# Patient Record
Sex: FEMALE | Race: WHITE | NOT HISPANIC OR LATINO | Employment: OTHER | URBAN - METROPOLITAN AREA
[De-identification: names, ages, dates, MRNs, and addresses within clinical notes are randomized per-mention and may not be internally consistent; named-entity substitution may affect disease eponyms.]

---

## 2017-03-02 ENCOUNTER — TRANSFERRED RECORDS (OUTPATIENT)
Dept: HEALTH INFORMATION MANAGEMENT | Facility: CLINIC | Age: 72
End: 2017-03-02

## 2017-06-09 DIAGNOSIS — I10 HYPERTENSION GOAL BP (BLOOD PRESSURE) < 150/90: ICD-10-CM

## 2017-06-09 DIAGNOSIS — E78.5 HYPERLIPIDEMIA WITH TARGET LDL LESS THAN 130: ICD-10-CM

## 2017-06-09 DIAGNOSIS — E03.8 OTHER SPECIFIED HYPOTHYROIDISM: ICD-10-CM

## 2017-06-12 RX ORDER — LISINOPRIL AND HYDROCHLOROTHIAZIDE 20; 25 MG/1; MG/1
TABLET ORAL
Qty: 90 TABLET | Refills: 0 | Status: SHIPPED | OUTPATIENT
Start: 2017-06-12 | End: 2017-08-17

## 2017-06-12 RX ORDER — LEVOTHYROXINE SODIUM 137 UG/1
TABLET ORAL
Qty: 90 TABLET | Refills: 0 | Status: SHIPPED | OUTPATIENT
Start: 2017-06-12 | End: 2017-08-17

## 2017-06-12 RX ORDER — ATORVASTATIN CALCIUM 40 MG/1
TABLET, FILM COATED ORAL
Qty: 90 TABLET | Refills: 0 | Status: SHIPPED | OUTPATIENT
Start: 2017-06-12 | End: 2017-08-17

## 2017-06-12 NOTE — TELEPHONE ENCOUNTER
Prescription approved per [facility]ill [facility].  Patient due for office visit for further refills.  Idalia Burleson RN - BC

## 2017-06-13 NOTE — PROGRESS NOTES
"  SUBJECTIVE:                                                    Gina Yeh is a 72 year old morbidly obese female who presents to clinic today for the following health issues:    Groin Pain      Duration: 3+ months     Description (location/character/radiation): localized left Groin Pain    Intensity:  Severe - off and on - improving     Accompanying signs and symptoms: none    History (similar episodes/previous evaluation): None    Precipitating or alleviating factors: sitting and laying makes pain worse    Therapies tried and outcome: stretching and exercise, Tylenol with some help     ROS:  C: NEGATIVE for fever, chills, change in weight  I: NEGATIVE for worrisome rashes, moles or lesions  MUSCULOSKELETAL:as above   N: NEGATIVE for weakness, dizziness or paresthesias    OBJECTIVE:                                                    /68 (BP Location: Right arm, Patient Position: Chair, Cuff Size: Adult Large)  Pulse 100  Temp 98.9  F (37.2  C) (Oral)  Ht 5' 3\" (1.6 m)  Wt 267 lb (121.1 kg)  SpO2 98%  BMI 47.3 kg/m2  Body mass index is 47.3 kg/(m^2).  GENERAL: alert, no distress and obese  NECK: no adenopathy, no asymmetry, masses, or scars and thyroid normal to palpation  RESP: lungs clear to auscultation - no rales, rhonchi or wheezes  CV: regular rate and rhythm, normal S1 S2, no S3 or S4, no murmur, click or rub, no peripheral edema and peripheral pulses strong  MS: extremities normal- no gross deformities noted  NEURO: Normal strength and tone, mentation intact and speech normal  PSYCH: mentation appears normal, affect normal/bright    Diagnostic Test Results:  Results for orders placed or performed in visit on 10/19/16   Lipid Profile   Result Value Ref Range    Triglycerides 241 (H) <150 mg/dL    HDL Cholesterol 33 (L) >OR=46 mg/dL    LDL Cholesterol Calculated 80 <130 mg/dL    Non HDL Cholesterol 128 mg/dl    Narrative    [facility]        ASSESSMENT/PLAN:               "                                      (R10.30) Left groin pain  (primary encounter diagnosis)  Comment: suspect ligamentous strain   Plan: XR Hip Left 2-3 Views        Over the counter pain medication as needed, ice or heat as she finds helpful, avoidance of aggravating activities, and return for persistence for consideration of further imaging or referral.     (E66.01) Morbid obesity due to excess calories (H)  (I10) Hypertension goal BP (blood pressure) < 150/90  (E78.5) Hyperlipidemia with target LDL less than 130  Plan: TSH WITH FREE T4 REFLEX, Lipid panel reflex to         direct LDL          (E03.9) Acquired hypothyroidism  Plan: TSH WITH FREE T4 REFLEX         (Z12.11) Screen for colon cancer  Plan: Fecal colorectal cancer screen (FIT)            See Patient Instructions    Verena Vee MD  [facility]

## 2017-06-13 NOTE — PATIENT INSTRUCTIONS
[facility]    If you have any questions regarding to your visit please contact your care team:       Team Red:   Clinic Hours Telephone Number   Dr. Verena Somers  (pediatrics)  Venecia Jesus NP 7am-7pm  Monday - Thursday   7am-5pm  Fridays  [phone] (fax)    Baljinder ARRIAGA  [phone]   Urgent Care - [place] and [place] Monday-Friday  [place] - 11am-8pm  Saturday-Sunday  Both sites - 9am-5pm  [phone] - [facility]  [phone] - [place]       What options do I have for visits at the clinic other than the traditional office visit?  To expand how we care for you, many of our providers are utilizing electronic visits (e-visits) and telephone visits, when medically appropriate, for interactions with their patients rather than a visit in the clinic.   We also offer nurse visits for many medical concerns. Just like any other service, we will bill your insurance company for this type of visit based on time spent on the phone with your provider. Not all insurance companies cover these visits. Please check with your medical insurance if this type of visit is covered. You will be responsible for any charges that are not paid by your insurance.      E-visits via [employer]:  generally incur a $35.00 fee.  Telephone visits:  Time spent on the phone: *charged based on time that is spent on the phone in increments of 10 minutes. Estimated cost:   5-10 mins $30.00   11-20 mins. $59.00   21-30 mins. $85.00     As always, Thank you for trusting us with your health care needs!            Discharged by Bambi Cadet MA.

## 2017-06-19 ENCOUNTER — OFFICE VISIT (OUTPATIENT)
Dept: FAMILY MEDICINE | Facility: CLINIC | Age: 72
End: 2017-06-19
Payer: MEDICARE

## 2017-06-19 ENCOUNTER — RADIANT APPOINTMENT (OUTPATIENT)
Dept: GENERAL RADIOLOGY | Facility: CLINIC | Age: 72
End: 2017-06-19
Attending: FAMILY MEDICINE
Payer: MEDICARE

## 2017-06-19 VITALS
SYSTOLIC BLOOD PRESSURE: 110 MMHG | HEIGHT: 63 IN | OXYGEN SATURATION: 98 % | BODY MASS INDEX: 47.31 KG/M2 | WEIGHT: 267 LBS | TEMPERATURE: 98.9 F | DIASTOLIC BLOOD PRESSURE: 68 MMHG | HEART RATE: 100 BPM

## 2017-06-19 DIAGNOSIS — R10.32 LEFT GROIN PAIN: Primary | ICD-10-CM

## 2017-06-19 DIAGNOSIS — E66.01 MORBID OBESITY DUE TO EXCESS CALORIES (H): ICD-10-CM

## 2017-06-19 DIAGNOSIS — E78.5 HYPERLIPIDEMIA WITH TARGET LDL LESS THAN 130: ICD-10-CM

## 2017-06-19 DIAGNOSIS — Z12.11 SCREEN FOR COLON CANCER: ICD-10-CM

## 2017-06-19 DIAGNOSIS — I10 HYPERTENSION GOAL BP (BLOOD PRESSURE) < 150/90: ICD-10-CM

## 2017-06-19 DIAGNOSIS — R10.32 LEFT GROIN PAIN: ICD-10-CM

## 2017-06-19 DIAGNOSIS — E03.9 ACQUIRED HYPOTHYROIDISM: ICD-10-CM

## 2017-06-19 LAB
ANION GAP SERPL CALCULATED.3IONS-SCNC: 10 MMOL/L (ref 3–14)
BUN SERPL-MCNC: 43 MG/DL (ref 7–30)
CALCIUM SERPL-MCNC: 9.9 MG/DL (ref 8.5–10.1)
CHLORIDE SERPL-SCNC: 104 MMOL/L (ref 94–109)
CHOLEST SERPL-MCNC: 161 MG/DL
CO2 SERPL-SCNC: 27 MMOL/L (ref 20–32)
CREAT SERPL-MCNC: 1.18 MG/DL (ref 0.52–1.04)
GFR SERPL CREATININE-BSD FRML MDRD: 45 ML/MIN/1.7M2
GLUCOSE SERPL-MCNC: 108 MG/DL (ref 70–99)
HDLC SERPL-MCNC: 39 MG/DL
LDLC SERPL CALC-MCNC: 88 MG/DL
NONHDLC SERPL-MCNC: 122 MG/DL
POTASSIUM SERPL-SCNC: 4.5 MMOL/L (ref 3.4–5.3)
SODIUM SERPL-SCNC: 141 MMOL/L (ref 133–144)
TRIGL SERPL-MCNC: 171 MG/DL
TSH SERPL DL<=0.005 MIU/L-ACNC: 1.38 MU/L (ref 0.4–4)

## 2017-06-19 PROCEDURE — 84443 ASSAY THYROID STIM HORMONE: CPT | Performed by: FAMILY MEDICINE

## 2017-06-19 PROCEDURE — 80048 BASIC METABOLIC PNL TOTAL CA: CPT | Performed by: FAMILY MEDICINE

## 2017-06-19 PROCEDURE — 80061 LIPID PANEL: CPT | Performed by: FAMILY MEDICINE

## 2017-06-19 PROCEDURE — 36415 COLL VENOUS BLD VENIPUNCTURE: CPT | Performed by: FAMILY MEDICINE

## 2017-06-19 PROCEDURE — 73502 X-RAY EXAM HIP UNI 2-3 VIEWS: CPT

## 2017-06-19 PROCEDURE — 99213 OFFICE O/P EST LOW 20 MIN: CPT | Performed by: FAMILY MEDICINE

## 2017-06-19 PROCEDURE — 82274 ASSAY TEST FOR BLOOD FECAL: CPT | Performed by: FAMILY MEDICINE

## 2017-06-19 NOTE — NURSING NOTE
"Chief Complaint   Patient presents with     Groin Pain     muscle pain       Initial /68 (BP Location: Right arm, Patient Position: Chair, Cuff Size: Adult Large)  Pulse 100  Temp 98.9  F (37.2  C) (Oral)  Ht 5' 3\" (1.6 m)  Wt 267 lb (121.1 kg)  SpO2 98%  BMI 47.3 kg/m2 Estimated body mass index is 47.3 kg/(m^2) as calculated from the following:    Height as of this encounter: 5' 3\" (1.6 m).    Weight as of this encounter: 267 lb (121.1 kg).  Medication Reconciliation: complete    "

## 2017-06-19 NOTE — PROGRESS NOTES
"Please call patient:  Your cholesterol and thyroid tests are fine. You have impaired glucose tolerance or \"pre-diabetes.\"  Exercise, weight loss, and low-calorie balanced diet including at least 5 servings of fruits and/or vegetables daily can lower blood glucose and risk for diabetes.  I recommend yearly diabetes screening.       Your kidney test is some worse. Avoid medications like ibuprofen, aleve, Excedrin, or higher doses than 81 mg of aspirin. Return for lab-only recheck of your test at your convenience in 3 months. Drink plenty of water the day of your test. You can take your medications and do not need to be fasting. Call our appointment line at [phone].     Verena Vee MD  "

## 2017-06-19 NOTE — MR AVS SNAPSHOT
After Visit Summary   6/19/2017    Gina Yhe    MRN: [id number]           Patient Information     Date Of Birth          1945        Visit Information        Provider Department      6/19/2017 9:40 AM Verena Vee MD [facility]        Today's Diagnoses     Left groin pain    -  1    Morbid obesity due to excess calories (H)        Hypertension goal BP (blood pressure) < 150/90        Hyperlipidemia with target LDL less than 130        Acquired hypothyroidism        Screen for colon cancer          Care Instructions    [facility]    If you have any questions regarding to your visit please contact your care team:       Team Red:   Clinic Hours Telephone Number   Dr. Verena Somers  (pediatrics)  Venecia Jesus NP 7am-7pm  Monday - Thursday   7am-5pm  Fridays  [phone] (fax)    Baljinder ARRIAGA  [phone]   Urgent Care - [place] and [place] Monday-Friday  [place] - 11am-8pm  Saturday-Sunday  Both sites - 9am-5pm  [phone] - [facility]  [phone] - [place]       What options do I have for visits at the clinic other than the traditional office visit?  To expand how we care for you, many of our providers are utilizing electronic visits (e-visits) and telephone visits, when medically appropriate, for interactions with their patients rather than a visit in the clinic.   We also offer nurse visits for many medical concerns. Just like any other service, we will bill your insurance company for this type of visit based on time spent on the phone with your provider. Not all insurance companies cover these visits. Please check with your medical insurance if this type of visit is covered. You will be responsible for any charges that are not paid by your insurance.      E-visits via [employer]:  generally incur a $35.00 fee.  Telephone visits:  Time spent on the phone: *charged based on time that is  spent on the phone in increments of 10 minutes. Estimated cost:   5-10 mins $30.00   11-20 mins. $59.00   21-30 mins. $85.00     As always, Thank you for trusting us with your health care needs!            Discharged by Bambi Cadet MA.            Follow-ups after your visit        Follow-up notes from your care team     Return in about 3 months (around 9/19/2017) for Wellness visit (fasting labs up to one week prior).      Future tests that were ordered for you today     Open Future Orders        Priority Expected Expires Ordered    BASIC METABOLIC PANEL Routine 7/31/2017 6/19/2018 6/19/2017    TSH WITH FREE T4 REFLEX Routine 7/31/2017 6/19/2018 6/19/2017    Fecal colorectal cancer screen (FIT) Routine 7/10/2017 9/11/2017 6/19/2017    Lipid panel reflex to direct LDL Routine 7/31/2017 6/19/2018 6/19/2017            Who to contact     If you have questions or need follow up information about today's clinic visit or your schedule please contact [facility] directly at [phone].  Normal or non-critical lab and imaging results will be communicated to you by [employer] Online[employer], letter or phone within 4 business days after the clinic has received the results. If you do not hear from us within 7 days, please contact the clinic through [email or web address] or phone. If you have a critical or abnormal lab result, we will notify you by phone as soon as possible.  Submit refill requests through [employer] or call your pharmacy and they will forward the refill request to us. Please allow 3 business days for your refill to be completed.          Additional Information About Your Visit        [employer] Online[employer] Information     [employer] gives you secure access to your electronic health record. If you see a primary care provider, you can also send messages to your care team and make appointments. If you have questions, please call your primary care clinic.  If you do not have a primary care provider, please call [phone] and they will assist  "you.        Care EveryWhere ID     This is your Care EveryWhere ID. This could be used by other organizations to access your [place] medical records  [id number]        Your Vitals Were     Pulse Temperature Height Pulse Oximetry BMI (Body Mass Index)       100 98.9  F (37.2  C) (Oral) 5' 3\" (1.6 m) 98% 47.3 kg/m2        Blood Pressure from Last 3 Encounters:   06/19/17 110/68   07/06/16 139/80   02/10/16 123/65    Weight from Last 3 Encounters:   06/19/17 267 lb (121.1 kg)   07/06/16 270 lb 8 oz (122.7 kg)   02/10/16 250 lb (113.4 kg)               Primary Care Provider Office Phone # Fax #    Verena Vee [phone]       [address] [address]        Thank you!     Thank you for choosing [facility]  for your care. Our goal is always to provide you with excellent care. Hearing back from our patients is one way we can continue to improve our services. Please take a few minutes to complete the written survey that you may receive in the mail after your visit with us. Thank you!             Your Updated Medication List - Protect others around you: Learn how to safely use, store and throw away your medicines at www.disposemymeds.org.          This list is accurate as of: 6/19/17 10:49 AM.  Always use your most recent med list.                   Brand Name Dispense Instructions for use    ASPIRIN CAPS 81 MG OR      1 CAPSULE DAILY       atorvastatin 40 MG tablet    LIPITOR    90 tablet    TAKE 1 TABLET EVERY DAY       CALCIUM GUMMIES PO          fish oil-omega-3 fatty acids 1000 MG capsule      2 daily.       levothyroxine 137 MCG tablet    SYNTHROID/LEVOTHROID    90 tablet    TAKE 1 TABLET EVERY DAY       lisinopril-hydrochlorothiazide 20-25 MG per tablet    PRINZIDE/ZESTORETIC    90 tablet    TAKE 1 TABLET EVERY DAY       MULTI-VITAMIN GUMMIES PO          VITAMIN D (ERGOCALCIFEROL) PO      Take 3,000 Units by mouth daily         "

## 2017-06-21 ENCOUNTER — MYC MEDICAL ADVICE (OUTPATIENT)
Dept: FAMILY MEDICINE | Facility: CLINIC | Age: 72
End: 2017-06-21

## 2017-06-21 DIAGNOSIS — Z12.11 SPECIAL SCREENING FOR MALIGNANT NEOPLASMS, COLON: Primary | ICD-10-CM

## 2017-06-21 PROBLEM — R19.5 POSITIVE FIT (FECAL IMMUNOCHEMICAL TEST): Status: ACTIVE | Noted: 2017-06-21

## 2017-06-21 LAB — HEMOCCULT STL QL IA: POSITIVE

## 2017-06-21 NOTE — PROGRESS NOTES
Please call patient:  Your stool test is not normal, with blood detected in your stool.  While this does not necessarily mean that you have colon cancer, further evaluation is recommended with colonoscopy.  Colonoscopy can prevent colon cancer by detecting and removing pre-cancerous colon polyps.  A  will call you to arrange this test.      Verena Vee MD

## 2017-07-28 ENCOUNTER — TRANSFERRED RECORDS (OUTPATIENT)
Dept: HEALTH INFORMATION MANAGEMENT | Facility: CLINIC | Age: 72
End: 2017-07-28

## 2017-08-07 ENCOUNTER — MYC MEDICAL ADVICE (OUTPATIENT)
Dept: FAMILY MEDICINE | Facility: CLINIC | Age: 72
End: 2017-08-07

## 2017-08-07 DIAGNOSIS — G47.33 OSA (OBSTRUCTIVE SLEEP APNEA): Primary | ICD-10-CM

## 2017-08-07 PROBLEM — R19.5 POSITIVE FIT (FECAL IMMUNOCHEMICAL TEST): Status: RESOLVED | Noted: 2017-06-21 | Resolved: 2017-08-07

## 2017-08-08 ENCOUNTER — RADIANT APPOINTMENT (OUTPATIENT)
Dept: MAMMOGRAPHY | Facility: CLINIC | Age: 72
End: 2017-08-08
Payer: MEDICARE

## 2017-08-08 DIAGNOSIS — Z12.31 VISIT FOR SCREENING MAMMOGRAM: ICD-10-CM

## 2017-08-08 PROCEDURE — G0202 SCR MAMMO BI INCL CAD: HCPCS | Mod: TC

## 2017-08-17 DIAGNOSIS — I10 HYPERTENSION GOAL BP (BLOOD PRESSURE) < 150/90: ICD-10-CM

## 2017-08-17 DIAGNOSIS — E78.5 HYPERLIPIDEMIA WITH TARGET LDL LESS THAN 130: ICD-10-CM

## 2017-08-17 DIAGNOSIS — E03.8 OTHER SPECIFIED HYPOTHYROIDISM: ICD-10-CM

## 2017-08-18 RX ORDER — LISINOPRIL AND HYDROCHLOROTHIAZIDE 20; 25 MG/1; MG/1
1 TABLET ORAL DAILY
Qty: 30 TABLET | Refills: 0 | Status: SHIPPED | OUTPATIENT
Start: 2017-08-18 | End: 2017-09-14

## 2017-08-18 RX ORDER — LEVOTHYROXINE SODIUM 137 UG/1
TABLET ORAL
Qty: 90 TABLET | Refills: 2 | Status: SHIPPED | OUTPATIENT
Start: 2017-08-18 | End: 2017-10-25

## 2017-08-18 RX ORDER — ATORVASTATIN CALCIUM 40 MG/1
TABLET, FILM COATED ORAL
Qty: 90 TABLET | Refills: 2 | Status: SHIPPED | OUTPATIENT
Start: 2017-08-18 | End: 2017-10-25

## 2017-08-18 NOTE — TELEPHONE ENCOUNTER
Lipitor and Levothyroxine Prescription approved per [facility] Refill Protocol.  Routing Lisinopril-hydrochlororthiazide refill request to provider for review/approval because:  Labs out of range:  Charles Burleson RN - BC

## 2017-08-18 NOTE — TELEPHONE ENCOUNTER
Signed Prescriptions:                        Disp   Refills    atorvastatin (LIPITOR) 40 MG tablet        90 tab*2        Sig: TAKE 1 TABLET EVERY DAY (DUE FOR OFFICE VISIT FOR           FURTHER REFILLS)  Authorizing Provider: SCAR VALENCIA  Ordering User: ISABEL VICKERS    lisinopril-hydrochlorothiazide (PRINZIDE/Z*30 tab*0        Sig: Take 1 tablet by mouth daily  Authorizing Provider: SCAR VALENCIA    levothyroxine (SYNTHROID/LEVOTHROID) 137 M*90 tab*2        Sig: TAKE 1 TABLET EVERY DAY (NEED APPOINTMENT FOR           REFILLS)  Authorizing Provider: SCAR VALENCIA  Ordering User: ISABEL VICKERS

## 2017-08-30 ENCOUNTER — OFFICE VISIT (OUTPATIENT)
Dept: SLEEP MEDICINE | Facility: CLINIC | Age: 72
End: 2017-08-30
Attending: FAMILY MEDICINE
Payer: MEDICARE

## 2017-08-30 VITALS
OXYGEN SATURATION: 95 % | HEART RATE: 84 BPM | WEIGHT: 271 LBS | DIASTOLIC BLOOD PRESSURE: 78 MMHG | HEIGHT: 64 IN | SYSTOLIC BLOOD PRESSURE: 119 MMHG | BODY MASS INDEX: 46.26 KG/M2

## 2017-08-30 DIAGNOSIS — I10 HYPERTENSION GOAL BP (BLOOD PRESSURE) < 150/90: ICD-10-CM

## 2017-08-30 DIAGNOSIS — E66.01 MORBID OBESITY DUE TO EXCESS CALORIES (H): Primary | ICD-10-CM

## 2017-08-30 DIAGNOSIS — E61.1 IRON DEFICIENCY: ICD-10-CM

## 2017-08-30 DIAGNOSIS — G47.33 OSA (OBSTRUCTIVE SLEEP APNEA): ICD-10-CM

## 2017-08-30 DIAGNOSIS — G25.81 RESTLESS LEGS SYNDROME (RLS): ICD-10-CM

## 2017-08-30 LAB
ANION GAP SERPL CALCULATED.3IONS-SCNC: 6 MMOL/L (ref 3–14)
BUN SERPL-MCNC: 33 MG/DL (ref 7–30)
CALCIUM SERPL-MCNC: 9.3 MG/DL (ref 8.5–10.1)
CHLORIDE SERPL-SCNC: 104 MMOL/L (ref 94–109)
CO2 SERPL-SCNC: 28 MMOL/L (ref 20–32)
CREAT SERPL-MCNC: 0.91 MG/DL (ref 0.52–1.04)
FERRITIN SERPL-MCNC: 7 NG/ML (ref 8–252)
GFR SERPL CREATININE-BSD FRML MDRD: 61 ML/MIN/1.7M2
GLUCOSE SERPL-MCNC: 104 MG/DL (ref 70–99)
POTASSIUM SERPL-SCNC: 4.1 MMOL/L (ref 3.4–5.3)
SODIUM SERPL-SCNC: 138 MMOL/L (ref 133–144)

## 2017-08-30 PROCEDURE — 80048 BASIC METABOLIC PNL TOTAL CA: CPT | Performed by: FAMILY MEDICINE

## 2017-08-30 PROCEDURE — 82728 ASSAY OF FERRITIN: CPT | Performed by: INTERNAL MEDICINE

## 2017-08-30 PROCEDURE — 36415 COLL VENOUS BLD VENIPUNCTURE: CPT | Performed by: INTERNAL MEDICINE

## 2017-08-30 PROCEDURE — 99204 OFFICE O/P NEW MOD 45 MIN: CPT | Performed by: INTERNAL MEDICINE

## 2017-08-30 RX ORDER — ZOLPIDEM TARTRATE 5 MG/1
TABLET ORAL
Qty: 1 TABLET | Refills: 0 | Status: SHIPPED | OUTPATIENT
Start: 2017-08-30 | End: 2017-09-15

## 2017-08-30 RX ORDER — GABAPENTIN 300 MG/1
300 CAPSULE ORAL AT BEDTIME
Qty: 30 CAPSULE | Refills: 1 | Status: SHIPPED | OUTPATIENT
Start: 2017-08-30 | End: 2017-10-11

## 2017-08-30 NOTE — NURSING NOTE
"Chief Complaint   Patient presents with     Sleep Problem     Consult       Initial There were no vitals taken for this visit. Estimated body mass index is 47.3 kg/(m^2) as calculated from the following:    Height as of 6/19/17: 1.6 m (5' 3\").    Weight as of 6/19/17: 121.1 kg (267 lb).  Medication Reconciliation: complete   Neck circumference: 18.25 inches/47 cm    "

## 2017-08-30 NOTE — MR AVS SNAPSHOT
After Visit Summary   8/30/2017    Gina Yeh    MRN: [id number]           Patient Information     Date Of Birth          1945        Visit Information        Provider Department      8/30/2017 8:00 AM Félix Salazar MD [facility]        Today's Diagnoses     Morbid obesity due to excess calories (H)    -  1    JOSE (obstructive sleep apnea)        Restless legs syndrome (RLS)        Iron deficiency           Care Instructions      Your BMI is Body mass index is 46.52 kg/(m^2).  Weight management is a personal decision.  If you are interested in exploring weight loss strategies, the following discussion covers the approaches that may be successful. Body mass index (BMI) is one way to tell whether you are at a healthy weight, overweight, or obese. It measures your weight in relation to your height.  A BMI of 18.5 to 24.9 is in the healthy range. A person with a BMI of 25 to 29.9 is considered overweight, and someone with a BMI of 30 or greater is considered obese. More than two-thirds of American adults are considered overweight or obese.  Being overweight or obese increases the risk for further weight gain. Excess weight may lead to heart disease and diabetes.  Creating and following plans for healthy eating and physical activity may help you improve your health.  Weight control is part of healthy lifestyle and includes exercise, emotional health, and healthy eating habits. Careful eating habits lifelong are the mainstay of weight control. Though there are significant health benefits from weight loss, long-term weight loss with diet alone may be very difficult to achieve- studies show long-term success with dietary management in less than 10% of people. Attaining a healthy weight may be especially difficult to achieve in those with severe obesity. In some cases, medications, devices and surgical management might be considered.  What can you do?  If you are  overweight or obese and are interested in methods for weight loss, you should discuss this with your provider.     Consider reducing daily calorie intake by 500 calories.     Keep a food journal.     Avoiding skipping meals, consider cutting portions instead.    Diet combined with exercise helps maintain muscle while optimizing fat loss. Strength training is particularly important for building and maintaining muscle mass. Exercise helps reduce stress, increase energy, and improves fitness. Increasing exercise without diet control, however, may not burn enough calories to loose weight.       Start walking three days a week 10-20 minutes at a time    Work towards walking thirty minutes five days a week     Eventually, increase the speed of your walking for 1-2 minutes at time    In addition, we recommend that you review healthy lifestyles and methods for weight loss available through the National Institutes of Health patient information sites:  http://win.niddk.nih.gov/publications/index.htm    And look into health and wellness programs that may be available through your health insurance provider, employer, local community center, or bebeto club.    Weight management plan: Patient was referred to their PCP to discuss a diet and exercise plan.              Follow-ups after your visit        Your next 10 appointments already scheduled     Aug 30, 2017  9:45 AM CDT   LAB with BK LAB   [facility]    [address] [place] [id number] [phone]           Patient must bring picture ID. Patient should be prepared to give a urine specimen  Please do not eat 10-12 hours before your appointment if you are coming in fasting for labs on lipids, cholesterol, or glucose (sugar). Pregnant women should follow their Care Team instructions. Water with medications is okay. Do not drink coffee or other fluids. If you have concerns about taking  your medications,  please ask at office or if scheduling via [employer], send a message by clicking on Secure Messaging, Message Your Care Team.            Sep 12, 2017  8:00 PM CDT   Psg Split W/Tcm with BK BED 4   [facility] ([facility]    [address]  [facility] [id number] [phone]            Oct 11, 2017 10:30 AM CDT   Return Sleep Patient with Félix Salazar MD   [facility] ([facility] [place])    [address]  [place] [id number] [phone]              Future tests that were ordered for you today     Open Future Orders        Priority Expected Expires Ordered    Comprehensive Sleep Study Routine  2/26/2018 8/30/2017    Ferritin Routine  10/29/2017 8/30/2017            Who to contact     If you have questions or need follow up information about today's clinic visit or your schedule please contact [facility] directly at [phone].  Normal or non-critical lab and imaging results will be communicated to you by Garment[employer]hart, letter or phone within 4 business days after the clinic has received the results. If you do not hear from us within 7 days, please contact the clinic through [employer] or phone. If you have a critical or abnormal lab result, we will notify you by phone as soon as possible.  Submit refill requests through [employer] or call your pharmacy and they will forward the refill request to us. Please allow 3 business days for your refill to be completed.          Additional Information About Your Visit        [employer] Information     [employer] gives you secure access to your electronic health record. If you see a primary care provider, you can also send messages to your care team and make appointments. If you have questions, please call your primary care clinic.  If you do not have a primary care provider, please call [phone] and they will assist you.       "  Care EveryWhere ID     This is your Care EveryWhere ID. This could be used by other organizations to access your [place] medical records  [id number]        Your Vitals Were     Pulse Height Pulse Oximetry BMI (Body Mass Index)          84 1.626 m (5' 4\") 95% 46.52 kg/m2         Blood Pressure from Last 3 Encounters:   08/30/17 119/78   06/19/17 110/68   07/06/16 139/80    Weight from Last 3 Encounters:   08/30/17 122.9 kg (271 lb)   06/19/17 121.1 kg (267 lb)   07/06/16 122.7 kg (270 lb 8 oz)              We Performed the Following     SLEEP EVALUATION & MANAGEMENT REFERRAL - ADULT          Today's Medication Changes          These changes are accurate as of: 8/30/17  8:54 AM.  If you have any questions, ask your nurse or doctor.               Start taking these medicines.        Dose/Directions    gabapentin 300 MG capsule   Commonly known as:  NEURONTIN   Used for:  Restless legs syndrome (RLS)   Started by:  Félix Salazar MD        Dose:  300 mg   Take 1 capsule (300 mg) by mouth At Bedtime   Quantity:  30 capsule   Refills:  1       zolpidem 5 MG tablet   Commonly known as:  AMBIEN   Used for:  JOSE (obstructive sleep apnea)   Started by:  Félix Salazar MD        Take tablet by mouth 15 minutes prior to sleep, for Sleep Study   Quantity:  1 tablet   Refills:  0            Where to get your medicines      These medications were sent to [place] [id number] [place], [place] - [address]  [address] [address]     Phone:  [phone]     gabapentin 300 MG capsule         Some of these will need a paper prescription and others can be bought over the counter.  Ask your nurse if you have questions.     Bring a paper prescription for each of these medications     zolpidem 5 MG tablet                Primary Care Provider Office Phone # Fax #    Verena Vee [phone] [address] [address]        Equal Access " to Services     CHELSI STEPHENS : Jose Oh, phylicia benavides, qaroverto cummings. So [facility] [phone].    ATENCIÓN: Si carlenela javon, tiene a alamo disposición servicios gratuitos de asistencia lingüística. Llame al [phone].    We comply with applicable federal civil rights laws and [place] laws. We do not discriminate on the basis of race, color, national origin, age, disability sex, sexual orientation or gender identity.            Thank you!     Thank you for choosing [facility]  for your care. Our goal is always to provide you with excellent care. Hearing back from our patients is one way we can continue to improve our services. Please take a few minutes to complete the written survey that you may receive in the mail after your visit with us. Thank you!             Your Updated Medication List - Protect others around you: Learn how to safely use, store and throw away your medicines at www.disposemymeds.org.          This list is accurate as of: 8/30/17  8:54 AM.  Always use your most recent med list.                   Brand Name Dispense Instructions for use Diagnosis    ASPIRIN CAPS 81 MG OR      1 CAPSULE DAILY        atorvastatin 40 MG tablet    LIPITOR    90 tablet    TAKE 1 TABLET EVERY DAY (DUE FOR OFFICE VISIT FOR FURTHER REFILLS)    Hyperlipidemia with target LDL less than 130       CALCIUM GUMMIES PO           fish oil-omega-3 fatty acids 1000 MG capsule      2 daily.        gabapentin 300 MG capsule    NEURONTIN    30 capsule    Take 1 capsule (300 mg) by mouth At Bedtime    Restless legs syndrome (RLS)       levothyroxine 137 MCG tablet    SYNTHROID/LEVOTHROID    90 tablet    TAKE 1 TABLET EVERY DAY (NEED APPOINTMENT FOR REFILLS)    Other specified hypothyroidism       lisinopril-hydrochlorothiazide 20-25 MG per tablet    PRINZIDE/ZESTORETIC    30 tablet    Take 1 tablet by mouth daily    Hypertension goal BP  (blood pressure) < 150/90       MULTI-VITAMIN GUMMIES PO           VITAMIN D (ERGOCALCIFEROL) PO      Take 3,000 Units by mouth daily        zolpidem 5 MG tablet    AMBIEN    1 tablet    Take tablet by mouth 15 minutes prior to sleep, for Sleep Study    JOSE (obstructive sleep apnea)

## 2017-08-30 NOTE — LETTER
8/30/2017         RE: Gina Yeh  [address]   [place] [address]        Dear Colleague,    Thank you for referring your patient, Gina Yeh, to the [facility]. Please see a copy of my visit note below.      Sleep Consultation:    Date on this visit: 8/30/2017    Gina Yeh is sent by Verena Vee for a sleep consultation regarding snoring and poor quality sleep.    Primary Physician: Verena Vee     She has a history of morbid obesity, HTN and hypothyroidism.    Schedule - Retired  but retired when first child was born.  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     Currently waking around 5 AM spontaneously and gets up.  Goes to computer for a couple hours.  Lays down for nap on couch around 10 AM and is back up around 20 minutes later.   Spends evenings watching TV and often falls asleep watching TV.  Usually getting into bed between 11 - midnight.  Then usually takes about 1 hour to fall asleep.  Wakes up on and off throughout the night.    Sleep Disordered Breathing - Snoring is terribly loud; snoring is audible outside the bedroom.  Does have snort arousals and witnessed apneas.   Has nocturia 2 - 4 times per night.  No nocturnal GERD.    Upon waking feels tired.  She reports morning headaches.  Does get morning dry mouth.  Does get morning sinus congestion.   Patient was counseled on the importance of driving while alert, to pull over if drowsy, or nap before getting into the vehicle if sleepy.    Movement/Behaviors - No somniloquy.  No somnambulism.  No sleep related eating.  No dream enactment behavior.   Patient reports typical restless legs syndrome symptoms.  Gets symptoms in the evenings requiring her to move and thrash. Symptoms may last hours.  Symptoms occur 2 - 3 times per week.     She denies bruxism.     Alcohol use - Rare wine.  Caffeine intake - no caffeine.  Tobacco exposure - Former (quit 5  years ago).  Illicit substances - None    Lives alone.   with 2 adult daughters.  Has no pets.     Does have family history of snoring in father.     Allergies:    Allergies   Allergen Reactions     Nkda [No Known Drug Allergies]        Medications:    Current Outpatient Prescriptions   Medication Sig Dispense Refill     atorvastatin (LIPITOR) 40 MG tablet TAKE 1 TABLET EVERY DAY (DUE FOR OFFICE VISIT FOR FURTHER REFILLS) 90 tablet 2     lisinopril-hydrochlorothiazide (PRINZIDE/ZESTORETIC) 20-25 MG per tablet Take 1 tablet by mouth daily 30 tablet 0     levothyroxine (SYNTHROID/LEVOTHROID) 137 MCG tablet TAKE 1 TABLET EVERY DAY (NEED APPOINTMENT FOR REFILLS) 90 tablet 2     VITAMIN D, ERGOCALCIFEROL, PO Take 3,000 Units by mouth daily       Calcium-Phosphorus-Vitamin D (CALCIUM GUMMIES PO)        Multiple Vitamins-Minerals (MULTI-VITAMIN GUMMIES PO)        ASPIRIN CAPS 81 MG OR 1 CAPSULE DAILY       FISH OIL 1000 MG OR CAPS 2 daily.          Problem List:  Patient Active Problem List    Diagnosis Date Noted     Hypertension goal BP (blood pressure) < 150/90      Priority: High     Hyperlipidemia with target LDL less than 130      Priority: High     Sensorineural hearing loss      Priority: Medium     JOSE (obstructive sleep apnea) 07/24/2015     Priority: Medium     Doesn't use cpap       MMT (medial meniscus tear) 10/13/2014     Priority: Medium     Morbidly obese (H) 05/29/2012     Priority: Medium     Bariatric surgery consult offered       Impaired glucose tolerance 05/29/2012     Priority: Medium     History of cervical dysplasia 05/28/2012     Priority: Medium     Advanced directives, counseling/discussion 06/02/2011     Priority: Medium     Advance Care Planning 1/7/2016: Receipt of ACP document:  Received: Health Care Directive which was witnessed or notarized on 11/25/15.  Document not previously scanned.  Validation form completed and sent with document to be scanned.  Code Status reflects choices in  most recent ACP document.  Confirmed/documented designated decision maker(s).  Added by Melina Griffin    Health Care Directive on file 11/25/15       Hypothyroid      Priority: Medium        Past Medical/Surgical History:  Past Medical History:   Diagnosis Date     Arthritis      Cervical dysplasia 1988     Hyperlipidemia LDL goal < 130      Hypertension goal BP (blood pressure) < 140/90      Hypothyroid      Impaired glucose tolerance 5/29/2012     MMT (medial meniscus tear) 9/21/2006    LT     Moderate recurrent major depression (H) 2/13/2014     Morbid obesity (H)      JOSE (obstructive sleep apnea) 7/24/2015    Doesn't use cpap     Positive FIT (fecal immunochemical test) 6/21/2017     Sensorineural hearing loss      Vitamin D insufficiency      Past Surgical History:   Procedure Laterality Date     BIOPSY       CERVIX SURGERY  1988    cervical cone     COLONOSCOPY       TONSILLECTOMY & ADENOIDECTOMY         Social History:  Social History     Social History     Marital status:      Spouse name: Umberto     Number of children: 2     Years of education: 13     Occupational History     book keeper  Retired     Social History Main Topics     Smoking status: Former Smoker     Packs/day: 0.50     Years: 49.00     Types: Cigarettes     Start date: 5/1/1960     Quit date: 5/1/2011     Smokeless tobacco: Former User     Quit date: 4/25/2011     Alcohol use No     Drug use: No     Sexual activity: Not Currently     Partners: Male     Birth control/ protection: Post-menopausal     Other Topics Concern     Parent/Sibling W/ Cabg, Mi Or Angioplasty Before 65f 55m? No     Social History Narrative       Family History:  Family History   Problem Relation Age of Onset     Allergies Mother      Cardiovascular Mother      Respiratory Mother      Thyroid Disease Mother      Other Cancer Mother      skin     Arthritis Father      Hypertension Father      Hyperlipidemia Father      CEREBROVASCULAR DISEASE Father      CANCER  "Maternal Grandmother      bone     Other Cancer Maternal Grandmother      bone     CEREBROVASCULAR DISEASE Paternal Grandmother      CANCER Paternal Grandfather      CEREBROVASCULAR DISEASE Paternal Grandfather      CANCER Brother      testicular      GASTROINTESTINAL DISEASE Brother      Other Cancer Brother      testicular     Allergies Sister      Allergies Daughter      GASTROINTESTINAL DISEASE Daughter      Depression Daughter      Anesthesia Reaction Daughter      Genitourinary Problems Brother      Allergies Daughter 10     metal     DIABETES No family hx of        Review of Systems:  A complete review of systems reviewed by me is negative with the exeption of what has been mentioned in the history of present illness.  High blood pressure  Shortness of breath with activity; wheezing with activity  Urinary frequency (nocturnal)    Physical Examination:  Vitals: /78  Pulse 84  Ht 1.626 m (5' 4\")  Wt 122.9 kg (271 lb)  SpO2 95%  BMI 46.52 kg/m2  BMI= Body mass index is 46.52 kg/(m^2).    [place] Total Score 8/30/2017   Total score - [place] 9     General appearance: No apparent distress, well groomed.    HEENT:   Head: Normocephalic, atraumatic.  Eyes: PERRL  Nose: Nares patent.  No exudate.  No septal deviation noted.  Mouth: Teeth: Partials top and bottom (molars).  Two left lower molars left.   Tongue: Normal  Oropharynx:  Mallampati Classification: III    Tonsils: Grade 0    Uvula: Normal    Neck: Supple without bruit.     Neck Cir (cm): 47 cm (8/30/2017  7:53 AM)    Cardiac: Regular rate and rhythm.  No murmurs.  Radial pulses are strong and symmetric.  Pulmonary: Symmetric air movement, lungs clear to auscultation bilaterally.  Musculoskeletal: No edema noted.  No clubbing or cyanosis.  Skin: Warm, dry, intact.  Neurologic: Alert and oriented to person, place and time   Gait is normal for age.  Psychiatric: Affect pleasant.  Mood good.     Impression/Plan:  1. JOSE (obstructive sleep apnea)  I " have a high suspicion for JOSE based on presence of snoring, snort arousals, witnessed apneas, enlarged neck girth >= 40 cm for female, and obesity with excessive daytime sleepiness. We discussed pathophysiology of obstructive sleep apnea, testing with overnight polysomnography, and treatment modalities (CPAP only discussed at this visit). We discussed consequences of untreated JOSE. Patient is interested in treatment and willing to undergo overnight sleep testing.   Home sleep testing is inappropriate for this patient due to risk of obesity hypoventilation.  TCM ordered with study (but no ABG).   Study has been ordered today.     In case of insomnia during the study: one-time medication has been ordered.  - SLEEP EVALUATION & MANAGEMENT REFERRAL - ADULT  - Comprehensive Sleep Study; Future  - zolpidem (AMBIEN) 5 MG tablet; Take tablet by mouth 15 minutes prior to sleep, for Sleep Study  Dispense: 1 tablet; Refill: 0    2. Morbid obesity due to excess calories (H)  We discussed the link between obesity, sleep apnea, and health outcomes.  Patient was encouraged to decrease caloric intake and increase activity levels to try to move towards a normal weight.  She was encouraged to discuss further strategies with her primary care provider.     3. Restless legs syndrome (RLS)  Patient has symptoms consistent moderate restless legs syndrome.  We discussed pathophysiology of disease including dopamine signaling and iron as a co-factor.  We discussed treatment options, both pharmacologic and non-pharmacologic (including massage, heating and cooling, stretching and exercise).  Specific medications were discussed, namely Gabapentin and Pramipexole (Mirapex).  We discuss symptoms and side effect profile of these medications.    - Patient would like to start treatment today.  - Ferritin will be checked as a surrogate for iron stores.  If ferritin is < 75, treatment will be recommended..   - Ferritin; Future  - gabapentin  (NEURONTIN) 300 MG capsule; Take 1 capsule (300 mg) by mouth At Bedtime  Dispense: 30 capsule; Refill: 1    4. Iron deficiency   History of heavy periods.  Will screen for iron deficiency  - Ferritin; Future     Literature provided regarding sleep apnea and restless leg syndrome.      She will follow up with me in approximately two weeks after her sleep study has been competed to review the results and discuss plan of care.       Polysomnography reviewed.  Obstructive sleep apnea reviewed.  Complications of untreated sleep apnea were reviewed.    Félix Salazar MD, Sleep Physician  Aug 30, 2017     CC: Verena Vee          Again, thank you for allowing me to participate in the care of your patient.        Sincerely,        Félix Salazar MD

## 2017-08-30 NOTE — PATIENT INSTRUCTIONS

## 2017-08-30 NOTE — PROGRESS NOTES
Sleep Consultation:    Date on this visit: 8/30/2017    Gina Yeh is sent by Verena Vee for a sleep consultation regarding snoring and poor quality sleep.    Primary Physician: Verena Vee     She has a history of morbid obesity, HTN and hypothyroidism.    Schedule - Retired  but retired when first child was born.  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     Currently waking around 5 AM spontaneously and gets up.  Goes to computer for a couple hours.  Lays down for nap on couch around 10 AM and is back up around 20 minutes later.   Spends evenings watching TV and often falls asleep watching TV.  Usually getting into bed between 11 - midnight.  Then usually takes about 1 hour to fall asleep.  Wakes up on and off throughout the night.    Sleep Disordered Breathing - Snoring is terribly loud; snoring is audible outside the bedroom.  Does have snort arousals and witnessed apneas.   Has nocturia 2 - 4 times per night.  No nocturnal GERD.    Upon waking feels tired.  She reports morning headaches.  Does get morning dry mouth.  Does get morning sinus congestion.   Patient was counseled on the importance of driving while alert, to pull over if drowsy, or nap before getting into the vehicle if sleepy.    Movement/Behaviors - No somniloquy.  No somnambulism.  No sleep related eating.  No dream enactment behavior.   Patient reports typical restless legs syndrome symptoms.  Gets symptoms in the evenings requiring her to move and thrash. Symptoms may last hours.  Symptoms occur 2 - 3 times per week.     She denies bruxism.     Alcohol use - Rare wine.  Caffeine intake - no caffeine.  Tobacco exposure - Former (quit 5 years ago).  Illicit substances - None    Lives alone.   with 2 adult daughters.  Has no pets.     Does have family history of snoring in father.     Allergies:    Allergies   Allergen Reactions     Nkda [No Known Drug Allergies]         Medications:    Current Outpatient Prescriptions   Medication Sig Dispense Refill     atorvastatin (LIPITOR) 40 MG tablet TAKE 1 TABLET EVERY DAY (DUE FOR OFFICE VISIT FOR FURTHER REFILLS) 90 tablet 2     lisinopril-hydrochlorothiazide (PRINZIDE/ZESTORETIC) 20-25 MG per tablet Take 1 tablet by mouth daily 30 tablet 0     levothyroxine (SYNTHROID/LEVOTHROID) 137 MCG tablet TAKE 1 TABLET EVERY DAY (NEED APPOINTMENT FOR REFILLS) 90 tablet 2     VITAMIN D, ERGOCALCIFEROL, PO Take 3,000 Units by mouth daily       Calcium-Phosphorus-Vitamin D (CALCIUM GUMMIES PO)        Multiple Vitamins-Minerals (MULTI-VITAMIN GUMMIES PO)        ASPIRIN CAPS 81 MG OR 1 CAPSULE DAILY       FISH OIL 1000 MG OR CAPS 2 daily.          Problem List:  Patient Active Problem List    Diagnosis Date Noted     Hypertension goal BP (blood pressure) < 150/90      Priority: High     Hyperlipidemia with target LDL less than 130      Priority: High     Sensorineural hearing loss      Priority: Medium     JOSE (obstructive sleep apnea) 07/24/2015     Priority: Medium     Doesn't use cpap       MMT (medial meniscus tear) 10/13/2014     Priority: Medium     Morbidly obese (H) 05/29/2012     Priority: Medium     Bariatric surgery consult offered       Impaired glucose tolerance 05/29/2012     Priority: Medium     History of cervical dysplasia 05/28/2012     Priority: Medium     Advanced directives, counseling/discussion 06/02/2011     Priority: Medium     Advance Care Planning 1/7/2016: Receipt of ACP document:  Received: Health Care Directive which was witnessed or notarized on 11/25/15.  Document not previously scanned.  Validation form completed and sent with document to be scanned.  Code Status reflects choices in most recent ACP document.  Confirmed/documented designated decision maker(s).  Added by Melina Griffin    Health Care Directive on file 11/25/15       Hypothyroid      Priority: Medium        Past Medical/Surgical History:  Past  Medical History:   Diagnosis Date     Arthritis      Cervical dysplasia 1988     Hyperlipidemia LDL goal < 130      Hypertension goal BP (blood pressure) < 140/90      Hypothyroid      Impaired glucose tolerance 5/29/2012     MMT (medial meniscus tear) 9/21/2006    LT     Moderate recurrent major depression (H) 2/13/2014     Morbid obesity (H)      JOSE (obstructive sleep apnea) 7/24/2015    Doesn't use cpap     Positive FIT (fecal immunochemical test) 6/21/2017     Sensorineural hearing loss      Vitamin D insufficiency      Past Surgical History:   Procedure Laterality Date     BIOPSY       CERVIX SURGERY  1988    cervical cone     COLONOSCOPY       TONSILLECTOMY & ADENOIDECTOMY         Social History:  Social History     Social History     Marital status:      Spouse name: Umberto     Number of children: 2     Years of education: 13     Occupational History     book keeper  Retired     Social History Main Topics     Smoking status: Former Smoker     Packs/day: 0.50     Years: 49.00     Types: Cigarettes     Start date: 5/1/1960     Quit date: 5/1/2011     Smokeless tobacco: Former User     Quit date: 4/25/2011     Alcohol use No     Drug use: No     Sexual activity: Not Currently     Partners: Male     Birth control/ protection: Post-menopausal     Other Topics Concern     Parent/Sibling W/ Cabg, Mi Or Angioplasty Before 65f 55m? No     Social History Narrative       Family History:  Family History   Problem Relation Age of Onset     Allergies Mother      Cardiovascular Mother      Respiratory Mother      Thyroid Disease Mother      Other Cancer Mother      skin     Arthritis Father      Hypertension Father      Hyperlipidemia Father      CEREBROVASCULAR DISEASE Father      CANCER Maternal Grandmother      bone     Other Cancer Maternal Grandmother      bone     CEREBROVASCULAR DISEASE Paternal Grandmother      CANCER Paternal Grandfather      CEREBROVASCULAR DISEASE Paternal Grandfather      CANCER Brother   "    testicular      GASTROINTESTINAL DISEASE Brother      Other Cancer Brother      testicular     Allergies Sister      Allergies Daughter      GASTROINTESTINAL DISEASE Daughter      Depression Daughter      Anesthesia Reaction Daughter      Genitourinary Problems Brother      Allergies Daughter 10     metal     DIABETES No family hx of        Review of Systems:  A complete review of systems reviewed by me is negative with the exeption of what has been mentioned in the history of present illness.  High blood pressure  Shortness of breath with activity; wheezing with activity  Urinary frequency (nocturnal)    Physical Examination:  Vitals: /78  Pulse 84  Ht 1.626 m (5' 4\")  Wt 122.9 kg (271 lb)  SpO2 95%  BMI 46.52 kg/m2  BMI= Body mass index is 46.52 kg/(m^2).    [place] Total Score 8/30/2017   Total score - [place] 9     General appearance: No apparent distress, well groomed.    HEENT:   Head: Normocephalic, atraumatic.  Eyes: PERRL  Nose: Nares patent.  No exudate.  No septal deviation noted.  Mouth: Teeth: Partials top and bottom (molars).  Two left lower molars left.   Tongue: Normal  Oropharynx:  Mallampati Classification: III    Tonsils: Grade 0    Uvula: Normal    Neck: Supple without bruit.     Neck Cir (cm): 47 cm (8/30/2017  7:53 AM)    Cardiac: Regular rate and rhythm.  No murmurs.  Radial pulses are strong and symmetric.  Pulmonary: Symmetric air movement, lungs clear to auscultation bilaterally.  Musculoskeletal: No edema noted.  No clubbing or cyanosis.  Skin: Warm, dry, intact.  Neurologic: Alert and oriented to person, place and time   Gait is normal for age.  Psychiatric: Affect pleasant.  Mood good.     Impression/Plan:  1. JOSE (obstructive sleep apnea)  I have a high suspicion for JOSE based on presence of snoring, snort arousals, witnessed apneas, enlarged neck girth >= 40 cm for female, and obesity with excessive daytime sleepiness. We discussed pathophysiology of obstructive sleep " apnea, testing with overnight polysomnography, and treatment modalities (CPAP only discussed at this visit). We discussed consequences of untreated JOSE. Patient is interested in treatment and willing to undergo overnight sleep testing.   Home sleep testing is inappropriate for this patient due to risk of obesity hypoventilation.  TCM ordered with study (but no ABG).   Study has been ordered today.     In case of insomnia during the study: one-time medication has been ordered.  - SLEEP EVALUATION & MANAGEMENT REFERRAL - ADULT  - Comprehensive Sleep Study; Future  - zolpidem (AMBIEN) 5 MG tablet; Take tablet by mouth 15 minutes prior to sleep, for Sleep Study  Dispense: 1 tablet; Refill: 0    2. Morbid obesity due to excess calories (H)  We discussed the link between obesity, sleep apnea, and health outcomes.  Patient was encouraged to decrease caloric intake and increase activity levels to try to move towards a normal weight.  She was encouraged to discuss further strategies with her primary care provider.     3. Restless legs syndrome (RLS)  Patient has symptoms consistent moderate restless legs syndrome.  We discussed pathophysiology of disease including dopamine signaling and iron as a co-factor.  We discussed treatment options, both pharmacologic and non-pharmacologic (including massage, heating and cooling, stretching and exercise).  Specific medications were discussed, namely Gabapentin and Pramipexole (Mirapex).  We discuss symptoms and side effect profile of these medications.    - Patient would like to start treatment today.  - Ferritin will be checked as a surrogate for iron stores.  If ferritin is < 75, treatment will be recommended..   - Ferritin; Future  - gabapentin (NEURONTIN) 300 MG capsule; Take 1 capsule (300 mg) by mouth At Bedtime  Dispense: 30 capsule; Refill: 1    4. Iron deficiency   History of heavy periods.  Will screen for iron deficiency  - Ferritin; Future     Literature provided regarding  sleep apnea and restless leg syndrome.      She will follow up with me in approximately two weeks after her sleep study has been competed to review the results and discuss plan of care.       Polysomnography reviewed.  Obstructive sleep apnea reviewed.  Complications of untreated sleep apnea were reviewed.    Félix Salazar MD, Sleep Physician  Aug 30, 2017     CC: Verena Vee

## 2017-09-12 ENCOUNTER — THERAPY VISIT (OUTPATIENT)
Dept: SLEEP MEDICINE | Facility: CLINIC | Age: 72
End: 2017-09-12
Payer: MEDICARE

## 2017-09-12 DIAGNOSIS — G47.33 OSA (OBSTRUCTIVE SLEEP APNEA): ICD-10-CM

## 2017-09-12 PROCEDURE — 95810 POLYSOM 6/> YRS 4/> PARAM: CPT | Performed by: INTERNAL MEDICINE

## 2017-09-12 NOTE — MR AVS SNAPSHOT
After Visit Summary   9/12/2017    Gina Yeh    MRN: [id number]           Patient Information     Date Of Birth          1945        Visit Information        Provider Department      9/12/2017 8:00 PM BK BED 4 [facility]        Today's Diagnoses     JOSE (obstructive sleep apnea)           Follow-ups after your visit        Your next 10 appointments already scheduled     Oct 11, 2017 10:30 AM CDT   Return Sleep Patient with Félix Salazar MD   [facility] ([facility] [place])    [address] [place] [id number] [phone]              Who to contact     If you have questions or need follow up information about today's clinic visit or your schedule please contact [facility] directly at [phone].  Normal or non-critical lab and imaging results will be communicated to you by MyChart, letter or phone within 4 business days after the clinic has received the results. If you do not hear from us within 7 days, please contact the clinic through MyChart or phone. If you have a critical or abnormal lab result, we will notify you by phone as soon as possible.  Submit refill requests through [employer]eePay or call your pharmacy and they will forward the refill request to us. Please allow 3 business days for your refill to be completed.          Additional Information About Your Visit        MyChart Information     YeePay gives you secure access to your electronic health record. If you see a primary care provider, you can also send messages to your care team and make appointments. If you have questions, please call your primary care clinic.  If you do not have a primary care provider, please call [phone] and they will assist you.        Care EveryWhere ID     This is your Care EveryWhere ID. This could be used by other organizations to access your [place] medical records  [id number]          Blood Pressure from Last 3 Encounters:   08/30/17 119/78   06/19/17 110/68   07/06/16 139/80    Weight from Last 3 Encounters:   08/30/17 122.9 kg (271 lb)   06/19/17 121.1 kg (267 lb)   07/06/16 122.7 kg (270 lb 8 oz)              We Performed the Following     Comprehensive Sleep Study        Primary Care Provider Office Phone # Fax #    Verena Vee [phone] [address] [facility]  [facility] [address]        Equal Access to Services     [facility]: Hadii aad ku hadasho Soomaali, waaxda luqadaha, qaybta kaalmada mini, roverto munoz . So [facility] [phone].    ATENCIÓN: Si habla español, tiene a alamo disposición servicios gratuitos de asistencia lingüística. [facility] [phone].    We comply with applicable federal civil rights laws and [place] laws. We do not discriminate on the basis of race, color, national origin, age, disability sex, sexual orientation or gender identity.            Thank you!     Thank you for choosing [facility]IC  for your care. Our goal is always to provide you with excellent care. Hearing back from our patients is one way we can continue to improve our services. Please take a few minutes to complete the written survey that you may receive in the mail after your visit with us. Thank you!             Your Updated Medication List - Protect others around you: Learn how to safely use, store and throw away your medicines at www.disposemymeds.org.          This list is accurate as of: 9/12/17 11:59 PM.  Always use your most recent med list.                   Brand Name Dispense Instructions for use Diagnosis    ASPIRIN CAPS 81 MG OR      1 CAPSULE DAILY        atorvastatin 40 MG tablet    LIPITOR    90 tablet    TAKE 1 TABLET EVERY DAY (DUE FOR OFFICE VISIT FOR FURTHER REFILLS)    Hyperlipidemia with target LDL less than 130       CALCIUM GUMMIES PO           fish oil-omega-3 fatty acids 1000 MG capsule      2 daily.         gabapentin 300 MG capsule    NEURONTIN    30 capsule    Take 1 capsule (300 mg) by mouth At Bedtime    Restless legs syndrome (RLS)       levothyroxine 137 MCG tablet    SYNTHROID/LEVOTHROID    90 tablet    TAKE 1 TABLET EVERY DAY (NEED APPOINTMENT FOR REFILLS)    Other specified hypothyroidism       lisinopril-hydrochlorothiazide 20-25 MG per tablet    PRINZIDE/ZESTORETIC    30 tablet    Take 1 tablet by mouth daily    Hypertension goal BP (blood pressure) < 150/90       MULTI-VITAMIN GUMMIES PO           VITAMIN D (ERGOCALCIFEROL) PO      Take 3,000 Units by mouth daily        zolpidem 5 MG tablet    AMBIEN    1 tablet    Take tablet by mouth 15 minutes prior to sleep, for Sleep Study    JOSE (obstructive sleep apnea)

## 2017-09-14 DIAGNOSIS — I10 HYPERTENSION GOAL BP (BLOOD PRESSURE) < 150/90: ICD-10-CM

## 2017-09-14 RX ORDER — LISINOPRIL AND HYDROCHLOROTHIAZIDE 20; 25 MG/1; MG/1
1 TABLET ORAL DAILY
Qty: 90 TABLET | Refills: 2 | Status: SHIPPED | OUTPATIENT
Start: 2017-09-14 | End: 2017-10-25

## 2017-09-14 NOTE — TELEPHONE ENCOUNTER
Signed Prescriptions:                        Disp   Refills    lisinopril-hydrochlorothiazide (PRINZIDE/Z*90 tab*2        Sig: Take 1 tablet by mouth daily  Authorizing Provider: SCAR VALENCIA

## 2017-09-14 NOTE — TELEPHONE ENCOUNTER
Routing refill request to provider for review/approval because:  Patient needs to be seen because:  Per last OV on 6/19/17 for patient to F/U in 3 months and patient didn't.    Baljinder HOWARD, RN, BSN

## 2017-09-14 NOTE — TELEPHONE ENCOUNTER
Lisinopril-hydrochlorothiazide      Last Written Prescription Date: 8/18/2017  Last Fill Quantity: 30, # refills: 0  Last Office Visit with G, P or [facility] prescribing provider: 6/19/2017       Potassium   Date Value Ref Range Status   08/30/2017 4.1 3.4 - 5.3 mmol/L Final     Creatinine   Date Value Ref Range Status   08/30/2017 0.91 0.52 - 1.04 mg/dL Final     BP Readings from Last 3 Encounters:   08/30/17 119/78   06/19/17 110/68   07/06/16 139/80

## 2017-09-15 ENCOUNTER — TELEPHONE (OUTPATIENT)
Dept: SLEEP MEDICINE | Facility: CLINIC | Age: 72
End: 2017-09-15

## 2017-09-15 DIAGNOSIS — G47.33 OSA (OBSTRUCTIVE SLEEP APNEA): ICD-10-CM

## 2017-09-15 RX ORDER — ZOLPIDEM TARTRATE 10 MG/1
TABLET ORAL
Qty: 1 TABLET | Refills: 0 | Status: SHIPPED | OUTPATIENT
Start: 2017-09-15 | End: 2017-10-11

## 2017-09-15 NOTE — PROCEDURES
" SLEEP STUDY INTERPRETATION  POLYSOMNOGRAPHY REPORT      Patient: Gina Yeh  YOB: 1945  Study Date: 9/12/2017  MRN: [id number]  Referring Provider: Verena Vee  Ordering Provider: Félix Salazar MD    Indications for Polysomnography: The patient is a 72 y old - who is 5' 4\" and weighs 271.0 lbs.  His/Her BMI is 46.8, Bosler sleepiness scale 9.0 and neck size is 47.0.  Relevant medical history includes RLS, morbid obesity, HTN and hypothyroidism. A diagnostic polysomnogram was performed to evaluate for JOSE and hypoventilation.    Polysomnogram Data:  A full night polysomnogram recorded the standard physiologic parameters including EEG, EOG, EMG, ECG, nasal and oral airflow.  Respiratory parameters of chest and abdominal movements were recorded with respiratory inductance plethysmography.  Oxygen saturation was recorded by pulse oximetry.      Sleep Architecture: Normal sleep latency with sleep aid, increased arousal index, and markedly decreased sleep efficiency.  The total recording time of the polysomnogram was 435.4 minutes.  The total sleep time was 186.0 minutes.  Sleep latency was normal at 6.0 minutes with the use of a sleep aid.  REM latency was - minutes.  Arousal index was increased at 98.4 arousals per hour.  Sleep efficiency was decreased at 42.7%.  Wake after sleep onset was 242.0 minutes.  The patient spent 80.1% of total sleep time in Stage N1, 10.5% in Stage N2, 9.4% in Stages N3, and 0.0% in REM.  Time in REM supine was - minutes.    Respiration: Very severe JOSE with associated oscillatory hypoxia (expected to resolve with treatment of JOSE).      Events - The polysomnogram revealed a presence of 26 obstructive, 1 central, and - mixed apneas resulting in an apnea index of 8.7 events per hour.  There were 217 hypopneas resulting in a hypopnea index of 70.0 events per hour.  The combined apnea/hypopnea index was 78.7 events per hour.  The REM AHI was - events per hour.  The " supine AHI was 73.5 events per hour.  The RERA index was 12.6 events per hour.   The RDI was 91.3 events per hour.    Snoring - was reported as loud.    Respiratory rate and pattern - was normal.    Sustained Sleep Associated Hypoventilation - Transcutaneous carbon dioxide monitoring was used; however significant hypoventilation was not definitively present with a maximum change of 8 mmHg (maximum value of 44 mmHg).    Sleep Associated Hypoxemia - (Greater than 5 minutes O2 sat below 89%) was present.  Baseline oxygen saturation was 92.9%. Lowest oxygen saturation was 79.5%.  Time spent less than or equal to 88% was 22.9 minutes.  Time spent less than or equal to 89% was 42.8 minutes.  91.3 12.6 78.7     Movement Activity: No abnormal behaviors noted.    Periodic Limb Activity - There were - PLMs during the entire study.     REM EMG Activity - Excessive muscle activity was not present.    Nocturnal Behavior - Abnormal sleep related behaviors were not noted.    Bruxism - None apparent.    Cardiac Summary: No arrhythmias noted.  The average pulse rate was 78.8 bpm.  The minimum pulse rate was 38.0 bpm while the maximum pulse rate was 97.1 bpm. The rhythm is normal sinus. Arrhythmias were not noted.    Assessment:     Normal sleep latency with sleep aid, increased arousal index, and markedly decreased sleep efficiency.    Very severe JOSE with associated oscillatory hypoxia (expected to resolve with treatment of JOSE).      Recommendations:    Recommend repeat polysomnography with full night titration of positive airway pressure therapy for the control of sleep disordered breathing.    Advise regarding the risks of drowsy driving.    Suggest optimizing sleep schedule and avoiding sleep deprivation.    Weight management (if BMI > 30).    Pharmacologic therapy should be used for management of restless legs syndrome only if present and clinically indicated and not based on the presence of periodic limb movements  alone.    Cardiac Comments: Normal Sinus Rhythm  Diagnostic Codes:     Obstructive Sleep Apnea G47.33    Sleep Hypoxemia/Hypoventilation G47.36        _____________________________________   Electronically Signed By: Félix Salazar MD 9/15/2017

## 2017-09-15 NOTE — TELEPHONE ENCOUNTER
Called to review results of Polysomnography.  Slept terribly (186 minutes of sleep in 7.25 hours of recording).  Very severe Obstructive Sleep Apnea.  Recommending titration study vs auto-cpap.  Given severity of sleep fragmentation will give 10 mg zolpidem script and order titration study.    Félix Salazar MD, Sleep Physician  Sep 15, 2017

## 2017-09-15 NOTE — TELEPHONE ENCOUNTER
Called patient and scheduled for sleep study on 9/26/17.  Rx for Ambien was called into the chosen pharmacy.

## 2017-09-26 ENCOUNTER — THERAPY VISIT (OUTPATIENT)
Dept: SLEEP MEDICINE | Facility: CLINIC | Age: 72
End: 2017-09-26
Payer: MEDICARE

## 2017-09-26 DIAGNOSIS — G47.33 OSA (OBSTRUCTIVE SLEEP APNEA): Primary | ICD-10-CM

## 2017-09-26 PROCEDURE — 95811 POLYSOM 6/>YRS CPAP 4/> PARM: CPT | Performed by: INTERNAL MEDICINE

## 2017-09-26 NOTE — MR AVS SNAPSHOT
After Visit Summary   9/26/2017    Gina Yeh    MRN: [id number]           Patient Information     Date Of Birth          1945        Visit Information        Provider Department      9/26/2017 8:00 PM BK BED 1 [facility]        Today's Diagnoses     JOSE (obstructive sleep apnea)           Follow-ups after your visit        Your next 10 appointments already scheduled     Oct 11, 2017 10:30 AM CDT   Return Sleep Patient with Félix Salazar MD   [facility] [place]    [address] [place] [id number] [phone]            Oct 25, 2017 10:00 AM CDT   PHYSICAL with Verena Vee MD   [facility] ([address] [id number] [phone]              Who to contact     If you have questions or need follow up information about today's clinic visit or your schedule please contact [facility] directly at [phone].  Normal or non-critical lab and imaging results will be communicated to you by Xeroxhart, letter or phone within 4 business days after the clinic has received the results. If you do not hear from us within 7 days, please contact the clinic through TripleLiftt or phone. If you have a critical or abnormal lab result, we will notify you by phone as soon as possible.  Submit refill requests through [employer] or call your pharmacy and they will forward the refill request to us. Please allow 3 business days for your refill to be completed.          Additional Information About Your Visit        Xeroxhart Information     [employer] gives you secure access to your electronic health record. If you see a primary care provider, you can also send messages to your care team and make appointments. If you have questions, please call your primary care clinic.  If you do not have a primary care provider, please call [phone] and  they will assist you.        Care EveryWhere ID     This is your Care EveryWhere ID. This could be used by other organizations to access your [place] medical records  [id number]         Blood Pressure from Last 3 Encounters:   08/30/17 119/78   06/19/17 110/68   07/06/16 139/80    Weight from Last 3 Encounters:   08/30/17 122.9 kg (271 lb)   06/19/17 121.1 kg (267 lb)   07/06/16 122.7 kg (270 lb 8 oz)              We Performed the Following     Comprehensive Sleep Study        Primary Care Provider Office Phone # Fax #    Verena Vee [phone]       [address] [place] [address]  [facility] [address]        Equal Access to Services     [facility] : Hadii celia martinez hadasho Sodonatoali, waaxda luqadaha, qaybta kaalmada adeegyada, waxkatie munoz . So [facility] [phone].    ATENCIÓN: Si habla español, tiene a alamo disposición servicios gratuitos de asistencia lingüística. [facility] [phone].    We comply with applicable federal civil rights laws and [place] laws. We do not discriminate on the basis of race, color, national origin, age, disability sex, sexual orientation or gender identity.            Thank you!     Thank you for choosing [facility]  for your care. Our goal is always to provide you with excellent care. Hearing back from our patients is one way we can continue to improve our services. Please take a few minutes to complete the written survey that you may receive in the mail after your visit with us. Thank you!             Your Updated Medication List - Protect others around you: Learn how to safely use, store and throw away your medicines at www.disposemymeds.org.          This list is accurate as of: 9/26/17 11:59 PM.  Always use your most recent med list.                   Brand Name Dispense Instructions for use Diagnosis    ASPIRIN CAPS 81 MG OR      1 CAPSULE DAILY        atorvastatin 40 MG tablet    LIPITOR    90 tablet    TAKE 1 TABLET EVERY DAY  (DUE FOR OFFICE VISIT FOR FURTHER REFILLS)    Hyperlipidemia with target LDL less than 130       CALCIUM GUMMIES PO           fish oil-omega-3 fatty acids 1000 MG capsule      2 daily.        gabapentin 300 MG capsule    NEURONTIN    30 capsule    Take 1 capsule (300 mg) by mouth At Bedtime    Restless legs syndrome (RLS)       levothyroxine 137 MCG tablet    SYNTHROID/LEVOTHROID    90 tablet    TAKE 1 TABLET EVERY DAY (NEED APPOINTMENT FOR REFILLS)    Other specified hypothyroidism       lisinopril-hydrochlorothiazide 20-25 MG per tablet    PRINZIDE/ZESTORETIC    90 tablet    Take 1 tablet by mouth daily    Hypertension goal BP (blood pressure) < 150/90       MULTI-VITAMIN GUMMIES PO           VITAMIN D (ERGOCALCIFEROL) PO      Take 3,000 Units by mouth daily        zolpidem 10 MG tablet    AMBIEN    1 tablet    Take tablet by mouth 15 minutes prior to sleep, for Sleep Study    JOSE (obstructive sleep apnea)

## 2017-09-27 NOTE — NURSING NOTE
Completed a all night titration PSG per provider order.    Preliminary AHI 78.  A final therapeutic PAP pressure was achieved.    Supine REM was seen on therapeutic pressure.    Patient reports feeling refreshed in AM.

## 2017-09-28 NOTE — PROCEDURES
"SLEEP STUDY INTERPRETATION  TITRATION STUDY      Patient: Gina Yeh  YOB: 1945  Study Date: 9/26/2017  MRN: [id number]  Referring Provider: Verena Vee MD  Ordering Provider: Dr. Salazar    Indications for Polysomnography: The patient is a 72 y old Female who is 5' 4\" and weighs 271.0 lbs.  Her BMI is 46.8, Kelford sleepiness scale is 9.0 and neck size is 47.0.  A diagnostic polysomnogram PAP titration was performed for severe JOSE    Polysomnogram Data:  A full night polysomnogram recorded the standard physiologic parameters including EEG, EOG, EMG, ECG, nasal and oral airflow.  Respiratory parameters of chest and abdominal movements were recorded with respiratory inductance plethysmography.  Oxygen saturation was recorded by pulse oximetry.      Treatment PSG:  Sleep Architecture: Patient had very hard time staying asleep, falling back to sleep due to very severe leg movements. No consolidated sleep was seen for 260 minutes.  The total recording time of the study was 508.3 minutes.  The total sleep time was 316.5 minutes.  Sleep latency was normal at 15.0 minutes with the use of a sleep aid. REM latency was 318.5 minutes.  Arousal index was 31.1 arousals per hour.  Sleep efficiency was decreased at 62.3%.  Wake after sleep onset was 176.5 minutes.   The patient spent 20.2% of sleep time in Stage N1, 35.9% in Stage N2, 25.4% in Stage N3 and 18.5% in REM.     Respiration:     The patient was titrated at pressures ranging from 5 cmH2O up to 8 cmH2O.  The optimal pressure achieved was 8 cmH2O with a residual AHI of 1.1 events per hour.  Time in REM supine on final pressure was 22.5 minutes.   - This titration was considered optimal (residual AHI < 5 events per hour and including REM-supine sleep at final pressure).     Respiratory rate and pattern - was notable for normal respiratory rate and pattern.    Sustained Sleep Associated Hypoventilation - Transcutaneous carbon dioxide monitoring was " not usedSleep Associated Hypoxemia - (Greater than 5 minutes O2 sat below 89%) was not present.  Baseline oxygen saturation was 94.3%. Lowest oxygen saturation was 82.0%.  Time spent less than or equal to 88% was 1.0 minutes.  Time spent less than or equal to 89% was 2.1 minutes.         Movement Activity:     Periodic Limb Activity - There were 135 PLMs during the entire study. The PLM index was 25.6 movements per hour.  The PLM Arousal Index was 8.0 per hour.    REM EMG Activity - Excessive transient / sustained muscle activity was not present.    Nocturnal Behavior - Abnormal sleep related behaviors were not noted    Bruxism - None apparent.    Cardiac Summary:  Arrhythmias were not noted.    Assessment:     Severe JOSE with optimal CPAP titration at 8 cm    Frequent PLMS    Sleep onset difficulties related to restless legs    Recommendations:    Treatment of JOSE with CPAP at 8 cmH2O.  Recommend clinical follow up with sleep management team, for coaching and review of effectiveness and compliance measures.    Suggest optimizing sleep schedule and avoiding sleep deprivation.    Weight management (if BMI > 30).    Pharmacologic therapy should be used for management of restless legs syndrome only if present and clinically indicated and not based on the presence of periodic limb movements alone.        _____________________________________   Mason Avelar MD 9/28/17         Table of Oximetry Distribution    Range(%) Time in range (min) Time in range (%) Time in or below range (min) Time in or below range (%)   0.0 - 88.0 1.0 0.2% 1.0 0.2%   0.0 - 89.0 2.1 0.4% 2.1 0.4%

## 2017-10-02 NOTE — PROGRESS NOTES
Left  for return call.  Schedule CPAP set up and r/s results to results and cpap f/u 7 weeks after set up.

## 2017-10-11 ENCOUNTER — OFFICE VISIT (OUTPATIENT)
Dept: SLEEP MEDICINE | Facility: CLINIC | Age: 72
End: 2017-10-11
Payer: MEDICARE

## 2017-10-11 VITALS
DIASTOLIC BLOOD PRESSURE: 76 MMHG | OXYGEN SATURATION: 98 % | WEIGHT: 271 LBS | SYSTOLIC BLOOD PRESSURE: 112 MMHG | BODY MASS INDEX: 46.26 KG/M2 | HEIGHT: 64 IN | HEART RATE: 80 BPM

## 2017-10-11 DIAGNOSIS — E61.1 IRON DEFICIENCY: ICD-10-CM

## 2017-10-11 DIAGNOSIS — G25.81 RESTLESS LEGS SYNDROME (RLS): ICD-10-CM

## 2017-10-11 DIAGNOSIS — E66.01 MORBIDLY OBESE (H): ICD-10-CM

## 2017-10-11 DIAGNOSIS — G47.33 OSA (OBSTRUCTIVE SLEEP APNEA): ICD-10-CM

## 2017-10-11 PROCEDURE — 99213 OFFICE O/P EST LOW 20 MIN: CPT | Performed by: INTERNAL MEDICINE

## 2017-10-11 NOTE — MR AVS SNAPSHOT
After Visit Summary   10/11/2017    Gina Yeh    MRN: [id number]           Patient Information     Date Of Birth          1945        Visit Information        Provider Department      10/11/2017 10:30 AM Félix Salazar MD [facility]        Today's Diagnoses     Iron deficiency        Restless legs syndrome (RLS)        JOSE (obstructive sleep apnea)        Morbidly obese (H)          Care Instructions      Your BMI is Body mass index is 46.52 kg/(m^2).  Weight management is a personal decision.  If you are interested in exploring weight loss strategies, the following discussion covers the approaches that may be successful. Body mass index (BMI) is one way to tell whether you are at a healthy weight, overweight, or obese. It measures your weight in relation to your height.  A BMI of 18.5 to 24.9 is in the healthy range. A person with a BMI of 25 to 29.9 is considered overweight, and someone with a BMI of 30 or greater is considered obese. More than two-thirds of American adults are considered overweight or obese.  Being overweight or obese increases the risk for further weight gain. Excess weight may lead to heart disease and diabetes.  Creating and following plans for healthy eating and physical activity may help you improve your health.  Weight control is part of healthy lifestyle and includes exercise, emotional health, and healthy eating habits. Careful eating habits lifelong are the mainstay of weight control. Though there are significant health benefits from weight loss, long-term weight loss with diet alone may be very difficult to achieve- studies show long-term success with dietary management in less than 10% of people. Attaining a healthy weight may be especially difficult to achieve in those with severe obesity. In some cases, medications, devices and surgical management might be considered.  What can you do?  If you are overweight or obese and are  interested in methods for weight loss, you should discuss this with your provider.     Consider reducing daily calorie intake by 500 calories.     Keep a food journal.     Avoiding skipping meals, consider cutting portions instead.    Diet combined with exercise helps maintain muscle while optimizing fat loss. Strength training is particularly important for building and maintaining muscle mass. Exercise helps reduce stress, increase energy, and improves fitness. Increasing exercise without diet control, however, may not burn enough calories to loose weight.       Start walking three days a week 10-20 minutes at a time    Work towards walking thirty minutes five days a week     Eventually, increase the speed of your walking for 1-2 minutes at time    In addition, we recommend that you review healthy lifestyles and methods for weight loss available through the National Institutes of Health patient information sites:  http://win.niddk.nih.gov/publications/index.htm    And look into health and wellness programs that may be available through your health insurance provider, employer, local community center, or bebeto club.    Weight management plan: Patient was referred to their PCP to discuss a diet and exercise plan.              Follow-ups after your visit        Your next 10 appointments already scheduled     Oct 12, 2017 10:30 AM CDT   PAP SETUP with TRANG FLEMING   [facility] [place]    [address] [address] [id number] [phone]            Oct 25, 2017 10:00 AM CDT   PHYSICAL with Verena Vee MD   [facility] ([address] [address]   [phone]            Nov 29, 2017 10:30 AM CST   Return Sleep Patient with Félix Salazar MD   [facility] [place]    [address] [address]  "  [phone]              Who to contact     If you have questions or need follow up information about today's clinic visit or your schedule please contact [facility] directly at [phone].  Normal or non-critical lab and imaging results will be communicated to you by MyChart, letter or phone within 4 business days after the clinic has received the results. If you do not hear from us within 7 days, please contact the clinic through testhubhart or phone. If you have a critical or abnormal lab result, we will notify you by phone as soon as possible.  Submit refill requests through [employer] or call your pharmacy and they will forward the refill request to us. Please allow 3 business days for your refill to be completed.          Additional Information About Your Visit        [facility] Information     [employer] gives you secure access to your electronic health record. If you see a primary care provider, you can also send messages to your care team and make appointments. If you have questions, please call your primary care clinic.  If you do not have a primary care provider, please call [phone] and they will assist you.        Care EveryWhere ID     This is your Care EveryWhere ID. This could be used by other organizations to access your [facility] medical records  [id number]        Your Vitals Were     Pulse Height Pulse Oximetry BMI (Body Mass Index)          80 1.626 m (5' 4\") 98% 46.52 kg/m2         Blood Pressure from Last 3 Encounters:   10/11/17 112/76   08/30/17 119/78   06/19/17 110/68    Weight from Last 3 Encounters:   10/11/17 122.9 kg (271 lb)   08/30/17 122.9 kg (271 lb)   06/19/17 121.1 kg (267 lb)              We Performed the Following     Sleep [facility] DME          Today's Medication Changes          These changes are accurate as of: 10/11/17 11:07 AM.  If you have any questions, ask your nurse or doctor.               These medicines have changed or have updated prescriptions.  "       Dose/Directions    order for DME   This may have changed:  additional instructions   Used for:  JOSE (obstructive sleep apnea)        autoCPAP 7-12 cm   Quantity:  1 Units   Refills:  0            Where to get your medicines      Information about where to get these medications is not yet available     ! Ask your nurse or doctor about these medications     order for DME                Primary Care Provider Office Phone # Fax #    Verena Vee [phone]       [address] [facility] [address]        Equal Access to Services     [facility] : Hadii aad ku hadasho Soomaali, waaxda luqadaha, qaybta kaalmada adeegyada, waxay idiin hayaan adeeg kharash labentley . So [facility] [phone].    ATENCIÓN: Si habla español, tiene a alamo disposición servicios gratuitos de asistencia lingüística. [facility] [phone].    We comply with applicable federal civil rights laws and [place] laws. We do not discriminate on the basis of race, color, national origin, age, disability, sex, sexual orientation, or gender identity.            Thank you!     Thank you for choosing [facility]  for your care. Our goal is always to provide you with excellent care. Hearing back from our patients is one way we can continue to improve our services. Please take a few minutes to complete the written survey that you may receive in the mail after your visit with us. Thank you!             Your Updated Medication List - Protect others around you: Learn how to safely use, store and throw away your medicines at www.disposemymeds.org.          This list is accurate as of: 10/11/17 11:07 AM.  Always use your most recent med list.                   Brand Name Dispense Instructions for use Diagnosis    ASPIRIN CAPS 81 MG OR      1 CAPSULE DAILY        atorvastatin 40 MG tablet    LIPITOR    90 tablet    TAKE 1 TABLET EVERY DAY (DUE FOR OFFICE VISIT FOR FURTHER REFILLS)    Hyperlipidemia with target LDL less than  130       CALCIUM GUMMIES PO           levothyroxine 137 MCG tablet    SYNTHROID/LEVOTHROID    90 tablet    TAKE 1 TABLET EVERY DAY (NEED APPOINTMENT FOR REFILLS)    Other specified hypothyroidism       lisinopril-hydrochlorothiazide 20-25 MG per tablet    PRINZIDE/ZESTORETIC    90 tablet    Take 1 tablet by mouth daily    Hypertension goal BP (blood pressure) < 150/90       MULTI-VITAMIN GUMMIES PO           OMEGA-3 KRILL OIL PO           order for DME     1 Units    autoCPAP 7-12 cm    JOSE (obstructive sleep apnea)       VITAMIN D (ERGOCALCIFEROL) PO      Take 3,000 Units by mouth daily

## 2017-10-11 NOTE — NURSING NOTE
"Chief Complaint   Patient presents with     Study Results       Initial /76  Pulse 80  Ht 1.626 m (5' 4\")  Wt 122.9 kg (271 lb)  SpO2 98%  BMI 46.52 kg/m2 Estimated body mass index is 46.52 kg/(m^2) as calculated from the following:    Height as of this encounter: 1.626 m (5' 4\").    Weight as of this encounter: 122.9 kg (271 lb).  Medication Reconciliation: complete   Chelsie Patel, ADARSH      "

## 2017-10-12 ENCOUNTER — DOCUMENTATION ONLY (OUTPATIENT)
Dept: SLEEP MEDICINE | Facility: CLINIC | Age: 72
End: 2017-10-12
Payer: MEDICARE

## 2017-10-12 DIAGNOSIS — G25.81 RESTLESS LEGS SYNDROME (RLS): ICD-10-CM

## 2017-10-12 DIAGNOSIS — E61.1 IRON DEFICIENCY: ICD-10-CM

## 2017-10-12 DIAGNOSIS — E66.01 MORBIDLY OBESE (H): ICD-10-CM

## 2017-10-12 DIAGNOSIS — G47.33 OSA (OBSTRUCTIVE SLEEP APNEA): ICD-10-CM

## 2017-10-12 PROCEDURE — 99207 ZZC NO CHARGE LOS: CPT

## 2017-10-12 NOTE — PROGRESS NOTES
Patient was offered choice of vendor and chose [facility].  Patient Gina Yeh was set up at [facility] on October 12, 2017. Patient received a Resmed AirSense 10 Auto. Pressures were set at 7-12 cm H2O.   Patient s ramp is 5 cm H2O for Off and FLEX/EPR is EPR, 2.  Patient received a Resmed Mask name: Mirage Fx  Nasal mask Size For Her, Small, heated tubing and heated humidifier.  Patient is enrolled in the STM Program and does need to meet compliance. Patient has a follow up on 11/29/17 with Dr. Salazar.    Dorinda Manjarrez

## 2017-10-13 NOTE — PATIENT INSTRUCTIONS
[facility]    If you have any questions regarding to your visit please contact your care team:       Team Red:   Clinic Hours Telephone Number   Dr. Verena Somers  (pediatrics)  Venecia Jesus NP 7am-7pm  Monday - Thursday   7am-5pm  Fridays  [phone] (fax)    Baljinder ARRIAGA  [phone]   Urgent Care - [place] and [place] Monday-Friday  [place] - 11am-8pm  Saturday-Sunday  Both sites - 9am-5pm  [phone] - [facility]  [phone] - [place]       What options do I have for visits at the clinic other than the traditional office visit?  To expand how we care for you, many of our providers are utilizing electronic visits (e-visits) and telephone visits, when medically appropriate, for interactions with their patients rather than a visit in the clinic.   We also offer nurse visits for many medical concerns. Just like any other service, we will bill your insurance company for this type of visit based on time spent on the phone with your provider. Not all insurance companies cover these visits. Please check with your medical insurance if this type of visit is covered. You will be responsible for any charges that are not paid by your insurance.      E-visits via [employer]:  generally incur a $35.00 fee.  Telephone visits:  Time spent on the phone: *charged based on time that is spent on the phone in increments of 10 minutes. Estimated cost:   5-10 mins $30.00   11-20 mins. $59.00   21-30 mins. $85.00     As always, Thank you for trusting us with your health care needs!              Preventive Health Recommendations  Female Ages 65 +    Yearly exam:     See your health care provider every year in order to  o Review health changes.   o Discuss preventive care.    o Review your medicines if your doctor has prescribed any.      You no longer need a yearly Pap test unless you've had an abnormal Pap test in the past 10 years. If you have vaginal  symptoms, such as bleeding or discharge, be sure to talk with your provider about a Pap test.      Every 1 to 2 years, have a mammogram.  If you are over 69, talk with your health care provider about whether or not you want to continue having screening mammograms.      Every 10 years, have a colonoscopy. Or, have a yearly FIT test (stool test). These exams will check for colon cancer.       Have a cholesterol test every 5 years, or more often if your doctor advises it.       Have a diabetes test (fasting glucose) every three years. If you are at risk for diabetes, you should have this test more often.       At age 65, have a bone density scan (DEXA) to check for osteoporosis (brittle bone disease).    Shots:    Get a flu shot each year.    Get a tetanus shot every 10 years.    Talk to your doctor about your pneumonia vaccines. There are now two you should receive - Pneumovax (PPSV 23) and Prevnar (PCV 13).    Talk to your doctor about the shingles vaccine.    Talk to your doctor about the hepatitis B vaccine.    Nutrition:     Eat at least 5 servings of fruits and vegetables each day.      Eat whole-grain bread, whole-wheat pasta and brown rice instead of white grains and rice.      Talk to your provider about Calcium and Vitamin D.     Lifestyle    Exercise at least 150 minutes a week (30 minutes a day, 5 days a week). This will help you control your weight and prevent disease.      Limit alcohol to one drink per day.      No smoking.       Wear sunscreen to prevent skin cancer.       See your dentist twice a year for an exam and cleaning.      See your eye doctor every 1 to 2 years to screen for conditions such as glaucoma, macular degeneration and cataracts.  [facility]    If you have any questions regarding to your visit please contact your care team:       Team Red:   Clinic Hours Telephone Number   Dr. Verena Jesus NP   7am-7pm  Monday - Thursday    7am-5pm  Fridays  [phone]  (Appointment scheduling available 24/7)    Questions about your visit?   Team Line  [phone]   Urgent Care - [place] - 11am-9pm Monday-Friday Saturday-Sunday- 9am-5pm   [place] - 5pm-9pm Monday-Friday Saturday-Sunday- 9am-5pm  [phone] - [place]   [phone] - [place]       What options do I have for visits at the clinic other than the traditional office visit?  To expand how we care for you, many of our providers are utilizing electronic visits (e-visits) and telephone visits, when medically appropriate, for interactions with their patients rather than a visit in the clinic.   We also offer nurse visits for many medical concerns. Just like any other service, we will bill your insurance company for this type of visit based on time spent on the phone with your provider. Not all insurance companies cover these visits. Please check with your medical insurance if this type of visit is covered. You will be responsible for any charges that are not paid by your insurance.      E-visits via [employer]:  generally incur a $35.00 fee.  Telephone visits:  Time spent on the phone: *charged based on time that is spent on the phone in increments of 10 minutes. Estimated cost:   5-10 mins $30.00   11-20 mins. $59.00   21-30 mins. $85.00     Use [employer] (secure email communication and access to your chart) to send your primary care provider a message or make an appointment. Ask someone on your Team how to sign up for [employer].  For a Price Quote for your services, please call our Consumer Price Line at [phone].      As always, Thank you for trusting us with your health care needs!  Carolann Thompson MA

## 2017-10-16 ENCOUNTER — DOCUMENTATION ONLY (OUTPATIENT)
Dept: SLEEP MEDICINE | Facility: CLINIC | Age: 72
End: 2017-10-16

## 2017-10-16 NOTE — PROGRESS NOTES
3 DAY STM VISIT    Patient contacted for 3 day STM visit  Subjective measures:  Pt states things are going well and she is getting dry and is going to increase her humidity. Pt is benefiting from therapy.     Device type: Auto-CPAP  PAP settings: CPAP min 7 cm  H20     CPAP max 12 cm  H20  Assessment: Nightly usage over four hours.  Action plan: Pt to have f/u 14 day STM visit.  Diagnostic AHI: 78.7

## 2017-10-25 ENCOUNTER — OFFICE VISIT (OUTPATIENT)
Dept: FAMILY MEDICINE | Facility: CLINIC | Age: 72
End: 2017-10-25
Payer: MEDICARE

## 2017-10-25 VITALS
HEART RATE: 89 BPM | HEIGHT: 64 IN | WEIGHT: 270 LBS | TEMPERATURE: 97.7 F | SYSTOLIC BLOOD PRESSURE: 116 MMHG | BODY MASS INDEX: 46.1 KG/M2 | RESPIRATION RATE: 16 BRPM | DIASTOLIC BLOOD PRESSURE: 72 MMHG | OXYGEN SATURATION: 98 %

## 2017-10-25 DIAGNOSIS — R73.02 IMPAIRED GLUCOSE TOLERANCE: ICD-10-CM

## 2017-10-25 DIAGNOSIS — E66.01 MORBIDLY OBESE (H): ICD-10-CM

## 2017-10-25 DIAGNOSIS — R35.0 URINARY FREQUENCY: ICD-10-CM

## 2017-10-25 DIAGNOSIS — Z23 NEED FOR PROPHYLACTIC VACCINATION AND INOCULATION AGAINST INFLUENZA: ICD-10-CM

## 2017-10-25 DIAGNOSIS — Z00.00 WELLNESS EXAMINATION: Primary | ICD-10-CM

## 2017-10-25 DIAGNOSIS — E03.9 ACQUIRED HYPOTHYROIDISM: ICD-10-CM

## 2017-10-25 DIAGNOSIS — E78.5 HYPERLIPIDEMIA WITH TARGET LDL LESS THAN 130: ICD-10-CM

## 2017-10-25 DIAGNOSIS — I10 HYPERTENSION GOAL BP (BLOOD PRESSURE) < 150/90: ICD-10-CM

## 2017-10-25 DIAGNOSIS — R31.29 MICROSCOPIC HEMATURIA: ICD-10-CM

## 2017-10-25 LAB
ALBUMIN SERPL-MCNC: 3.7 G/DL (ref 3.4–5)
ALBUMIN UR-MCNC: NEGATIVE MG/DL
ALP SERPL-CCNC: 121 U/L (ref 40–150)
ALT SERPL W P-5'-P-CCNC: 31 U/L (ref 0–50)
ANION GAP SERPL CALCULATED.3IONS-SCNC: 8 MMOL/L (ref 3–14)
APPEARANCE UR: ABNORMAL
AST SERPL W P-5'-P-CCNC: 12 U/L (ref 0–45)
BACTERIA #/AREA URNS HPF: ABNORMAL /HPF
BILIRUB SERPL-MCNC: 0.5 MG/DL (ref 0.2–1.3)
BILIRUB UR QL STRIP: NEGATIVE
BUN SERPL-MCNC: 32 MG/DL (ref 7–30)
CALCIUM SERPL-MCNC: 9.9 MG/DL (ref 8.5–10.1)
CHLORIDE SERPL-SCNC: 100 MMOL/L (ref 94–109)
CHOLEST SERPL-MCNC: 181 MG/DL
CO2 SERPL-SCNC: 28 MMOL/L (ref 20–32)
COLOR UR AUTO: YELLOW
CREAT SERPL-MCNC: 0.95 MG/DL (ref 0.52–1.04)
CREAT UR-MCNC: 118 MG/DL
GFR SERPL CREATININE-BSD FRML MDRD: 58 ML/MIN/1.7M2
GLUCOSE SERPL-MCNC: 110 MG/DL (ref 70–99)
GLUCOSE UR STRIP-MCNC: NEGATIVE MG/DL
HBA1C MFR BLD: 5.6 % (ref 4.3–6)
HDLC SERPL-MCNC: 42 MG/DL
HGB UR QL STRIP: ABNORMAL
KETONES UR STRIP-MCNC: NEGATIVE MG/DL
LDLC SERPL CALC-MCNC: 101 MG/DL
LEUKOCYTE ESTERASE UR QL STRIP: NEGATIVE
MICROALBUMIN UR-MCNC: 10 MG/L
MICROALBUMIN/CREAT UR: 8.73 MG/G CR (ref 0–25)
NITRATE UR QL: NEGATIVE
NON-SQ EPI CELLS #/AREA URNS LPF: ABNORMAL /LPF
NONHDLC SERPL-MCNC: 139 MG/DL
PH UR STRIP: 6 PH (ref 5–7)
POTASSIUM SERPL-SCNC: 4.5 MMOL/L (ref 3.4–5.3)
PROT SERPL-MCNC: 8.5 G/DL (ref 6.8–8.8)
RBC #/AREA URNS AUTO: ABNORMAL /HPF
SODIUM SERPL-SCNC: 136 MMOL/L (ref 133–144)
SOURCE: ABNORMAL
SP GR UR STRIP: 1.02 (ref 1–1.03)
TRIGL SERPL-MCNC: 188 MG/DL
TSH SERPL DL<=0.005 MIU/L-ACNC: 3.02 MU/L (ref 0.4–4)
UROBILINOGEN UR STRIP-ACNC: 0.2 EU/DL (ref 0.2–1)
WBC #/AREA URNS AUTO: ABNORMAL /HPF

## 2017-10-25 PROCEDURE — 82043 UR ALBUMIN QUANTITATIVE: CPT | Performed by: FAMILY MEDICINE

## 2017-10-25 PROCEDURE — 87086 URINE CULTURE/COLONY COUNT: CPT | Performed by: FAMILY MEDICINE

## 2017-10-25 PROCEDURE — 80053 COMPREHEN METABOLIC PANEL: CPT | Performed by: FAMILY MEDICINE

## 2017-10-25 PROCEDURE — 80061 LIPID PANEL: CPT | Performed by: FAMILY MEDICINE

## 2017-10-25 PROCEDURE — 36415 COLL VENOUS BLD VENIPUNCTURE: CPT | Performed by: FAMILY MEDICINE

## 2017-10-25 PROCEDURE — 84443 ASSAY THYROID STIM HORMONE: CPT | Performed by: FAMILY MEDICINE

## 2017-10-25 PROCEDURE — G0439 PPPS, SUBSEQ VISIT: HCPCS | Performed by: FAMILY MEDICINE

## 2017-10-25 PROCEDURE — 81001 URINALYSIS AUTO W/SCOPE: CPT | Performed by: FAMILY MEDICINE

## 2017-10-25 PROCEDURE — 83036 HEMOGLOBIN GLYCOSYLATED A1C: CPT | Performed by: FAMILY MEDICINE

## 2017-10-25 PROCEDURE — G0008 ADMIN INFLUENZA VIRUS VAC: HCPCS | Performed by: FAMILY MEDICINE

## 2017-10-25 PROCEDURE — 90662 IIV NO PRSV INCREASED AG IM: CPT | Performed by: FAMILY MEDICINE

## 2017-10-25 RX ORDER — LEVOTHYROXINE SODIUM 137 UG/1
TABLET ORAL
Qty: 90 TABLET | Refills: 3 | Status: SHIPPED | OUTPATIENT
Start: 2017-10-25 | End: 2018-08-06

## 2017-10-25 RX ORDER — ATORVASTATIN CALCIUM 40 MG/1
TABLET, FILM COATED ORAL
Qty: 90 TABLET | Refills: 3 | Status: SHIPPED | OUTPATIENT
Start: 2017-10-25 | End: 2018-08-06

## 2017-10-25 RX ORDER — LISINOPRIL AND HYDROCHLOROTHIAZIDE 20; 25 MG/1; MG/1
1 TABLET ORAL DAILY
Qty: 90 TABLET | Refills: 3 | Status: SHIPPED | OUTPATIENT
Start: 2017-10-25 | End: 2017-10-25 | Stop reason: ALTCHOICE

## 2017-10-25 RX ORDER — LISINOPRIL 20 MG/1
20 TABLET ORAL DAILY
Qty: 90 TABLET | Refills: 3 | Status: SHIPPED | OUTPATIENT
Start: 2017-10-25 | End: 2018-08-06

## 2017-10-25 NOTE — PROGRESS NOTES
Your final test results are pending.  Please check your chart again within 3 to 5 days. You will receive further instruction when your full test result panel is complete.

## 2017-10-25 NOTE — MR AVS SNAPSHOT
After Visit Summary   10/25/2017    Gina Yeh    MRN: [id number]           Patient Information     Date Of Birth          1945        Visit Information        Provider Department      10/25/2017 10:00 AM Verena Vee MD [facility]        Today's Diagnoses     Wellness examination    -  1    Morbidly obese (H)        Hypertension goal BP (blood pressure) < 150/90        Hyperlipidemia with target LDL less than 130        Acquired hypothyroidism        Impaired glucose tolerance          Care Instructions    [facility]    If you have any questions regarding to your visit please contact your care team:       Team Red:   Clinic Hours Telephone Number   Dr. Verena Somers  (pediatrics)  Venecia Jesus NP 7am-7pm  Monday - Thursday   7am-5pm  Fridays  [phone] (fax)    Baljinder ARRIAGA  [phone]   Urgent Care - [place] and [place] Monday-Friday  [place] - 11am-8pm  Saturday-Sunday  Both sites - 9am-5pm  [phone] - [facility]  [phone] - [place]       What options do I have for visits at the clinic other than the traditional office visit?  To expand how we care for you, many of our providers are utilizing electronic visits (e-visits) and telephone visits, when medically appropriate, for interactions with their patients rather than a visit in the clinic.   We also offer nurse visits for many medical concerns. Just like any other service, we will bill your insurance company for this type of visit based on time spent on the phone with your provider. Not all insurance companies cover these visits. Please check with your medical insurance if this type of visit is covered. You will be responsible for any charges that are not paid by your insurance.      E-visits via Compiere:  generally incur a $35.00 fee.  Telephone visits:  Time spent on the phone: *charged based on time that is spent on the  phone in increments of 10 minutes. Estimated cost:   5-10 mins $30.00   11-20 mins. $59.00   21-30 mins. $85.00     As always, Thank you for trusting us with your health care needs!              Preventive Health Recommendations  Female Ages 65 +    Yearly exam:     See your health care provider every year in order to  o Review health changes.   o Discuss preventive care.    o Review your medicines if your doctor has prescribed any.      You no longer need a yearly Pap test unless you've had an abnormal Pap test in the past 10 years. If you have vaginal symptoms, such as bleeding or discharge, be sure to talk with your provider about a Pap test.      Every 1 to 2 years, have a mammogram.  If you are over 69, talk with your health care provider about whether or not you want to continue having screening mammograms.      Every 10 years, have a colonoscopy. Or, have a yearly FIT test (stool test). These exams will check for colon cancer.       Have a cholesterol test every 5 years, or more often if your doctor advises it.       Have a diabetes test (fasting glucose) every three years. If you are at risk for diabetes, you should have this test more often.       At age 65, have a bone density scan (DEXA) to check for osteoporosis (brittle bone disease).    Shots:    Get a flu shot each year.    Get a tetanus shot every 10 years.    Talk to your doctor about your pneumonia vaccines. There are now two you should receive - Pneumovax (PPSV 23) and Prevnar (PCV 13).    Talk to your doctor about the shingles vaccine.    Talk to your doctor about the hepatitis B vaccine.    Nutrition:     Eat at least 5 servings of fruits and vegetables each day.      Eat whole-grain bread, whole-wheat pasta and brown rice instead of white grains and rice.      Talk to your provider about Calcium and Vitamin D.     Lifestyle    Exercise at least 150 minutes a week (30 minutes a day, 5 days a week). This will help you control your weight and  prevent disease.      Limit alcohol to one drink per day.      No smoking.       Wear sunscreen to prevent skin cancer.       See your dentist twice a year for an exam and cleaning.      See your eye doctor every 1 to 2 years to screen for conditions such as glaucoma, macular degeneration and cataracts.  [facility]    If you have any questions regarding to your visit please contact your care team:       Team Red:   Clinic Hours Telephone Number   Dr. Verena Jesus NP   7am-7pm  Monday - Thursday   7am-5pm  Fridays  [phone]  (Appointment scheduling available 24/7)    Questions about your visit?   Team Line  [phone]   Urgent Care - [place] and [facility] - 11am-9pm Monday-Friday Saturday-Sunday- 9am-5pm   [place] - 5pm-9pm Monday-Friday Saturday-Sunday- 9am-5pm  [phone] - [place]   [phone] - [place]       What options do I have for visits at the clinic other than the traditional office visit?  To expand how we care for you, many of our providers are utilizing electronic visits (e-visits) and telephone visits, when medically appropriate, for interactions with their patients rather than a visit in the clinic.   We also offer nurse visits for many medical concerns. Just like any other service, we will bill your insurance company for this type of visit based on time spent on the phone with your provider. Not all insurance companies cover these visits. Please check with your medical insurance if this type of visit is covered. You will be responsible for any charges that are not paid by your insurance.      E-visits via [employer]:  generally incur a $35.00 fee.  Telephone visits:  Time spent on the phone: *charged based on time that is spent on the phone in increments of 10 minutes. Estimated cost:   5-10 mins $30.00   11-20 mins. $59.00   21-30 mins. $85.00     Use [employer] (secure email communication and access to  your chart) to send your primary care provider a message or make an appointment. Ask someone on your Team how to sign up for aDealio.  For a Price Quote for your services, please call our [employer] Line at [phone].      As always, Thank you for trusting us with your health care needs!  Carolann Thompson MA            Follow-ups after your visit        Follow-up notes from your care team     Return in about 1 year (around 10/25/2018) for Wellness visit (fasting labs up to one week prior).      Your next 10 appointments already scheduled     Nov 29, 2017 10:30 AM CST   Return Sleep Patient with Félix Salazar MD   [facility] ([facility])    [address] [place] [id number] [phone]              Who to contact     If you have questions or need follow up information about today's clinic visit or your schedule please contact [facility] directly at [phone].  Normal or non-critical lab and imaging results will be communicated to you by MyChart, letter or phone within 4 business days after the clinic has received the results. If you do not hear from us within 7 days, please contact the clinic through Warrantly[employer] or phone. If you have a critical or abnormal lab result, we will notify you by phone as soon as possible.  Submit refill requests through aDealio or call your pharmacy and they will forward the refill request to us. Please allow 3 business days for your refill to be completed.          Additional Information About Your Visit        aDealio Information     aDealio gives you secure access to your electronic health record. If you see a primary care provider, you can also send messages to your care team and make appointments. If you have questions, please call your primary care clinic.  If you do not have a primary care provider, please call [phone] and they will assist you.        Care EveryWhere ID      "This is your Care EveryWhere ID. This could be used by other organizations to access your [place] medical records  [id number]        Your Vitals Were     Pulse Temperature Respirations Height Pulse Oximetry BMI (Body Mass Index)    89 97.7  F (36.5  C) 16 5' 4\" (1.626 m) 98% 46.35 kg/m2       Blood Pressure from Last 3 Encounters:   10/25/17 116/72   10/11/17 112/76   08/30/17 119/78    Weight from Last 3 Encounters:   10/25/17 270 lb (122.5 kg)   10/11/17 271 lb (122.9 kg)   08/30/17 271 lb (122.9 kg)              We Performed the Following     Albumin Random Urine Quantitative with Creat Ratio     Comprehensive metabolic panel     Hemoglobin A1c     Lipid panel reflex to direct LDL Fasting     TSH with free T4 reflex     UA reflex to Microscopic and Culture          Today's Medication Changes          These changes are accurate as of: 10/25/17 10:28 AM.  If you have any questions, ask your nurse or doctor.               Start taking these medicines.        Dose/Directions    lisinopril 20 MG tablet   Commonly known as:  PRINIVIL/ZESTRIL   Used for:  Hypertension goal BP (blood pressure) < 150/90   Started by:  Verena Vee MD        Dose:  20 mg   Take 1 tablet (20 mg) by mouth daily   Quantity:  90 tablet   Refills:  3         These medicines have changed or have updated prescriptions.        Dose/Directions    atorvastatin 40 MG tablet   Commonly known as:  LIPITOR   This may have changed:  See the new instructions.   Used for:  Hyperlipidemia with target LDL less than 130   Changed by:  Verena Vee MD        TAKE 1 TABLET EVERY DAY   Quantity:  90 tablet   Refills:  3       levothyroxine 137 MCG tablet   Commonly known as:  SYNTHROID/LEVOTHROID   This may have changed:  See the new instructions.   Used for:  Acquired hypothyroidism   Changed by:  Verena Vee MD        TAKE 1 TABLET EVERY DAY   Quantity:  90 tablet   Refills:  3         Stop taking these medicines if you haven't already. Please " contact your care team if you have questions.     lisinopril-hydrochlorothiazide 20-25 MG per tablet   Commonly known as:  PRINZIDE/ZESTORETIC   Stopped by:  Verena Vee MD                Where to get your medicines      These medications were sent to [facility] - [place], [place] - [address]  [address] [address]     Phone:  [phone]     atorvastatin 40 MG tablet    levothyroxine 137 MCG tablet    lisinopril 20 MG tablet                Primary Care Provider Office Phone # Fax #    Verena Vee [phone] [address] [facility] [address] [facility] [address]        Equal Access to Services     [facility]: Hadii celia martinez hadasho Soomaali, waaxda luqadaha, qaybta kaalmada adeegyada, roverto munoz . So [facility] [phone].    ATENCIÓN: Si habla español, tiene a alamo disposición servicios gratuitos de asistencia lingüística. Llame al [phone].    We comply with applicable federal civil rights laws and [place] laws. We do not discriminate on the basis of race, color, national origin, age, disability, sex, sexual orientation, or gender identity.            Thank you!     Thank you for choosing [facility]  for your care. Our goal is always to provide you with excellent care. Hearing back from our patients is one way we can continue to improve our services. Please take a few minutes to complete the written survey that you may receive in the mail after your visit with us. Thank you!             Your Updated Medication List - Protect others around you: Learn how to safely use, store and throw away your medicines at www.disposemymeds.org.          This list is accurate as of: 10/25/17 10:28 AM.  Always use your most recent med list.                   Brand Name Dispense Instructions for use Diagnosis    ASPIRIN CAPS 81 MG OR      1 CAPSULE DAILY        atorvastatin 40 MG tablet    LIPITOR    90 tablet    TAKE 1  TABLET EVERY DAY    Hyperlipidemia with target LDL less than 130       CALCIUM GUMMIES PO           levothyroxine 137 MCG tablet    SYNTHROID/LEVOTHROID    90 tablet    TAKE 1 TABLET EVERY DAY    Acquired hypothyroidism       lisinopril 20 MG tablet    PRINIVIL/ZESTRIL    90 tablet    Take 1 tablet (20 mg) by mouth daily    Hypertension goal BP (blood pressure) < 150/90       MULTI-VITAMIN GUMMIES PO           OMEGA-3 KRILL OIL PO           order for DME     1 Units    autoCPAP 7-12 cm    JOSE (obstructive sleep apnea)       VITAMIN D (ERGOCALCIFEROL) PO      Take 3,000 Units by mouth daily

## 2017-10-25 NOTE — PROGRESS NOTES

## 2017-10-25 NOTE — PROGRESS NOTES
SUBJECTIVE:   Gina Yeh is a 72 year old female morbidly obese who presents for Preventive Visit.  Are you in the first 12 months of your Medicare coverage?  No    Physical   Annual:     Getting at least 3 servings of Calcium per day::  Yes    Bi-annual eye exam::  Yes    Dental care twice a year::  NO    Sleep apnea or symptoms of sleep apnea::  Sleep apnea    Diet::  Low salt    Frequency of exercise::  2-3 days/week    Duration of exercise::  Other    Taking medications regularly::  Yes    Medication side effects::  None    Additional concerns today::  YES      COGNITIVE SCREEN  1) Repeat 3 items (Banana, Sunrise, Chair)    2) Clock draw: NORMAL  3) 3 item recall: Recalls 3 objects  Results: NORMAL clock, 1-2 items recalled: COGNITIVE IMPAIRMENT LESS LIKELY    Mini-CogTM Copyright S Mirta. Licensed by the author for use in [facility]; reprinted with permission (hali@[facility]). All rights reserved.          Reviewed and updated as needed this visit by clinical staff  Tobacco  Allergies  Meds  Problems  Med Hx  Soc Hx        Reviewed and updated as needed this visit by Provider  Tobacco  Allergies  Meds  Problems  Med Hx  Soc Hx       Social History   Substance Use Topics     Smoking status: Former Smoker     Packs/day: 0.50     Years: 49.00     Types: Cigarettes     Start date: 5/1/1960     Quit date: 5/1/2011     Smokeless tobacco: Former User     Quit date: 4/25/2011     Alcohol use No       The patient does not drink >3 drinks per day nor >7 drinks per week.      Today's PHQ-2 Score:   PHQ-2 ( 1999 Pfizer) 10/24/2017   Q1: Little interest or pleasure in doing things 0   Q2: Feeling down, depressed or hopeless 0   PHQ-2 Score 0   Q1: Little interest or pleasure in doing things Not at all   Q2: Feeling down, depressed or hopeless Not at all   PHQ-2 Score 0       Do you feel safe in your environment - No    Do you have a Health Care Directive?: Yes: Patient states has Advance  Directive and will bring in a copy to clinic.    Current providers sharing in care for this patient include:   Patient Care Team:  Verena Vee MD as PCP - General      Hearing impairment: No    Ability to successfully perform activities of daily living: Yes, no assistance needed     Fall risk:         Home safety:  none identified      The following health maintenance items are reviewed in Epic and correct as of today:  Health Maintenance   Topic Date Due     INFLUENZA VACCINE (SYSTEM ASSIGNED)  09/01/2017     TSH W/ FREE T4 REFLEX Q1 YEAR  06/19/2018     FALL RISK ASSESSMENT  06/19/2018     LIPID MONITORING Q1 YEAR  06/19/2018     BMP Q1 YR  08/30/2018     MEDICARE ANNUAL WELLNESS VISIT  10/25/2018     MAMMO SCREEN Q2 YR (SYSTEM ASSIGNED)  08/08/2019     ADVANCE DIRECTIVE PLANNING Q5 YRS  01/07/2021     TETANUS IMMUNIZATION (SYSTEM ASSIGNED)  03/23/2021     COLONOSCOPY Q5 YR  07/28/2022     DEXA SCAN SCREENING (SYSTEM ASSIGNED)  Completed     PNEUMOCOCCAL  Completed     HEPATITIS C SCREENING  Completed     Patient Active Problem List   Diagnosis     Hyperlipidemia with target LDL less than 130     Hypothyroid     Hypertension goal BP (blood pressure) < 150/90     Advanced directives, counseling/discussion     History of cervical dysplasia     Morbidly obese (H)     Impaired glucose tolerance     MMT (medial meniscus tear)     JOSE (obstructive sleep apnea)     Sensorineural hearing loss     Restless legs syndrome (RLS)     Iron deficiency     Past Surgical History:   Procedure Laterality Date     BIOPSY       CERVIX SURGERY  1988    cervical cone     COLONOSCOPY       TONSILLECTOMY & ADENOIDECTOMY         Social History   Substance Use Topics     Smoking status: Former Smoker     Packs/day: 0.50     Years: 49.00     Types: Cigarettes     Start date: 5/1/1960     Quit date: 5/1/2011     Smokeless tobacco: Former User     Quit date: 4/25/2011     Alcohol use No     Family History   Problem Relation Age of Onset      Allergies Mother      Cardiovascular Mother      Respiratory Mother      Thyroid Disease Mother      Other Cancer Mother      skin     Arthritis Father      Hypertension Father      Hyperlipidemia Father      CEREBROVASCULAR DISEASE Father      CANCER Maternal Grandmother      bone     Other Cancer Maternal Grandmother      bone     CEREBROVASCULAR DISEASE Paternal Grandmother      CANCER Paternal Grandfather      CEREBROVASCULAR DISEASE Paternal Grandfather      CANCER Brother      testicular      GASTROINTESTINAL DISEASE Brother      Other Cancer Brother      testicular     Allergies Sister      Allergies Daughter      GASTROINTESTINAL DISEASE Daughter      Depression Daughter      Anesthesia Reaction Daughter      Genitourinary Problems Brother      Allergies Daughter 10     metal     DIABETES No family hx of          Current Outpatient Prescriptions   Medication Sig Dispense Refill     atorvastatin (LIPITOR) 40 MG tablet TAKE 1 TABLET EVERY DAY 90 tablet 3     levothyroxine (SYNTHROID/LEVOTHROID) 137 MCG tablet TAKE 1 TABLET EVERY DAY 90 tablet 3     lisinopril (PRINIVIL/ZESTRIL) 20 MG tablet Take 1 tablet (20 mg) by mouth daily 90 tablet 3     OMEGA-3 KRILL OIL PO        order for DME autoCPAP 7-12 cm 1 Units 0     VITAMIN D, ERGOCALCIFEROL, PO Take 3,000 Units by mouth daily       Calcium-Phosphorus-Vitamin D (CALCIUM GUMMIES PO)        Multiple Vitamins-Minerals (MULTI-VITAMIN GUMMIES PO)        ASPIRIN CAPS 81 MG OR 1 CAPSULE DAILY       [DISCONTINUED] atorvastatin (LIPITOR) 40 MG tablet TAKE 1 TABLET EVERY DAY (DUE FOR OFFICE VISIT FOR FURTHER REFILLS) 90 tablet 2     [DISCONTINUED] levothyroxine (SYNTHROID/LEVOTHROID) 137 MCG tablet TAKE 1 TABLET EVERY DAY (NEED APPOINTMENT FOR REFILLS) 90 tablet 2     Allergies   Allergen Reactions     Nkda [No Known Drug Allergies]      Review of Systems  C: NEGATIVE for fever, chills, change in weight  I: NEGATIVE for worrisome rashes, moles or lesions  E: NEGATIVE  "for vision changes or irritation  E/M: NEGATIVE for ear, mouth and throat problems  R: NEGATIVE for significant cough or SOB  B: NEGATIVE for masses, tenderness or discharge  CV: NEGATIVE for chest pain, palpitations or peripheral edema  GI: NEGATIVE for nausea, abdominal pain, heartburn, or change in bowel habits   female: frequency  M: NEGATIVE for significant arthralgias or myalgia  N: NEGATIVE for weakness, dizziness or paresthesias  E: NEGATIVE for temperature intolerance, skin/hair changes  H: NEGATIVE for bleeding problems  P: NEGATIVE for changes in mood or affect    OBJECTIVE:   /72  Pulse 89  Temp 97.7  F (36.5  C)  Resp 16  Ht 5' 4\" (1.626 m)  Wt 270 lb (122.5 kg)  SpO2 98%  BMI 46.35 kg/m2 Estimated body mass index is 46.35 kg/(m^2) as calculated from the following:    Height as of this encounter: 5' 4\" (1.626 m).    Weight as of this encounter: 270 lb (122.5 kg).  Physical Exam  GENERAL: alert, no distress and obese  EYES: Eyes grossly normal to inspection, PERRL and conjunctivae and sclerae normal  HENT: ear canals and TM's normal, nose and mouth without ulcers or lesions  NECK: no adenopathy, no asymmetry, masses, or scars and thyroid normal to palpation  RESP: lungs clear to auscultation - no rales, rhonchi or wheezes  BREAST: normal without masses, tenderness or nipple discharge and no palpable axillary masses or adenopathy  CV: regular rate and rhythm, normal S1 S2, no S3 or S4, no murmur, click or rub, no peripheral edema and peripheral pulses strong  ABDOMEN: soft, nontender, no hepatosplenomegaly, no masses and bowel sounds normal  MS: no gross musculoskeletal defects noted, no edema  SKIN: no suspicious lesions or rashes  NEURO: Normal strength and tone, mentation intact and speech normal  PSYCH: mentation appears normal, affect normal/bright    ASSESSMENT / PLAN:   (Z00.00) Wellness examination  (primary encounter diagnosis)    (E66.01) Morbidly obese (H)  Plan: Counseled to " make better food choices, exercise as tolerated, and lose weight. Offered referral.     (I10) Hypertension goal BP (blood pressure) < 150/90  Comment: Well controlled with medications with possible urinary side effects   Plan: UA reflex to Microscopic and Culture,         lisinopril (PRINIVIL/ZESTRIL) 20 MG tablet,         Albumin Random Urine Quantitative with Creat         Ratio, Comprehensive metabolic panel        Change prinzide to lisinopril. Follow up ancillary blood pressure recheck in one month or sooner for worsening of symptoms or side effects.      (E78.5) Hyperlipidemia with target LDL less than 130  Comment: Well controlled with medications without side effects.    Plan: atorvastatin (LIPITOR) 40 MG tablet, Lipid         panel reflex to direct LDL Fasting,         Comprehensive metabolic panel          (E03.9) Acquired hypothyroidism  Comment: euthyroid on replacement   Plan: levothyroxine (SYNTHROID/LEVOTHROID) 137 MCG         tablet, TSH with free T4 reflex          (R73.02) Impaired glucose tolerance  Plan: Hemoglobin A1c, UA reflex to Microscopic and         Culture, Comprehensive metabolic panel                End of Life Planning:  Patient currently has an advanced directive: Yes.  Practitioner is supportive of decision.    COUNSELING:  Reviewed preventive health counseling, as reflected in patient instructions  Special attention given to:       Regular exercise       Healthy diet/nutrition       Osteoporosis Prevention/Bone Health       Colon cancer screening       Hepatitis C screening       The 10-year ASCVD risk score (Jeancarlos NIKITA Jr, et al., 2013) is: 10.5%    Values used to calculate the score:      Age: 72 years      Sex: Female      Is Non- : No      Diabetic: No      Tobacco smoker: No      Systolic Blood Pressure: 116 mmHg      Is BP treated: Yes      HDL Cholesterol: 39 mg/dL      Total Cholesterol: 161 mg/dL       Advanced Planning         Estimated body mass index  "is 46.35 kg/(m^2) as calculated from the following:    Height as of this encounter: 5' 4\" (1.626 m).    Weight as of this encounter: 270 lb (122.5 kg).  Weight management plan: Discussed healthy diet and exercise guidelines and patient will follow up in 12 months in clinic to re-evaluate.   reports that she quit smoking about 6 years ago. Her smoking use included Cigarettes. She started smoking about 57 years ago. She has a 24.50 pack-year smoking history. She quit smokeless tobacco use about 6 years ago.        Appropriate preventive services were discussed with this patient, including applicable screening as appropriate for cardiovascular disease, diabetes, osteopenia/osteoporosis, and glaucoma.  As appropriate for age/gender, discussed screening for colorectal cancer, prostate cancer, breast cancer, and cervical cancer. Checklist reviewing preventive services available has been given to the patient.    Reviewed patients plan of care and provided an AVS. The Basic Care Plan (routine screening as documented in Health Maintenance) for Gina meets the Care Plan requirement. This Care Plan has been established and reviewed with the Patient.    Counseling Resources:  ATP IV Guidelines  Pooled Cohorts Equation Calculator  Breast Cancer Risk Calculator  FRAX Risk Assessment  ICSI Preventive Guidelines  Dietary Guidelines for Americans, 2010  [employer]'s MyPlate  ASA Prophylaxis  Lung CA Screening    Verena Vee MD  [facility] for HPI/ROS submitted by the patient on 10/24/2017   PHQ-2 Score: 0    "

## 2017-10-25 NOTE — NURSING NOTE
"Chief Complaint   Patient presents with     Wellness Visit     Flu Shot       Initial /72  Pulse 89  Temp 97.7  F (36.5  C)  Resp 16  Ht 5' 4\" (1.626 m)  Wt 270 lb (122.5 kg)  SpO2 98%  BMI 46.35 kg/m2 Estimated body mass index is 46.35 kg/(m^2) as calculated from the following:    Height as of this encounter: 5' 4\" (1.626 m).    Weight as of this encounter: 270 lb (122.5 kg).  Medication Reconciliation: complete     Norman Acevedo. MA      "

## 2017-10-26 PROBLEM — R31.29 MICROSCOPIC HEMATURIA: Status: ACTIVE | Noted: 2017-10-26

## 2017-10-26 LAB
BACTERIA SPEC CULT: NORMAL
SPECIMEN SOURCE: NORMAL

## 2017-10-26 NOTE — PROGRESS NOTES
Please call patient:  Your thyroid level is fine. Your cholesterol is controlled. Your liver test is normal. Your kidney test is okay, but let's follow this yearly. Continue to avoid medications like ibuprofen, aleve, Excedrin, or higher doses than 81 mg of aspirin. You do not have a bladder infection, but do have some cells in your urine. Return for lab-only recheck of your test at your convenience. Let's consider further evaluation.  Verena Vee MD

## 2017-10-27 ENCOUNTER — DOCUMENTATION ONLY (OUTPATIENT)
Dept: SLEEP MEDICINE | Facility: CLINIC | Age: 72
End: 2017-10-27

## 2017-10-27 NOTE — PROGRESS NOTES
14 DAY STM VISIT    Subjective measures:   Pt states things are going well and has no issues or complaints.  Pt is benefiting from therapy.    Assessment: Pt meeting objective benchmarks.  Patient meeting subjective benchmarks.   Action plan: pt to have 30 day STM visit.    Device type: Auto-CPAP  PAP settings: CPAP min 7 cm  H20     CPAP max 12 cm  H20    95th% pressure 11.9 cm  H20   Objective measures: 14 day rolling measures      Compliance  100 %      Leak  12.69 lpm  last  upload      AHI 0.58   last  upload      Average number of minutes 435    Diagnostic AHI: 78.7     Objective measure goal  Compliance   Goal >70%  Leak   Goal < 24 lpm  AHI  Goal < 5  Usage  Goal >240

## 2017-11-02 DIAGNOSIS — R31.29 MICROSCOPIC HEMATURIA: ICD-10-CM

## 2017-11-02 LAB
ALBUMIN UR-MCNC: NEGATIVE MG/DL
APPEARANCE UR: CLEAR
BILIRUB UR QL STRIP: NEGATIVE
COLOR UR AUTO: YELLOW
GLUCOSE UR STRIP-MCNC: NEGATIVE MG/DL
HGB UR QL STRIP: ABNORMAL
HYALINE CASTS #/AREA URNS LPF: ABNORMAL /LPF
KETONES UR STRIP-MCNC: NEGATIVE MG/DL
LEUKOCYTE ESTERASE UR QL STRIP: NEGATIVE
NITRATE UR QL: NEGATIVE
NON-SQ EPI CELLS #/AREA URNS LPF: ABNORMAL /LPF
PH UR STRIP: 6 PH (ref 5–7)
RBC #/AREA URNS AUTO: ABNORMAL /HPF
SOURCE: ABNORMAL
SP GR UR STRIP: 1.02 (ref 1–1.03)
UROBILINOGEN UR STRIP-ACNC: 0.2 EU/DL (ref 0.2–1)
WBC #/AREA URNS AUTO: ABNORMAL /HPF

## 2017-11-02 PROCEDURE — 81001 URINALYSIS AUTO W/SCOPE: CPT | Performed by: FAMILY MEDICINE

## 2017-11-02 NOTE — PROGRESS NOTES
Please call patient:  You continue to have a few blood cells in your urine. I recommend a CT scan and then follow-up visit with our urologist to rule out a chance of a stone or tumor. Call the imaging center at [phone] or  [phone] to schedule your CT. Call our appointment line at [phone] or go to [email or web address] to see our urologist.  Verena Vee MD

## 2017-11-06 ENCOUNTER — RADIANT APPOINTMENT (OUTPATIENT)
Dept: CT IMAGING | Facility: CLINIC | Age: 72
End: 2017-11-06
Attending: FAMILY MEDICINE
Payer: MEDICARE

## 2017-11-06 DIAGNOSIS — R31.29 MICROSCOPIC HEMATURIA: ICD-10-CM

## 2017-11-06 DIAGNOSIS — N83.8 MASS OF RIGHT OVARY: Primary | ICD-10-CM

## 2017-11-06 PROCEDURE — 74178 CT ABD&PLV WO CNTR FLWD CNTR: CPT | Mod: TC

## 2017-11-06 RX ORDER — IOPAMIDOL 755 MG/ML
95 INJECTION, SOLUTION INTRAVASCULAR ONCE
Status: COMPLETED | OUTPATIENT
Start: 2017-11-06 | End: 2017-11-06

## 2017-11-06 RX ADMIN — IOPAMIDOL 95 ML: 755 INJECTION, SOLUTION INTRAVASCULAR at 07:52

## 2017-11-06 NOTE — PROGRESS NOTES
Please call patient:  No cause for blood in your urine was seen. I recommend follow-up with our urologist.   You have a cyst on your right ovary which requires follow-up. Call the imaging center at [phone] or  [phone] to schedule an ultrasound soon.  Verena Vee MD

## 2017-11-07 NOTE — PROGRESS NOTES
Please call patient:  Granulomas are like scars from prior infection and no treatment is necessary.   Verena Vee MD

## 2017-11-09 ENCOUNTER — RADIANT APPOINTMENT (OUTPATIENT)
Dept: ULTRASOUND IMAGING | Facility: CLINIC | Age: 72
End: 2017-11-09
Attending: FAMILY MEDICINE
Payer: MEDICARE

## 2017-11-09 DIAGNOSIS — R93.89 ENDOMETRIAL STRIPE INCREASED: Primary | ICD-10-CM

## 2017-11-09 DIAGNOSIS — N83.8 MASS OF RIGHT OVARY: ICD-10-CM

## 2017-11-09 PROCEDURE — 76856 US EXAM PELVIC COMPLETE: CPT

## 2017-11-09 PROCEDURE — 76830 TRANSVAGINAL US NON-OB: CPT

## 2017-11-09 NOTE — PROGRESS NOTES
Please call patient:  You have benign ovary cysts. The lining of your uterus is borderline thickened, so I'd like you to review these results with our gynecologist Dr. Davis. Call our appointment line at [phone] or go to [email or web address].     Verena Vee MD

## 2017-11-13 ENCOUNTER — DOCUMENTATION ONLY (OUTPATIENT)
Dept: SLEEP MEDICINE | Facility: CLINIC | Age: 72
End: 2017-11-13

## 2017-11-13 NOTE — PROGRESS NOTES
30 DAY STM VISIT    Subjective measures:   Pt states things are going well and has no issues or complaints.  Pt is benefiting from therapy.    Assessment: Pt meeting objective benchmarks.  Patient meeting subjective benchmarks.   Action plan: pt to have 6 month STM visit  Patient has a follow up visit with Dr. Salazar on 11/29/17.   Device type: Auto-CPAP  PAP settings: CPAP min 7 cm  H20     CPAP max 12 cm  H20    95th% pressure 11.9 cm  H20   Objective measures: 14 day rolling measures      Compliance  85 %      Leak  17.36 lpm  last  upload      AHI 0.37   last  upload      Average number of minutes 371    Diagnostic AHI: 78.7         Objective measure goal  Compliance   Goal >70%  Leak   Goal < 24 lpm  AHI  Goal < 5  Usage  Goal >240

## 2017-11-29 ENCOUNTER — OFFICE VISIT (OUTPATIENT)
Dept: OBGYN | Facility: CLINIC | Age: 72
End: 2017-11-29
Payer: MEDICARE

## 2017-11-29 ENCOUNTER — OFFICE VISIT (OUTPATIENT)
Dept: SLEEP MEDICINE | Facility: CLINIC | Age: 72
End: 2017-11-29
Payer: MEDICARE

## 2017-11-29 VITALS
OXYGEN SATURATION: 96 % | HEART RATE: 85 BPM | HEIGHT: 64 IN | BODY MASS INDEX: 46.95 KG/M2 | SYSTOLIC BLOOD PRESSURE: 123 MMHG | WEIGHT: 275 LBS | DIASTOLIC BLOOD PRESSURE: 73 MMHG

## 2017-11-29 VITALS
SYSTOLIC BLOOD PRESSURE: 136 MMHG | WEIGHT: 275 LBS | DIASTOLIC BLOOD PRESSURE: 82 MMHG | OXYGEN SATURATION: 96 % | HEART RATE: 79 BPM | BODY MASS INDEX: 47.2 KG/M2

## 2017-11-29 DIAGNOSIS — N88.2 STENOTIC CERVICAL OS: ICD-10-CM

## 2017-11-29 DIAGNOSIS — R93.89 ENDOMETRIAL THICKENING ON ULTRA SOUND: Primary | ICD-10-CM

## 2017-11-29 DIAGNOSIS — N83.209 CYST OF OVARY, UNSPECIFIED LATERALITY: ICD-10-CM

## 2017-11-29 DIAGNOSIS — G25.81 RESTLESS LEGS SYNDROME (RLS): ICD-10-CM

## 2017-11-29 DIAGNOSIS — R31.29 OTHER MICROSCOPIC HEMATURIA: ICD-10-CM

## 2017-11-29 DIAGNOSIS — G47.33 OSA (OBSTRUCTIVE SLEEP APNEA): ICD-10-CM

## 2017-11-29 DIAGNOSIS — E61.1 IRON DEFICIENCY: ICD-10-CM

## 2017-11-29 DIAGNOSIS — E66.01 MORBIDLY OBESE (H): ICD-10-CM

## 2017-11-29 PROCEDURE — 99204 OFFICE O/P NEW MOD 45 MIN: CPT | Mod: 25 | Performed by: OBSTETRICS & GYNECOLOGY

## 2017-11-29 PROCEDURE — 58100 BIOPSY OF UTERUS LINING: CPT | Mod: 52 | Performed by: OBSTETRICS & GYNECOLOGY

## 2017-11-29 PROCEDURE — 99214 OFFICE O/P EST MOD 30 MIN: CPT | Performed by: INTERNAL MEDICINE

## 2017-11-29 RX ORDER — FERROUS SULFATE 325(65) MG
TABLET ORAL
COMMUNITY
End: 2018-02-28

## 2017-11-29 NOTE — NURSING NOTE
"Chief Complaint   Patient presents with     CPAP Follow Up       Initial There were no vitals taken for this visit. Estimated body mass index is 47.2 kg/(m^2) as calculated from the following:    Height as of 10/25/17: 1.626 m (5' 4\").    Weight as of an earlier encounter on 11/29/17: 124.7 kg (275 lb).  Medication Reconciliation: complete    "

## 2017-11-29 NOTE — PATIENT INSTRUCTIONS

## 2017-11-29 NOTE — NURSING NOTE
"Chief Complaint   Patient presents with     Consult     Had US thickened Endometrium per Dr. Vee       Initial /82 (BP Location: Right arm, Cuff Size: Adult Large)  Pulse 79  Wt 275 lb (124.7 kg)  SpO2 96%  BMI 47.2 kg/m2 Estimated body mass index is 47.2 kg/(m^2) as calculated from the following:    Height as of 10/25/17: 5' 4\" (1.626 m).    Weight as of this encounter: 275 lb (124.7 kg).  Medication Reconciliation: yousif Cabrales MA 11/29/2017         "

## 2017-11-29 NOTE — PROGRESS NOTES
Obstructive Sleep Apnea- PAP Follow-Up Visit:    Chief Complaint   Patient presents with     CPAP Follow Up       Gina Yeh comes in today for follow-up of their very severe obstructive sleep apnea, managed with CPAP.     No specialty comments available.    Overall, she rates the experience with PAP as 10 (0 poor, 10 great). The mask is comfortable.    The mask is not leaking .  She is not snoring with the mask on. She is not having gasp arousals.  She is not having significant oral/nasal dryness. The pressure is comfortable.     Her PAP interface is Nasal Pillows.    Bedtime is typically 2200. Usually it takes about 20 min minutes to fall asleep with the mask on. Wake time is typically 0600.  Patient is using PAP therapy 6 hours per night. The patient is usually getting 6 hours of sleep per night.    She does feel rested in the morning.    Total score - Almo: 10 (11/29/2017 10:00 AM)    ResMed   Auto-PAP 7.0 - 12.0 cmH2O 30 day usage data:    86% of days with > 4 hours of use. 0/30 days with no use. Average use 380 minutes per day.   95%ile Leak 17.2 L/min.   CPAP 95% pressure 11.7 cm.   AHI 0.39 events per hour.     Past medical/surgical history, family history, social history, medications and allergies were reviewed.      Problem List:  Patient Active Problem List    Diagnosis Date Noted     Hypertension goal BP (blood pressure) < 150/90      Priority: High     Hyperlipidemia with target LDL less than 130      Priority: High     Endometrial stripe increased 11/09/2017     Priority: Medium     Mass of right ovary 11/06/2017     Priority: Medium     Microscopic hematuria 10/26/2017     Priority: Medium     Restless legs syndrome (RLS) 08/30/2017     Priority: Medium     Iron deficiency 08/30/2017     Priority: Medium     Noted Aug 2017.    Rec start on daily ferrous sulfate and recent in Nov/Dec.       Sensorineural hearing loss      Priority: Medium     JOSE (obstructive sleep apnea) 07/24/2015      "Priority: Medium     Diagnostic Study 9/12/2017 - (271.0 lbs) AHI 78.7, RDI 91.3, Supine AHI 73.5, REM AHI -, Low O2 79.5%, Time Spent ?88% 22.9 minutes.  Titration Study: 9/26/2017- (271.0 lbs) The patient was titrated at pressures ranging from 5 cmH2O up to 8 cmH2O.  The optimal pressure achieved was 8 cmH2O with a residual AHI of 1.1 events per hour.  Time in REM supine on final pressure was 22.5 minutes.  PLM index was 25.6 movements per hour.  The PLM Arousal Index was 8.0 per hour.       MMT (medial meniscus tear) 10/13/2014     Priority: Medium     Morbidly obese (H) 05/29/2012     Priority: Medium     Bariatric surgery consult offered       Impaired glucose tolerance 05/29/2012     Priority: Medium     History of cervical dysplasia 05/28/2012     Priority: Medium     Advanced directives, counseling/discussion 06/02/2011     Priority: Medium     Advance Care Planning 1/7/2016: Receipt of ACP document:  Received: Health Care Directive which was witnessed or notarized on 11/25/15.  Document not previously scanned.  Validation form completed and sent with document to be scanned.  Code Status reflects choices in most recent ACP document.  Confirmed/documented designated decision maker(s).  Added by Melina Griffin    Health Care Directive on file 11/25/15       Hypothyroid      Priority: Medium        /73  Pulse 85  Ht 1.626 m (5' 4\")  Wt 124.7 kg (275 lb)  SpO2 96%  BMI 47.2 kg/m2    Impression/Plan:  1. Iron deficiency  Still taking iron and tolerating well in face of regular blood donation.      2. JOSE (obstructive sleep apnea)  Very Severe Obstructive Sleep apnea. Tolerating PAP well. Daytime symptoms are improved.    3. Morbidly obese (H)  We discussed the link between obesity, sleep apnea, and health outcomes.  Patient was encouraged to decrease caloric intake and increase activity levels to try to move towards a normal weight.  She was encouraged to discuss further strategies with her primary " care provider.       4. Dyspnea on exertion  5. Lower Extremity Edema, Bilateral  Discussed talking with Dr. Vee to repeat spirometry  Recommended compression hose during the day.  Defer cardiac evaluation to PCP.    Gina Yeh will follow up in about 1 year(s).     Twenty-five minutes spent with patient, all of which were spent face-to-face counseling, consulting, coordinating plan of care.      CC:  Verena Vee,

## 2017-11-29 NOTE — PROGRESS NOTES
Gina is a 72 year old  female .  I have been asked to see patient in consultation by Dr. Vee regarding endometrial thickening on ultrasound. She has not had this previously, and she does not know of any aggravating or alleviating factors.     She was seen for her annual exam 2017.  She had hematuria (otherwise, no bleeding) and the she had a CT scan.      CT ABDOMEN/PELVIS HEMATURIA WITH AND WITHOUT IV CONTRAST 2017 8:09 AM   HISTORY: Microhematuria.  TECHNIQUE: CT urogram protocol was performed. Volumetric helical  sections were acquired from the lung bases through the ischial  tuberosities prior to administration of IV contrast. 95 mL Isovue-370  IV were administered using a split bolus technique. After contrast  administration, volumetric helical sections were again acquired from  the lung bases to the ischial tuberosities.  Coronal images were also  reconstructed. Radiation dose for this scan was reduced using  automated exposure control, adjustment of the mA and/or kV according  to patient size, or iterative reconstruction technique.  COMPARISON: None.  FINDINGS:   Right urinary tract: No urinary calculi. No hydronephrosis. No solid  renal masses. The collecting system and proximal ureter are moderately  well opacified, and no filling defects are identified.   Left urinary tract: No urinary calculi. No hydronephrosis. No solid  renal masses. The collecting system and proximal ureter are moderately  well opacified, and no filling defects are identified.    Urinary bladder: No bladder stones or masses are identified.  Other findings: Small calcified granuloma in the right lower lobe of  the lung. Scattered small calcified granulomas in the spleen. The  liver, gallbladder, spleen, adrenal glands, and pancreas are otherwise  unremarkable. No bowel obstruction. Scattered sigmoid diverticulosis,  without convincing evidence for diverticulitis. The appendix is well  seen, and is  unremarkable. No free fluid in the pelvis. A right  ovarian cystic lesion measures 4.2 cm, and is indeterminate. There is  mild atherosclerotic aortoiliac calcification. Mild degenerative  changes in the lumbar spine.  IMPRESSION:   1. No urinary calculi or urinary tract masses are identified. No  definite cause for microhematuria.  2. There is a 4.2 cm indeterminate right ovarian cystic lesion.  Ovarian neoplasm cannot be excluded from this appearance. Pelvic  ultrasound is recommended for further characterization.   3. Scattered sigmoid diverticulosis, without evidence for  diverticulitis.     ANNABELLE SUÁREZ MD    She then had the ultrasound and we reviewed the report.  We attempted to view the ultrasound films.  We reviewed the adnexal cyst and the endometrial stripe.       ULTRASOUND PELVIC COMPLETE WITH TRANSVAGINAL IMAGING  11/9/2017 11:16 AM   HISTORY:  Mass of right ovary.  COMPARISON: CT abdomen and pelvis 11/6/2017.  FINDINGS:  Transvaginal images were performed to better evaluate the  patient's uterus, ovaries and endometrial stripe.  The uterus is normal in size measuring 6.9 x 4.0 x 2.7 cm. No fibroids  are evident. Endometrial stripe measures 5 mm and is mildly thickened  for patient's age. The right ovary measures 5.1 x 3.9 x 3.0 cm and  contains a mildly complex cyst measuring 2.5 x 3.1 x 2.3 cm. An  adjacent smaller simple-appearing cyst measures 2.5 x 2.3 x 1.8 cm.  The left ovary is not visualized.  No adnexal masses are present. No  free pelvic fluid is present.  IMPRESSION:   1. Two right ovarian cysts. One is mildly complex and the smaller  adjacent cyst is simple in appearance. These may represent follicles.  The larger lesion measures smaller than on the prior CT. These are  likely physiologic in nature. No further followup required unless  clinically warranted.  2. Left ovary not visualized.  3. Borderline endometrial thickening at 5 mm.     SALUD POTTS MD        Past Medical History:    Diagnosis Date     Arthritis      Cervical dysplasia      Hyperlipidemia LDL goal < 130      Hypertension goal BP (blood pressure) < 140/90      Hypothyroid      Impaired glucose tolerance 2012     Microscopic hematuria      MMT (medial meniscus tear) 2006    LT     Moderate recurrent major depression (H) 2014     Morbid obesity (H)      JOSE (obstructive sleep apnea) 2015    Doesn't use cpap     Sensorineural hearing loss      Vitamin D insufficiency        Past Surgical History:   Procedure Laterality Date     BIOPSY       CERVIX SURGERY      cervical cone     COLONOSCOPY       TONSILLECTOMY & ADENOIDECTOMY         Obstetric History       T0      L2     SAB0   TAB0   Ectopic0   Multiple0   Live Births0       # Outcome Date GA Lbr Tuan/2nd Weight Sex Delivery Anes PTL Lv   2 Para            1 Para                   Gynecological History         No LMP recorded. Patient is postmenopausal.     no STD/no PID/no IUD      see above HPI        Allergies   Allergen Reactions     Nkda [No Known Drug Allergies]        Current Outpatient Prescriptions   Medication Sig Dispense Refill     ferrous sulfate (IRON) 325 (65 FE) MG tablet Take by mouth daily (with breakfast)       atorvastatin (LIPITOR) 40 MG tablet TAKE 1 TABLET EVERY DAY 90 tablet 3     levothyroxine (SYNTHROID/LEVOTHROID) 137 MCG tablet TAKE 1 TABLET EVERY DAY 90 tablet 3     lisinopril (PRINIVIL/ZESTRIL) 20 MG tablet Take 1 tablet (20 mg) by mouth daily 90 tablet 3     OMEGA-3 KRILL OIL PO        order for DME autoCPAP 7-12 cm 1 Units 0     VITAMIN D, ERGOCALCIFEROL, PO Take 3,000 Units by mouth daily       Multiple Vitamins-Minerals (MULTI-VITAMIN GUMMIES PO)        ASPIRIN CAPS 81 MG OR 1 CAPSULE DAILY       Calcium-Phosphorus-Vitamin D (CALCIUM GUMMIES PO)          Social History     Social History     Marital status:      Spouse name: Umberto     Number of children: 2     Years of education: 13      Occupational History     book keeper  Retired     Social History Main Topics     Smoking status: Former Smoker     Packs/day: 0.50     Years: 49.00     Types: Cigarettes     Start date: 5/1/1960     Quit date: 5/1/2011     Smokeless tobacco: Former User     Quit date: 4/25/2011     Alcohol use No     Drug use: No     Sexual activity: Not Currently     Partners: Male     Birth control/ protection: Post-menopausal     Other Topics Concern     Parent/Sibling W/ Cabg, Mi Or Angioplasty Before 65f 55m? No     Social History Narrative       Family History   Problem Relation Age of Onset     Allergies Mother      Cardiovascular Mother      Respiratory Mother      Thyroid Disease Mother      Other Cancer Mother      skin     Arthritis Father      Hypertension Father      Hyperlipidemia Father      CEREBROVASCULAR DISEASE Father      CANCER Maternal Grandmother      bone     Other Cancer Maternal Grandmother      bone     CEREBROVASCULAR DISEASE Paternal Grandmother      CANCER Paternal Grandfather      CEREBROVASCULAR DISEASE Paternal Grandfather      CANCER Brother      testicular      GASTROINTESTINAL DISEASE Brother      Other Cancer Brother      testicular     Allergies Sister      Allergies Daughter      GASTROINTESTINAL DISEASE Daughter      Depression Daughter      Anesthesia Reaction Daughter      Genitourinary Problems Brother      Allergies Daughter 10     metal     DIABETES No family hx of        Review of Systems:  10 point ROS of systems including Constitutional, Eyes, Respiratory, Cardiovascular, Gastroenterology, Genitourinary, Integumentary, Muscularskeletal, Psychiatric were all negative except for pertinent positives noted in my HPI and in the PMH.        EXAM:  /82 (BP Location: Right arm, Cuff Size: Adult Large)  Pulse 79  Wt 275 lb (124.7 kg)  SpO2 96%  BMI 47.2 kg/m2  Body mass index is 47.2 kg/(m^2).  General:  WNWD female, NAD  Alert  Oriented x 3  Gait:  Normal  Skin:  Normal skin  turgor  HEENT:  NC/AT, EOMI  Neck:  No masses noted, symmetrical  Lungs:  Good respiratory effort   Abdomen:  Non-tender, non-distended.  Vulva: No external lesions, normal hair distribution, no adenopathy  BUS:  Normal, no masses noted  Urethra:  No hypermobility noted   Urethral meatus:  No masses or lesions seen.  Vagina: Moist, pink, no abnormal discharge, well rugated, no lesions  Cervix: Smooth, pink, no visible lesions.  Os stenotic.   Uterus: Normal size, anteverted, non-tender, mobile  Ovaries: No mass, non-tender, mobile  Perianal: no masses or lesions seen.  Extremities:  No clubbing, cyanosis or edema.       ASSESSMENT/PLAN  Endometrial, thickening   Ovarian cyst.    Cervical stenosis.   Hematuria  She has risk factors for endometrial abnormalities.  These include the lipid profile abnormalies, her BMI, and thyroid abnormalities.    She is interested in the EMB  We discussed the other options for the management and the ednometrial thckening.  EMB, SIS and the D&C and the associated, and the goals and limitations.    She also voices understanding that the EMB may be considered a step process for evalation and she might need to consider the SIS or the D&C. The goals and limitations reviewed.     She agrees with the plan and the associate risks and benefits.    The associoated risks and goals reviewed.  The EMB was attempted, but the pipelle was not able to be passed through the cervical os.   She agrees with the SIS  Follow up after the ultrasound performed.   Questions seemed to be answered.  TT 30 min  CT and review of the records:  Greater than 70%  Juan Alberto Davis MD        PROCEDURE NOTE:  The procedure was reviewed with patient.  After consenting to the procedure she was placed into the dorsal lithotomy position.  The examination was performed.  The speculum was placed into the vagina.  The cervix was prepped with Betadine.  The anterior lip of the cervix was grasped with the Allis tenaculum.  I was  not able to prenetrate the cervical os.  The procedure was terminated.  trhe risks and benefits and goals and limitations are reviewed.    The next option available would be the SIS.  The D&C  Reviewed.  She has decided to stay with radiology radiology will be scheduled. D&C might be needed.   After the probe was performed and she will shcedule.    Ectopic pregnancy symtpoms are possible.   Follow up after the SIS.  The goals and limitations are reviewed.    quesitons seem to be answered.    The D&C might be indicated   Chart to abstraction.   Juan Alberto Davis MD

## 2017-11-29 NOTE — MR AVS SNAPSHOT
After Visit Summary   11/29/2017    Gina Yeh    MRN: [id number]           Patient Information     Date Of Birth          1945        Visit Information        Provider Department      11/29/2017 10:30 AM Félix Salazar MD [facility]        Today's Diagnoses     Iron deficiency        Restless legs syndrome (RLS)        JOSE (obstructive sleep apnea)        Morbidly obese (H)          Care Instructions      Your BMI is Body mass index is 47.2 kg/(m^2).  Weight management is a personal decision.  If you are interested in exploring weight loss strategies, the following discussion covers the approaches that may be successful. Body mass index (BMI) is one way to tell whether you are at a healthy weight, overweight, or obese. It measures your weight in relation to your height.  A BMI of 18.5 to 24.9 is in the healthy range. A person with a BMI of 25 to 29.9 is considered overweight, and someone with a BMI of 30 or greater is considered obese. More than two-thirds of American adults are considered overweight or obese.  Being overweight or obese increases the risk for further weight gain. Excess weight may lead to heart disease and diabetes.  Creating and following plans for healthy eating and physical activity may help you improve your health.  Weight control is part of healthy lifestyle and includes exercise, emotional health, and healthy eating habits. Careful eating habits lifelong are the mainstay of weight control. Though there are significant health benefits from weight loss, long-term weight loss with diet alone may be very difficult to achieve- studies show long-term success with dietary management in less than 10% of people. Attaining a healthy weight may be especially difficult to achieve in those with severe obesity. In some cases, medications, devices and surgical management might be considered.  What can you do?  If you are overweight or obese and are  interested in methods for weight loss, you should discuss this with your provider.     Consider reducing daily calorie intake by 500 calories.     Keep a food journal.     Avoiding skipping meals, consider cutting portions instead.    Diet combined with exercise helps maintain muscle while optimizing fat loss. Strength training is particularly important for building and maintaining muscle mass. Exercise helps reduce stress, increase energy, and improves fitness. Increasing exercise without diet control, however, may not burn enough calories to loose weight.       Start walking three days a week 10-20 minutes at a time    Work towards walking thirty minutes five days a week     Eventually, increase the speed of your walking for 1-2 minutes at time    In addition, we recommend that you review healthy lifestyles and methods for weight loss available through the National Institutes of Health patient information sites:  http://win.niddk.nih.gov/publications/index.htm    And look into health and wellness programs that may be available through your health insurance provider, employer, local community center, or bebeto club.    Weight management plan: Patient was referred to their PCP to discuss a diet and exercise plan.              Follow-ups after your visit        Follow-up notes from your care team     Return in 1 year (on 11/29/2018) for PAP follow up.      Your next 10 appointments already scheduled     Dec 06, 2017  7:30 AM CST   New Visit with Karthik Benitez MD   [facility] ([address] [id number] [phone]            Dec 13, 2017 10:30 AM CST   US HYSTEROSONOGRAPHY with MGUS1, MG US TECH, MG IMAGING NURSE, MG NM RAD   [facility] ([facility])    [id number] [place] [id number] [phone]           You cannot have the exam while on your period. Please schedule this test for a day that you  won t be menstruating.  Do not have sex (intercourse) from the start of your period until you come in for the exam. If you have had sex, we may need to reschedule the exam.  An hour before you arrive, you may take 800 mg of ibuprofen (Advil or Motrin). This will reduce any cramping during the exam.  Bring a list of your medicines, including vitamins, minerals and over-the-counter drugs. Tell your doctor if there s any chance you are pregnant.              Future tests that were ordered for you today     Open Future Orders        Priority Expected Expires Ordered    US Hysterosonography Routine  11/29/2018 11/29/2017            Who to contact     If you have questions or need follow up information about today's clinic visit or your schedule please contact [facility] directly at [phone].  Normal or non-critical lab and imaging results will be communicated to you by [employer], letter or phone within 4 business days after the clinic has received the results. If you do not hear from us within 7 days, please contact the clinic through [employer] or phone. If you have a critical or abnormal lab result, we will notify you by phone as soon as possible.  Submit refill requests through [employer] or call your pharmacy and they will forward the refill request to us. Please allow 3 business days for your refill to be completed.          Additional Information About Your Visit        [employer] Information     [employer] gives you secure access to your electronic health record. If you see a primary care provider, you can also send messages to your care team and make appointments. If you have questions, please call your primary care clinic.  If you do not have a primary care provider, please call [phone] and they will assist you.        Care EveryWhere ID     This is your Care EveryWhere ID. This could be used by other organizations to access your [place] medical records  [id number]        Your Vitals Were     Pulse  "Height Pulse Oximetry BMI (Body Mass Index)          85 1.626 m (5' 4\") 96% 47.2 kg/m2         Blood Pressure from Last 3 Encounters:   11/29/17 123/73   11/29/17 136/82   10/25/17 116/72    Weight from Last 3 Encounters:   11/29/17 124.7 kg (275 lb)   11/29/17 124.7 kg (275 lb)   10/25/17 122.5 kg (270 lb)              Today, you had the following     No orders found for display       Primary Care Provider Office Phone # Fax #    Verena Vee [phone] [address] [facility] [address]        Equal Access to Services     CHELSI STEPHENS : Hadii celia hickeyo Solayla, waaxda luqadaha, qaybta kaalmada adeegyada, roverto munoz . So [facility] [phone].    ATENCIÓN: Si habla español, tiene a alamo disposición servicios gratuitos de asistencia lingüística. [facility] [phone].    We comply with applicable federal civil rights laws and [place] laws. We do not discriminate on the basis of race, color, national origin, age, disability, sex, sexual orientation, or gender identity.            Thank you!     Thank you for choosing [facility]  for your care. Our goal is always to provide you with excellent care. Hearing back from our patients is one way we can continue to improve our services. Please take a few minutes to complete the written survey that you may receive in the mail after your visit with us. Thank you!             Your Updated Medication List - Protect others around you: Learn how to safely use, store and throw away your medicines at www.disposemymeds.org.          This list is accurate as of: 11/29/17 11:07 AM.  Always use your most recent med list.                   Brand Name Dispense Instructions for use Diagnosis    ASPIRIN CAPS 81 MG OR      1 CAPSULE DAILY        atorvastatin 40 MG tablet    LIPITOR    90 tablet    TAKE 1 TABLET EVERY DAY    Hyperlipidemia with target LDL less than 130       CALCIUM GUMMIES PO           ferrous " sulfate 325 (65 FE) MG tablet    IRON     Take by mouth daily (with breakfast)        levothyroxine 137 MCG tablet    SYNTHROID/LEVOTHROID    90 tablet    TAKE 1 TABLET EVERY DAY    Acquired hypothyroidism       lisinopril 20 MG tablet    PRINIVIL/ZESTRIL    90 tablet    Take 1 tablet (20 mg) by mouth daily    Hypertension goal BP (blood pressure) < 150/90       MULTI-VITAMIN GUMMIES PO           OMEGA-3 KRILL OIL PO           order for DME     1 Units    autoCPAP 7-12 cm    JOSE (obstructive sleep apnea)       VITAMIN D (ERGOCALCIFEROL) PO      Take 3,000 Units by mouth daily

## 2017-11-29 NOTE — LETTER
11/29/2017         RE: Gina Yeh  [address]   [place] [address]        Dear Colleague,    Thank you for referring your patient, Gina Yeh, to the [facility]. Please see a copy of my visit note below.    Obstructive Sleep Apnea- PAP Follow-Up Visit:    Chief Complaint   Patient presents with     CPAP Follow Up       Gina Yeh comes in today for follow-up of their very severe obstructive sleep apnea, managed with CPAP.     No specialty comments available.    Overall, she rates the experience with PAP as 10 (0 poor, 10 great). The mask is comfortable.    The mask is not leaking .  She is not snoring with the mask on. She is not having gasp arousals.  She is not having significant oral/nasal dryness. The pressure is comfortable.     Her PAP interface is Nasal Pillows.    Bedtime is typically 2200. Usually it takes about 20 min minutes to fall asleep with the mask on. Wake time is typically 0600.  Patient is using PAP therapy 6 hours per night. The patient is usually getting 6 hours of sleep per night.    She does feel rested in the morning.    Total score - [place]: 10 (11/29/2017 10:00 AM)    ResMed   Auto-PAP 7.0 - 12.0 cmH2O 30 day usage data:    86% of days with > 4 hours of use. 0/30 days with no use. Average use 380 minutes per day.   95%ile Leak 17.2 L/min.   CPAP 95% pressure 11.7 cm.   AHI 0.39 events per hour.     Past medical/surgical history, family history, social history, medications and allergies were reviewed.      Problem List:  Patient Active Problem List    Diagnosis Date Noted     Hypertension goal BP (blood pressure) < 150/90      Priority: High     Hyperlipidemia with target LDL less than 130      Priority: High     Endometrial stripe increased 11/09/2017     Priority: Medium     Mass of right ovary 11/06/2017     Priority: Medium     Microscopic hematuria 10/26/2017     Priority: Medium     Restless legs syndrome (RLS) 08/30/2017      "Priority: Medium     Iron deficiency 08/30/2017     Priority: Medium     Noted Aug 2017.    Rec start on daily ferrous sulfate and recent in Nov/Dec.       Sensorineural hearing loss      Priority: Medium     JOSE (obstructive sleep apnea) 07/24/2015     Priority: Medium     Diagnostic Study 9/12/2017 - (271.0 lbs) AHI 78.7, RDI 91.3, Supine AHI 73.5, REM AHI -, Low O2 79.5%, Time Spent ?88% 22.9 minutes.  Titration Study: 9/26/2017- (271.0 lbs) The patient was titrated at pressures ranging from 5 cmH2O up to 8 cmH2O.  The optimal pressure achieved was 8 cmH2O with a residual AHI of 1.1 events per hour.  Time in REM supine on final pressure was 22.5 minutes.  PLM index was 25.6 movements per hour.  The PLM Arousal Index was 8.0 per hour.       MMT (medial meniscus tear) 10/13/2014     Priority: Medium     Morbidly obese (H) 05/29/2012     Priority: Medium     Bariatric surgery consult offered       Impaired glucose tolerance 05/29/2012     Priority: Medium     History of cervical dysplasia 05/28/2012     Priority: Medium     Advanced directives, counseling/discussion 06/02/2011     Priority: Medium     Advance Care Planning 1/7/2016: Receipt of ACP document:  Received: Health Care Directive which was witnessed or notarized on 11/25/15.  Document not previously scanned.  Validation form completed and sent with document to be scanned.  Code Status reflects choices in most recent ACP document.  Confirmed/documented designated decision maker(s).  Added by Melina Griffin    Health Care Directive on file 11/25/15       Hypothyroid      Priority: Medium        /73  Pulse 85  Ht 1.626 m (5' 4\")  Wt 124.7 kg (275 lb)  SpO2 96%  BMI 47.2 kg/m2    Impression/Plan:  1. Iron deficiency  Still taking iron and tolerating well in face of regular blood donation.      2. JOSE (obstructive sleep apnea)  Very Severe Obstructive Sleep apnea. Tolerating PAP well. Daytime symptoms are improved.    3. Morbidly obese (H)  We " discussed the link between obesity, sleep apnea, and health outcomes.  Patient was encouraged to decrease caloric intake and increase activity levels to try to move towards a normal weight.  She was encouraged to discuss further strategies with her primary care provider.       4. Dyspnea on exertion  5. Lower Extremity Edema, Bilateral  Discussed talking with Dr. Vee to repeat spirometry  Recommended compression hose during the day.  Defer cardiac evaluation to PCP.    Gina ANGEL Yeh will follow up in about 1 year(s).     Twenty-five minutes spent with patient, all of which were spent face-to-face counseling, consulting, coordinating plan of care.      CC:  Verena Vee,       Again, thank you for allowing me to participate in the care of your patient.        Sincerely,        Fléix Salazar MD

## 2017-11-29 NOTE — MR AVS SNAPSHOT
After Visit Summary   11/29/2017    Gina Yeh    MRN: [id number]           Patient Information     Date Of Birth          1945        Visit Information        Provider Department      11/29/2017 8:30 AM Juan Alberto Davis MD [facility]        Today's Diagnoses     Endometrial thickening on ultra sound    -  1       Follow-ups after your visit        Your next 10 appointments already scheduled     Nov 29, 2017 10:30 AM CST   Return Sleep Patient with Félix Salazar MD   [facility] ([facility])    [address] [address]   [phone]            Dec 06, 2017  7:30 AM CST   New Visit with Karthik Benitez MD   [facility] ([facility])    [address] [place] [address]   [phone]            Dec 13, 2017 10:30 AM CST   US HYSTEROSONOGRAPHY with MGUS1, MG US TECH, MG IMAGING NURSE, MG NM RAD   [facility] ([facility])    [address] [place] [id number] [phone]           You cannot have the exam while on your period. Please schedule this test for a day that you won t be menstruating.  Do not have sex (intercourse) from the start of your period until you come in for the exam. If you have had sex, we may need to reschedule the exam.  An hour before you arrive, you may take 800 mg of ibuprofen (Advil or Motrin). This will reduce any cramping during the exam.  Bring a list of your medicines, including vitamins, minerals and over-the-counter drugs. Tell your doctor if there s any chance you are pregnant.              Future tests that were ordered for you today     Open Future Orders        Priority Expected Expires Ordered    US Hysterosonography Routine  11/29/2018 11/29/2017            Who to contact     If you have questions or need follow up information about today's clinic visit or  your schedule please contact [facility] directly at [phone].  Normal or non-critical lab and imaging results will be communicated to you by MyChart, letter or phone within 4 business days after the clinic has received the results. If you do not hear from us within 7 days, please contact the clinic through [employer] or phone. If you have a critical or abnormal lab result, we will notify you by phone as soon as possible.  Submit refill requests through [employer] or call your pharmacy and they will forward the refill request to us. Please allow 3 business days for your refill to be completed.          Additional Information About Your Visit        [employer] Information     [employer] gives you secure access to your electronic health record. If you see a primary care provider, you can also send messages to your care team and make appointments. If you have questions, please call your primary care clinic.  If you do not have a primary care provider, please call [phone] and they will assist you.        Care EveryWhere ID     This is your Care EveryWhere ID. This could be used by other organizations to access your [place] medical records  [id number]        Your Vitals Were     Pulse Pulse Oximetry BMI (Body Mass Index)             79 96% 47.2 kg/m2          Blood Pressure from Last 3 Encounters:   11/29/17 136/82   10/25/17 116/72   10/11/17 112/76    Weight from Last 3 Encounters:   11/29/17 275 lb (124.7 kg)   10/25/17 270 lb (122.5 kg)   10/11/17 271 lb (122.9 kg)               Primary Care Provider Office Phone # Fax #    Verena Vee [phone]       [address] [facility] [address] [facility] [address] [address]        Equal Access to Services     [facility]: Hadii aad ku hadasho Soomaali, waaxda luqadaha, qaybta kaalmada adeegyada, roverto velazquez. So [facility] [phone].    ATENCIÓN: Si habla español, tiene a alamo disposición servicios gratuitos de asistencia lingüística.  Eloisa hickman [phone].    We comply with applicable federal civil rights laws and [place] laws. We do not discriminate on the basis of race, color, national origin, age, disability, sex, sexual orientation, or gender identity.            Thank you!     Thank you for choosing [facility]  for your care. Our goal is always to provide you with excellent care. Hearing back from our patients is one way we can continue to improve our services. Please take a few minutes to complete the written survey that you may receive in the mail after your visit with us. Thank you!             Your Updated Medication List - Protect others around you: Learn how to safely use, store and throw away your medicines at www.disposemymeds.org.          This list is accurate as of: 11/29/17  9:50 AM.  Always use your most recent med list.                   Brand Name Dispense Instructions for use Diagnosis    ASPIRIN CAPS 81 MG OR      1 CAPSULE DAILY        atorvastatin 40 MG tablet    LIPITOR    90 tablet    TAKE 1 TABLET EVERY DAY    Hyperlipidemia with target LDL less than 130       CALCIUM GUMMIES PO           ferrous sulfate 325 (65 FE) MG tablet    IRON     Take by mouth daily (with breakfast)        levothyroxine 137 MCG tablet    SYNTHROID/LEVOTHROID    90 tablet    TAKE 1 TABLET EVERY DAY    Acquired hypothyroidism       lisinopril 20 MG tablet    PRINIVIL/ZESTRIL    90 tablet    Take 1 tablet (20 mg) by mouth daily    Hypertension goal BP (blood pressure) < 150/90       MULTI-VITAMIN GUMMIES PO           OMEGA-3 KRILL OIL PO           order for DME     1 Units    autoCPAP 7-12 cm    JOSE (obstructive sleep apnea)       VITAMIN D (ERGOCALCIFEROL) PO      Take 3,000 Units by mouth daily

## 2017-12-06 ENCOUNTER — OFFICE VISIT (OUTPATIENT)
Dept: UROLOGY | Facility: CLINIC | Age: 72
End: 2017-12-06
Payer: MEDICARE

## 2017-12-06 VITALS — RESPIRATION RATE: 16 BRPM | SYSTOLIC BLOOD PRESSURE: 114 MMHG | HEART RATE: 68 BPM | DIASTOLIC BLOOD PRESSURE: 76 MMHG

## 2017-12-06 DIAGNOSIS — R31.29 MICROSCOPIC HEMATURIA: Primary | ICD-10-CM

## 2017-12-06 LAB
ALBUMIN UR-MCNC: NEGATIVE MG/DL
APPEARANCE UR: CLEAR
BILIRUB UR QL STRIP: NEGATIVE
COLOR UR AUTO: YELLOW
GLUCOSE UR STRIP-MCNC: NEGATIVE MG/DL
HGB UR QL STRIP: ABNORMAL
KETONES UR STRIP-MCNC: NEGATIVE MG/DL
LEUKOCYTE ESTERASE UR QL STRIP: NEGATIVE
NITRATE UR QL: NEGATIVE
PH UR STRIP: 5.5 PH (ref 5–7)
RBC #/AREA URNS AUTO: ABNORMAL /HPF
SOURCE: ABNORMAL
SP GR UR STRIP: 1.02 (ref 1–1.03)
UROBILINOGEN UR STRIP-ACNC: 0.2 EU/DL (ref 0.2–1)
WBC #/AREA URNS AUTO: ABNORMAL /HPF

## 2017-12-06 PROCEDURE — 52000 CYSTOURETHROSCOPY: CPT | Performed by: UROLOGY

## 2017-12-06 PROCEDURE — 99205 OFFICE O/P NEW HI 60 MIN: CPT | Mod: 25 | Performed by: UROLOGY

## 2017-12-06 PROCEDURE — 81001 URINALYSIS AUTO W/SCOPE: CPT | Performed by: UROLOGY

## 2017-12-06 NOTE — NURSING NOTE
"Chief Complaint   Patient presents with     Consult For     microhematuria       Initial /76 (BP Location: Right arm, Patient Position: Chair, Cuff Size: Adult Large)  Pulse 68  Resp 16 Estimated body mass index is 47.2 kg/(m^2) as calculated from the following:    Height as of 11/29/17: 1.626 m (5' 4\").    Weight as of 11/29/17: 124.7 kg (275 lb).  Medication Reconciliation: complete   Chelsie Clinton, ADARSH      "

## 2017-12-06 NOTE — MR AVS SNAPSHOT
After Visit Summary   12/6/2017    Gina Yeh    MRN: [id number]           Patient Information     Date Of Birth          1945        Visit Information        Provider Department      12/6/2017 7:30 AM Karthik Benitez MD [facility]        Today's Diagnoses     Microscopic hematuria    -  1       Follow-ups after your visit        Follow-up notes from your care team     Return if symptoms worsen or fail to improve.      Your next 10 appointments already scheduled     Dec 13, 2017 10:30 AM CST   US HYSTEROSONOGRAPHY with MGUS1, MG US TECH, MG IMAGING NURSE, MG NM RAD   [facility] [[email or web address]] ([facility])    [address] [place] [id number] [phone]           You cannot have the exam while on your period. Please schedule this test for a day that you won t be menstruating.  Do not have sex (intercourse) from the start of your period until you come in for the exam. If you have had sex, we may need to reschedule the exam.  An hour before you arrive, you may take 800 mg of ibuprofen (Advil or Motrin). This will reduce any cramping during the exam.  Bring a list of your medicines, including vitamins, minerals and over-the-counter drugs. Tell your doctor if there s any chance you are pregnant.              Who to contact     If you have questions or need follow up information about today's clinic visit or your schedule please contact [facility] directly at [phone].  Normal or non-critical lab and imaging results will be communicated to you by MyChart, letter or phone within 4 business days after the clinic has received the results. If you do not hear from us within 7 days, please contact the clinic through MyChart or phone. If you have a critical or abnormal lab result, we will notify you by phone as soon as possible.  Submit refill requests through [email or web address] or call your pharmacy and they will forward the refill request  to us. Please allow 3 business days for your refill to be completed.          Additional Information About Your Visit        MyChart Information     Chronicityhart gives you secure access to your electronic health record. If you see a primary care provider, you can also send messages to your care team and make appointments. If you have questions, please call your primary care clinic.  If you do not have a primary care provider, please call [phone] and they will assist you.        Care EveryWhere ID     This is your Care EveryWhere ID. This could be used by other organizations to access your [facility] medical records  [id number]        Your Vitals Were     Pulse Respirations                68 16           Blood Pressure from Last 3 Encounters:   12/06/17 114/76   11/29/17 123/73   11/29/17 136/82    Weight from Last 3 Encounters:   11/29/17 124.7 kg (275 lb)   11/29/17 124.7 kg (275 lb)   10/25/17 122.5 kg (270 lb)              We Performed the Following     CYSTOURETHROSCOPY     UA reflex to Microscopic and Culture     Urine Microscopic        Primary Care Provider Office Phone # Fax #    Verena Vee [phone] [facility] [address]        Equal Access to Services     JACEK STEPHENS : Hadii celia martinez hadshiloo Sodonatoali, waaxda luqadaha, qaybta kaalmada adeegyada, roverto velazquez. So [facility] [phone].    ATENCIÓN: Si habla español, tiene a alamo disposición servicios gratuitos de asistencia lingüística. Lakes[place]iaame al [phone].    We comply with applicable federal civil rights laws and [place] laws. We do not discriminate on the basis of race, color, national origin, age, disability, sex, sexual orientation, or gender identity.            Thank you!     Thank you for choosing [facility]  for your care. Our goal is always to provide you with excellent care. Hearing back from our patients is one way we can continue to improve our services.  Please take a few minutes to complete the written survey that you may receive in the mail after your visit with us. Thank you!             Your Updated Medication List - Protect others around you: Learn how to safely use, store and throw away your medicines at www.disposemymeds.org.          This list is accurate as of: 12/6/17  8:43 AM.  Always use your most recent med list.                   Brand Name Dispense Instructions for use Diagnosis    ASPIRIN CAPS 81 MG OR      1 CAPSULE DAILY        atorvastatin 40 MG tablet    LIPITOR    90 tablet    TAKE 1 TABLET EVERY DAY    Hyperlipidemia with target LDL less than 130       CALCIUM GUMMIES PO           ferrous sulfate 325 (65 FE) MG tablet    IRON     Take by mouth daily (with breakfast)        levothyroxine 137 MCG tablet    SYNTHROID/LEVOTHROID    90 tablet    TAKE 1 TABLET EVERY DAY    Acquired hypothyroidism       lisinopril 20 MG tablet    PRINIVIL/ZESTRIL    90 tablet    Take 1 tablet (20 mg) by mouth daily    Hypertension goal BP (blood pressure) < 150/90       MULTI-VITAMIN GUMMIES PO           OMEGA-3 KRILL OIL PO           order for DME     1 Units    autoCPAP 7-12 cm    JOSE (obstructive sleep apnea)       VITAMIN D (ERGOCALCIFEROL) PO      Take 3,000 Units by mouth daily

## 2017-12-06 NOTE — PROGRESS NOTES
Gina Yeh is a 72 year old female seen in consultation for hematuria/incont. Consult from Verena Vee.      Pt has had minimal microhematuria on several recent studies (see below).    Also with mixed UI, stress > urge, wears one Poise #5 per day, no pad at nite.     Denies dysuria, gross hematuria, frequency. Nocturia x 3-4, dry at nite, no pad.     Denies significant UTI's, prior  eval, hx bladder surgery, use of bladder meds, success with kegels.    Hx 2 vag deliveries, now absintent. Not on HRT. Denies constipation, fecal incont.    Drinks 2-3 decaf coffee per day 2 x 32 oz [place] per day.    Retired.    Former smoker, none since 2012. No personal hx stones but brother has kidney stones. No occupational exposure hx. No FH other  dz.    (Reviewed voiding diary; reflects above.)      Past Medical History:   Diagnosis Date     Arthritis      Cervical dysplasia 1988     Hyperlipidemia LDL goal < 130      Hypertension goal BP (blood pressure) < 140/90      Hypothyroid      Impaired glucose tolerance 5/29/2012     Microscopic hematuria      MMT (medial meniscus tear) 9/21/2006    LT     Moderate recurrent major depression (H) 2/13/2014     Morbid obesity (H)      JOSE (obstructive sleep apnea) 7/24/2015    Doesn't use cpap     Sensorineural hearing loss      Vitamin D insufficiency        Past Surgical History:   Procedure Laterality Date     BIOPSY       CERVIX SURGERY  1988    cervical cone     COLONOSCOPY       TONSILLECTOMY & ADENOIDECTOMY         Social History     Social History     Marital status:      Spouse name: Umberto     Number of children: 2     Years of education: 13     Occupational History     book keeper  Retired     Social History Main Topics     Smoking status: Former Smoker     Packs/day: 0.50     Years: 49.00     Types: Cigarettes     Start date: 5/1/1960     Quit date: 5/1/2011     Smokeless tobacco: Former User     Quit date: 4/25/2011     Alcohol use No     Drug use: No      "Sexual activity: Not Currently     Partners: Male     Birth control/ protection: Post-menopausal     Other Topics Concern     Parent/Sibling W/ Cabg, Mi Or Angioplasty Before 65f 55m? No     Social History Narrative       Current Outpatient Prescriptions   Medication Sig Dispense Refill     ferrous sulfate (IRON) 325 (65 FE) MG tablet Take by mouth daily (with breakfast)       atorvastatin (LIPITOR) 40 MG tablet TAKE 1 TABLET EVERY DAY 90 tablet 3     levothyroxine (SYNTHROID/LEVOTHROID) 137 MCG tablet TAKE 1 TABLET EVERY DAY 90 tablet 3     lisinopril (PRINIVIL/ZESTRIL) 20 MG tablet Take 1 tablet (20 mg) by mouth daily 90 tablet 3     OMEGA-3 KRILL OIL PO        order for DME autoCPAP 7-12 cm 1 Units 0     VITAMIN D, ERGOCALCIFEROL, PO Take 3,000 Units by mouth daily       Calcium-Phosphorus-Vitamin D (CALCIUM GUMMIES PO)        Multiple Vitamins-Minerals (MULTI-VITAMIN GUMMIES PO)        ASPIRIN CAPS 81 MG OR 1 CAPSULE DAILY         Physical Exam:    GENL: NAD. 5'4\"   275#   ABD: Soft, non-tender, no masses.    EG: Well-estrogenized, no masses.    VAGINA: Well-estrogenized, no masses.    BN HYPERMOBILITY: Mild.    CYSTOCELE: Grade 1.    APICAL PROLAPSE: Minimal.    RECTOCELE: Minimal.    BIMANUAL: No mass or tenderness.    Cysto:    (Informed consent obtained. Pause for cause performed)   Sterile prep.    17 Fr scope inserted through urethra. Systematic examination w 70 degree lens.   PVR: 5 cc   MUCOSA: Normal without lesion   ORIFICES: Normal location and morphology   CAPACITY: 550 cc; no pain with filling >> apparent UDC with leak   Scope withdrawn without untoward effect.    Valsalva:   Mild hypermobility, moderate leakage noted.    (Pt tolerated procedure without difficulty).        Component      Latest Ref Rng & Units 10/25/2017 11/2/2017   Color Urine       Yellow Yellow   Appearance Urine       Slightly Cloudy Clear   Glucose Urine      NEG:Negative mg/dL Negative Negative   Bilirubin Urine      " NEG:Negative Negative Negative   Ketones Urine      NEG:Negative mg/dL Negative Negative   Specific Gravity Urine      1.003 - 1.035 1.020 1.020   Blood Urine      NEG:Negative Small (A) Trace (A)   pH Urine      5.0 - 7.0 pH 6.0 6.0   Protein Albumin Urine      NEG:Negative mg/dL Negative Negative   Urobilinogen Urine      0.2 - 1.0 EU/dL 0.2 0.2   Nitrite Urine      NEG:Negative Negative Negative   Leukocyte Esterase Urine      NEG:Negative Negative Negative   Source       Midstream Urine Midstream Urine   WBC Urine      OTO2:O - 2 /HPF 2-5 (A) O - 2   RBC Urine      OTO2:O - 2 /HPF 2-5 (A) 2-5 (A)   Squamous Epithelial /LPF Urine      FEW:Few /LPF Few Many (A)   Bacteria Urine      NEG:Negative /HPF Few (A)    Hyaline Casts      OTO2:O - 2 /LPF  2-5 (A)   Specimen Description       Midstream Urine    Culture Micro       10,000 to 50,000 colonies/mL . . .        Today:    Results for orders placed or performed in visit on 12/06/17   UA reflex to Microscopic and Culture   Result Value Ref Range    Color Urine Yellow     Appearance Urine Clear     Glucose Urine Negative NEG^Negative mg/dL    Bilirubin Urine Negative NEG^Negative    Ketones Urine Negative NEG^Negative mg/dL    Specific Gravity Urine 1.025 1.003 - 1.035    Blood Urine Small (A) NEG^Negative    pH Urine 5.5 5.0 - 7.0 pH    Protein Albumin Urine Negative NEG^Negative mg/dL    Urobilinogen Urine 0.2 0.2 - 1.0 EU/dL    Nitrite Urine Negative NEG^Negative    Leukocyte Esterase Urine Negative NEG^Negative    Source Midstream Urine    Urine Microscopic   Result Value Ref Range    WBC Urine O - 2 OTO2^O - 2 /HPF    RBC Urine 2-5 (A) OTO2^O - 2 /HPF       CT abd/pelvis 11/6/17:  benign, ovarian cyst      IMP:  1. Minimal microhematuria  2. DINESH with hypermobility  3. Obesity, other medical issues        PLAN:  1. Discussed situation with patient in detail.    2. No additional w/u for hematuria indicated at this point. Would continue with annual UA; RTC for  significant progression of hematuria.    3. ALTIS SLING DISCUSSION: We discussed the patients situation in detail including her specific anatomy and conditions. Diagrams are drawn.    We discussed the surgical treatment including Altis pubovaginal sling utilizing mesh material. We addressed the technical aspects of the procedure as well as the potential risks and complications incuding, but not limited to bleeding, infection, damage to organs or other injury. We discussed the recovery process and the potential effect on sexual function in detail. She also understands the alternative forms of therapy (including no therapy and treatment without mesh), and the potential need for additional therapy. We discussed the FDA report on the use of surgical mesh. All questions are answered in detail. Informed consent is obtained. Consent form signed. Handout given.    Pt suggests that she will look into her ovarian cyst issue and perhaps contact us for sling at some point    4. 60 minutes spent with patient, more than 50% in counseling and coordination of care for incont/hematuria, which did not include time spent for the procedure.    5. Copy E FoxyP2feld

## 2017-12-13 ENCOUNTER — RADIANT APPOINTMENT (OUTPATIENT)
Dept: ULTRASOUND IMAGING | Facility: CLINIC | Age: 72
End: 2017-12-13
Attending: OBSTETRICS & GYNECOLOGY
Payer: MEDICARE

## 2017-12-13 DIAGNOSIS — R93.89 ENDOMETRIAL THICKENING ON ULTRA SOUND: ICD-10-CM

## 2017-12-13 PROCEDURE — 76831 ECHO EXAM UTERUS: CPT

## 2017-12-13 PROCEDURE — 58340 CATHETER FOR HYSTEROGRAPHY: CPT

## 2017-12-15 ENCOUNTER — TELEPHONE (OUTPATIENT)
Dept: OBGYN | Facility: CLINIC | Age: 72
End: 2017-12-15

## 2017-12-15 DIAGNOSIS — N83.291 COMPLEX CYST OF RIGHT OVARY: Primary | ICD-10-CM

## 2017-12-15 DIAGNOSIS — D39.11 NEOPLASM OF UNCERTAIN BEHAVIOR OF OVARY, RIGHT: ICD-10-CM

## 2017-12-15 DIAGNOSIS — N83.291 COMPLEX CYST OF RIGHT OVARY: ICD-10-CM

## 2017-12-15 LAB — CANCER AG125 SERPL-ACNC: <5 U/ML (ref 0–30)

## 2017-12-15 PROCEDURE — 36415 COLL VENOUS BLD VENIPUNCTURE: CPT | Performed by: OBSTETRICS & GYNECOLOGY

## 2017-12-15 PROCEDURE — 86304 IMMUNOASSAY TUMOR CA 125: CPT | Performed by: OBSTETRICS & GYNECOLOGY

## 2017-12-15 NOTE — TELEPHONE ENCOUNTER
Discussed US results.  There is a complex ovarian mass.      Recommend she have a  done.      They were not able to do the sonohyst.     Recommend she make an appointment with Dr. Davis to discussed the  result and the next steps for evaluation of thickened endometrial stripe.

## 2017-12-19 ENCOUNTER — OFFICE VISIT (OUTPATIENT)
Dept: OBGYN | Facility: CLINIC | Age: 72
End: 2017-12-19
Payer: MEDICARE

## 2017-12-19 VITALS
OXYGEN SATURATION: 99 % | WEIGHT: 275 LBS | SYSTOLIC BLOOD PRESSURE: 127 MMHG | DIASTOLIC BLOOD PRESSURE: 78 MMHG | BODY MASS INDEX: 47.2 KG/M2 | HEART RATE: 93 BPM

## 2017-12-19 DIAGNOSIS — R93.89 ENDOMETRIAL THICKENING ON ULTRA SOUND: Primary | ICD-10-CM

## 2017-12-19 DIAGNOSIS — N83.201 CYST OF RIGHT OVARY: ICD-10-CM

## 2017-12-19 PROCEDURE — 99214 OFFICE O/P EST MOD 30 MIN: CPT | Performed by: OBSTETRICS & GYNECOLOGY

## 2017-12-19 NOTE — MR AVS SNAPSHOT
After Visit Summary   12/19/2017    Gina Yeh    MRN: [id number]           Patient Information     Date Of Birth          1945        Visit Information        Provider Department      12/19/2017 2:15 PM Juan Alberto Davis MD [facility]        Today's Diagnoses     Endometrial thickening on ultra sound    -  1    Cyst of right ovary           Follow-ups after your visit        Your next 10 appointments already scheduled     Jan 24, 2018  8:00 AM CST   US PELVIC COMPLETE W TRANSVAGINAL with MGUS1, MG US TECH   [facility] ([facility])    [id number] [place] [id number] [phone]           Please bring a list of your medicines (including vitamins, minerals and over-the-counter drugs). Also, tell your doctor about any allergies you may have. Wear comfortable clothes and leave your valuables at home.  Adults: Drink six 8-ounce glasses of fluid one hour before your exam. Do NOT empty your bladder.  If you need to empty your bladder before your exam, try to release only a little bit of urine. Then, drink another 8oz glass of fluid.  Children: Children who are potty trained should drink at least 4 cups (32 oz) of liquid 45 minutes to one hour prior to the exam. The child s bladder must be full in order to achieve a diagnostic exam. If your child is very uncomfortable or has an urgent need to pee, please notify a technologist; they will try to find out how much longer the wait may be and provide instructions to help relieve the pressure. Occasionally it is medically necessary to insert a urinary catheter to fill the bladder.  Please call the Imaging Department at your exam site with any questions.              Future tests that were ordered for you today     Open Future Orders        Priority Expected Expires Ordered    US Pelvic Complete with Transvaginal Routine  12/19/2018 12/19/2017            Who to contact     If you  have questions or need follow up information about today's clinic visit or your schedule please contact [facility] directly at [phone].  Normal or non-critical lab and imaging results will be communicated to you by MyChart, letter or phone within 4 business days after the clinic has received the results. If you do not hear from us within 7 days, please contact the clinic through Hyginexhart or phone. If you have a critical or abnormal lab result, we will notify you by phone as soon as possible.  Submit refill requests through Recommend or call your pharmacy and they will forward the refill request to us. Please allow 3 business days for your refill to be completed.          Additional Information About Your Visit        [employer]yg[employer]" Information     Recommend gives you secure access to your electronic health record. If you see a primary care provider, you can also send messages to your care team and make appointments. If you have questions, please call your primary care clinic.  If you do not have a primary care provider, please call [phone] and they will assist you.        Care EveryWhere ID     This is your Care EveryWhere ID. This could be used by other organizations to access your [facility] medical records  [id number]        Your Vitals Were     Pulse Pulse Oximetry BMI (Body Mass Index)             93 99% 47.2 kg/m2          Blood Pressure from Last 3 Encounters:   12/19/17 127/78   12/06/17 114/76   11/29/17 123/73    Weight from Last 3 Encounters:   12/19/17 275 lb (124.7 kg)   11/29/17 275 lb (124.7 kg)   11/29/17 275 lb (124.7 kg)               Primary Care Provider Office Phone # Fax #    Verena Vee [phone] [facility] [address]        Equal Access to Services     CHELSI STEPHENS : Jose Oh, phylicia benavides, pa morse, roverto velazquez. So [facility] [phone].    ATENCIÓN: Gregorio fung  español, tiene a alamo disposición servicios gratuitos de asistencia lingüística. Eloisa hickman [phone].    We comply with applicable federal civil rights laws and [place] laws. We do not discriminate on the basis of race, color, national origin, age, disability, sex, sexual orientation, or gender identity.            Thank you!     Thank you for choosing [facility]  for your care. Our goal is always to provide you with excellent care. Hearing back from our patients is one way we can continue to improve our services. Please take a few minutes to complete the written survey that you may receive in the mail after your visit with us. Thank you!             Your Updated Medication List - Protect others around you: Learn how to safely use, store and throw away your medicines at www.disposemymeds.org.          This list is accurate as of: 12/19/17  3:23 PM.  Always use your most recent med list.                   Brand Name Dispense Instructions for use Diagnosis    ASPIRIN CAPS 81 MG OR      1 CAPSULE DAILY        atorvastatin 40 MG tablet    LIPITOR    90 tablet    TAKE 1 TABLET EVERY DAY    Hyperlipidemia with target LDL less than 130       CALCIUM GUMMIES PO           ferrous sulfate 325 (65 FE) MG tablet    IRON     Take by mouth daily (with breakfast)        levothyroxine 137 MCG tablet    SYNTHROID/LEVOTHROID    90 tablet    TAKE 1 TABLET EVERY DAY    Acquired hypothyroidism       lisinopril 20 MG tablet    PRINIVIL/ZESTRIL    90 tablet    Take 1 tablet (20 mg) by mouth daily    Hypertension goal BP (blood pressure) < 150/90       MULTI-VITAMIN GUMMIES PO           OMEGA-3 KRILL OIL PO           order for DME     1 Units    autoCPAP 7-12 cm    JOSE (obstructive sleep apnea)       VITAMIN D (ERGOCALCIFEROL) PO      Take 3,000 Units by mouth daily

## 2017-12-19 NOTE — PROGRESS NOTES
Gina is a 72 year old female, , who presents today for follow up of the endometrial thickening on ultrasound.  She was noted to have hematuria at the time of her annual exam 2017 and CT scan was ordered.  There is a 4.2 cm indeterminate right ovarian cystic lesion. Ovarian neoplasm cannot be excluded from this appearance. Pelvic ultrasound is recommended for further characterization.   She then had the ultrasound performed on 2017, and the endometrial stripe was 5 mm and a complex cyst was seen.  We reviewed the ultrasound films and the impression report.   IMPRESSION:   1. Two right ovarian cysts. One is mildly complex and the smaller  adjacent cyst is simple in appearance. These may represent follicles.  The larger lesion measures smaller than on the prior CT. These are  likely physiologic in nature. No further followup required unless  clinically warranted.  2. Left ovary not visualized.  3. Borderline endometrial thickening at 5 mm.    She desired to have the SIS performed.    The SIS was attempted and it was not able to be performed.  We reviewed the past ultrasound and films and the current SIS report and films.      Pelvic ultrasound and attempted sonohysterogram    Comparisons: Pelvic ultrasound 2017, CT of the abdomen and pelvis  2017    HISTORY: Endometrial thickening on ultrasound.    FINDINGS: The uterus measures 6.9 x 3.3 x 4.0 cm. The endometrial  stripe measures 10 mm thick. No definite internal blood flow. The  right and left ovaries measure 6.6 x 4.1 x 3.5 cm and 1.1 x 1.7 x 1.2  cm, respectively. In the right ovary there is a cystic mass with  multiple thin septations measuring 4.1 x 3.5 x 5.0 cm. There is a  small benign-appearing cyst in the left ovary measuring up to 1.0 cm.    Sonohysterography was attempted, however the cervix could not be  cannulated despite multiple attempts.             Impression             IMPRESSION:  1. Unsuccessful sonohysterography  due to inability to cannulate the  cervix.  2. Thickened endometrial stripe measuring up to 10 mm in this  postmenopausal woman. Recommend surgical consultation.  3. 5.0 cm cystic mass in the right ovary with multiple thin  septations. Recommend pelvic MRI or surgical consultation.        Past Medical History:   Diagnosis Date     Arthritis      Cervical dysplasia 1988     Hyperlipidemia LDL goal < 130      Hypertension goal BP (blood pressure) < 140/90      Hypothyroid      Impaired glucose tolerance 5/29/2012     Microscopic hematuria      MMT (medial meniscus tear) 9/21/2006    LT     Moderate recurrent major depression (H) 2/13/2014     Morbid obesity (H)      JOSE (obstructive sleep apnea) 7/24/2015    Doesn't use cpap     Sensorineural hearing loss      Vitamin D insufficiency      Past Surgical History:   Procedure Laterality Date     BIOPSY       CERVIX SURGERY  1988    cervical cone     COLONOSCOPY       TONSILLECTOMY & ADENOIDECTOMY          Allergies   Allergen Reactions     Nkda [No Known Drug Allergies]        Current Outpatient Prescriptions   Medication Sig Dispense Refill     ferrous sulfate (IRON) 325 (65 FE) MG tablet Take by mouth daily (with breakfast)       atorvastatin (LIPITOR) 40 MG tablet TAKE 1 TABLET EVERY DAY 90 tablet 3     levothyroxine (SYNTHROID/LEVOTHROID) 137 MCG tablet TAKE 1 TABLET EVERY DAY 90 tablet 3     lisinopril (PRINIVIL/ZESTRIL) 20 MG tablet Take 1 tablet (20 mg) by mouth daily 90 tablet 3     OMEGA-3 KRILL OIL PO        order for DME autoCPAP 7-12 cm 1 Units 0     VITAMIN D, ERGOCALCIFEROL, PO Take 3,000 Units by mouth daily       Calcium-Phosphorus-Vitamin D (CALCIUM GUMMIES PO)        Multiple Vitamins-Minerals (MULTI-VITAMIN GUMMIES PO)        ASPIRIN CAPS 81 MG OR 1 CAPSULE DAILY         Social History     Social History     Marital status:      Spouse name: Umberto     Number of children: 2     Years of education: 13     Occupational History     book keeper   Retired     Social History Main Topics     Smoking status: Former Smoker     Packs/day: 0.50     Years: 49.00     Types: Cigarettes     Start date: 5/1/1960     Quit date: 5/1/2011     Smokeless tobacco: Former User     Quit date: 4/25/2011     Alcohol use No     Drug use: No     Sexual activity: Not Currently     Partners: Male     Birth control/ protection: Post-menopausal     Other Topics Concern     Parent/Sibling W/ Cabg, Mi Or Angioplasty Before 65f 55m? No     Social History Narrative       Family History   Problem Relation Age of Onset     Allergies Mother      Cardiovascular Mother      Respiratory Mother      Thyroid Disease Mother      Other Cancer Mother      skin     Arthritis Father      Hypertension Father      Hyperlipidemia Father      CEREBROVASCULAR DISEASE Father      CANCER Maternal Grandmother      bone     Other Cancer Maternal Grandmother      bone     CEREBROVASCULAR DISEASE Paternal Grandmother      CANCER Paternal Grandfather      CEREBROVASCULAR DISEASE Paternal Grandfather      CANCER Brother      testicular      GASTROINTESTINAL DISEASE Brother      Other Cancer Brother      testicular     Allergies Sister      Allergies Daughter      GASTROINTESTINAL DISEASE Daughter      Depression Daughter      Anesthesia Reaction Daughter      Genitourinary Problems Brother      Allergies Daughter 10     metal     DIABETES No family hx of        Review of Systems:  10 point ROS of systems including Constitutional, Eyes, Respiratory, Cardiovascular, Gastroenterology, Genitourinary, Integumentary, Muscularskeletal, Psychiatric were all negative except for pertinent positives noted in my HPI and in the PMH.      Exam  /78 (BP Location: Right arm, Cuff Size: Adult Large)  Pulse 93  Wt 275 lb (124.7 kg)  SpO2 99%  BMI 47.2 kg/m2  General:  WNWD female, NAD  Alert  Oriented x 3  Gait:  Normal  Skin:  Normal skin turgor  HEENT:  NC/AT, EOMI  Abdomen:  Non-tender, non-distended.  Pelvic exam:  Not  performed  Extremities:  No clubbing, no cyanosis and no edema.      Assessment  Complex ovarian cyst  Endometrial thickening on ultrasound.     Plan  We discussed the endometrial thickening and the inability to have the SIS performed and also the EMB was not able to be performed.  At this time she does not want the D&C.     We reviewed the complex ovarian cyst and the films of the ultrasound.    At this time she desires to repeat ultrasound in about 6 weeks, from the past ultrasound.   Questions seem to be answered.       Juan Alberto Davis MD

## 2017-12-19 NOTE — NURSING NOTE
"Chief Complaint   Patient presents with     RECHECK     follow up results       Initial /78 (BP Location: Right arm, Cuff Size: Adult Large)  Pulse 93  Wt 275 lb (124.7 kg)  SpO2 99%  BMI 47.2 kg/m2 Estimated body mass index is 47.2 kg/(m^2) as calculated from the following:    Height as of 11/29/17: 5' 4\" (1.626 m).    Weight as of this encounter: 275 lb (124.7 kg).  Medication Reconciliation: yousif Cabrales MA 12/19/2017         "

## 2018-01-24 ENCOUNTER — RADIANT APPOINTMENT (OUTPATIENT)
Dept: ULTRASOUND IMAGING | Facility: CLINIC | Age: 73
End: 2018-01-24
Attending: OBSTETRICS & GYNECOLOGY
Payer: MEDICARE

## 2018-01-24 DIAGNOSIS — N83.201 CYST OF RIGHT OVARY: ICD-10-CM

## 2018-01-24 DIAGNOSIS — R93.89 ENDOMETRIAL THICKENING ON ULTRA SOUND: ICD-10-CM

## 2018-01-24 PROCEDURE — 76830 TRANSVAGINAL US NON-OB: CPT

## 2018-01-24 PROCEDURE — 76856 US EXAM PELVIC COMPLETE: CPT

## 2018-01-29 ENCOUNTER — MYC MEDICAL ADVICE (OUTPATIENT)
Dept: OBGYN | Facility: CLINIC | Age: 73
End: 2018-01-29

## 2018-01-29 ENCOUNTER — TELEPHONE (OUTPATIENT)
Dept: OBGYN | Facility: CLINIC | Age: 73
End: 2018-01-29

## 2018-01-29 NOTE — TELEPHONE ENCOUNTER
Reason for Call:  Request for results:    Name of test or procedure: Pelvic transvaginal ultrasound    Date of test of procedure: 01/24/18    Location of the test or procedure: [facility]    OK to leave the result message on voice mail or with a family member? YES    Phone number Patient can be reached at:  Home number on file [phone] (home)    Additional comments: patient calling to check the status of the ultra sound results. Please contact patient.    Call taken on 1/29/2018 at 1:12 PM by Ankita Nunez

## 2018-01-29 NOTE — TELEPHONE ENCOUNTER
Notes Recorded by Juan Alberto Davis MD on 1/28/2018 at 3:08 PM  I called patient's number and I reached voicemail.  I will attempt to call her again.  Juan Alberto Davis MD      Forwarded to provider to advise.

## 2018-01-30 ENCOUNTER — MYC MEDICAL ADVICE (OUTPATIENT)
Dept: FAMILY MEDICINE | Facility: CLINIC | Age: 73
End: 2018-01-30

## 2018-01-30 NOTE — TELEPHONE ENCOUNTER
Reason for Call:  Other call back    Detailed comments: patient states she just spoke to provider but forgot to ask a few more questions. Patient asking for a return call to get those questions answered with the Provider. Please contact patient.    Phone Number Patient can be reached at: Home number on file [phone] (home)    Best Time: any time    Can we leave a detailed message on this number? YES    Call taken on 1/30/2018 at 3:47 PM by Ankita Nunez

## 2018-01-30 NOTE — TELEPHONE ENCOUNTER
I called patient.   I reviewed the 3 ultrasounds (report and films) and the endometrial thickening.  Her last 2 ultrasounds show the endometrial stripe measuring 1 cm.  The ovarian cyst(s) remain stable in size over the past 3 ultrasounds (over the past 2 months).   It would be possible to have the D&C and the laparoscopy (to evaluate and possibly remove the ovary) together.     She might consider this if she could have the sling procedure for the urinary symptoms together with the gynecological procedures.    She will discuss with family and she will let us know.   Juan Alberto Davis MD

## 2018-01-30 NOTE — TELEPHONE ENCOUNTER
Called patient  She would like to proceed with surgery  Will discuss with Linda and Dr. Benitez.   Juan Alberto Davis MD

## 2018-02-01 NOTE — TELEPHONE ENCOUNTER
Patient to be scheduled for a D&C with Dr. Davis, possible laparoscopy.  Forwarded to Dr. Medina to advise on surgical procedure; details, etc.  Then will contact patient to schedule.  Surgery to be done at [facility].

## 2018-02-02 NOTE — TELEPHONE ENCOUNTER
Surgery Scheduled.    Date of Surgery 3/19/18 Time of Surgery 7:30 am  Procedure: Hysteroscopy D&C/ Laparoscopy/ possible Cyst Removal (Mid Urethral Sling- Altis Sling Product)  Hospital/Surgical Facility: [facility]  Surgeon: Dr. Davis (Dr. Benitez  Type of Anesthesia Anticipated: General  Pre-op: 2/28/18 with Dr. Vee at  [facility]  2 week post op: 4/4/18 with Dr. Davis at  [facility]  Pre-certification 2/2/18  Consent signed: NA  Hospital Stay NA       [facility] surgery packet mailed to patient's home address.  Patient instructed NPO 12 hours prior to surgery, arrive 1 1/2 hours prior to surgery, must have a .  Patient understood and agrees to the plan.      Surgery Pre-Certification     Medical Record Number: [id number]   Gina Yeh   YOB: 1945   Phone: [phone] (home)   Primary Provider: Verena Vee     Reason for Admit:   Endometrial Thickening/ Right Ovarian Cyst (Microscopic Hematuria/ Urinary Incontinence)     Surgeon: ERASTO Davis MD (Dr. Benitez)   Surgical Procedure: D&C/ Laparoscopy (Mid Urethral Sling w/ Altis Sling Product w/ Cysto)   ICD-9 Coded: R93.8/ N83.201 (R31.29/ N39.41)   Date of Surgery: 3/19/18   Consent signed? N/A       Hospital: [facility]   Outpatient     Requestor:   Leydi Dai     Location:   [facility]   Linda Bernstein CMA

## 2018-02-02 NOTE — TELEPHONE ENCOUNTER
Tony Mckeon. Dr. Benitez will do a mid urethral sling using Altis sling product.   Chelsie (Routing comment)

## 2018-02-02 NOTE — TELEPHONE ENCOUNTER
CLAU     Patient's surgery on 3/19/18 at [facility] with Dr. Davis should be:     Hysteroscopy/ D&C/ Laparoscopy w/ possible Cyst Removal (Dr. Benitez still doing Mid Urethral Sling)     Thanks

## 2018-02-05 NOTE — TELEPHONE ENCOUNTER
atorvastatin (LIPITOR) 40 MG tablet  Last Written Prescription Date: 07/06/2016  Last Fill Quantity: 90, # refills: 3  Last Office Visit with [facility], [facility] or  Health prescribing provider: 07/06/2016  Next 5 appointments (look out 90 days)     Jun 19, 2017  9:40 AM CDT   [facility] with Verena Vee MD   [facility]    [address] [place] [address]  [place] [address]   [phone]                   Lab Results   Component Value Date    CHOL 185 07/06/2016     Lab Results   Component Value Date    HDL 33 10/19/2016     Lab Results   Component Value Date    LDL 80 10/19/2016     Lab Results   Component Value Date    TRIG 241 10/19/2016     Lab Results   Component Value Date    CHOLHDLRATIO 5.2 07/02/2015       levothyroxine (SYNTHROID, LEVOTHROID) 137 MCG tablet     Last Written Prescription Date: 07/06/2016  Last Quantity: 90, # refills: 3  Last Office Visit with [facility], [facility] or  Health prescribing provider: 07/06/2016   Next 5 appointments (look out 90 days)     Jun 19, 2017  9:40 AM CDT   SHORT with Verena Vee MD   [facility]    [address] [place] [address]  [place] [address]   [phone]                   TSH   Date Value Ref Range Status   07/06/2016 2.29 0.40 - 4.00 mU/L Final     lisinopril-hydrochlorothiazide (PRINZIDE,ZESTORETIC) 20-25 MG per tablet      Last Written Prescription Date: 07/06/2016  Last Fill Quantity: 90, # refills: 3  Last Office Visit with [facility], [facility] or  Health prescribing provider: 07/06/2016  Next 5 appointments (look out 90 days)     Jun 19, 2017  9:40 AM CDT   SHORT with Verena Vee MD   [facility]    [address] [place] [address]  [place] [address]   [phone]                   Potassium   Date Value Ref Range Status   07/06/2016 4.0 3.4 - 5.3 mmol/L Final     Creatinine   Date Value Ref Range Status   07/06/2016 0.90 0.52 - 1.04 mg/dL Final     BP Readings from Last 3  Encounters:   07/06/16 139/80   02/10/16 123/65   09/08/15 107/65     [place] Salinas MA     Principal Discharge DX:	Pregnancy  Secondary Diagnosis:	Gastroenteritis

## 2018-02-27 NOTE — PROGRESS NOTES
"  SUBJECTIVE:   Gina Yeh is a 73 year old female who presents to clinic today for the following health issues:  {Provider please address medication reconciliation discrepancies--rooming staff please delete if no med/rec issues}    {Superlists:276987}    {additional problems for provider to add:906444}    Problem list and histories reviewed & adjusted, as indicated.  Additional history: {NONE - AS DOCUMENTED:331901::\"as documented\"}    {HIST REVIEW/ LINKS 2:486785}    Reviewed and updated as needed this visit by clinical staff       Reviewed and updated as needed this visit by Provider         {PROVIDER CHARTING PREFERENCE:042780}    "

## 2018-02-28 ENCOUNTER — TRANSFERRED RECORDS (OUTPATIENT)
Dept: HEALTH INFORMATION MANAGEMENT | Facility: CLINIC | Age: 73
End: 2018-02-28

## 2018-02-28 ENCOUNTER — OFFICE VISIT (OUTPATIENT)
Dept: FAMILY MEDICINE | Facility: CLINIC | Age: 73
End: 2018-02-28
Payer: MEDICARE

## 2018-02-28 VITALS
OXYGEN SATURATION: 97 % | TEMPERATURE: 98.6 F | SYSTOLIC BLOOD PRESSURE: 118 MMHG | RESPIRATION RATE: 20 BRPM | BODY MASS INDEX: 47.63 KG/M2 | HEIGHT: 64 IN | HEART RATE: 92 BPM | WEIGHT: 279 LBS | DIASTOLIC BLOOD PRESSURE: 76 MMHG

## 2018-02-28 DIAGNOSIS — E66.01 MORBIDLY OBESE (H): ICD-10-CM

## 2018-02-28 DIAGNOSIS — E61.1 IRON DEFICIENCY: ICD-10-CM

## 2018-02-28 DIAGNOSIS — N39.3 FEMALE STRESS INCONTINENCE: ICD-10-CM

## 2018-02-28 DIAGNOSIS — G47.33 OSA (OBSTRUCTIVE SLEEP APNEA): Chronic | ICD-10-CM

## 2018-02-28 DIAGNOSIS — R93.89 ENDOMETRIAL STRIPE INCREASED: ICD-10-CM

## 2018-02-28 DIAGNOSIS — N83.8 MASS OF RIGHT OVARY: ICD-10-CM

## 2018-02-28 DIAGNOSIS — I10 HYPERTENSION GOAL BP (BLOOD PRESSURE) < 150/90: ICD-10-CM

## 2018-02-28 DIAGNOSIS — Z01.818 PREOP GENERAL PHYSICAL EXAM: Primary | ICD-10-CM

## 2018-02-28 LAB
ANION GAP SERPL CALCULATED.3IONS-SCNC: 6 MMOL/L (ref 3–14)
BASOPHILS # BLD AUTO: 0.1 10E9/L (ref 0–0.2)
BASOPHILS NFR BLD AUTO: 0.5 %
BUN SERPL-MCNC: 22 MG/DL (ref 7–30)
CALCIUM SERPL-MCNC: 9.5 MG/DL (ref 8.5–10.1)
CHLORIDE SERPL-SCNC: 105 MMOL/L (ref 94–109)
CO2 SERPL-SCNC: 29 MMOL/L (ref 20–32)
CREAT SERPL-MCNC: 0.88 MG/DL (ref 0.52–1.04)
DIFFERENTIAL METHOD BLD: ABNORMAL
EOSINOPHIL # BLD AUTO: 0.1 10E9/L (ref 0–0.7)
EOSINOPHIL NFR BLD AUTO: 1.3 %
ERYTHROCYTE [DISTWIDTH] IN BLOOD BY AUTOMATED COUNT: 15.1 % (ref 10–15)
GFR SERPL CREATININE-BSD FRML MDRD: 63 ML/MIN/1.7M2
GLUCOSE SERPL-MCNC: 103 MG/DL (ref 70–99)
HCT VFR BLD AUTO: 43 % (ref 35–47)
HGB BLD-MCNC: 13.6 G/DL (ref 11.7–15.7)
LYMPHOCYTES # BLD AUTO: 1.1 10E9/L (ref 0.8–5.3)
LYMPHOCYTES NFR BLD AUTO: 11.9 %
MCH RBC QN AUTO: 27.6 PG (ref 26.5–33)
MCHC RBC AUTO-ENTMCNC: 31.6 G/DL (ref 31.5–36.5)
MCV RBC AUTO: 87 FL (ref 78–100)
MONOCYTES # BLD AUTO: 0.6 10E9/L (ref 0–1.3)
MONOCYTES NFR BLD AUTO: 6.5 %
NEUTROPHILS # BLD AUTO: 7.3 10E9/L (ref 1.6–8.3)
NEUTROPHILS NFR BLD AUTO: 79.8 %
PLATELET # BLD AUTO: 263 10E9/L (ref 150–450)
POTASSIUM SERPL-SCNC: 4.4 MMOL/L (ref 3.4–5.3)
RBC # BLD AUTO: 4.93 10E12/L (ref 3.8–5.2)
SODIUM SERPL-SCNC: 140 MMOL/L (ref 133–144)
WBC # BLD AUTO: 9.2 10E9/L (ref 4–11)

## 2018-02-28 PROCEDURE — 36415 COLL VENOUS BLD VENIPUNCTURE: CPT | Performed by: FAMILY MEDICINE

## 2018-02-28 PROCEDURE — 80048 BASIC METABOLIC PNL TOTAL CA: CPT | Performed by: FAMILY MEDICINE

## 2018-02-28 PROCEDURE — 99214 OFFICE O/P EST MOD 30 MIN: CPT | Performed by: FAMILY MEDICINE

## 2018-02-28 PROCEDURE — 93000 ELECTROCARDIOGRAM COMPLETE: CPT | Performed by: FAMILY MEDICINE

## 2018-02-28 PROCEDURE — 85025 COMPLETE CBC W/AUTO DIFF WBC: CPT | Performed by: FAMILY MEDICINE

## 2018-02-28 ASSESSMENT — PAIN SCALES - GENERAL: PAINLEVEL: NO PAIN (0)

## 2018-02-28 NOTE — PROGRESS NOTES
Your results are normal.  Your final test results are pending.  Please check your chart again within 3 to 5 days. You will receive further instruction when your full test result panel is complete.    Verena Vee MD

## 2018-02-28 NOTE — PATIENT INSTRUCTIONS
Stop fish oil and aspirin a week before surgery. Stop lisinopril the day before surgery. Take other pills with a sip of water when fasting the day of surgery.       [facility]    If you have any questions regarding to your visit please contact your care team:       Team Red:   Clinic Hours Telephone Number   Dr. Vernea Jesus, NP   7am-7pm  Monday - Thursday   7am-5pm  Fridays  [phone]  (Appointment scheduling available 24/7)    Questions about your visit?   Team Line  [phone]   Urgent [facility] - [place] - 11am-9pm Monday-Friday Saturday-Sunday- 9am-5pm   [place] - 5pm-9pm Monday-Friday Saturday-Sunday- 9am-5pm  [phone] - [place]   [phone] - [place]       What options do I have for visits at the clinic other than the traditional office visit?  To expand how we care for you, many of our providers are utilizing electronic visits (e-visits) and telephone visits, when medically appropriate, for interactions with their patients rather than a visit in the clinic.   We also offer nurse visits for many medical concerns. Just like any other service, we will bill your insurance company for this type of visit based on time spent on the phone with your provider. Not all insurance companies cover these visits. Please check with your medical insurance if this type of visit is covered. You will be responsible for any charges that are not paid by your insurance.      E-visits via [employer]:  generally incur a $35.00 fee.  Telephone visits:  Time spent on the phone: *charged based on time that is spent on the phone in increments of 10 minutes. Estimated cost:   5-10 mins $30.00   11-20 mins. $59.00   21-30 mins. $85.00     Use [employer] (secure email communication and access to your chart) to send your primary care provider a message or make an appointment. Ask someone on your Team how to sign up for [employer].  For a  Price Quote for your services, please call our Consumer Price Line at [phone].      As always, Thank you for trusting us with your health care needs!    Before Your Surgery      Call your surgeon if there is any change in your health. This includes signs of a cold or flu (such as a sore throat, runny nose, cough, rash or fever).    Do not smoke, drink alcohol or take over the counter medicine (unless your surgeon or primary care doctor tells you to) for the 24 hours before and after surgery.    If you take prescribed drugs: Follow your doctor s orders about which medicines to take and which to stop until after surgery.    Eating and drinking prior to surgery: follow the instructions from your surgeon    Take a shower or bath the night before surgery. Use the soap your surgeon gave you to gently clean your skin. If you do not have soap from your surgeon, use your regular soap. Do not shave or scrub the surgery site.  Wear clean pajamas and have clean sheets on your bed.        Discharged by Bambi Cadet MA.

## 2018-02-28 NOTE — MR AVS SNAPSHOT
After Visit Summary   2/28/2018    Gina Yeh    MRN: [id number]           Patient Information     Date Of Birth          1945        Visit Information        Provider Department      2/28/2018 7:00 AM Verena Vee MD [facility]        Today's Diagnoses     Preop general physical exam    -  1    Mass of right ovary        Endometrial stripe increased        Female stress incontinence        Morbidly obese (H)        JOSE (obstructive sleep apnea)        Hypertension goal BP (blood pressure) < 150/90        Iron deficiency          Care Instructions    Stop fish oil and aspirin a week before surgery. Stop lisinopril the day before surgery. Take other pills with a sip of water when fasting the day of surgery.       [facility]    If you have any questions regarding to your visit please contact your care team:       Team Red:   Clinic Hours Telephone Number   Dr. Verena Jesus, NP   7am-7pm  Monday - Thursday   7am-5pm  Fridays  [phone]  (Appointment scheduling available 24/7)    Questions about your visit?   Team Line  [phone]   Urgent Care - [place] [facility] - 11am-9pm Monday-Friday Saturday-Sunday- 9am-5pm   [place] - 5pm-9pm Monday-Friday Saturday-Sunday- 9am-5pm  [phone] - [place]   [phone] - [place]       What options do I have for visits at the clinic other than the traditional office visit?  To expand how we care for you, many of our providers are utilizing electronic visits (e-visits) and telephone visits, when medically appropriate, for interactions with their patients rather than a visit in the clinic.   We also offer nurse visits for many medical concerns. Just like any other service, we will bill your insurance company for this type of visit based on time spent on the phone with your provider. Not all insurance companies cover these visits. Please check  with your medical insurance if this type of visit is covered. You will be responsible for any charges that are not paid by your insurance.      E-visits via [employer]:  generally incur a $35.00 fee.  Telephone visits:  Time spent on the phone: *charged based on time that is spent on the phone in increments of 10 minutes. Estimated cost:   5-10 mins $30.00   11-20 mins. $59.00   21-30 mins. $85.00     Use [employer] (secure email communication and access to your chart) to send your primary care provider a message or make an appointment. Ask someone on your Team how to sign up for [employer].  For a Price Quote for your services, please call our [place] Line at [phone].      As always, Thank you for trusting us with your health care needs!    Before Your Surgery      Call your surgeon if there is any change in your health. This includes signs of a cold or flu (such as a sore throat, runny nose, cough, rash or fever).    Do not smoke, drink alcohol or take over the counter medicine (unless your surgeon or primary care doctor tells you to) for the 24 hours before and after surgery.    If you take prescribed drugs: Follow your doctor s orders about which medicines to take and which to stop until after surgery.    Eating and drinking prior to surgery: follow the instructions from your surgeon    Take a shower or bath the night before surgery. Use the soap your surgeon gave you to gently clean your skin. If you do not have soap from your surgeon, use your regular soap. Do not shave or scrub the surgery site.  Wear clean pajamas and have clean sheets on your bed.        Discharged by Bambi Cadet MA.               Follow-ups after your visit        Follow-up notes from your care team     Return in about 8 months (around 10/25/2018) for Wellness visit.      Your next 10 appointments already scheduled     Mar 02, 2018  7:45 AM CST   Office Visit with Juan Alberto Davis MD   [facility] [place] ([facility]  [facility])    [address] [place] [address]  [place] [address] [phone]           Bring a current list of meds and any records pertaining to this visit. For Physicals, please bring immunization records and any forms needing to be filled out. Please arrive 10 minutes early to complete paperwork.            Apr 04, 2018  8:00 AM CDT   Post Op with Juan Alberto Davis MD   [facility] ([address]  [place] [address] [phone]              Who to contact     If you have questions or need follow up information about today's clinic visit or your schedule please contact [facility] directly at [phone].  Normal or non-critical lab and imaging results will be communicated to you by MyChart, letter or phone within 4 business days after the clinic has received the results. If you do not hear from us within 7 days, please contact the clinic through MyChart or phone. If you have a critical or abnormal lab result, we will notify you by phone as soon as possible.  Submit refill requests through [employer] or call your pharmacy and they will forward the refill request to us. Please allow 3 business days for your refill to be completed.          Additional Information About Your Visit        [employer] Information     [employer] gives you secure access to your electronic health record. If you see a primary care provider, you can also send messages to your care team and make appointments. If you have questions, please call your primary care clinic.  If you do not have a primary care provider, please call [phone] and they will assist you.        Care EveryWhere ID     This is your Care EveryWhere ID. This could be used by other organizations to access your [facility] medical records  [id number]        Your Vitals Were     Pulse Temperature Respirations Height Pulse Oximetry BMI (Body Mass Index)    92 98.6  F (37  C) (Oral) 20 5' 4\" (1.626 m) 97% 47.89 kg/m2 "       Blood Pressure from Last 3 Encounters:   02/28/18 118/76   12/19/17 127/78   12/06/17 114/76    Weight from Last 3 Encounters:   02/28/18 279 lb (126.6 kg)   12/19/17 275 lb (124.7 kg)   11/29/17 275 lb (124.7 kg)              We Performed the Following     Basic metabolic panel     CBC with platelets differential     EKG 12-lead complete w/read - Clinics        Primary Care Provider Office Phone # Fax #    Verena Vee [phone]       [address] [facility] [address]        Equal Access to Services     [facility]: Hadii celia martinez hadasho Soomaali, waaxda luqadaha, qaybta kaalmada ademartinyada, roverto munoz . So [facility] [phone].    ATENCIÓN: Si habla español, tiene a alamo disposición servicios gratuitos de asistencia lingüística. [facility] [phone].    We comply with applicable federal civil rights laws and [place] laws. We do not discriminate on the basis of race, color, national origin, age, disability, sex, sexual orientation, or gender identity.            Thank you!     Thank you for choosing [facility]  for your care. Our goal is always to provide you with excellent care. Hearing back from our patients is one way we can continue to improve our services. Please take a few minutes to complete the written survey that you may receive in the mail after your visit with us. Thank you!             Your Updated Medication List - Protect others around you: Learn how to safely use, store and throw away your medicines at www.disposemymeds.org.          This list is accurate as of 2/28/18  7:44 AM.  Always use your most recent med list.                   Brand Name Dispense Instructions for use Diagnosis    ASPIRIN CAPS 81 MG OR      1 CAPSULE DAILY        atorvastatin 40 MG tablet    LIPITOR    90 tablet    TAKE 1 TABLET EVERY DAY    Hyperlipidemia with target LDL less than 130       CALCIUM GUMMIES PO           levothyroxine 137 MCG tablet     SYNTHROID/LEVOTHROID    90 tablet    TAKE 1 TABLET EVERY DAY    Acquired hypothyroidism       lisinopril 20 MG tablet    PRINIVIL/ZESTRIL    90 tablet    Take 1 tablet (20 mg) by mouth daily    Hypertension goal BP (blood pressure) < 150/90       MULTI-VITAMIN GUMMIES PO           OMEGA-3 KRILL OIL PO           order for DME     1 Units    autoCPAP 7-12 cm    JOSE (obstructive sleep apnea)       VITAMIN D (ERGOCALCIFEROL) PO      Take 3,000 Units by mouth daily

## 2018-02-28 NOTE — PROGRESS NOTES
[facility]  [address] [place] [address]  [phone]  Dept: [phone]    PRE-OP EVALUATION:  Today's date: 2018    Gina Yeh (: 1945) presents for pre-operative evaluation assessment as requested by Dr. Davis & Dr. Medina.  She requires evaluation and anesthesia risk assessment prior to undergoing surgery/procedure for treatment of D&C, Laproscopy, Bladder Sling.    Fax number for surgical facility:    Primary Physician: Verena Vee  Type of Anesthesia Anticipated: to be determined    Patient has a Health Care Directive or Living Will:  YES     Preop Questions 2018   Who is doing your surgery? Dr Davis & Dr Medina   What are you having done? D&C, lapriscopy, bladder sling insertion   Date of Surgery/Procedure: 2018   Facility or Hospital where procedure/surgery will be performed: [facility]   1.  Do you have a history of Heart attack, stroke, stent, coronary bypass surgery, or other heart surgery? No   2.  Do you ever have any pain or discomfort in your chest? No     3.  Do you have a history of  Heart Failure? No   4.   Are you troubled by shortness of breath when:  walking on a level surface, or up a slight hill, or at night? YES -     5.  Do you currently have a cold, bronchitis or other respiratory infection? No   6.  Do you have a cough, shortness of breath, or wheezing? YES -     7.  Do you sometimes get pains in the calves of your legs when you walk? No   8. Do you or anyone in your family have previous history of blood clots? No   9.  Do you or does anyone in your family have a serious bleeding problem such as prolonged bleeding following surgeries or cuts? No   10. Have you ever had problems with anemia or been told to take iron pills? YES -     11. Have you had any abnormal blood loss such as black, tarry or bloody stools, or abnormal vaginal bleeding? No   12. Have you ever had a blood transfusion? No   13. Have you or  any of your relatives ever had problems with anesthesia? YES -     14. Do you have sleep apnea, excessive snoring or daytime drowsiness? YES - JOSE    15. Do you have any prosthetic heart valves? No   16. Do you have prosthetic joints? No   17. Is there any chance that you may be pregnant? No       HPI:   Gina Yeh is a 73 year old female who presents with incontinence. Symptom onset has been gradual, worsening for a time period of years. Severity is described as moderate. Course of her symptoms over time is worsening.    See problem list for active medical problems.  Problems all longstanding and stable, except as noted/documented.  See ROS for pertinent symptoms related to these conditions.                                                                                                  .    MEDICAL HISTORY:     Patient Active Problem List    Diagnosis Date Noted     Mass of right ovary 11/06/2017     Priority: High     Morbidly obese (H) 05/29/2012     Priority: High     Bariatric surgery consult offered       Hypertension goal BP (blood pressure) < 150/90      Priority: High     Hyperlipidemia with target LDL less than 130      Priority: High     Endometrial stripe increased 11/09/2017     Priority: Medium     Microscopic hematuria 10/26/2017     Priority: Medium     Urologic evaluation complete; yearly UA       Restless legs syndrome (RLS) 08/30/2017     Priority: Medium     Iron deficiency 08/30/2017     Priority: Medium     Noted Aug 2017.    Rec start on daily ferrous sulfate and recent in Nov/Dec.       JOSE (obstructive sleep apnea) 07/24/2015     Priority: Medium     Diagnostic Study 9/12/2017 - (271.0 lbs) AHI 78.7, RDI 91.3, Supine AHI 73.5, REM AHI -, Low O2 79.5%, Time Spent ?88% 22.9 minutes.  Titration Study: 9/26/2017- (271.0 lbs) The patient was titrated at pressures ranging from 5 cmH2O up to 8 cmH2O.  The optimal pressure achieved was 8 cmH2O with a residual AHI of 1.1 events per hour.  Time  in REM supine on final pressure was 22.5 minutes.  PLM index was 25.6 movements per hour.  The PLM Arousal Index was 8.0 per hour.       Impaired glucose tolerance 05/29/2012     Priority: Medium     Hypothyroid      Priority: Medium     Sensorineural hearing loss      Priority: Low     MMT (medial meniscus tear) 10/13/2014     Priority: Low     History of cervical dysplasia 05/28/2012     Priority: Low     Advanced directives, counseling/discussion 06/02/2011     Priority: Low     Advance Care Planning 1/7/2016: Receipt of ACP document:  Received: Health Care Directive which was witnessed or notarized on 11/25/15.  Document not previously scanned.  Validation form completed and sent with document to be scanned.  Code Status reflects choices in most recent ACP document.  Confirmed/documented designated decision maker(s).  Added by Melina Griffin    Health Care Directive on file 11/25/15        Past Medical History:   Diagnosis Date     Arthritis      Cervical dysplasia 1988     Hyperlipidemia LDL goal < 130      Hypertension goal BP (blood pressure) < 140/90      Hypothyroid      Impaired glucose tolerance 5/29/2012     Microscopic hematuria      MMT (medial meniscus tear) 9/21/2006    LT     Moderate recurrent major depression (H) 2/13/2014     Morbid obesity (H)      JOSE (obstructive sleep apnea) 7/24/2015    Doesn't use cpap     Sensorineural hearing loss      Vitamin D insufficiency      Past Surgical History:   Procedure Laterality Date     BIOPSY       CERVIX SURGERY  1988    cervical cone     COLONOSCOPY       TONSILLECTOMY & ADENOIDECTOMY       Current Outpatient Prescriptions   Medication Sig Dispense Refill     atorvastatin (LIPITOR) 40 MG tablet TAKE 1 TABLET EVERY DAY 90 tablet 3     levothyroxine (SYNTHROID/LEVOTHROID) 137 MCG tablet TAKE 1 TABLET EVERY DAY 90 tablet 3     lisinopril (PRINIVIL/ZESTRIL) 20 MG tablet Take 1 tablet (20 mg) by mouth daily 90 tablet 3     OMEGA-3 KRILL OIL PO        order  for DME autoCPAP 7-12 cm 1 Units 0     VITAMIN D, ERGOCALCIFEROL, PO Take 3,000 Units by mouth daily       Calcium-Phosphorus-Vitamin D (CALCIUM GUMMIES PO)        Multiple Vitamins-Minerals (MULTI-VITAMIN GUMMIES PO)        ASPIRIN CAPS 81 MG OR 1 CAPSULE DAILY       OTC products: None, except as noted above    Allergies   Allergen Reactions     Nkda [No Known Drug Allergies]       Latex Allergy: NO    Social History   Substance Use Topics     Smoking status: Former Smoker     Packs/day: 0.50     Years: 49.00     Types: Cigarettes     Start date: 5/1/1960     Quit date: 5/1/2011     Smokeless tobacco: Former User     Quit date: 4/25/2011     Alcohol use No     History   Drug Use No     Social History     Social History     Marital status:      Spouse name: Umberto     Number of children: 2     Years of education: 13     Occupational History     book keeper  Retired     Social History Main Topics     Smoking status: Former Smoker     Packs/day: 0.50     Years: 49.00     Types: Cigarettes     Start date: 5/1/1960     Quit date: 5/1/2011     Smokeless tobacco: Former User     Quit date: 4/25/2011     Alcohol use No     Drug use: No     Sexual activity: Not Currently     Partners: Male     Birth control/ protection: Post-menopausal     Other Topics Concern     Parent/Sibling W/ Cabg, Mi Or Angioplasty Before 65f 55m? No     Social History Narrative     Family History   Problem Relation Age of Onset     Allergies Mother      Cardiovascular Mother      Respiratory Mother      Thyroid Disease Mother      Other Cancer Mother      skin     Arthritis Father      Hypertension Father      Hyperlipidemia Father      CEREBROVASCULAR DISEASE Father      CANCER Maternal Grandmother      bone     Other Cancer Maternal Grandmother      bone     CEREBROVASCULAR DISEASE Paternal Grandmother      CANCER Paternal Grandfather      CEREBROVASCULAR DISEASE Paternal Grandfather      CANCER Brother      testicular      GASTROINTESTINAL  "DISEASE Brother      Other Cancer Brother      testicular     Allergies Sister      Allergies Daughter      GASTROINTESTINAL DISEASE Daughter      Depression Daughter      Anesthesia Reaction Daughter      Genitourinary Problems Brother      Allergies Daughter 10     metal     DIABETES No family hx of       REVIEW OF SYSTEMS:   CONSTITUTIONAL: NEGATIVE for fever, chills, change in weight  INTEGUMENTARY/SKIN: NEGATIVE for worrisome rashes, moles or lesions  EYES: NEGATIVE for vision changes or irritation  ENT/MOUTH: NEGATIVE for ear, mouth and throat problems  RESP:SOB/dyspnea  CV: NEGATIVE for chest pain, palpitations or peripheral edema  GI: NEGATIVE for nausea, abdominal pain, heartburn, or change in bowel habits   female: incontinence-stress  MUSCULOSKELETAL: chronic shoulder pain   NEURO: NEGATIVE for weakness, dizziness or paresthesias  ENDOCRINE: NEGATIVE for temperature intolerance, skin/hair changes  HEME: NEGATIVE for bleeding problems  PSYCHIATRIC: NEGATIVE for changes in mood or affect  Complete ROS otherwise negative.     EXAM:   /76 (BP Location: Left arm, Patient Position: Chair, Cuff Size: Adult Large)  Pulse 92  Temp 98.6  F (37  C) (Oral)  Resp 20  Ht 5' 4\" (1.626 m)  Wt 279 lb (126.6 kg)  SpO2 97%  BMI 47.89 kg/m2    GENERAL APPEARANCE: alert, no distress and morbidly obese     EYES: EOMI, PERRL     HENT: ear canals and TM's normal and nose and mouth without ulcers or lesions     NECK: no adenopathy, no asymmetry, masses, or scars and thyroid normal to palpation     RESP: lungs clear to auscultation - no rales, rhonchi or wheezes     CV: regular rates and rhythm, normal S1 S2, no S3 or S4 and no murmur, click or rub     ABDOMEN:  soft, nontender, no HSM or masses and bowel sounds normal     MS: extremities normal- no gross deformities noted, no evidence of inflammation in joints, FROM in all extremities.     SKIN: no suspicious lesions or rashes     NEURO: Normal strength and tone, " sensory exam grossly normal, mentation intact and speech normal     PSYCH: mentation appears normal. and affect normal/bright     LYMPHATICS: No cervical adenopathy    DIAGNOSTICS:   EKG: appears normal, NSR, normal axis, normal intervals, no acute ST/T changes c/w ischemia, no LVH by voltage criteria    Recent Labs   Lab Test  10/25/17   1037  08/30/17   0915   07/02/15   1113   HGB   --    --    --   13.9   PLT   --    --    --   293   NA  136  138   < >  140   POTASSIUM  4.5  4.1   < >  4.2   CR  0.95  0.91   < >  0.90   A1C  5.6   --    --    --     < > = values in this interval not displayed.        IMPRESSION:   Reason for surgery/procedure: endometrial strip increased, right ovary mass, stress incontinence   Diagnosis/reason for consult: morbid obesity with JOSE, hypertension, Hx iron deficiency anemia     The proposed surgical procedure is considered INTERMEDIATE risk.    REVISED CARDIAC RISK INDEX  The patient has the following serious cardiovascular risks for perioperative complications such as (MI, PE, VFib and 3  AV Block):  No serious cardiac risks  INTERPRETATION: 0 risks: Class I (very low risk - 0.4% complication rate)    The patient has the following additional risks for perioperative complications:  No identified additional risks      ICD-10-CM    1. Preop general physical exam Z01.818 EKG 12-lead complete w/read - Clinics   2. Mass of right ovary N83.9    3. Endometrial stripe increased R93.8    4. Female stress incontinence N39.3    5. Morbidly obese (H) E66.01    6. JOSE (obstructive sleep apnea) G47.33    7. Hypertension goal BP (blood pressure) < 150/90 I10    8. Iron deficiency E61.1        RECOMMENDATIONS:   --Patient is to take all scheduled medications on the day of surgery EXCEPT for modifications listed below.    Anticoagulant or Antiplatelet Medication Use  ASPIRIN: Discontinue ASA 7-10 days prior to procedure to reduce bleeding risk.  It should be resumed post-operatively.        ACE  Inhibitor or Angiotensin Receptor Blocker (ARB) Use  Ace inhibitor or Angiotensin Receptor Blocker (ARB) and should HOLD this medication for the 24 hours prior to surgery.      APPROVAL GIVEN to proceed with proposed procedure, without further diagnostic evaluation       Signed Electronically by: Verena Vee MD    Copy of this evaluation report is provided to requesting physician.    [place] Preop Guidelines

## 2018-03-02 ENCOUNTER — OFFICE VISIT (OUTPATIENT)
Dept: OBGYN | Facility: CLINIC | Age: 73
End: 2018-03-02
Payer: MEDICARE

## 2018-03-02 VITALS
DIASTOLIC BLOOD PRESSURE: 78 MMHG | WEIGHT: 276 LBS | HEART RATE: 82 BPM | OXYGEN SATURATION: 96 % | SYSTOLIC BLOOD PRESSURE: 130 MMHG | BODY MASS INDEX: 47.38 KG/M2

## 2018-03-02 DIAGNOSIS — N95.0 POSTMENOPAUSAL BLEEDING: ICD-10-CM

## 2018-03-02 DIAGNOSIS — R93.89 ENDOMETRIAL STRIPE INCREASED: ICD-10-CM

## 2018-03-02 DIAGNOSIS — N83.291 COMPLEX CYST OF RIGHT OVARY: Primary | ICD-10-CM

## 2018-03-02 PROCEDURE — 99214 OFFICE O/P EST MOD 30 MIN: CPT | Performed by: OBSTETRICS & GYNECOLOGY

## 2018-03-02 NOTE — MR AVS SNAPSHOT
After Visit Summary   3/2/2018    Gina Yeh    MRN: [id number]           Patient Information     Date Of Birth          1945        Visit Information        Provider Department      3/2/2018 7:45 AM Juan Alberto Davis MD [facility]        Today's Diagnoses     Complex cyst of right ovary    -  1    Endometrial stripe increased        Postmenopausal bleeding           Follow-ups after your visit        Your next 10 appointments already scheduled     Apr 04, 2018  8:00 AM CDT   Post Op with Juan Alberto Davis MD   [facility] ([address] [address] [phone]              Who to contact     If you have questions or need follow up information about today's clinic visit or your schedule please contact [facility] directly at [phone].  Normal or non-critical lab and imaging results will be communicated to you by MyChart, letter or phone within 4 business days after the clinic has received the results. If you do not hear from us within 7 days, please contact the clinic through [employer] or phone. If you have a critical or abnormal lab result, we will notify you by phone as soon as possible.  Submit refill requests through [employer] or call your pharmacy and they will forward the refill request to us. Please allow 3 business days for your refill to be completed.          Additional Information About Your Visit        MyChart Information     [employer] gives you secure access to your electronic health record. If you see a primary care provider, you can also send messages to your care team and make appointments. If you have questions, please call your primary care clinic.  If you do not have a primary care provider, please call [phone] and they will assist you.        Care EveryWhere ID     This is your Care EveryWhere ID. This could be used by other organizations to access your [facility]  medical records  [id number]        Your Vitals Were     Pulse Pulse Oximetry BMI (Body Mass Index)             82 96% 47.38 kg/m2          Blood Pressure from Last 3 Encounters:   03/02/18 130/78   02/28/18 118/76   12/19/17 127/78    Weight from Last 3 Encounters:   03/02/18 276 lb (125.2 kg)   02/28/18 279 lb (126.6 kg)   12/19/17 275 lb (124.7 kg)              Today, you had the following     No orders found for display       Primary Care Provider Office Phone # Fax #    eVrena Vee [phone]       [address] [facility] [address]        Equal Access to Services     CHELSI STEPHENS : Hadii celia hickeyo Solayla, waaxda luqadaha, qaybta kaalmada adeegyada, roverto munoz . So [facility] [phone].    ATENCIÓN: Si habla español, tiene a alamo disposición servicios gratuitos de asistencia lingüística. Ll[facility] [phone].    We comply with applicable federal civil rights laws and [place] laws. We do not discriminate on the basis of race, color, national origin, age, disability, sex, sexual orientation, or gender identity.            Thank you!     Thank you for choosing [facility]  for your care. Our goal is always to provide you with excellent care. Hearing back from our patients is one way we can continue to improve our services. Please take a few minutes to complete the written survey that you may receive in the mail after your visit with us. Thank you!             Your Updated Medication List - Protect others around you: Learn how to safely use, store and throw away your medicines at www.disposemymeds.org.          This list is accurate as of 3/2/18 11:59 PM.  Always use your most recent med list.                   Brand Name Dispense Instructions for use Diagnosis    ASPIRIN CAPS 81 MG OR      1 CAPSULE DAILY        atorvastatin 40 MG tablet    LIPITOR    90 tablet    TAKE 1 TABLET EVERY DAY    Hyperlipidemia with target LDL less than 130        CALCIUM GUMMIES PO           levothyroxine 137 MCG tablet    SYNTHROID/LEVOTHROID    90 tablet    TAKE 1 TABLET EVERY DAY    Acquired hypothyroidism       lisinopril 20 MG tablet    PRINIVIL/ZESTRIL    90 tablet    Take 1 tablet (20 mg) by mouth daily    Hypertension goal BP (blood pressure) < 150/90       MULTI-VITAMIN GUMMIES PO           OMEGA-3 KRILL OIL PO           order for DME     1 Units    autoCPAP 7-12 cm    JOSE (obstructive sleep apnea)       VITAMIN D (ERGOCALCIFEROL) PO      Take 3,000 Units by mouth daily

## 2018-03-02 NOTE — PROGRESS NOTES
"Gina is a 72 year old female, , who presents today for further management of the endometrial thickening and the ovarian cyst.  She has also had incontinence.  She would like to have surgical management for the ovarian cyst and the endometrial thickening and the incontinence.   She was noted to have hematuria at the time of her annual exam 2017, and CT scan was ordered.  There was a 4.2 cm indeterminate right ovarian cystic lesion. \"Ovarian neoplasm cannot be excluded from this appearance. Pelvic ultrasound is recommended for further characterization.\"  She then had the ultrasound performed on 2017, and the endometrial stripe was 5 mm and a complex ovarian cyst was seen.     IMPRESSION:   1. Two right ovarian cysts. One is mildly complex and the smaller  adjacent cyst is simple in appearance. These may represent follicles.  The larger lesion measures smaller than on the prior CT. These are  likely physiologic in nature. No further followup required unless  clinically warranted.  2. Left ovary not visualized.  3. Borderline endometrial thickening at 5 mm.     She desired to have the SIS performed.    The SIS was attempted and it was not able to be performed.  We reviewed the past ultrasound and films and the current SIS report and films.       Pelvic ultrasound and attempted sonohysterogram    Comparisons: Pelvic ultrasound 2017, CT of the abdomen and pelvis  2017    HISTORY: Endometrial thickening on ultrasound.    FINDINGS: The uterus measures 6.9 x 3.3 x 4.0 cm. The endometrial  stripe measures 10 mm thick. No definite internal blood flow. The  right and left ovaries measure 6.6 x 4.1 x 3.5 cm and 1.1 x 1.7 x 1.2  cm, respectively. In the right ovary there is a cystic mass with  multiple thin septations measuring 4.1 x 3.5 x 5.0 cm. There is a  small benign-appearing cyst in the left ovary measuring up to 1.0 cm.    Sonohysterography was attempted, however the cervix could not " be  cannulated despite multiple attempts.     IMPRESSION:  1. Unsuccessful sonohysterography due to inability to cannulate the  cervix.  2. Thickened endometrial stripe measuring up to 10 mm in this  postmenopausal woman. Recommend surgical consultation.  3. 5.0 cm cystic mass in the right ovary with multiple thin  septations. Recommend pelvic MRI or surgical consultation.       The patient and I reviewed the approaches for the oophorectomy.   We discussed the different routes of surgery including abdominal and laparoscopic and the possibility that the route of surgery may change during the procedure.   The ACOG pamphlets have been given and reviewed with the patient. The risks of surgery were reviewed and include, but are not limited to, death, brain damage, paralysis of any or all limbs, loss of an organ, function of an organ, disfiguring scars, bleeding, infection, damage to the ovaries, bowel, bladder and vagina.  In addition, there is a possibility of other procedures that might be deemed necessary at the time of the surgery, depending upon findings and/or complications.  This may also include laparoscopy, laparotomy, and rarely colostomy (which often times can be reversible in the future).  Risks with blood include but are not limited to HIV/AIDS, hepatitis and transfusion reactions, with transfusion reactions being the greatest risk.    We reviewed pre and post op course. Pre op enemas were reviewed.  NPO after MN.  Post op pain management, ambulation, barnard packing's were also reviewed.  Discharge precautions, lifting restrictions, driving restrictions as well as the pelvic activity restrictions were reviewed with her.  Patient was given the opportunity to ask questions and have them answered.  The medical history, social history and family history are documented in the electronic record in the appropriate sections.  No   updates at this visit.      Review of Systems:  10 point ROS of systems including  Constitutional, Eyes, Respiratory, Cardiovascular, Gastroenterology, Genitourinary, Integumentary, Muscularskeletal, Psychiatric were all negative except for pertinent positives noted in my HPI and in the PMH.      Exam  /78 (BP Location: Right arm, Cuff Size: Adult Large)  Pulse 82  Wt 276 lb (125.2 kg)  SpO2 96%  BMI 47.38 kg/m2  General:  WNWD female, NAD  Alert  Oriented x 3  Gait:  Normal  Skin:  Normal skin turgor  HEENT:  NC/AT, EOMI  Abdomen:  Non-tender, non-distended.  Pelvic exam:  Not performed  Extremities:  No clubbing, no cyanosis and no edema.      Assessment  Complex ovarian cyst  Endometrial thickening on ultrasound  Postmenopausal bleeding      Plan  She desires to proceed with the oophorectomy and the D&C/endometrial sampling.  It is possible that the D&C might not be able to be performed as the SIS was not able to be performed.   Questions seem to be answered.     TT 30 min  CT and review of records, greater than 50%, as noted above in the HPI.     Juan Alberto Davis MD

## 2018-03-02 NOTE — NURSING NOTE
"Chief Complaint   Patient presents with     RECHECK     pre surgery discussion       Initial /78 (BP Location: Right arm, Cuff Size: Adult Large)  Pulse 82  Wt 276 lb (125.2 kg)  SpO2 96%  BMI 47.38 kg/m2 Estimated body mass index is 47.38 kg/(m^2) as calculated from the following:    Height as of 2/28/18: 5' 4\" (1.626 m).    Weight as of this encounter: 276 lb (125.2 kg).  Medication Reconciliation: yousif Cabrales MA 3/2/2018         "

## 2018-03-19 ENCOUNTER — TRANSFERRED RECORDS (OUTPATIENT)
Dept: HEALTH INFORMATION MANAGEMENT | Facility: CLINIC | Age: 73
End: 2018-03-19

## 2018-03-21 PROBLEM — R93.89 ENDOMETRIAL STRIPE INCREASED: Status: RESOLVED | Noted: 2017-11-09 | Resolved: 2018-03-21

## 2018-03-21 PROBLEM — N83.8 MASS OF RIGHT OVARY: Status: RESOLVED | Noted: 2017-11-06 | Resolved: 2018-03-21

## 2018-03-24 ENCOUNTER — MYC MEDICAL ADVICE (OUTPATIENT)
Dept: FAMILY MEDICINE | Facility: CLINIC | Age: 73
End: 2018-03-24

## 2018-03-26 NOTE — TELEPHONE ENCOUNTER
ED / Discharge Outreach Protocol    Patient Contact    Attempt # 1    Was call answered?  No.  Left message on voicemail with information to call me back.    Chelsie Martin RN  [facility]

## 2018-03-26 NOTE — TELEPHONE ENCOUNTER
This patient was discharged from [facility] on 3/20/2018.    Discharge Diagnosis: Complex right ovarian cyst- final Pathology pending      A follow-up visit has not been scheduled.   Next 5 appointments (look out 90 days)     Apr 18, 2018  9:00 AM CDT   [facility] K[facility]ik Benitez MD   [facility]    [address] [address]   [phone]              Number of ED/ER visits in the last 12 months:    Please follow-up with patient.    Sara Catalan,

## 2018-03-26 NOTE — TELEPHONE ENCOUNTER
"Hospital/TCU/ED for chronic condition Discharge Protocol    \"Hi, my name is Chelsie Martin, a registered nurse, and I am calling from [facility] ([facility]).  I am calling to follow up and see how things are going for you after your recent emergency visit/hospital/TCU stay.\"    Tell me how you are doing now that you are home?\"  Cough and phlegm has improved. Only had to cough a couple of times. Still has swelling in lower extremities. Is walking a lot up and down the halls. Not in much pain. No pain in her extremities and knees.      Discharge Instructions    \"Let's review your discharge instructions.  What is/are the follow-up recommendations?  Pt. Response: Walk 5 minutes per day, then increase to 10, then increase by 5, by the end of 2 weeks should walk for 30 minutes.    \"Has an appointment with your primary care provider been scheduled?\"   No (schedule appointment), appt scheduled. States she cannot drive for another week, so she requested to schedule an appt for the same day around the same time as her OB appt on 4/4. Appt scheduled.    \"When you see the provider, I would recommend that you bring your medications with you.\"    Medications    \"Tell me what changed about your medicines when you discharged?\"    Changes to chronic meds?    0-1    \"What questions do you have about your medications?\"    None     New diagnoses of heart failure, COPD, diabetes, or MI?    No              Medication reconciliation completed? Yes  Was MTM referral placed (*Make sure to put transitions as reason for referral)?   No    Call Summary    \"What questions or concerns do you have about your recent visit and your follow-up care?\"     none    \"If you have questions or things don't continue to improve, we encourage you contact us through the main clinic number (give number).  Even if the clinic is not open, triage nurses are available 24/7 to help you.     We would like you to know that our clinic has extended hours (provide " "information).  We also have urgent care (provide details on closest location and hours/contact info)\"      \"Thank you for your time and take care!\"           "

## 2018-03-30 ENCOUNTER — MYC MEDICAL ADVICE (OUTPATIENT)
Dept: OBGYN | Facility: CLINIC | Age: 73
End: 2018-03-30

## 2018-03-30 NOTE — TELEPHONE ENCOUNTER
Message handled by Nurse Triage with [employer] - provider name: Dr Davis. Responded to patient via My Chart.  Next 5 appointments (look out 90 days)     Apr 04, 2018  7:40 AM CDT   [facility] Verena Vee MD   [facility]    [address] [facility] [address] [facility] [address]   [phone]            Apr 18, 2018  9:00 AM CDT   Return Visit with Karthik Benitez MD   [facility]    [address] [address]   [phone]                Milli Hendrix RN

## 2018-04-03 ENCOUNTER — PATIENT OUTREACH (OUTPATIENT)
Dept: CARE COORDINATION | Facility: CLINIC | Age: 73
End: 2018-04-03

## 2018-04-03 NOTE — PROGRESS NOTES
"  SUBJECTIVE:   Gina Yeh is a 73 year old female who presents to clinic today for the following health issues:    Hospital Follow-up Visit:    Hospital/Nursing Home/ Rehab Facility: [facility]  Date of Admission: 3-19-18  Date of Discharge: 3-20-18  Reason(s) for Admission: post menopausal bleeding and right ovarian cyst, s/p oophorectomy and endometrial curettage             Problems taking medications regularly:  None       Medication changes since discharge: None       Problems adhering to non-medication therapy:  None     Summary of hospitalization:  CareEverywhere information obtained and reviewed  Diagnostic Tests/Treatments reviewed.  Follow up needed: none  Other Healthcare Providers Involved in Patient s Care:         Specialist appointment - Dr. Davis, gynecologist, today  Update since discharge: improved.      Post Discharge Medication Reconciliation: discharge medications reconciled, continue medications without change.  Plan of care communicated with patient     Coding guidelines for this visit:  Type of Medical   Decision Making Face-to-Face Visit       within 7 Days of discharge Face-to-Face Visit        within 14 days of discharge   Moderate Complexity [id number]   High Complexity [id number]          I have reviewed the patient's medical history in detail and updated the computerized patient record.     ROS:  CONSTITUTIONAL: NEGATIVE for fever, chills, change in weight  INTEGUMENTARY/SKIN: NEGATIVE for worrisome rashes, moles or lesions  RESP: NEGATIVE for significant cough or SOB  CV: NEGATIVE for chest pain, palpitations or peripheral edema  MUSCULOSKELETAL: NEGATIVE for significant arthralgias or myalgia  HEME: NEGATIVE for bleeding problems    OBJECTIVE:     /76  Pulse 78  Temp 97.6  F (36.4  C) (Oral)  Resp 24  Ht 5' 4\" (1.626 m)  Wt 273 lb (123.8 kg)  SpO2 97%  Breastfeeding? No  BMI 46.86 kg/m2  Body mass index is 46.86 kg/(m^2).  GENERAL: alert, no " distress and obese  RESP: lungs clear to auscultation - no rales, rhonchi or wheezes  CV: regular rate and rhythm, normal S1 S2, no S3 or S4, no murmur, click or rub  ABDOMEN: soft, nontender, without hepatosplenomegaly or masses and bowel sounds normal  MS: extremities normal- no gross deformities noted  SKIN: healing abdomina wound   PSYCH: mentation appears normal, affect normal/bright    Diagnostic Test Results:  Results for orders placed or performed in visit on 02/28/18   CBC with platelets differential   Result Value Ref Range    WBC 9.2 4.0 - 11.0 10e9/L    RBC Count 4.93 3.8 - 5.2 10e12/L    Hemoglobin 13.6 11.7 - 15.7 g/dL    Hematocrit 43.0 35.0 - 47.0 %    MCV 87 78 - 100 fl    MCH 27.6 26.5 - 33.0 pg    MCHC 31.6 31.5 - 36.5 g/dL    RDW 15.1 (H) 10.0 - 15.0 %    Platelet Count 263 150 - 450 10e9/L    Diff Method Automated Method     % Neutrophils 79.8 %    % Lymphocytes 11.9 %    % Monocytes 6.5 %    % Eosinophils 1.3 %    % Basophils 0.5 %    Absolute Neutrophil 7.3 1.6 - 8.3 10e9/L    Absolute Lymphocytes 1.1 0.8 - 5.3 10e9/L    Absolute Monocytes 0.6 0.0 - 1.3 10e9/L    Absolute Eosinophils 0.1 0.0 - 0.7 10e9/L    Absolute Basophils 0.1 0.0 - 0.2 10e9/L   Basic metabolic panel   Result Value Ref Range    Sodium 140 133 - 144 mmol/L    Potassium 4.4 3.4 - 5.3 mmol/L    Chloride 105 94 - 109 mmol/L    Carbon Dioxide 29 20 - 32 mmol/L    Anion Gap 6 3 - 14 mmol/L    Glucose 103 (H) 70 - 99 mg/dL    Urea Nitrogen 22 7 - 30 mg/dL    Creatinine 0.88 0.52 - 1.04 mg/dL    GFR Estimate 63 >60 mL/min/1.7m2    GFR Estimate If Black 76 >60 mL/min/1.7m2    Calcium 9.5 8.5 - 10.1 mg/dL       ASSESSMENT/PLAN:   (Z09) Hospital discharge follow-up  (primary encounter diagnosis)  (Z90.722) S/P right oophorectomy  (E66.01) Morbidly obese (H)  Comment: doing well   Plan: follow-up as planned     See Patient Instructions    Verena Vee MD  [facility]

## 2018-04-03 NOTE — PROGRESS NOTES
Clinic Care Coordination Contact  [facility] Care Coordination Outreach    Patient was enrolled in [facility] upon                Clinical Data:       Patient indicated some lightheadedness on survey. Patient has vertigo and seems to be worse since admission. Patient has follow up with PCP tomorrow and is doing exercises to help with dizziness.  No other concerns reported.        Plan: RN CC will continue to monitor patients responses. Will plan to outreach as needed and with any new variances or concerns.     Rei Carlson RN  Clinic Care Coordinator  [phone] or [phone]

## 2018-04-03 NOTE — PATIENT INSTRUCTIONS
It is recommended that you get a vaccination for shingles called Shingrix (given as 2 shots, 1 to 6 months apart), even if you have already had the Zostavax vaccine. Discuss getting the Shingix vaccine from your pharmacist, or schedule an ancillary shot visit here. Some insurances do not cover the cost of these vaccines.      [facility]    If you have any questions regarding to your visit please contact your care team:       Team Red:   Clinic Hours Telephone Number   Dr. Verena Jesus, NP   7am-7pm  Monday - Thursday   7am-5pm  Fridays  [phone]  (Appointment scheduling available 24/7)    Questions about your visit?   Team Line  [phone]   Urgent Care - [place] and [facility] - 11am-9pm Monday-Friday Saturday-Sunday- 9am-5pm   [place] - 5pm-9pm Monday-Friday Saturday-Sunday- 9am-5pm  [phone] - [facility]  [phone] - [place]       What options do I have for visits at the clinic other than the traditional office visit?  To expand how we care for you, many of our providers are utilizing electronic visits (e-visits) and telephone visits, when medically appropriate, for interactions with their patients rather than a visit in the clinic.   We also offer nurse visits for many medical concerns. Just like any other service, we will bill your insurance company for this type of visit based on time spent on the phone with your provider. Not all insurance companies cover these visits. Please check with your medical insurance if this type of visit is covered. You will be responsible for any charges that are not paid by your insurance.      E-visits via [employer]:  generally incur a $35.00 fee.  Telephone visits:  Time spent on the phone: *charged based on time that is spent on the phone in increments of 10 minutes. Estimated cost:   5-10 mins $30.00   11-20 mins. $59.00   21-30 mins. $85.00     Use [employer] (secure email  communication and access to your chart) to send your primary care provider a message or make an appointment. Ask someone on your Team how to sign up for [employer].  For a Price Quote for your services, please call our [employer] Price Line at [phone].      As always, Thank you for trusting us with your health care needs!  Discharged By:  CATHY MCKEON

## 2018-04-04 ENCOUNTER — PATIENT OUTREACH (OUTPATIENT)
Dept: CARE COORDINATION | Facility: CLINIC | Age: 73
End: 2018-04-04

## 2018-04-04 ENCOUNTER — OFFICE VISIT (OUTPATIENT)
Dept: FAMILY MEDICINE | Facility: CLINIC | Age: 73
End: 2018-04-04
Payer: MEDICARE

## 2018-04-04 ENCOUNTER — OFFICE VISIT (OUTPATIENT)
Dept: OBGYN | Facility: CLINIC | Age: 73
End: 2018-04-04
Payer: MEDICARE

## 2018-04-04 VITALS
RESPIRATION RATE: 24 BRPM | HEIGHT: 64 IN | OXYGEN SATURATION: 97 % | DIASTOLIC BLOOD PRESSURE: 76 MMHG | BODY MASS INDEX: 46.61 KG/M2 | HEART RATE: 78 BPM | TEMPERATURE: 97.6 F | WEIGHT: 273 LBS | SYSTOLIC BLOOD PRESSURE: 114 MMHG

## 2018-04-04 VITALS
HEART RATE: 78 BPM | DIASTOLIC BLOOD PRESSURE: 76 MMHG | SYSTOLIC BLOOD PRESSURE: 114 MMHG | WEIGHT: 273 LBS | BODY MASS INDEX: 46.86 KG/M2 | OXYGEN SATURATION: 97 %

## 2018-04-04 DIAGNOSIS — Z98.890 POSTOPERATIVE STATE: Primary | ICD-10-CM

## 2018-04-04 DIAGNOSIS — E66.01 MORBIDLY OBESE (H): ICD-10-CM

## 2018-04-04 DIAGNOSIS — Z90.721 S/P RIGHT OOPHORECTOMY: ICD-10-CM

## 2018-04-04 DIAGNOSIS — Z09 HOSPITAL DISCHARGE FOLLOW-UP: Primary | ICD-10-CM

## 2018-04-04 PROCEDURE — 99213 OFFICE O/P EST LOW 20 MIN: CPT | Performed by: FAMILY MEDICINE

## 2018-04-04 PROCEDURE — 99212 OFFICE O/P EST SF 10 MIN: CPT | Performed by: OBSTETRICS & GYNECOLOGY

## 2018-04-04 RX ORDER — PNV NO.95/FERROUS FUM/FOLIC AC 28MG-0.8MG
1 TABLET ORAL DAILY
COMMUNITY

## 2018-04-04 NOTE — NURSING NOTE
"Chief Complaint   Patient presents with     Hospital F/U       Initial /76  Pulse 78  Temp 97.6  F (36.4  C) (Oral)  Resp 24  Ht 5' 4\" (1.626 m)  Wt 273 lb (123.8 kg)  SpO2 97%  Breastfeeding? No  BMI 46.86 kg/m2 Estimated body mass index is 46.86 kg/(m^2) as calculated from the following:    Height as of this encounter: 5' 4\" (1.626 m).    Weight as of this encounter: 273 lb (123.8 kg).  Medication Reconciliation: complete   Melina PINA MA      "

## 2018-04-04 NOTE — NURSING NOTE
"Chief Complaint   Patient presents with     Surgical Followup     2 week post       Initial /76 (BP Location: Right arm, Cuff Size: Adult Large)  Pulse 78  Wt 273 lb (123.8 kg)  SpO2 97%  BMI 46.86 kg/m2 Estimated body mass index is 46.86 kg/(m^2) as calculated from the following:    Height as of an earlier encounter on 4/4/18: 5' 4\" (1.626 m).    Weight as of this encounter: 273 lb (123.8 kg).  Medication Reconciliation: yousif Cabrales MA 4/4/2018         "

## 2018-04-04 NOTE — MR AVS SNAPSHOT
After Visit Summary   4/4/2018    Gina Yeh    MRN: [id number]           Patient Information     Date Of Birth          1945        Visit Information        Provider Department      4/4/2018 8:00 AM Juan Alberto Davis MD [facility]        Today's Diagnoses     Postoperative state    -  1       Follow-ups after your visit        Follow-up notes from your care team     Return if symptoms worsen or fail to improve.      Your next 10 appointments already scheduled     Apr 18, 2018  9:00 AM CDT   Return Visit with Karthki Benitez MD   [facility]    [address] [place] [id number] [phone]              Who to contact     If you have questions or need follow up information about today's clinic visit or your schedule please contact [facility] directly at [phone].  Normal or non-critical lab and imaging results will be communicated to you by MyChart, letter or phone within 4 business days after the clinic has received the results. If you do not hear from us within 7 days, please contact the clinic through MyChart or phone. If you have a critical or abnormal lab result, we will notify you by phone as soon as possible.  Submit refill requests through autoGraph or call your pharmacy and they will forward the refill request to us. Please allow 3 business days for your refill to be completed.          Additional Information About Your Visit        MyChart Information     autoGraph gives you secure access to your electronic health record. If you see a primary care provider, you can also send messages to your care team and make appointments. If you have questions, please call your primary care clinic.  If you do not have a primary care provider, please call [phone] and they will assist you.        Care EveryWhere ID     This is your Care EveryWhere ID. This could be used by other organizations to access  your [place] medical records  [id number]        Your Vitals Were     Pulse Pulse Oximetry BMI (Body Mass Index)             78 97% 46.86 kg/m2          Blood Pressure from Last 3 Encounters:   04/04/18 114/76   04/04/18 114/76   03/02/18 130/78    Weight from Last 3 Encounters:   04/04/18 273 lb (123.8 kg)   04/04/18 273 lb (123.8 kg)   03/02/18 276 lb (125.2 kg)              Today, you had the following     No orders found for display       Primary Care Provider Office Phone # Fax #    Verena Vee [phone]       [address] [facility] [address]        Equal Access to Services     CHELSI STEPHENS : Hadii celia hickeyo Solayla, waaxda luqadaha, qaybta kaalmada adeegyada, roverto munoz . So [facility] [phone].    ATENCIÓN: Si habla español, tiene a alamo disposición servicios gratuitos de asistencia lingüística. [facility] [phone].    We comply with applicable federal civil rights laws and [place] laws. We do not discriminate on the basis of race, color, national origin, age, disability, sex, sexual orientation, or gender identity.            Thank you!     Thank you for choosing [facility]  for your care. Our goal is always to provide you with excellent care. Hearing back from our patients is one way we can continue to improve our services. Please take a few minutes to complete the written survey that you may receive in the mail after your visit with us. Thank you!             Your Updated Medication List - Protect others around you: Learn how to safely use, store and throw away your medicines at www.disposemymeds.org.          This list is accurate as of 4/4/18  8:24 AM.  Always use your most recent med list.                   Brand Name Dispense Instructions for use Diagnosis    ASPIRIN CAPS 81 MG OR      1 CAPSULE DAILY        atorvastatin 40 MG tablet    LIPITOR    90 tablet    TAKE 1 TABLET EVERY DAY    Hyperlipidemia with target LDL  less than 130       CALCIUM GUMMIES PO           iron 325 (65 FE) MG tablet      Take 1 tablet by mouth daily        levothyroxine 137 MCG tablet    SYNTHROID/LEVOTHROID    90 tablet    TAKE 1 TABLET EVERY DAY    Acquired hypothyroidism       lisinopril 20 MG tablet    PRINIVIL/ZESTRIL    90 tablet    Take 1 tablet (20 mg) by mouth daily    Hypertension goal BP (blood pressure) < 150/90       MULTI-VITAMIN GUMMIES PO           OMEGA-3 KRILL OIL PO           order for DME     1 Units    autoCPAP 7-12 cm    JOSE (obstructive sleep apnea)       VITAMIN D (ERGOCALCIFEROL) PO      Take 3,000 Units by mouth daily

## 2018-04-04 NOTE — MR AVS SNAPSHOT
After Visit Summary   4/4/2018    Gina Yeh    MRN: [id number]           Patient Information     Date Of Birth          1945        Visit Information        Provider Department      4/4/2018 7:40 AM Verena Vee MD [facility]        Today's Diagnoses     Hospital discharge follow-up    -  1    S/P right oophorectomy        Morbidly obese (H)          Care Instructions    It is recommended that you get a vaccination for shingles called Shingrix (given as 2 shots, 1 to 6 months apart), even if you have already had the Zostavax vaccine. Discuss getting the Shingix vaccine from your pharmacist, or schedule an ancillary shot visit here. Some insurances do not cover the cost of these vaccines.      [facility]    If you have any questions regarding to your visit please contact your care team:       Team Red:   Clinic Hours Telephone Number   Dr. Verena Jesus, NP   7am-7pm  Monday - Thursday   7am-5pm  Fridays  [phone]  (Appointment scheduling available 24/7)    Questions about your visit?   Team Line  [phone]   Urgent Care - [facility] - 11am-9pm Monday-Friday Saturday-Sunday- 9am-5pm   [place] - 5pm-9pm Monday-Friday Saturday-Sunday- 9am-5pm  [phone] - [place]   [phone] - [place]       What options do I have for visits at the clinic other than the traditional office visit?  To expand how we care for you, many of our providers are utilizing electronic visits (e-visits) and telephone visits, when medically appropriate, for interactions with their patients rather than a visit in the clinic.   We also offer nurse visits for many medical concerns. Just like any other service, we will bill your insurance company for this type of visit based on time spent on the phone with your provider. Not all insurance companies cover these visits. Please check with your medical  insurance if this type of visit is covered. You will be responsible for any charges that are not paid by your insurance.      E-visits via Inf[employer]:  generally incur a $35.00 fee.  Telephone visits:  Time spent on the phone: *charged based on time that is spent on the phone in increments of 10 minutes. Estimated cost:   5-10 mins $30.00   11-20 mins. $59.00   21-30 mins. $85.00     Use [employer] (secure email communication and access to your chart) to send your primary care provider a message or make an appointment. Ask someone on your Team how to sign up for D[employer].  For a Price Quote for your services, please call our Tracks.by Line at [phone].      As always, Thank you for trusting us with your health care needs!  Discharged By:  CATHY MCKEON            Follow-ups after your visit        Follow-up notes from your care team     Return in about 7 months (around 10/25/2018), or if symptoms worsen or fail to improve, for Wellness visit (fasting labs up to one week prior).      Your next 10 appointments already scheduled     Apr 04, 2018  8:00 AM CDT   Post Op with Juan Alberto Davis MD   [facility] ([facility]    [address] [place] [id number]   [phone]            Apr 18, 2018  9:00 AM CDT   Return Visit with Karthik Benitez MD   [facility] ([facility])    [address] [place] [phone]   [phone]              Who to contact     If you have questions or need follow up information about today's clinic visit or your schedule please contact [facility] directly at [phone].  Normal or non-critical lab and imaging results will be communicated to you by MyChart, letter or phone within 4 business days after the clinic has received the results. If you do not hear from us within 7 days, please contact the clinic through MyChart or phone. If you have a critical or abnormal lab result, we will notify you by  "phone as soon as possible.  Submit refill requests through SimScale or call your pharmacy and they will forward the refill request to us. Please allow 3 business days for your refill to be completed.          Additional Information About Your Visit        S[employer] Information     SimScale gives you secure access to your electronic health record. If you see a primary care provider, you can also send messages to your care team and make appointments. If you have questions, please call your primary care clinic.  If you do not have a primary care provider, please call [phone] and they will assist you.        Care EveryWhere ID     This is your Care EveryWhere ID. This could be used by other organizations to access your [place] medical records  [id number]        Your Vitals Were     Pulse Temperature Respirations Height Pulse Oximetry Breastfeeding?    78 97.6  F (36.4  C) (Oral) 24 5' 4\" (1.626 m) 97% No    BMI (Body Mass Index)                   46.86 kg/m2            Blood Pressure from Last 3 Encounters:   04/04/18 114/76   03/02/18 130/78   02/28/18 118/76    Weight from Last 3 Encounters:   04/04/18 273 lb (123.8 kg)   03/02/18 276 lb (125.2 kg)   02/28/18 279 lb (126.6 kg)              Today, you had the following     No orders found for display       Primary Care Provider Office Phone # Fax #    Verena Vee [phone]       [address] [facility] [address]        Equal Access to Services     [facility]: Hadii aad ku hadasho Soomaali, waaxda luqadaha, qaybta kaalmada adeegyada, waxay chasidy munoz . So [facility] [phone].    ATENCIÓN: Si habla español, tiene a alamo disposición servicios gratuitos de asistencia lingüística. Llame al [phone].    We comply with applicable federal civil rights laws and [place] laws. We do not discriminate on the basis of race, color, national origin, age, disability, sex, sexual orientation, or gender identity.          "   Thank you!     Thank you for choosing [facility]  for your care. Our goal is always to provide you with excellent care. Hearing back from our patients is one way we can continue to improve our services. Please take a few minutes to complete the written survey that you may receive in the mail after your visit with us. Thank you!             Your Updated Medication List - Protect others around you: Learn how to safely use, store and throw away your medicines at www.disposemymeds.org.          This list is accurate as of 4/4/18  7:59 AM.  Always use your most recent med list.                   Brand Name Dispense Instructions for use Diagnosis    ASPIRIN CAPS 81 MG OR      1 CAPSULE DAILY        atorvastatin 40 MG tablet    LIPITOR    90 tablet    TAKE 1 TABLET EVERY DAY    Hyperlipidemia with target LDL less than 130       CALCIUM GUMMIES PO           iron 325 (65 FE) MG tablet      Take 1 tablet by mouth daily        levothyroxine 137 MCG tablet    SYNTHROID/LEVOTHROID    90 tablet    TAKE 1 TABLET EVERY DAY    Acquired hypothyroidism       lisinopril 20 MG tablet    PRINIVIL/ZESTRIL    90 tablet    Take 1 tablet (20 mg) by mouth daily    Hypertension goal BP (blood pressure) < 150/90       MULTI-VITAMIN GUMMIES PO           OMEGA-3 KRILL OIL PO           order for DME     1 Units    autoCPAP 7-12 cm    JOSE (obstructive sleep apnea)       VITAMIN D (ERGOCALCIFEROL) PO      Take 3,000 Units by mouth daily

## 2018-04-04 NOTE — PROGRESS NOTES
Gina is a 73 year old , She is 2 weeks s/p RSO and D&C.  Her postop recovery has been uncomplicated.  She is currently requiring no medications for pain management.  no vaginal bleeding, no hot flashes. Energy level is returning .  Denies fever, chills.    The pathology report showed:   A. Endometrium, curettage - Fragments of endometrial polyp   B. Right ovary and fallopian tube, salpingo-oophorectomy - Serous cystadenofibroma    The medical history, social history and family history have been reviewed and updated as indicated.         ROS:4 point ROS including Respiratory, CV, GI and , other than that noted in the HPI and the PMH, is negative     PE: /76 (BP Location: Right arm, Cuff Size: Adult Large)  Pulse 78  Wt 273 lb (123.8 kg)  SpO2 97%  BMI 46.86 kg/m2  HEENT:  NC/AT, EOMI  Abd: soft, non tender, no masses  Incision: intact, no induration or discharge.  Erythema noted that is consistent with skin yeast       ASSESSMENT:  Post op state    PLAN:  Recommend to use Monistat cream for the skin yeast.   Return to clinic in one year, sooner if any concerns.      Juan Alberto Davis MD

## 2018-04-04 NOTE — PROGRESS NOTES
Clinic Care Coordination Contact  [facility] Care Coordination Outreach    Patient was enrolled in [facility] upon  discharge              Clinical Data:       Indicated RN call request.  L/M with call back numbers.        Plan: RN CC will continue to monitor patients responses. Will plan to outreach as needed and with any new variances or concerns.     Rei Carlson RN  Clinic Care Coordinator  [phone] or [phone]

## 2018-04-06 ENCOUNTER — PATIENT OUTREACH (OUTPATIENT)
Dept: CARE COORDINATION | Facility: CLINIC | Age: 73
End: 2018-04-06

## 2018-04-06 NOTE — PROGRESS NOTES
Clinic Care Coordination Contact  [facility] Care Coordination Outreach    Patient was enrolled in [facility] upon discharge.                Clinical Data:       Patient had question about incision care. Patient has a yeast infection in wound site and was instructed to apply monistat to area and cover with dressing. Patient wanted to know how often to change dressing. Currently patient is only doing it once a day. Patient has small amount of drainage from area, but seems to be getting better over all. Patient is washing area with soap and water in the shower. CC encouraged that if she wants to she can change the dressing 2 times per day with proper cleansing.          Plan: RN CC will continue to monitor patients responses. Will plan to outreach as needed and with any new variances or concerns.     Rei Carlson RN  Clinic Care Coordinator  [phone] or [phone]

## 2018-04-11 ENCOUNTER — DOCUMENTATION ONLY (OUTPATIENT)
Dept: SLEEP MEDICINE | Facility: CLINIC | Age: 73
End: 2018-04-11

## 2018-04-11 NOTE — PROGRESS NOTES
6 month [facility] Recheck Visit     Data only recheck     Assessment: Pt meeting objective benchmarks.     Action plan:  pt to follow up per provider request (1-2 yrs)    Diagnostic AHI: 78.7     Device type: Auto-CPAP  PAP settings: CPAP min 7 cm  H20     CPAP max 12 cm  H20    95th% pressure 11.9 cm  H20   Objective measures: 14 day rolling measures      Compliance  100 %      Leak  8.16 lpm  last  upload      AHI 0.33   last  upload      Average number of minutes 435      Objective measure goal  Compliance   Goal >70%  Leak   Goal < 24 lpm  AHI  Goal < 5  Usage  Goal >240

## 2018-04-18 ENCOUNTER — OFFICE VISIT (OUTPATIENT)
Dept: UROLOGY | Facility: CLINIC | Age: 73
End: 2018-04-18
Payer: MEDICARE

## 2018-04-18 VITALS — SYSTOLIC BLOOD PRESSURE: 129 MMHG | HEART RATE: 81 BPM | OXYGEN SATURATION: 96 % | DIASTOLIC BLOOD PRESSURE: 74 MMHG

## 2018-04-18 DIAGNOSIS — N39.3 SUI (STRESS URINARY INCONTINENCE, FEMALE): Primary | ICD-10-CM

## 2018-04-18 PROCEDURE — 99024 POSTOP FOLLOW-UP VISIT: CPT | Performed by: UROLOGY

## 2018-04-18 NOTE — MR AVS SNAPSHOT
After Visit Summary   4/18/2018    Gina Yeh    MRN: [id number]           Patient Information     Date Of Birth          1945        Visit Information        Provider Department      4/18/2018 9:00 AM Karthik Benitez MD [facility]        Today's Diagnoses     DINESH (stress urinary incontinence, female)    -  1       Follow-ups after your visit        Follow-up notes from your care team     Return if symptoms worsen or fail to improve.      Who to contact     If you have questions or need follow up information about today's clinic visit or your schedule please contact [facility] directly at [phone].  Normal or non-critical lab and imaging results will be communicated to you by MyChart, letter or phone within 4 business days after the clinic has received the results. If you do not hear from us within 7 days, please contact the clinic through [email or web address] or phone. If you have a critical or abnormal lab result, we will notify you by phone as soon as possible.  Submit refill requests through BlackBamboozStudio or call your pharmacy and they will forward the refill request to us. Please allow 3 business days for your refill to be completed.          Additional Information About Your Visit        MyChart Information     [employer] gives you secure access to your electronic health record. If you see a primary care provider, you can also send messages to your care team and make appointments. If you have questions, please call your primary care clinic.  If you do not have a primary care provider, please call [phone] and they will assist you.        Care EveryWhere ID     This is your Care EveryWhere ID. This could be used by other organizations to access your [place] medical records  [id number]        Your Vitals Were     Pulse Pulse Oximetry                81 96%           Blood Pressure from Last 3 Encounters:   04/18/18 129/74   04/04/18 114/76   04/04/18 114/76    Weight  from Last 3 Encounters:   04/04/18 123.8 kg (273 lb)   04/04/18 123.8 kg (273 lb)   03/02/18 125.2 kg (276 lb)              Today, you had the following     No orders found for display       Primary Care Provider Office Phone # Fax #    Verena Vee [phone] [address] [facility] [address]        Equal Access to Services     JACEK STEPHENS : Hadii aad ku hadasho Soomaali, waaxda luqadaha, qaybta kaalmada adeegyada, waxay idiin hayaan adeeg kharash labentley . So [facility] [phone].    ATENCIÓN: Si carlenela espconor, tiene a alamo disposición servicios gratuitos de asistencia lingüística. Llame al [phone].    We comply with applicable federal civil rights laws and [place] laws. We do not discriminate on the basis of race, color, national origin, age, disability, sex, sexual orientation, or gender identity.            Thank you!     Thank you for choosing [facility]  for your care. Our goal is always to provide you with excellent care. Hearing back from our patients is one way we can continue to improve our services. Please take a few minutes to complete the written survey that you may receive in the mail after your visit with us. Thank you!             Your Updated Medication List - Protect others around you: Learn how to safely use, store and throw away your medicines at www.disposemymeds.org.          This list is accurate as of 4/18/18  9:36 AM.  Always use your most recent med list.                   Brand Name Dispense Instructions for use Diagnosis    ASPIRIN CAPS 81 MG OR      1 CAPSULE DAILY        atorvastatin 40 MG tablet    LIPITOR    90 tablet    TAKE 1 TABLET EVERY DAY    Hyperlipidemia with target LDL less than 130       CALCIUM GUMMIES PO           iron 325 (65 Fe) MG tablet      Take 1 tablet by mouth daily        levothyroxine 137 MCG tablet    SYNTHROID/LEVOTHROID    90 tablet    TAKE 1 TABLET EVERY DAY    Acquired hypothyroidism       lisinopril 20 MG  tablet    PRINIVIL/ZESTRIL    90 tablet    Take 1 tablet (20 mg) by mouth daily    Hypertension goal BP (blood pressure) < 150/90       MULTI-VITAMIN GUMMIES PO           OMEGA-3 KRILL OIL PO           order for DME     1 Units    autoCPAP 7-12 cm    JOSE (obstructive sleep apnea)       VITAMIN D (ERGOCALCIFEROL) PO      Take 3,000 Units by mouth daily

## 2018-04-18 NOTE — PROGRESS NOTES
F/u after [place] sling (in conjunction with open ovarian cystectomy)    Completely dry and pad-free.    Denies dysuria, gross hematuria, frequency. Nocturia x 3.    Drinks 4 caf coffee per day, 3 [place].      PE: Excellent support, non-tender      (Unable to provide urine specimen today)      IMP:  1. Resolution of DINESH with sling      PLAN:  1. No restrictions from  standpoint  2. RTC only PRN  3. Discussed effect of coffee/caffeine on voiding; also yarelis hx of nocturia  4. Copy

## 2018-10-08 DIAGNOSIS — I10 HYPERTENSION GOAL BP (BLOOD PRESSURE) < 150/90: ICD-10-CM

## 2018-10-08 DIAGNOSIS — E03.9 ACQUIRED HYPOTHYROIDISM: ICD-10-CM

## 2018-10-08 DIAGNOSIS — E78.5 HYPERLIPIDEMIA WITH TARGET LDL LESS THAN 130: ICD-10-CM

## 2018-10-09 RX ORDER — LISINOPRIL 20 MG/1
TABLET ORAL
Qty: 90 TABLET | Refills: 0 | Status: SHIPPED | OUTPATIENT
Start: 2018-10-09 | End: 2018-12-12

## 2018-10-09 RX ORDER — ATORVASTATIN CALCIUM 40 MG/1
TABLET, FILM COATED ORAL
Qty: 90 TABLET | Refills: 0 | Status: SHIPPED | OUTPATIENT
Start: 2018-10-09 | End: 2018-12-12

## 2018-10-09 RX ORDER — LEVOTHYROXINE SODIUM 137 UG/1
TABLET ORAL
Qty: 90 TABLET | Refills: 0 | Status: SHIPPED | OUTPATIENT
Start: 2018-10-09 | End: 2018-12-12

## 2018-10-10 DIAGNOSIS — G47.33 OSA (OBSTRUCTIVE SLEEP APNEA): Primary | Chronic | ICD-10-CM

## 2018-10-10 DIAGNOSIS — E61.1 IRON DEFICIENCY: ICD-10-CM

## 2018-10-10 DIAGNOSIS — E66.01 MORBIDLY OBESE (H): ICD-10-CM

## 2018-10-10 DIAGNOSIS — G25.81 RESTLESS LEGS SYNDROME (RLS): ICD-10-CM

## 2018-12-05 ENCOUNTER — OFFICE VISIT (OUTPATIENT)
Dept: SLEEP MEDICINE | Facility: CLINIC | Age: 73
End: 2018-12-05
Payer: MEDICARE

## 2018-12-05 VITALS
HEIGHT: 64 IN | BODY MASS INDEX: 47.29 KG/M2 | HEART RATE: 75 BPM | OXYGEN SATURATION: 96 % | SYSTOLIC BLOOD PRESSURE: 137 MMHG | WEIGHT: 277 LBS | DIASTOLIC BLOOD PRESSURE: 83 MMHG

## 2018-12-05 DIAGNOSIS — E61.1 IRON DEFICIENCY: ICD-10-CM

## 2018-12-05 DIAGNOSIS — E66.01 MORBIDLY OBESE (H): ICD-10-CM

## 2018-12-05 DIAGNOSIS — G25.81 RESTLESS LEGS SYNDROME (RLS): ICD-10-CM

## 2018-12-05 DIAGNOSIS — G47.33 OSA (OBSTRUCTIVE SLEEP APNEA): ICD-10-CM

## 2018-12-05 PROCEDURE — 99213 OFFICE O/P EST LOW 20 MIN: CPT | Performed by: INTERNAL MEDICINE

## 2018-12-05 NOTE — PROGRESS NOTES
Obstructive Sleep Apnea - PAP Follow-Up Visit:    Chief Complaint   Patient presents with     CPAP Follow Up     Gina Yeh comes in today for follow-up of their severe sleep apnea, managed with CPAP.     No specialty comments available.    Overall, she rates the experience with PAP as 10 (0 poor, 10 great). The mask is comfortable.    The mask is not leaking .  She is not snoring with the mask on. She is not having gasp arousals.  She is not having significant oral/nasal dryness. The pressure is comfortable.     Her PAP interface is Nasal Pillows.    Bedtime is typically 2200. Usually it takes about 20 min minutes to fall asleep with the mask on. Wake time is typically 0530.  Patient is using PAP therapy 8 hours per night. The patient is usually getting 8 hours of sleep per night.  She does feel rested in the morning.    Total score - Orr: 4 (12/5/2018 11:00 AM)  JONNY Total Score: 2    ResMed     Auto-PAP 7.0 - 12.0 cmH2O 30 day usage data:    93% of days with > 4 hours of use. 0/30 days with no use.   Average use 409 minutes per day.   95%ile Leak 10.43 L/min.   CPAP 95% pressure 11.9 cm.   AHI 0.66 events per hour.     Past medical/surgical history, family history, social history, medications and allergies were reviewed.      Problem List:  Patient Active Problem List    Diagnosis Date Noted     Hypertension goal BP (blood pressure) < 150/90      Priority: High     Hyperlipidemia with target LDL less than 130      Priority: High     Microscopic hematuria 10/26/2017     Priority: Medium     Urologic evaluation complete; yearly UA       Restless legs syndrome (RLS) 08/30/2017     Priority: Medium     Iron deficiency 08/30/2017     Priority: Medium     Noted Aug 2017.    Rec start on daily ferrous sulfate and recent in Nov/Dec.       Sensorineural hearing loss      Priority: Medium     JOSE (obstructive sleep apnea) 07/24/2015     Priority: Medium     Diagnostic Study 9/12/2017 - (271.0 lbs) AHI 78.7, RDI  "91.3, Supine AHI 73.5, REM AHI -, Low O2 79.5%, Time Spent ?88% 22.9 minutes.  Titration Study: 9/26/2017- (271.0 lbs) The patient was titrated at pressures ranging from 5 cmH2O up to 8 cmH2O.  The optimal pressure achieved was 8 cmH2O with a residual AHI of 1.1 events per hour.  Time in REM supine on final pressure was 22.5 minutes.  PLM index was 25.6 movements per hour.  The PLM Arousal Index was 8.0 per hour.       MMT (medial meniscus tear) 10/13/2014     Priority: Medium     Morbidly obese (H) 05/29/2012     Priority: Medium     Bariatric surgery consult offered       Impaired glucose tolerance 05/29/2012     Priority: Medium     History of cervical dysplasia 05/28/2012     Priority: Medium     Advanced directives, counseling/discussion 06/02/2011     Priority: Medium     Advance Care Planning 1/7/2016: Receipt of ACP document:  Received: Health Care Directive which was witnessed or notarized on 11/25/15.  Document not previously scanned.  Validation form completed and sent with document to be scanned.  Code Status reflects choices in most recent ACP document.  Confirmed/documented designated decision maker(s).  Added by Melina Griffin    Health Care Directive on file 11/25/15       Hypothyroid      Priority: Medium      /83  Pulse 75  Ht 1.626 m (5' 4\")  Wt 125.6 kg (277 lb)  SpO2 96%  BMI 47.55 kg/m2    Impression/Plan:  1. Restless legs syndrome (RLS)  Symptoms resolved.    2. JOSE (obstructive sleep apnea)  Severe Sleep apnea. Tolerating PAP well. Daytime symptoms are improved.   - Comprehensive DME    3. Morbidly obese (H)  We discussed the link between obesity, sleep apnea, and health outcomes.  Patient was encouraged to decrease caloric intake and increase activity levels to try to move towards a normal weight.  She was encouraged to discuss further strategies with her primary care provider.     Gina Yeh will follow up in about 1 year(s).     Fifteen minutes spent with patient, all " of which were spent face-to-face counseling, consulting, coordinating plan of care.      CC:  Verena Vee,

## 2018-12-05 NOTE — NURSING NOTE
"Chief Complaint   Patient presents with     CPAP Follow Up       Initial /83  Pulse 75  Ht 1.626 m (5' 4\")  Wt 125.6 kg (277 lb)  SpO2 96%  BMI 47.55 kg/m2 Estimated body mass index is 47.55 kg/(m^2) as calculated from the following:    Height as of this encounter: 1.626 m (5' 4\").    Weight as of this encounter: 125.6 kg (277 lb).    Medication Reconciliation: complete      "

## 2018-12-05 NOTE — MR AVS SNAPSHOT
After Visit Summary   12/5/2018    Gina Yeh    MRN: [id number]           Patient Information     Date Of Birth          1945        Visit Information        Provider Department      12/5/2018 11:00 AM Félix Salazar MD [facility]        Today's Diagnoses     Iron deficiency        Restless legs syndrome (RLS)        JOSE (obstructive sleep apnea)        Morbidly obese (H)           Follow-ups after your visit        Follow-up notes from your care team     Return in 1 year (on 12/5/2019) for PAP follow up.      Who to contact     If you have questions or need follow up information about today's clinic visit or your schedule please contact [facility] directly at [phone].  Normal or non-critical lab and imaging results will be communicated to you by Oon[employer], letter or phone within 4 business days after the clinic has received the results. If you do not hear from us within 7 days, please contact the clinic through Oonair[employer] or phone. If you have a critical or abnormal lab result, we will notify you by phone as soon as possible.  Submit refill requests through [employer] or call your pharmacy and they will forward the refill request to us. Please allow 3 business days for your refill to be completed.          Additional Information About Your Visit        MyChart Information     [employer] gives you secure access to your electronic health record. If you see a primary care provider, you can also send messages to your care team and make appointments. If you have questions, please call your primary care clinic.  If you do not have a primary care provider, please call [phone] and they will assist you.        Care EveryWhere ID     This is your Care EveryWhere ID. This could be used by other organizations to access your [place] medical records  [id number]        Your Vitals Were     Pulse Height Pulse Oximetry BMI (Body Mass Index)          75 1.626 m  "(5' 4\") 96% 47.55 kg/m2         Blood Pressure from Last 3 Encounters:   12/05/18 137/83   04/18/18 129/74   04/04/18 114/76    Weight from Last 3 Encounters:   12/05/18 125.6 kg (277 lb)   04/04/18 123.8 kg (273 lb)   04/04/18 123.8 kg (273 lb)              We Performed the Following     Comprehensive DME        Primary Care Provider Office Phone # Fax #    Verena Vee [phone]       [address] [facility] [address]  [place] [address]        Equal Access to Services     [facility]: Hadii celia martinez hadasho Solayla, waaxda luqadaha, qaybta kaalmada ademartinyada, roverto munoz . So [facility] [phone].    ATENCIÓN: Si habla español, tiene a alamo disposición servicios gratuitos de asistencia lingüística. [facility] [phone].    We comply with applicable federal civil rights laws and [place] laws. We do not discriminate on the basis of race, color, national origin, age, disability, sex, sexual orientation, or gender identity.            Thank you!     Thank you for choosing [facility]  for your care. Our goal is always to provide you with excellent care. Hearing back from our patients is one way we can continue to improve our services. Please take a few minutes to complete the written survey that you may receive in the mail after your visit with us. Thank you!             Your Updated Medication List - Protect others around you: Learn how to safely use, store and throw away your medicines at www.disposemymeds.org.          This list is accurate as of 12/5/18 11:19 AM.  Always use your most recent med list.                   Brand Name Dispense Instructions for use Diagnosis    ASPIRIN CAPS 81 MG OR      1 CAPSULE DAILY        atorvastatin 40 MG tablet    LIPITOR    90 tablet    TAKE 1 TABLET EVERY DAY    Hyperlipidemia with target LDL less than 130       CALCIUM GUMMIES PO           iron 325 (65 Fe) MG tablet      Take 1 tablet by mouth daily        levothyroxine 137 " MCG tablet    SYNTHROID/LEVOTHROID    90 tablet    TAKE 1 TABLET EVERY DAY    Acquired hypothyroidism       lisinopril 20 MG tablet    PRINIVIL/ZESTRIL    90 tablet    TAKE 1 TABLET EVERY DAY    Hypertension goal BP (blood pressure) < 150/90       MULTI-VITAMIN GUMMIES PO           OMEGA-3 KRILL OIL PO           order for DME     1 Units    autoCPAP 7-12 cm    JOSE (obstructive sleep apnea)       VITAMIN D (ERGOCALCIFEROL) PO      Take 3,000 Units by mouth daily

## 2018-12-12 DIAGNOSIS — E78.5 HYPERLIPIDEMIA WITH TARGET LDL LESS THAN 130: ICD-10-CM

## 2018-12-12 DIAGNOSIS — I10 HYPERTENSION GOAL BP (BLOOD PRESSURE) < 150/90: ICD-10-CM

## 2018-12-12 DIAGNOSIS — E03.9 ACQUIRED HYPOTHYROIDISM: ICD-10-CM

## 2018-12-14 RX ORDER — LEVOTHYROXINE SODIUM 137 UG/1
137 TABLET ORAL DAILY
Qty: 30 TABLET | Refills: 0 | Status: SHIPPED | OUTPATIENT
Start: 2018-12-14 | End: 2019-01-07

## 2018-12-14 RX ORDER — LISINOPRIL 20 MG/1
TABLET ORAL
Qty: 90 TABLET | Refills: 0 | Status: SHIPPED | OUTPATIENT
Start: 2018-12-14 | End: 2019-02-18

## 2018-12-14 RX ORDER — ATORVASTATIN CALCIUM 40 MG/1
40 TABLET, FILM COATED ORAL DAILY
Qty: 30 TABLET | Refills: 0 | Status: SHIPPED | OUTPATIENT
Start: 2018-12-14 | End: 2019-01-07

## 2019-01-07 DIAGNOSIS — E78.5 HYPERLIPIDEMIA WITH TARGET LDL LESS THAN 130: ICD-10-CM

## 2019-01-07 DIAGNOSIS — E03.9 ACQUIRED HYPOTHYROIDISM: ICD-10-CM

## 2019-01-10 RX ORDER — LEVOTHYROXINE SODIUM 137 UG/1
TABLET ORAL
Qty: 15 TABLET | Refills: 0 | Status: SHIPPED | OUTPATIENT
Start: 2019-01-10 | End: 2019-04-10

## 2019-01-10 RX ORDER — ATORVASTATIN CALCIUM 40 MG/1
TABLET, FILM COATED ORAL
Qty: 15 TABLET | Refills: 0 | Status: SHIPPED | OUTPATIENT
Start: 2019-01-10 | End: 2019-04-10

## 2019-01-10 NOTE — TELEPHONE ENCOUNTER
"Routing refill request to provider for review/approval because:  Carissa given x1 and patient did not follow up, please advise  Labs not current:        Requested Prescriptions   Pending Prescriptions Disp Refills     atorvastatin (LIPITOR) 40 MG tablet [Pharmacy Med Name: ATORVASTATIN CALCIUM 40 MG Tablet]  Last Written Prescription Date:  12/14/18  Last Fill Quantity: 30,  # refills: 0   Last office visit: 4/4/2018 with prescribing provider:     Future Office Visit:     30 tablet 0     Sig: TAKE 1 TABLET EVERY DAY  ( APPOINTMENT IS NEEDED  FOR  FURTHER  REFILLS)    Statins Protocol Failed - 1/7/2019  3:31 PM       Failed - LDL on file in past 12 months    Recent Labs   Lab Test 10/25/17  1037   *            Passed - No abnormal creatine kinase in past 12 months    Recent Labs   Lab Test 09/08/11  1026   CKT 53               Passed - Recent (12 mo) or future (30 days) visit within the authorizing provider's specialty    Patient had office visit in the last 12 months or has a visit in the next 30 days with authorizing provider or within the authorizing provider's specialty.  See \"Patient Info\" tab in inbasket, or \"Choose Columns\" in Meds & Orders section of the refill encounter.             Passed - Medication is active on med list       Passed - Patient is age 18 or older       Passed - No active pregnancy on record       Passed - No positive pregnancy test in past 12 months        levothyroxine (SYNTHROID/LEVOTHROID) 137 MCG tablet [Pharmacy Med Name: LEVOTHYROXINE SODIUM 137 MCG Tablet]  Last Written Prescription Date:  12/14/18  Last Fill Quantity: 90,  # refills: 0   Last office visit: 4/4/2018 with prescribing provider:     Future Office Visit:     30 tablet 0     Sig: TAKE 1 TABLET EVERY DAY  (NEED MD APPT FOR REFILLS)    Thyroid Protocol Failed - 1/7/2019  3:31 PM       Failed - Normal TSH on file in past 12 months    Recent Labs   Lab Test 10/25/17  1037   TSH 3.02             Passed - Patient is 12 " "years or older       Passed - Recent (12 mo) or future (30 days) visit within the authorizing provider's specialty    Patient had office visit in the last 12 months or has a visit in the next 30 days with authorizing provider or within the authorizing provider's specialty.  See \"Patient Info\" tab in inbasket, or \"Choose Columns\" in Meds & Orders section of the refill encounter.             Passed - Medication is active on med list       Passed - No active pregnancy on record    If patient is pregnant or has had a positive pregnancy test, please check TSH.         Passed - No positive pregnancy test in past 12 months    If patient is pregnant or has had a positive pregnancy test, please check TSH.          Idalia Burleson, CORINA - BC      "

## 2019-01-24 DIAGNOSIS — E03.9 ACQUIRED HYPOTHYROIDISM: ICD-10-CM

## 2019-01-24 DIAGNOSIS — E78.5 HYPERLIPIDEMIA WITH TARGET LDL LESS THAN 130: ICD-10-CM

## 2019-01-24 NOTE — TELEPHONE ENCOUNTER
Routing refill request to provider for review/approval because:  Carissa given x 2 and patient did not follow up, please advise  Labs not current:  Thyroid, FLP    Adriel Ortiz RN

## 2019-01-25 RX ORDER — LEVOTHYROXINE SODIUM 137 UG/1
TABLET ORAL
OUTPATIENT
Start: 2019-01-25

## 2019-01-25 RX ORDER — ATORVASTATIN CALCIUM 40 MG/1
TABLET, FILM COATED ORAL
OUTPATIENT
Start: 2019-01-25

## 2019-02-18 DIAGNOSIS — I10 HYPERTENSION GOAL BP (BLOOD PRESSURE) < 150/90: ICD-10-CM

## 2019-02-19 RX ORDER — LISINOPRIL 20 MG/1
TABLET ORAL
Qty: 90 TABLET | Refills: 0 | Status: SHIPPED | OUTPATIENT
Start: 2019-02-19 | End: 2019-04-10

## 2019-04-10 ENCOUNTER — OFFICE VISIT (OUTPATIENT)
Dept: FAMILY MEDICINE | Facility: CLINIC | Age: 74
End: 2019-04-10
Payer: MEDICARE

## 2019-04-10 VITALS
WEIGHT: 280 LBS | TEMPERATURE: 98.4 F | HEIGHT: 62 IN | HEART RATE: 98 BPM | SYSTOLIC BLOOD PRESSURE: 136 MMHG | BODY MASS INDEX: 51.53 KG/M2 | DIASTOLIC BLOOD PRESSURE: 76 MMHG | OXYGEN SATURATION: 94 % | RESPIRATION RATE: 20 BRPM

## 2019-04-10 DIAGNOSIS — Z00.01 ENCOUNTER FOR GENERAL ADULT MEDICAL EXAMINATION WITH ABNORMAL FINDINGS: Primary | ICD-10-CM

## 2019-04-10 DIAGNOSIS — E03.9 ACQUIRED HYPOTHYROIDISM: ICD-10-CM

## 2019-04-10 DIAGNOSIS — E55.9 VITAMIN D INSUFFICIENCY: ICD-10-CM

## 2019-04-10 DIAGNOSIS — E66.01 MORBIDLY OBESE (H): ICD-10-CM

## 2019-04-10 DIAGNOSIS — R06.09 DYSPNEA ON EXERTION: ICD-10-CM

## 2019-04-10 DIAGNOSIS — I10 HYPERTENSION GOAL BP (BLOOD PRESSURE) < 140/90: ICD-10-CM

## 2019-04-10 DIAGNOSIS — Z12.31 VISIT FOR SCREENING MAMMOGRAM: ICD-10-CM

## 2019-04-10 DIAGNOSIS — L98.9 SKIN LESION: ICD-10-CM

## 2019-04-10 DIAGNOSIS — E78.5 HYPERLIPIDEMIA WITH TARGET LDL LESS THAN 130: ICD-10-CM

## 2019-04-10 DIAGNOSIS — L91.8 SKIN TAG: ICD-10-CM

## 2019-04-10 LAB
ANION GAP SERPL CALCULATED.3IONS-SCNC: 7 MMOL/L (ref 3–14)
BUN SERPL-MCNC: 27 MG/DL (ref 7–30)
CALCIUM SERPL-MCNC: 9.6 MG/DL (ref 8.5–10.1)
CHLORIDE SERPL-SCNC: 105 MMOL/L (ref 94–109)
CHOLEST SERPL-MCNC: 183 MG/DL
CK SERPL-CCNC: 64 U/L (ref 30–225)
CO2 SERPL-SCNC: 27 MMOL/L (ref 20–32)
CREAT SERPL-MCNC: 0.93 MG/DL (ref 0.52–1.04)
DEPRECATED CALCIDIOL+CALCIFEROL SERPL-MC: 64 UG/L (ref 20–75)
FEF 25/75: NORMAL
FEV-1: NORMAL
FEV1/FVC: NORMAL
FVC: NORMAL
GFR SERPL CREATININE-BSD FRML MDRD: 61 ML/MIN/{1.73_M2}
GLUCOSE SERPL-MCNC: 109 MG/DL (ref 70–99)
HDLC SERPL-MCNC: 44 MG/DL
HGB BLD-MCNC: 14.2 G/DL (ref 11.7–15.7)
LDLC SERPL CALC-MCNC: 101 MG/DL
NONHDLC SERPL-MCNC: 139 MG/DL
POTASSIUM SERPL-SCNC: 4.9 MMOL/L (ref 3.4–5.3)
SODIUM SERPL-SCNC: 139 MMOL/L (ref 133–144)
TRIGL SERPL-MCNC: 191 MG/DL
TSH SERPL DL<=0.005 MIU/L-ACNC: 3.01 MU/L (ref 0.4–4)

## 2019-04-10 PROCEDURE — 82306 VITAMIN D 25 HYDROXY: CPT | Performed by: FAMILY MEDICINE

## 2019-04-10 PROCEDURE — 93000 ELECTROCARDIOGRAM COMPLETE: CPT | Performed by: FAMILY MEDICINE

## 2019-04-10 PROCEDURE — 80061 LIPID PANEL: CPT | Performed by: FAMILY MEDICINE

## 2019-04-10 PROCEDURE — 82550 ASSAY OF CK (CPK): CPT | Performed by: FAMILY MEDICINE

## 2019-04-10 PROCEDURE — 85018 HEMOGLOBIN: CPT | Performed by: FAMILY MEDICINE

## 2019-04-10 PROCEDURE — 80048 BASIC METABOLIC PNL TOTAL CA: CPT | Performed by: FAMILY MEDICINE

## 2019-04-10 PROCEDURE — G0439 PPPS, SUBSEQ VISIT: HCPCS | Performed by: FAMILY MEDICINE

## 2019-04-10 PROCEDURE — 94010 BREATHING CAPACITY TEST: CPT | Performed by: FAMILY MEDICINE

## 2019-04-10 PROCEDURE — 36415 COLL VENOUS BLD VENIPUNCTURE: CPT | Performed by: FAMILY MEDICINE

## 2019-04-10 PROCEDURE — 99214 OFFICE O/P EST MOD 30 MIN: CPT | Mod: 25 | Performed by: FAMILY MEDICINE

## 2019-04-10 PROCEDURE — 84443 ASSAY THYROID STIM HORMONE: CPT | Performed by: FAMILY MEDICINE

## 2019-04-10 RX ORDER — ATORVASTATIN CALCIUM 40 MG/1
TABLET, FILM COATED ORAL
Qty: 90 TABLET | Refills: 3 | Status: SHIPPED | OUTPATIENT
Start: 2019-04-10 | End: 2020-02-04

## 2019-04-10 RX ORDER — LISINOPRIL 20 MG/1
TABLET ORAL
Qty: 90 TABLET | Refills: 3 | Status: SHIPPED | OUTPATIENT
Start: 2019-04-10 | End: 2020-02-04

## 2019-04-10 RX ORDER — LEVOTHYROXINE SODIUM 137 UG/1
TABLET ORAL
Qty: 90 TABLET | Refills: 3 | Status: SHIPPED | OUTPATIENT
Start: 2019-04-10 | End: 2020-02-04

## 2019-04-10 ASSESSMENT — ACTIVITIES OF DAILY LIVING (ADL): CURRENT_FUNCTION: NO ASSISTANCE NEEDED

## 2019-04-10 ASSESSMENT — MIFFLIN-ST. JEOR: SCORE: 1733.32

## 2019-04-10 NOTE — PROGRESS NOTES
"SUBJECTIVE:   Gina Yeh is a 74 year old female   who presents for Preventive Visit. Are you in the first 12 months of your Medicare coverage?  No    Gina also presents with chronic dyspnea on exertion. Symptom onset has been gradual, worsening for a time period of years. Severity is described as moderate. Course of her symptoms over time is worsening and sometimes accompanied by wheezing.     Hypercholesterolemia well controlled with current treatment plan without side effects. Hypertension well controlled on current medications without side effects, chest pain, palpitations, PND, orthopnea, lower extremity edema, or cough. Patient also presents for follow-up of hypothyroidism, controlled on current medication.  Denies symptoms of hypo- or hyperthyroidism.     She has several chronic skin lesions.     Healthy Habits:    In general, how would you rate your overall health?  Very good    Frequency of exercise:  6-7 days/week    Duration of exercise:  45-60 minutes    Do you usually eat at least 4 servings of fruit and vegetables a day, include whole grains    & fiber and avoid regularly eating high fat or \"junk\" foods?  Yes    Taking medications regularly:  Yes    Barriers to taking medications:  Not applicable    Medication side effects:  Not applicable    Ability to successfully perform activities of daily living:  No assistance needed    Home Safety:  No safety concerns identified    Hearing Impairment:  No hearing concerns    In the past 6 months, have you been bothered by leaking of urine? Yes    In general, how would you rate your overall mental or emotional health?  Excellent      PHQ-2 Total Score:    Additional concerns today:  No    Do you feel safe in your environment? Yes    Do you have a Health Care Directive? Yes: Advance Directive has been received and scanned.      Fall risk  Fallen 2 or more times in the past year?: No  Any fall with injury in the past year?: No    Cognitive Screening "   1) Repeat 3 items (Leader, Season, Table)    2) Clock draw: NORMAL  3) 3 item recall: Recalls 3 objects  Results: NORMAL clock, 1-2 items recalled: COGNITIVE IMPAIRMENT LESS LIKELY    Mini-CogTM Copyright S Mir[employer]. Licensed by the author for use in [facility]; reprinted with permission (hali@[facility]). All rights reserved.      Do you have sleep apnea, excessive snoring or daytime drowsiness?: yes    Reviewed and updated as needed this visit by clinical staff         Reviewed and updated as needed this visit by Provider  Tobacco  Allergies  Meds  Problems  Med Hx  Surg Hx  Fam Hx        Social History     Tobacco Use     Smoking status: Former Smoker     Packs/day: 0.50     Years: 49.00     Pack years: 24.50     Types: Cigarettes     Start date: 1960     Last attempt to quit: 2011     Years since quittin.9     Smokeless tobacco: Former User     Quit date: 2011   Substance Use Topics     Alcohol use: No         Alcohol Use 10/25/2017   Prescreen: >3 drinks/day or >7 drinks/week? The patient does not drink >3 drinks per day nor >7 drinks per week.       Current providers sharing in care for this patient include:   Patient Care Team:  Verena Vee MD as PCP - General  Verena Vee MD as Assigned PCP    The following health maintenance items are reviewed in Epic and correct as of today:  Health Maintenance   Topic Date Due     ZOSTER IMMUNIZATION (2 of 3) 2007     TSH W/ FREE T4 REFLEX Q1 YEAR  10/25/2018     LIPID MONITORING Q1 YEAR  10/25/2018     BMP Q1 YR  2019     FALL RISK ASSESSMENT  2019     MAMMO SCREEN Q2 YR (SYSTEM ASSIGNED)  2019     MEDICARE ANNUAL WELLNESS VISIT  04/10/2020     PHQ-2 Q1 YR  04/10/2020     ADVANCE DIRECTIVE PLANNING Q5 YRS  2021     DTAP/TDAP/TD IMMUNIZATION (3 - Td) 2021     COLONOSCOPY Q5 YR  2022     DEXA SCAN SCREENING (SYSTEM ASSIGNED)  Completed     INFLUENZA VACCINE  Completed     HEPATITIS C  SCREENING  Completed     IPV IMMUNIZATION  Aged Out     MENINGITIS IMMUNIZATION  Aged Out     Patient Active Problem List   Diagnosis     Hyperlipidemia with target LDL less than 130     Hypothyroid     Hypertension goal BP (blood pressure) < 140/90     Advanced directives, counseling/discussion     History of cervical dysplasia     Morbidly obese (H)     Impaired glucose tolerance     MMT (medial meniscus tear)     JOSE (obstructive sleep apnea)     Sensorineural hearing loss     Restless legs syndrome (RLS)     Iron deficiency     Microscopic hematuria     RLS (restless legs syndrome)     Past Surgical History:   Procedure Laterality Date     BIOPSY       BLADDER SURGERY  2018     CERVIX SURGERY  1988    cervical cone     COLONOSCOPY       LAPAROSCOPIC OOPHORECTOMY Right 2018    with hysteroscopy/EMB, and pubovaginal sling      TONSILLECTOMY & ADENOIDECTOMY         Social History     Tobacco Use     Smoking status: Former Smoker     Packs/day: 0.50     Years: 49.00     Pack years: 24.50     Types: Cigarettes     Start date: 1960     Last attempt to quit: 2011     Years since quittin.9     Smokeless tobacco: Former User     Quit date: 2011   Substance Use Topics     Alcohol use: No     Family History   Problem Relation Age of Onset     Allergies Mother      Cardiovascular Mother      Respiratory Mother      Thyroid Disease Mother      Other Cancer Mother         skin     Arthritis Father      Hypertension Father      Hyperlipidemia Father      Cerebrovascular Disease Father      Cancer Maternal Grandmother         bone     Other Cancer Maternal Grandmother         bone     Cerebrovascular Disease Paternal Grandmother      Cancer Paternal Grandfather      Cerebrovascular Disease Paternal Grandfather      Cancer Brother         testicular      Gastrointestinal Disease Brother      Other Cancer Brother         testicular     Allergies Sister      Allergies Daughter      Gastrointestinal  "Disease Daughter      Depression Daughter      Anesthesia Reaction Daughter      Genitourinary Problems Brother      Allergies Daughter 10        metal     Diabetes No family hx of          Current Outpatient Medications   Medication Sig Dispense Refill     ASPIRIN CAPS 81 MG OR 1 CAPSULE DAILY       atorvastatin (LIPITOR) 40 MG tablet TAKE 1 TABLET EVERY DAY 90 tablet 3     Ferrous Sulfate (IRON) 325 (65 FE) MG tablet Take 1 tablet by mouth daily       levothyroxine (SYNTHROID/LEVOTHROID) 137 MCG tablet TAKE 1 TABLET EVERY DAY 90 tablet 3     lisinopril (PRINIVIL/ZESTRIL) 20 MG tablet TAKE 1 TABLET EVERY DAY 90 tablet 3     OMEGA-3 KRILL OIL PO        order for DME autoCPAP 7-12 cm 1 Units 0     VITAMIN D, ERGOCALCIFEROL, PO Take 3,000 Units by mouth daily       Allergies   Allergen Reactions     Nkda [No Known Drug Allergies]      Review of Systems  CONSTITUTIONAL: NEGATIVE for fever, chills, change in weight  INTEGUMENTARY/SKIN: as above   EYES: NEGATIVE for vision changes or irritation  ENT/MOUTH: NEGATIVE for ear, mouth and throat problems  RESP:as above  BREAST: NEGATIVE for masses, tenderness or discharge  CV: NEGATIVE for chest pain, palpitations or peripheral edema  GI: NEGATIVE for nausea, abdominal pain, heartburn, or change in bowel habits  : NEGATIVE for frequency, dysuria, or hematuria  MUSCULOSKELETAL:gait disturbance with use of walker at times   NEURO: NEGATIVE for weakness, dizziness or paresthesias  ENDOCRINE: NEGATIVE for temperature intolerance, skin/hair changes  HEME: NEGATIVE for bleeding problems  PSYCHIATRIC: NEGATIVE for changes in mood or affect    OBJECTIVE:   /76   Pulse 98   Temp 98.4  F (36.9  C) (Oral)   Resp 20   Ht 1.575 m (5' 2\")   Wt 127 kg (280 lb)   SpO2 94%   Breastfeeding? No   BMI 51.21 kg/m   Estimated body mass index is 51.21 kg/m  as calculated from the following:    Height as of this encounter: 1.575 m (5' 2\").    Weight as of this encounter: 127 kg (280 " lb).  Physical Exam  GENERAL: alert, no distress and obese  EYES: Eyes grossly normal to inspection, PERRL and conjunctivae and sclerae normal  HENT: ear canals and TM's normal, nose and mouth without ulcers or lesions  NECK: no adenopathy, no asymmetry, masses, or scars and thyroid normal to palpation  RESP: lungs clear to auscultation - no rales, rhonchi or wheezes  BREAST: normal without masses, tenderness or nipple discharge and no palpable axillary masses or adenopathy  CV: regular rate and rhythm, normal S1 S2, no S3 or S4, no murmur, click or rub, no peripheral edema   ABDOMEN: soft, nontender, no hepatosplenomegaly, no masses and bowel sounds normal  MS: no gross musculoskeletal defects noted, no edema  SKIN: no suspicious lesions or rashes; scattered seborrheic keratoses, cherry hemangiomas and a dermatofibroma on right finger; numerous skin tags at nape of bilateral neck   NEURO: Normal strength and tone, mentation intact and speech normal  PSYCH: mentation appears normal, affect normal/bright    Diagnostic Test Results:  Results for orders placed or performed in visit on 04/10/19   Spirometry, Breathing Capacity   Result Value Ref Range    FEV-1      FVC      FEV1/FVC      FEF 25/75         ASSESSMENT / PLAN:   (Z00.01) Encounter for general adult medical examination with abnormal findings  (primary encounter diagnosis)    (R06.09) Dyspnea on exertion  Comment: Differential diagnoses would include: COPD, stable angina, restrictive lung disease, deconditioning   Plan: BASIC METABOLIC PANEL, Hemoglobin, XR Chest 2         Views, EKG 12-lead complete w/read - Clinics,         Spirometry, Breathing Capacity, Echocardiogram         Dobutamine Stress, OFFICE/OUTPT VISIT,EST,LEVL         IV        Follow-up pending results     (E66.01) Morbidly obese (H)  Plan: BASIC METABOLIC PANEL, Lipid panel reflex to         direct LDL Fasting, TSH WITH FREE T4 REFLEX        Counseled to make better food choices, exercise  as tolerated, and lose weight.     (E78.5) Hyperlipidemia with target LDL less than 130  Comment: Well controlled with medications without side effects.   Plan: Lipid panel reflex to direct LDL Fasting, TSH         WITH FREE T4 REFLEX, atorvastatin (LIPITOR) 40         MG tablet, CK total          (I10) Hypertension goal BP (blood pressure) < 140/90  Comment: Well controlled with medications without side effects.   Plan: BASIC METABOLIC PANEL, lisinopril         (PRINIVIL/ZESTRIL) 20 MG tablet, EKG 12-lead         complete w/read - Clinics          (E03.9) Acquired hypothyroidism  Comment: euthyroid on replacement   Plan: TSH WITH FREE T4 REFLEX, levothyroxine         (SYNTHROID/LEVOTHROID) 137 MCG tablet          (L98.9) Skin lesion  Plan: The patient is reassured that these symptoms do not appear to represent a serious or threatening condition.     (L91.8) Skin tag  Comment: neck   Plan: follow-up for excision with scissors      End of Life Planning:  Patient currently has an advanced directive: Yes.  Practitioner is supportive of decision.    COUNSELING:  Reviewed preventive health counseling, as reflected in patient instructions  Special attention given to:       Regular exercise       Healthy diet/nutrition       Hearing screening       Bladder control       Osteoporosis Prevention/Bone Health       Colon cancer screening       Hepatitis C screening       HIV screening for high risk patient       The 10-year ASCVD risk score (Stepto[employer]ebra SHELTON Jr., et al., 2013) is: 17.5%    Values used to calculate the score:      Age: 74 years      Sex: Female      Is Non- : No      Diabetic: No      Tobacco smoker: No      Systolic Blood Pressure: 136 mmHg      Is BP treated: Yes      HDL Cholesterol: 42 mg/dL      Total Cholesterol: 181 mg/dL       Advanced Planning     BP Readings from Last 1 Encounters:   04/10/19 136/76     Estimated body mass index is 51.21 kg/m  as calculated from the following:    Height  "as of this encounter: 1.575 m (5' 2\").    Weight as of this encounter: 127 kg (280 lb).      Weight management plan: Discussed healthy diet and exercise guidelines     reports that she quit smoking about 7 years ago. Her smoking use included cigarettes. She started smoking about 58 years ago. She has a 24.50 pack-year smoking history. She quit smokeless tobacco use about 7 years ago.      Appropriate preventive services were discussed with this patient, including applicable screening as appropriate for cardiovascular disease, diabetes, osteopenia/osteoporosis, and glaucoma.  As appropriate for age/gender, discussed screening for colorectal cancer, prostate cancer, breast cancer, and cervical cancer. Checklist reviewing preventive services available has been given to the patient.    Reviewed patients plan of care and provided an AVS. The Basic Care Plan (routine screening as documented in Health Maintenance) for Gina meets the Care Plan requirement. This Care Plan has been established and reviewed with the Patient.    Counseling Resources:  ATP IV Guidelines  Pooled Cohorts Equation Calculator  Breast Cancer Risk Calculator  FRAX Risk Assessment  ICSI Preventive Guidelines  Dietary Guidelines for Americans, 2010  USDA's MyPlate  ASA Prophylaxis  Lung CA Screening    Verena Vee MD  [facility]    Identified Health Risks:  "

## 2019-04-10 NOTE — PATIENT INSTRUCTIONS
Patient Education   Personalized Prevention Plan  You are due for the preventive services outlined below.  Your care team is available to assist you in scheduling these services.  If you have already completed any of these items, please share that information with your care team to update in your medical record.  Health Maintenance Due   Topic Date Due     Zoster (Shingles) Vaccine (2 of 3) 09/04/2007     Thyroid Function Lab (TSH) - yearly  10/25/2018     Annual Wellness Visit  10/25/2018     Cholesterol Lab - yearly  10/25/2018     Basic Metabolic Lab - yearly  02/28/2019     FALL RISK ASSESSMENT  04/04/2019     Depression Assessment 2 - yearly  04/04/2019        Patient Education   Personalized Prevention Plan  You are due for the preventive services outlined below.  Your care team is available to assist you in scheduling these services.  If you have already completed any of these items, please share that information with your care team to update in your medical record.  Health Maintenance Due   Topic Date Due     Zoster (Shingles) Vaccine (2 of 3) 09/04/2007     Thyroid Function Lab (TSH) - yearly  10/25/2018     Annual Wellness Visit  10/25/2018     Cholesterol Lab - yearly  10/25/2018     Basic Metabolic Lab - yearly  02/28/2019     FALL RISK ASSESSMENT  04/04/2019       Urinary Incontinence, Female (Adult)  Urinary incontinence means loss of control of the bladder. This problem affects many women, especially as they get older. If you have incontinence, you may be embarrassed to ask for help. But know that this problem can be treated.  Types of Incontinence  There are different types of incontinence. Two of the main types are described here. You can have more than one type.    Stress incontinence. With this type, urine leaks when pressure (stress) is put on the bladder. This may happen when you cough, sneeze, or laugh. Stress incontinence most often occurs because the pelvic floor muscles that support the  bladder and urethra are weak. This can happen after pregnancy and vaginal childbirth or a hysterectomy. It can also be due to excess body weight or hormone changes.    Urge incontinence (also called overactive bladder). With this type, a sudden urge to urinate is felt often. This may happen even though there may not be much urine in the bladder. The need to urinate often during the night is common. Urge incontinence most often occurs because of bladder spasms. This may be due to bladder irritation or infection. Damage to bladder nerves or pelvic muscles, constipation, and certain medicines can also lead to urge incontinence.  Treatment of urinary incontinence depends on the cause. Further evaluation is needed to find the type you have. This will likely include an exam and certain tests. Based on the results, you and your healthcare provider can then plan treatment. Until a diagnosis is made, the home care tips below can help relieve symptoms.  Home care    Do pelvic floor muscle exercises, if they are prescribed. The pelvic floor muscles help support the bladder and urethra. Many women find that their symptoms improve when doing special exercises that strengthen these muscles. To do the exercises contract the muscles you would use to stop your stream of urine, but do this when you re not urinating. Hold for 10 seconds, then relax. Repeat 10 to 20 times in a row, at least 3 times a day. Your provider may give you other instructions for how to do the exercises and how often.    Keep a bladder diary. This helps track how often and how much you urinate over a set period of time. Bring this diary with you to your next visit with the provider. The information can help your provider learn more about your bladder problem.    Lose weight, if advised to by your provider. Excess weight puts pressure on the bladder. Your provider can help you create a weight-loss plan that s right for you. This may include exercising more and  making certain diet changes.    Don't consume foods and drinks that may irritate the bladder. These can include alcohol and caffeinated drinks.    Quit smoking. Smoking and other tobacco use can lead to chronic cough that strains the pelvic floor muscles. Smoking may also damage the bladder and urethra. Talk with your provider about treatments or methods you can use to quit smoking.    If drinking large amounts of fluid causes you to have symptoms, you may be advised to limit your fluid intake. You may also be advised to drink most of your fluids during the day and to limit fluids at night.    If you re worried about urine leakage or accidents, you may wear absorbent pads to catch urine. Change the pads often. This helps reduce discomfort. It may also reduce the risk of skin or bladder infections.  Follow-up care  Follow up with your healthcare provider, or as directed. It may take some to find the right treatment for your problem. Your treatment plan may include special therapies or medicines. Certain procedures or surgery may also be options. Be sure to discuss any questions you have with your provider.  When to seek medical advice  Call the healthcare provider right away if any of these occur:    Fever of 100.4 F (38 C) or higher, or as directed by your provider    Bladder pain or fullness    Abdominal swelling    Nausea or vomiting    Back pain    Weakness, dizziness or fainting  Date Last Reviewed: 10/1/2017    8429-5125 [employer]. [address] [place] [address], [place] [address]. All rights reserved. This information is not intended as a substitute for professional medical care. Always follow your healthcare professional's instructions.          It is recommended that you get a vaccination for shingles called Shingrix (given as 2 shots, 2 to 6 months apart), even if you have already had the Zostavax vaccine. Discuss getting the Shingix vaccine from your pharmacist, or schedule an ancillary shot visit  "here. Some insurances do not cover the cost of these vaccines.      Your provider has recommended a heart stress test, and a  will call you to arrange this test.      Some types of stress tests require that you be able to walk or bike for 8 to 12 minutes, but others use a chemical to \"stress\" the heart.  If you take a beta-blocker type medication (such as metoprolol or atenolol) and will have an exercise stress test, discuss stopping this medication the day before your test, until after the test.  If you have COPD or an abnormal heart rhythm, you should be scheduled for a nuclear test rather than an echocardiogram test.      Do not smoke for 6 hours prior to the test.    Do not take caffeine (pop, coffee, tea, chocolate, Anican, Excedrin, etc.) for 12 hours prior to the test.    Do not eat for 3 hours prior to the test (water is fine).  Wear comfortable clothes and shoes (such as tennis shoes).    Nuclear tests take 3 hours to complete.  Other stress tests may take up to 1.5 hours.              "

## 2019-04-10 NOTE — RESULT ENCOUNTER NOTE
Please call patient and confirm scheduling of stress test:    Your breathing and EKG tests are normal. Let's proceed with stress testing.    Verena Vee MD

## 2019-04-11 DIAGNOSIS — E55.9 VITAMIN D INSUFFICIENCY: Primary | ICD-10-CM

## 2019-04-11 RX ORDER — FAMOTIDINE 20 MG
1 TABLET ORAL DAILY
Start: 2019-04-11

## 2019-04-11 NOTE — RESULT ENCOUNTER NOTE
"Meg,    Your vitamin D level is just fine. I recommend reducing your vitamin D dose to 1000 units daily, as higher doses can accumulate in the body.    Your anemia, kidney, muscle enzyme, and thyroid tests are normal. Your cholesterol is controlled. You have impaired glucose tolerance or \"pre-diabetes.\"  Exercise, weight loss, and low-calorie balanced diet including at least 4 to 5 servings of fruits and/or vegetables daily can lower blood glucose and risk for diabetes.  I recommend yearly diabetes screening.    Verena Vee MD  "

## 2019-04-17 ENCOUNTER — ANCILLARY PROCEDURE (OUTPATIENT)
Dept: GENERAL RADIOLOGY | Facility: CLINIC | Age: 74
End: 2019-04-17
Attending: FAMILY MEDICINE
Payer: MEDICARE

## 2019-04-17 ENCOUNTER — ANCILLARY PROCEDURE (OUTPATIENT)
Dept: MAMMOGRAPHY | Facility: CLINIC | Age: 74
End: 2019-04-17
Attending: FAMILY MEDICINE
Payer: MEDICARE

## 2019-04-17 DIAGNOSIS — Z12.31 VISIT FOR SCREENING MAMMOGRAM: ICD-10-CM

## 2019-04-17 PROCEDURE — 71046 X-RAY EXAM CHEST 2 VIEWS: CPT

## 2019-04-17 PROCEDURE — 77067 SCR MAMMO BI INCL CAD: CPT | Mod: TC

## 2019-04-26 ENCOUNTER — ANCILLARY PROCEDURE (OUTPATIENT)
Dept: CARDIOLOGY | Facility: CLINIC | Age: 74
End: 2019-04-26
Attending: FAMILY MEDICINE
Payer: MEDICARE

## 2019-04-26 DIAGNOSIS — R06.09 DYSPNEA ON EXERTION: ICD-10-CM

## 2019-04-26 PROCEDURE — 93325 DOPPLER ECHO COLOR FLOW MAPG: CPT | Performed by: INTERNAL MEDICINE

## 2019-04-26 PROCEDURE — 93018 CV STRESS TEST I&R ONLY: CPT | Performed by: INTERNAL MEDICINE

## 2019-04-26 PROCEDURE — 93017 CV STRESS TEST TRACING ONLY: CPT | Performed by: INTERNAL MEDICINE

## 2019-04-26 PROCEDURE — 93350 STRESS TTE ONLY: CPT | Performed by: INTERNAL MEDICINE

## 2019-04-26 PROCEDURE — 93016 CV STRESS TEST SUPVJ ONLY: CPT | Performed by: INTERNAL MEDICINE

## 2019-04-26 PROCEDURE — 93321 DOPPLER ECHO F-UP/LMTD STD: CPT | Performed by: INTERNAL MEDICINE

## 2019-04-26 PROCEDURE — 93352 ADMIN ECG CONTRAST AGENT: CPT | Performed by: INTERNAL MEDICINE

## 2019-04-26 RX ORDER — DOBUTAMINE HYDROCHLORIDE 200 MG/100ML
2.5-2 INJECTION INTRAVENOUS CONTINUOUS
Status: DISCONTINUED | OUTPATIENT
Start: 2019-04-26 | End: 2023-02-16

## 2019-04-26 RX ORDER — ATROPINE SULFATE 0.4 MG/ML
0.4 AMPUL (ML) INJECTION ONCE
Status: DISCONTINUED | OUTPATIENT
Start: 2019-04-26 | End: 2023-02-16

## 2019-04-26 RX ORDER — METOPROLOL TARTRATE 1 MG/ML
5 INJECTION, SOLUTION INTRAVENOUS EVERY 5 MIN PRN
Status: DISCONTINUED | OUTPATIENT
Start: 2019-04-26 | End: 2023-02-16

## 2019-04-26 RX ADMIN — METOPROLOL TARTRATE 1 MG: 1 INJECTION, SOLUTION INTRAVENOUS at 12:27

## 2019-04-26 RX ADMIN — Medication 5 ML: at 12:32

## 2019-04-26 RX ADMIN — DOBUTAMINE HYDROCHLORIDE 10 MCG/KG/MIN: 200 INJECTION INTRAVENOUS at 12:14

## 2019-04-29 ENCOUNTER — MYC MEDICAL ADVICE (OUTPATIENT)
Dept: FAMILY MEDICINE | Facility: CLINIC | Age: 74
End: 2019-04-29

## 2019-04-29 DIAGNOSIS — R06.09 DYSPNEA ON EXERTION: Primary | ICD-10-CM

## 2019-05-23 ENCOUNTER — TRANSFERRED RECORDS (OUTPATIENT)
Dept: HEALTH INFORMATION MANAGEMENT | Facility: CLINIC | Age: 74
End: 2019-05-23

## 2019-07-08 NOTE — PROGRESS NOTES
"Subjective     Gina Yeh is a 74 year old female who presents to clinic today for the following health issues:    HPI   Chief Complaint   Patient presents with     Skin Tags     Gina Yeh is a 74 year old female who presents with follow-up of skin tags on her neck and axilla, painless. Symptom onset has been gradual, worsening for a time period of years. Severity is described as moderate and generalized. Course of her symptoms over time is worsening.    She also presents for follow-up of COPD, evaluated and treated with inhaler by pulmonology. Symptoms are controlled without side effects.     Reviewed and updated as needed this visit by Provider  Tobacco  Allergies  Meds  Problems  Med Hx  Surg Hx  Fam Hx         Review of Systems   ROS COMP: CONSTITUTIONAL:POSITIVE  for weight loss  INTEGUMENTARY/SKIN: skin tags   RESP: NEGATIVE for significant cough or SOB  MUSCULOSKELETAL: NEGATIVE for significant arthralgias or myalgia      Objective    /72   Pulse 103   Temp 98.7  F (37.1  C) (Oral)   Resp 22   Ht 1.575 m (5' 2\")   Wt 125.2 kg (276 lb)   SpO2 96%   Breastfeeding? No   BMI 50.48 kg/m    Body mass index is 50.48 kg/m .  Physical Exam   GENERAL: alert, no distress and obese  EYES: Eyes grossly normal to inspection, PERRL and conjunctivae and sclerae normal  NECK: no adenopathy, no asymmetry, masses, or scars and thyroid normal to palpation  RESP: lungs clear to auscultation - no rales, rhonchi or wheezes  CV: regular rate and rhythm, normal S1 S2, no S3 or S4, no murmur, click or rub, no peripheral edema   MS: no gross musculoskeletal defects noted, no edema  SKIN: no suspicious lesions or rashes and multiple skin tags at the bilateral nape of neck and axilla   PSYCH: mentation appears normal, affect normal/bright    Diagnostic Test Results:  Labs reviewed in Epic  Results for orders placed or performed in visit on 04/26/19   Echocardiogram Dobutamine Stress    Narrative    " [id number]  [id number]  [id number]  [id number]^ERIK^SCAR           [facility]  Echocardiography Laboratory  [address] [place]  [place] [address]        Name: TONI NIEVES  MRN: [id number]  : 1945  Study Date: 2019 11:27 AM  Age: 74 yrs  Gender: Female  Patient Location: [facility]  Reason For Study: Dyspnea on exertion  Ordering Physician: SCAR VALENCIA  Referring Physician: SCAR VALENCIA  Performed By: Jalyn Rose RDCS     BSA: 2.2 m2  Height: 62 in  Weight: 280 lb  HR: 83  BP: 145/85 mmHg  _____________________________________________________________________________  __     _____________________________________________________________________________  __        Interpretation Summary     Negative dobutamine stress echocardiogram.     Normal LV size and function at rest. The LVEF is 55-60% and increases  appropriately to 70-75% with dobutamine infusion.  No regional wall motion abnormalities at rest and with infusion of dobutamine.  Normal augmentation of wall motion and LV function.  Normal blood pressure response to dobutamine infusion.  No ECG evidence of ischemia at rest and with infusion of dobutamine. Test  terminated when target heart rate was achieved.  No subjective evidence of ischemia at rest and with infusion of dobutamine. No  reported chest discomfort.  No significant valvular disease noted on routine screening color flow Doppler  and pulsed Doppler examination. The ascending aortic size appears normal.  _____________________________________________________________________________  __     Stress  Definity (NDC #38234-607-87) given intravenously.  Patient was given 5ml mixture of 1.5ml Definity and 8.5ml saline.  5 ml wasted.  IV start location L Hand .  The maximum dose of dobutamine was 30mcg/kg/min.  The maximum dose of metoprolol was 1mg.  Atrial Ectopy. Very Frequent.  The drug infusion was stopped due to target heart rate achieved.  The patient did  "not exhibit any symptoms during drug infusion.     Stress Results     Protocol:  Dobutamine Stress Echo        Maximum Predicted HR:   148 bpm     Target HR: 126 bpm                       % Maximum Predicted HR: 86 %                             StageDurationHeart Rate  BP                                (mm:ss)   (bpm)                           Base            83    145/85                           Peak  9:45      127   166/28                             Stress Duration:   9:45 mm:ss                       Maximum Stress HR: 127 bpm  _____________________________________________________________________________  __     MMode/2D Measurements & Calculations  asc Aorta Diam: 2.8 cm        Doppler Measurements & Calculations  Ao V2 max: 234.1 cm/sec  Ao max P.0 mmHg  PA V2 max: 139.6 cm/sec  PA max P.8 mmHg           _____________________________________________________________________________  __           Report approved by: Patricia Waldron 2019 01:49 PM              Assessment & Plan     (L91.8) Skin tag  (primary encounter diagnosis)  Plan: REMOVAL OF SKIN TAGS, FIRST 15        Discussed risks and benefits of this procedure. Liquid nitrogen was applied for 5 -10 seconds x 2 to the skin lesions and the expected blistering or scabbing reaction explained. Patient reminded to expect hypopigmented scars from the procedure. Return if lesions fail to fully resolve.      (J44.9) Chronic obstructive pulmonary disease, unspecified COPD type (H)  Comment: Well controlled with medications without side effects.   Plan: see COPD Action Plan      BMI:   Estimated body mass index is 50.48 kg/m  as calculated from the following:    Height as of this encounter: 1.575 m (5' 2\").    Weight as of this encounter: 125.2 kg (276 lb).   Weight management plan: Discussed healthy diet and exercise guidelines            Return in about 9 months (around 4/10/2020) for Wellness visit (fasting labs up to one week " prior).    Verena Vee MD  [facility]

## 2019-07-10 NOTE — PROGRESS NOTES
Your final test results are pending.  Please check your chart again within 3 to 5 days. You will receive further instruction when your full test result panel is complete.    
Hypercholesterolemia

## 2019-07-16 ENCOUNTER — OFFICE VISIT (OUTPATIENT)
Dept: FAMILY MEDICINE | Facility: CLINIC | Age: 74
End: 2019-07-16
Payer: MEDICARE

## 2019-07-16 VITALS
RESPIRATION RATE: 22 BRPM | BODY MASS INDEX: 50.79 KG/M2 | OXYGEN SATURATION: 96 % | TEMPERATURE: 98.7 F | SYSTOLIC BLOOD PRESSURE: 126 MMHG | DIASTOLIC BLOOD PRESSURE: 72 MMHG | HEART RATE: 103 BPM | HEIGHT: 62 IN | WEIGHT: 276 LBS

## 2019-07-16 DIAGNOSIS — J44.9 CHRONIC OBSTRUCTIVE PULMONARY DISEASE, UNSPECIFIED COPD TYPE (H): ICD-10-CM

## 2019-07-16 DIAGNOSIS — L91.8 SKIN TAG: Primary | ICD-10-CM

## 2019-07-16 PROCEDURE — 99213 OFFICE O/P EST LOW 20 MIN: CPT | Mod: 25 | Performed by: FAMILY MEDICINE

## 2019-07-16 PROCEDURE — 11200 RMVL SKIN TAGS UP TO&INC 15: CPT | Performed by: FAMILY MEDICINE

## 2019-07-16 RX ORDER — TIOTROPIUM BROMIDE AND OLODATEROL 3.124; 2.736 UG/1; UG/1
2 SPRAY, METERED RESPIRATORY (INHALATION) DAILY
COMMUNITY
Start: 2019-05-25

## 2019-07-16 ASSESSMENT — MIFFLIN-ST. JEOR: SCORE: 1715.18

## 2019-07-16 NOTE — LETTER
My COPD Action Plan     Name: Gina Yeh    YOB: 1945   Date: 7/16/2019    My doctor: Verena Vee MD   My clinic: [address] [address] [phone]  My Controller Medicine: Olodaterol/tiotropium (Stiolto Respimat)   Dose: 2 puffs daily      My Rescue Medicine: Albuterol (Proair/Ventolin/Proventil) inhaler   Dose:       My Flare Up Medicine: Prednisone   Dose:   FEV-1 (no units)   Date Value   05/29/2012 86     FEV1/FVC (no units)   Date Value   05/29/2012 96      My COPD Severity: Mild = FeV1 > 80%      Use of Oxygen: Oxygen Not Prescribed      Make sure you've had your pneumonia   vaccines.          GREEN ZONE       Doing well today      Usual level of activity and exercise    Usual amount of cough and mucus    No shortness of breath    Usual level of health (thinking clearly, sleeping well, feel like eating) Actions:      Take daily medicines    Use oxygen as prescribed    Follow regular exercise and diet plan    Avoid cigarette smoke and other irritants that harm the lungs           YELLOW ZONE          Having a bad day or flare up      Short of breath more than usual    A lot more sputum (mucus) than usual    Sputum looks yellow, green, tan, brown or bloody    More coughing or wheezing    Fever or chills    Less energy; trouble completing activities    Trouble thinking or focusing    Using quick relief inhaler or nebulizer more often    Poor sleep; symptoms wake me up    Do not feel like eating Actions:      Get plenty of rest    Take daily medicines    Use quick relief inhaler every 4 hours    If you use oxygen, call you doctor to see if you should adjust your oxygen    Do breathing exercises or other things to help you relax    Let a loved one, friend or neighbor know you are feeling worse    Call your care team if you have 2 or more symptoms.  Start taking steroids or antibiotics if directed by your care team           RED  ZONE       Need medical care now      Severe shortness of breath (feel you can't breathe)    Fever, chills    Not enough breath to do any activity    Trouble coughing up mucus, walking or talking    Blood in mucus    Frequent coughing   Rescue medicines are not working    Not able to sleep because of breathing    Feel confused or drowsy    Chest pain    Actions:      Call your health care team.  If you cannot reach your care team, call 911 or go to the emergency room.        Annual Reminders:  Meet with Care Team, Flu Shot every Fall  Pharmacy: [facility] - [facility] [id number]

## 2019-09-12 ENCOUNTER — TRANSFERRED RECORDS (OUTPATIENT)
Dept: HEALTH INFORMATION MANAGEMENT | Facility: CLINIC | Age: 74
End: 2019-09-12

## 2019-10-15 NOTE — PROGRESS NOTES
"Subjective     Gina Yeh is a 74 year old female who presents to clinic today for the following health issues:    HPI   Chief Complaint   Patient presents with     RECHECK     F/U on skin tags     Flu Shot     Gina Yeh is a 74 year old female who presents with super morbid obesity, despite attempts at weight loss with diet and exercise. Symptom onset has been waxing and waning for a time period of years. Severity is described as severe. Course of her symptoms over time is unchanged.    She also has a skin lesion on her finger. She is happy with prior skin tag removal, and has as few persistent on her anterior neck.    Reviewed and updated as needed this visit by Provider  Tobacco  Allergies  Meds  Problems  Med Hx  Surg Hx  Fam Hx         Review of Systems   ROS COMP: CONSTITUTIONAL: NEGATIVE for fever, chills, change in weight  INTEGUMENTARY/SKIN: skin tags, lesion   RESP:Hx COPD  CV: NEGATIVE for chest pain, palpitations or peripheral edema  MUSCULOSKELETAL: NEGATIVE for significant arthralgias or myalgia      Objective    /66   Pulse 100   Temp 97.7  F (36.5  C) (Oral)   Resp 20   Ht 1.575 m (5' 2\")   Wt 125.2 kg (276 lb)   SpO2 95%   Breastfeeding? No   BMI 50.48 kg/m    Body mass index is 50.48 kg/m .  Physical Exam   GENERAL: alert, no distress and obese  MS: extremities normal- no gross deformities noted  SKIN: few scattered skin tags at nape of neck; waxy symmetric papule right dorsal distal index finger  PSYCH: mentation appears normal, affect flat and tearful    Diagnostic Test Results:  Labs reviewed in Epic  Results for orders placed or performed in visit on 09/12/19   Pulmonary Function Test - HIM Scan    Narrative    [facility] PROGRESS NOTE/CLINIC NOTE             Assessment & Plan     (E66.01) Morbidly obese (H)  (primary encounter diagnosis)  Plan: INTERNAL MEDICINE REFERRAL          (I10) Hypertension goal BP (blood pressure) < " "140/90  Comment: Well controlled with medications without side effects.     (L98.9) Skin lesion  Comment: Differential diagnoses would include: dermatofibroma   Plan: DERMATOLOGY REFERRAL          (L91.8) Skin tag  Plan: follow-up for cryotherapy when desired      BMI:   Estimated body mass index is 50.48 kg/m  as calculated from the following:    Height as of this encounter: 1.575 m (5' 2\").    Weight as of this encounter: 125.2 kg (276 lb).   Weight management plan: Patient referred to endocrine and/or weight management specialty            Return in about 6 months (around 4/11/2020) for Wellness visit.    Verena Vee MD  [facility]    "

## 2019-10-21 ENCOUNTER — OFFICE VISIT (OUTPATIENT)
Dept: FAMILY MEDICINE | Facility: CLINIC | Age: 74
End: 2019-10-21
Payer: MEDICARE

## 2019-10-21 VITALS
HEIGHT: 62 IN | WEIGHT: 276 LBS | SYSTOLIC BLOOD PRESSURE: 130 MMHG | HEART RATE: 100 BPM | RESPIRATION RATE: 20 BRPM | OXYGEN SATURATION: 95 % | BODY MASS INDEX: 50.79 KG/M2 | TEMPERATURE: 97.7 F | DIASTOLIC BLOOD PRESSURE: 66 MMHG

## 2019-10-21 DIAGNOSIS — L91.8 SKIN TAG: ICD-10-CM

## 2019-10-21 DIAGNOSIS — I10 HYPERTENSION GOAL BP (BLOOD PRESSURE) < 140/90: ICD-10-CM

## 2019-10-21 DIAGNOSIS — L98.9 SKIN LESION: ICD-10-CM

## 2019-10-21 DIAGNOSIS — E66.01 MORBIDLY OBESE (H): Primary | ICD-10-CM

## 2019-10-21 PROCEDURE — 90662 IIV NO PRSV INCREASED AG IM: CPT | Performed by: FAMILY MEDICINE

## 2019-10-21 PROCEDURE — 99213 OFFICE O/P EST LOW 20 MIN: CPT | Mod: 25 | Performed by: FAMILY MEDICINE

## 2019-10-21 PROCEDURE — G0008 ADMIN INFLUENZA VIRUS VAC: HCPCS | Performed by: FAMILY MEDICINE

## 2019-10-21 ASSESSMENT — MIFFLIN-ST. JEOR: SCORE: 1715.18

## 2019-12-04 ENCOUNTER — OFFICE VISIT (OUTPATIENT)
Dept: SLEEP MEDICINE | Facility: CLINIC | Age: 74
End: 2019-12-04
Payer: MEDICARE

## 2019-12-04 VITALS
SYSTOLIC BLOOD PRESSURE: 137 MMHG | HEIGHT: 62 IN | HEART RATE: 84 BPM | DIASTOLIC BLOOD PRESSURE: 80 MMHG | BODY MASS INDEX: 50.24 KG/M2 | OXYGEN SATURATION: 96 % | WEIGHT: 273 LBS

## 2019-12-04 DIAGNOSIS — E61.1 IRON DEFICIENCY: ICD-10-CM

## 2019-12-04 DIAGNOSIS — G25.81 RESTLESS LEGS SYNDROME (RLS): ICD-10-CM

## 2019-12-04 DIAGNOSIS — G47.33 OSA (OBSTRUCTIVE SLEEP APNEA): Primary | ICD-10-CM

## 2019-12-04 DIAGNOSIS — E66.01 MORBIDLY OBESE (H): ICD-10-CM

## 2019-12-04 PROCEDURE — 99213 OFFICE O/P EST LOW 20 MIN: CPT | Performed by: PHYSICIAN ASSISTANT

## 2019-12-04 ASSESSMENT — MIFFLIN-ST. JEOR: SCORE: 1701.57

## 2019-12-04 NOTE — PATIENT INSTRUCTIONS
Your BMI is Body mass index is 49.93 kg/m .  Weight management is a personal decision.  If you are interested in exploring weight loss strategies, the following discussion covers the approaches that may be successful. Body mass index (BMI) is one way to tell whether you are at a healthy weight, overweight, or obese. It measures your weight in relation to your height.  A BMI of 18.5 to 24.9 is in the healthy range. A person with a BMI of 25 to 29.9 is considered overweight, and someone with a BMI of 30 or greater is considered obese. More than two-thirds of American adults are considered overweight or obese.  Being overweight or obese increases the risk for further weight gain. Excess weight may lead to heart disease and diabetes.  Creating and following plans for healthy eating and physical activity may help you improve your health.  Weight control is part of healthy lifestyle and includes exercise, emotional health, and healthy eating habits. Careful eating habits lifelong are the mainstay of weight control. Though there are significant health benefits from weight loss, long-term weight loss with diet alone may be very difficult to achieve- studies show long-term success with dietary management in less than 10% of people. Attaining a healthy weight may be especially difficult to achieve in those with severe obesity. In some cases, medications, devices and surgical management might be considered.  What can you do?  If you are overweight or obese and are interested in methods for weight loss, you should discuss this with your provider.     Consider reducing daily calorie intake by 500 calories.     Keep a food journal.     Avoiding skipping meals, consider cutting portions instead.    Diet combined with exercise helps maintain muscle while optimizing fat loss. Strength training is particularly important for building and maintaining muscle mass. Exercise helps reduce stress, increase energy, and improves fitness.  Increasing exercise without diet control, however, may not burn enough calories to loose weight.       Start walking three days a week 10-20 minutes at a time    Work towards walking thirty minutes five days a week     Eventually, increase the speed of your walking for 1-2 minutes at time    In addition, we recommend that you review healthy lifestyles and methods for weight loss available through the National Institutes of Health patient information sites:  http://win.niddk.nih.gov/publications/index.htm    And look into health and wellness programs that may be available through your health insurance provider, employer, local community center, or bebeto club.    Weight management plan: Patient was referred to their PCP to discuss a diet and exercise plan.

## 2019-12-04 NOTE — NURSING NOTE
"Chief Complaint   Patient presents with     CPAP Follow Up       Initial /80   Pulse 84   Ht 1.575 m (5' 2\")   Wt 123.8 kg (273 lb)   SpO2 96%   BMI 49.93 kg/m   Estimated body mass index is 49.93 kg/m  as calculated from the following:    Height as of this encounter: 1.575 m (5' 2\").    Weight as of this encounter: 123.8 kg (273 lb).    Medication Reconciliation: complete      "

## 2019-12-04 NOTE — PROGRESS NOTES
Obstructive Sleep Apnea - PAP Follow-Up Visit:    Chief Complaint   Patient presents with     CPAP Follow Up     Gina Yeh comes in today for follow-up of their severe sleep apnea, managed with CPAP.     Initially presented with snoring, snort arousals, witnessed apneas, enlarged neck girth >= 40 cm for female, and obesity with excessive daytime sleepiness. RLS.     Diagnostic Study 9/12/2017 - (271.0 lbs) AHI 78.7, RDI 91.3, Supine AHI 73.5, REM AHI -, Low O2 79.5%, Time Spent less than 88% 22.9 minutes.    Titration Study: 9/26/2017- (271.0 lbs) The patient was titrated at pressures ranging from 5 cmH2O up to 8 cmH2O. The optimal pressure achieved was 8 cmH2O with a residual AHI of 1.1 events per hour. Time in REM supine on final pressure was 22.5 minutes. PLM index was 25.6 movements per hour. The PLM Arousal Index was 8.0 per hour.    Overall, she rates the experience with PAP as 10 (0 poor, 10 great). The mask is comfortable.    The mask is not leaking .  She is not snoring with the mask on. She is not having gasp arousals.  She is not having significant oral/nasal dryness. The pressure is comfortable.     Her PAP interface is Nasal Mask.    Bedtime is typically 2200. Usually it takes about 20 min minutes to fall asleep with the mask on. Wake time is typically 0600.  Patient is using PAP therapy 8 hours per night. The patient is usually getting 7.5 hours of sleep per night.    She does feel rested in the morning.    Total score - [place]: 4 (12/4/2019  8:00 AM)    JONNY Total Score: 3    ResMed   Auto-PAP 7.0 - 12.0 cmH2O 30 day usage data:    93% of days with > 4 hours of use. 0/30 days with no use.   Average use 396 minutes per day.   95%ile Leak 7.24 L/min.   CPAP 95% pressure 11.9 cm.   AHI 0.55 events per hour.     She reports CPAP is going well. No sleep concerns today.     Past medical/surgical history, family history, social history, medications and allergies were reviewed.      Problem  List:  Patient Active Problem List    Diagnosis Date Noted     Hypertension goal BP (blood pressure) < 140/90      Priority: High     Hyperlipidemia with target LDL less than 130      Priority: High     COPD (chronic obstructive pulmonary disease) (H)      Priority: Medium     Microscopic hematuria 10/26/2017     Priority: Medium     Urologic evaluation complete; yearly UA       Restless legs syndrome (RLS) 08/30/2017     Priority: Medium     Iron deficiency 08/30/2017     Priority: Medium     Noted Aug 2017.    Rec start on daily ferrous sulfate and recent in Nov/Dec.       Sensorineural hearing loss      Priority: Medium     JOSE (obstructive sleep apnea) 07/24/2015     Priority: Medium     Diagnostic Study 9/12/2017 - (271.0 lbs) AHI 78.7, RDI 91.3, Supine AHI 73.5, REM AHI -, Low O2 79.5%, Time Spent ?88% 22.9 minutes.  Titration Study: 9/26/2017- (271.0 lbs) The patient was titrated at pressures ranging from 5 cmH2O up to 8 cmH2O.  The optimal pressure achieved was 8 cmH2O with a residual AHI of 1.1 events per hour.  Time in REM supine on final pressure was 22.5 minutes.  PLM index was 25.6 movements per hour.  The PLM Arousal Index was 8.0 per hour.       MMT (medial meniscus tear) 10/13/2014     Priority: Medium     Morbidly obese (H) 05/29/2012     Priority: Medium     Bariatric surgery consult offered       Impaired glucose tolerance 05/29/2012     Priority: Medium     History of cervical dysplasia 05/28/2012     Priority: Medium     Advanced directives, counseling/discussion 06/02/2011     Priority: Medium     Advance Care Planning 1/7/2016: Receipt of ACP document:  Received: Health Care Directive which was witnessed or notarized on 11/25/15.  Document not previously scanned.  Validation form completed and sent with document to be scanned.  Code Status reflects choices in most recent ACP document.  Confirmed/documented designated decision maker(s).  Added by Melina Griffin    Health Care Directive on  "file 11/25/15       Hypothyroid      Priority: Medium        /80   Pulse 84   Ht 1.575 m (5' 2\")   Wt 123.8 kg (273 lb)   SpO2 96%   BMI 49.93 kg/m      Impression/Plan:  1. Severe obstructive sleep apnea-  Excellent PAP compliance and AHI is  well controlled on CPAP 7-12 cm/H20.  Continue current treatment.   Comprehensive DME.    2.  Restless leg syndrome-  Resolved.     Gina Yeh will follow up in about 2 years of sooner if any concerns.     Fifteen minutes spent with patient, all of which were spent face-to-face counseling, consulting, coordinating plan of care.    Yazmin Jack PA-C      "

## 2020-01-31 DIAGNOSIS — I10 HYPERTENSION GOAL BP (BLOOD PRESSURE) < 140/90: ICD-10-CM

## 2020-01-31 DIAGNOSIS — E03.9 ACQUIRED HYPOTHYROIDISM: ICD-10-CM

## 2020-01-31 DIAGNOSIS — E78.5 HYPERLIPIDEMIA WITH TARGET LDL LESS THAN 130: ICD-10-CM

## 2020-02-04 RX ORDER — LEVOTHYROXINE SODIUM 137 UG/1
TABLET ORAL
Qty: 90 TABLET | Refills: 0 | Status: SHIPPED | OUTPATIENT
Start: 2020-02-04 | End: 2020-04-03

## 2020-02-04 RX ORDER — LISINOPRIL 20 MG/1
TABLET ORAL
Qty: 90 TABLET | Refills: 0 | Status: SHIPPED | OUTPATIENT
Start: 2020-02-04 | End: 2020-04-03

## 2020-02-04 RX ORDER — ATORVASTATIN CALCIUM 40 MG/1
TABLET, FILM COATED ORAL
Qty: 90 TABLET | Refills: 0 | Status: SHIPPED | OUTPATIENT
Start: 2020-02-04 | End: 2020-04-03

## 2020-04-03 DIAGNOSIS — E03.9 ACQUIRED HYPOTHYROIDISM: ICD-10-CM

## 2020-04-03 DIAGNOSIS — E78.5 HYPERLIPIDEMIA WITH TARGET LDL LESS THAN 130: ICD-10-CM

## 2020-04-03 DIAGNOSIS — I10 HYPERTENSION GOAL BP (BLOOD PRESSURE) < 140/90: ICD-10-CM

## 2020-04-03 RX ORDER — LEVOTHYROXINE SODIUM 137 UG/1
TABLET ORAL
Qty: 90 TABLET | Refills: 0 | Status: SHIPPED | OUTPATIENT
Start: 2020-04-03 | End: 2020-06-02

## 2020-04-03 RX ORDER — LISINOPRIL 20 MG/1
TABLET ORAL
Qty: 90 TABLET | Refills: 0 | Status: SHIPPED | OUTPATIENT
Start: 2020-04-03 | End: 2020-06-02

## 2020-04-03 RX ORDER — ATORVASTATIN CALCIUM 40 MG/1
TABLET, FILM COATED ORAL
Qty: 90 TABLET | Refills: 0 | Status: SHIPPED | OUTPATIENT
Start: 2020-04-03 | End: 2020-06-02

## 2020-04-03 NOTE — TELEPHONE ENCOUNTER
Disp  Refills  Start  End  MATTIE    lisinopril (PRINIVIL/ZESTRIL) 20 MG tablet  90 tablet  0  2/4/2020   No    Sig: TAKE 1 TABLET EVERY DAY    Sent to pharmacy as: lisinopril (PRINIVIL/ZESTRIL) 20 MG tablet    Class: E-Prescribe    Notes to Pharmacy: DUPLICATE    Order: [id number]    E-Prescribing Status: Receipt confirmed by pharmacy (2/4/2020  8:11 AM CST)       Disp  Refills  Start  End  MATTIE    levothyroxine (SYNTHROID/LEVOTHROID) 137 MCG tablet  90 tablet  0  2/4/2020   No    Sig: TAKE 1 TABLET EVERY DAY    Sent to pharmacy as: levothyroxine (SYNTHROID/LEVOTHROID) 137 MCG tablet    Class: E-Prescribe    Notes to Pharmacy: DUPLICATE    Order: [id number]    E-Prescribing Status: Receipt confirmed by pharmacy (2/4/2020  8:11 AM CST)       Disp  Refills  Start  End  MATTIE    atorvastatin (LIPITOR) 40 MG tablet  90 tablet  0  2/4/2020   No    Sig: TAKE 1 TABLET EVERY DAY    Sent to pharmacy as: atorvastatin (LIPITOR) 40 MG tablet    Class: E-Prescribe    Notes to Pharmacy: DUPLICATE    Order: [id number]    E-Prescribing Status: Receipt confirmed by pharmacy (2/4/2020  8:11 AM CST)     Rimma Ibrahim MA on 4/3/2020 at 2:36 PM

## 2020-04-03 NOTE — TELEPHONE ENCOUNTER
"Prescription approved per [facility] Refill Protocol.    Requested Prescriptions   Pending Prescriptions Disp Refills     atorvastatin (LIPITOR) 40 MG tablet [Pharmacy Med Name: ATORVASTATIN CALCIUM 40 MG Tablet] 90 tablet 0     Sig: TAKE 1 TABLET EVERY DAY       Statins Protocol Passed - 4/3/2020  2:36 PM        Passed - LDL on file in past 12 months     Recent Labs   Lab Test 04/10/19  0841   *             Passed - No abnormal creatine kinase in past 12 months     Recent Labs   Lab Test 04/10/19  0841   CKT 64                Passed - Recent (12 mo) or future (30 days) visit within the authorizing provider's specialty     Patient has had an office visit with the authorizing provider or a provider within the authorizing providers department within the previous 12 mos or has a future within next 30 days. See \"Patient Info\" tab in inbasket, or \"Choose Columns\" in Meds & Orders section of the refill encounter.              Passed - Medication is active on med list        Passed - Patient is age 18 or older        Passed - No active pregnancy on record        Passed - No positive pregnancy test in past 12 months           levothyroxine (SYNTHROID/LEVOTHROID) 137 MCG tablet [Pharmacy Med Name: LEVOTHYROXINE SODIUM 137 MCG Tablet] 90 tablet 0     Sig: TAKE 1 TABLET EVERY DAY       Thyroid Protocol Passed - 4/3/2020  2:36 PM        Passed - Patient is 12 years or older        Passed - Recent (12 mo) or future (30 days) visit within the authorizing provider's specialty     Patient has had an office visit with the authorizing provider or a provider within the authorizing providers department within the previous 12 mos or has a future within next 30 days. See \"Patient Info\" tab in inTalkitoet, or \"Choose Columns\" in Meds & Orders section of the refill encounter.              Passed - Medication is active on med list        Passed - Normal TSH on file in past 12 months     Recent Labs   Lab Test 04/10/19  0841   TSH 3.01          " "    Passed - No active pregnancy on record     If patient is pregnant or has had a positive pregnancy test, please check TSH.          Passed - No positive pregnancy test in past 12 months     If patient is pregnant or has had a positive pregnancy test, please check TSH.             lisinopril (ZESTRIL) 20 MG tablet [Pharmacy Med Name: LISINOPRIL 20 MG Tablet] 90 tablet 0     Sig: TAKE 1 TABLET EVERY DAY       ACE Inhibitors (Including Combos) Protocol Passed - 4/3/2020  2:36 PM        Passed - Blood pressure under 140/90 in past 12 months     BP Readings from Last 3 Encounters:   12/04/19 137/80   10/21/19 130/66   07/16/19 126/72                 Passed - Recent (12 mo) or future (30 days) visit within the authorizing provider's specialty     Patient has had an office visit with the authorizing provider or a provider within the authorizing providers department within the previous 12 mos or has a future within next 30 days. See \"Patient Info\" tab in inbasket, or \"Choose Columns\" in Meds & Orders section of the refill encounter.              Passed - Medication is active on med list        Passed - Patient is age 18 or older        Passed - No active pregnancy on record        Passed - Normal serum creatinine on file in past 12 months     Recent Labs   Lab Test 04/10/19  0841   CR 0.93       Ok to refill medication if creatinine is low          Passed - Normal serum potassium on file in past 12 months     Recent Labs   Lab Test 04/10/19  0841   POTASSIUM 4.9             Passed - No positive pregnancy test within past 12 months           "

## 2020-05-27 DIAGNOSIS — E03.9 ACQUIRED HYPOTHYROIDISM: ICD-10-CM

## 2020-05-27 DIAGNOSIS — E78.5 HYPERLIPIDEMIA WITH TARGET LDL LESS THAN 130: ICD-10-CM

## 2020-05-27 DIAGNOSIS — I10 HYPERTENSION GOAL BP (BLOOD PRESSURE) < 140/90: ICD-10-CM

## 2020-05-29 NOTE — TELEPHONE ENCOUNTER
Appointment needed for further refills for med review (can be telephone or video). Please call to schedule.

## 2020-06-02 ENCOUNTER — VIRTUAL VISIT (OUTPATIENT)
Dept: FAMILY MEDICINE | Facility: CLINIC | Age: 75
End: 2020-06-02
Payer: COMMERCIAL

## 2020-06-02 DIAGNOSIS — E66.01 MORBIDLY OBESE (H): ICD-10-CM

## 2020-06-02 DIAGNOSIS — I10 HYPERTENSION GOAL BP (BLOOD PRESSURE) < 140/90: ICD-10-CM

## 2020-06-02 DIAGNOSIS — E78.5 HYPERLIPIDEMIA WITH TARGET LDL LESS THAN 130: Primary | ICD-10-CM

## 2020-06-02 DIAGNOSIS — E03.9 ACQUIRED HYPOTHYROIDISM: ICD-10-CM

## 2020-06-02 DIAGNOSIS — J44.9 CHRONIC OBSTRUCTIVE PULMONARY DISEASE, UNSPECIFIED COPD TYPE (H): ICD-10-CM

## 2020-06-02 PROCEDURE — 99214 OFFICE O/P EST MOD 30 MIN: CPT | Mod: 95 | Performed by: FAMILY MEDICINE

## 2020-06-02 RX ORDER — LISINOPRIL 20 MG/1
TABLET ORAL
Qty: 90 TABLET | Refills: 0 | OUTPATIENT
Start: 2020-06-02

## 2020-06-02 RX ORDER — LISINOPRIL 20 MG/1
TABLET ORAL
Qty: 90 TABLET | Refills: 0 | Status: SHIPPED | OUTPATIENT
Start: 2020-06-02 | End: 2020-08-05

## 2020-06-02 RX ORDER — ATORVASTATIN CALCIUM 40 MG/1
TABLET, FILM COATED ORAL
Qty: 90 TABLET | Refills: 3 | Status: SHIPPED | OUTPATIENT
Start: 2020-06-02 | End: 2021-06-28

## 2020-06-02 RX ORDER — LEVOTHYROXINE SODIUM 137 UG/1
TABLET ORAL
Qty: 90 TABLET | Refills: 0 | Status: SHIPPED | OUTPATIENT
Start: 2020-06-02 | End: 2020-08-05

## 2020-06-02 RX ORDER — LEVOTHYROXINE SODIUM 137 UG/1
TABLET ORAL
Qty: 90 TABLET | Refills: 0 | OUTPATIENT
Start: 2020-06-02

## 2020-06-02 RX ORDER — ATORVASTATIN CALCIUM 40 MG/1
TABLET, FILM COATED ORAL
Qty: 90 TABLET | Refills: 0 | OUTPATIENT
Start: 2020-06-02

## 2020-06-02 NOTE — PROGRESS NOTES
"Gina Yeh is a 75 year old female who is being evaluated via a billable telephone visit.    [facility]: [id number] BIN [id number]  The patient has been notified of following:   \"This telephone visit will be conducted via a call between you and your physician/provider. We have found that certain health care needs can be provided without the need for a physical exam. This service lets us provide the care you need with a short phone conversation. If a prescription is necessary we can send it directly to your pharmacy. If lab work is needed we can place an order for that and you can then stop by our lab to have the test done at a later time.  Telephone visits are billed at different rates depending on your insurance coverage. During this emergency period, for some insurers they may be billed the same as an in-person visit. Please reach out to your insurance provider with any questions.  If during the course of the call the physician/provider feels a telephone visit is not appropriate, you will not be charged for this service.\"    Patient has given verbal consent for Telephone visit?  Yes    What phone number would you like to be contacted at? [phone]    How would you like to obtain your AVS? [place]   Gina Yeh is a 75 year old female who presents via phone visit today for the following health issues:  HPI   Hyperlipidemia Follow-Up    Are you regularly taking any medication or supplement to lower your cholesterol?   Yes- Atorvastatin    Are you having muscle aches or other side effects that you think could be caused by your cholesterol lowering medication?  No    Hypertension Follow-up    Do you check your blood pressure regularly outside of the clinic? Yes     Are you following a low salt diet? Yes    Are your blood pressures ever more than 140 on the top number (systolic) OR more   than 90 on the bottom number (diastolic), for example 140/90? No    Hypothyroidism Follow-up    Since last visit, " patient describes the following symptoms: Weight stable, no hair loss, no skin changes, no constipation, no loose stools      How many servings of fruits and vegetables do you eat daily?  4 or more    On average, how many sweetened beverages do you drink each day (Examples: soda, juice, sweet tea, etc.  Do NOT count diet or artificially sweetened beverages)?   0    How many days per week do you exercise enough to make your heart beat faster? 3 or less    How many minutes a day do you exercise enough to make your heart beat faster? 9 or less    How many days per week do you miss taking your medication? 0                Reviewed and updated as needed this visit by Provider  Tobacco  Allergies  Meds  Problems  Med Hx  Surg Hx  Fam Hx         Review of Systems   CONSTITUTIONAL:POSITIVE  for weight loss  RESP: NEGATIVE for significant cough or SOB  CV: NEGATIVE for chest pain, palpitations or peripheral edema  MUSCULOSKELETAL: NEGATIVE for significant arthralgias or myalgia  PSYCHIATRIC: NEGATIVE for changes in mood or affect       Objective   Reported vitals:  There were no vitals taken for this visit.   healthy, alert and no distress  PSYCH: Alert and oriented times 3; coherent speech, normal   rate and volume, able to articulate logical thoughts, able   to abstract reason, no tangential thoughts, no hallucinations   or delusions  Her affect is normal  RESP: No cough, no audible wheezing, able to talk in full sentences  Remainder of exam unable to be completed due to telephone visits    Diagnostic Test Results:  Labs reviewed in Epic  No results found for this or any previous visit (from the past 24 hour(s)).        Assessment/Plan:  1. Hyperlipidemia with target LDL less than 130  -Well controlled with medications without side effects.   - Lipid panel reflex to direct LDL Fasting; Future  - atorvastatin (LIPITOR) 40 MG tablet; TAKE 1 TABLET EVERY DAY  Dispense: 90 tablet; Refill: 3    2. Hypertension goal BP  (blood pressure) < 140/90  3. Morbidly obese (H)  -Well controlled with medications without side effects.   - BASIC METABOLIC PANEL; Future  - lisinopril (ZESTRIL) 20 MG tablet; TAKE 1 TABLET EVERY DAY  Dispense: 90 tablet; Refill: 0  -Counseled to make better food choices, exercise as tolerated, and lose weight.     4. Acquired hypothyroidism  -euthyroid on replacement   - TSH WITH FREE T4 REFLEX; Future  - levothyroxine (SYNTHROID/LEVOTHROID) 137 MCG tablet; TAKE 1 TABLET EVERY DAY  Dispense: 90 tablet; Refill: 0    5. Chronic obstructive pulmonary disease, unspecified COPD type (H)  -Well controlled with medications without side effects.   -follow-up with pulmonology as planned       Return in about 3 months (around 9/2/2020) for Wellness visit (fasting labs up to one week prior).      Phone call duration:  6 minutes    Verena Vee MD

## 2020-06-02 NOTE — TELEPHONE ENCOUNTER
Called patient informed her of message below patient has telephone visit with PCP today 6/2/2020 at 4:40pm  Thank you  LS

## 2020-06-29 ENCOUNTER — DOCUMENTATION ONLY (OUTPATIENT)
Dept: SLEEP MEDICINE | Facility: CLINIC | Age: 75
End: 2020-06-29

## 2020-06-29 DIAGNOSIS — G25.81 RESTLESS LEGS SYNDROME (RLS): ICD-10-CM

## 2020-06-29 DIAGNOSIS — G47.33 OSA (OBSTRUCTIVE SLEEP APNEA): ICD-10-CM

## 2020-06-29 DIAGNOSIS — E66.01 MORBIDLY OBESE (H): ICD-10-CM

## 2020-06-29 DIAGNOSIS — E61.1 IRON DEFICIENCY: ICD-10-CM

## 2020-06-29 NOTE — PROGRESS NOTES
Returned patient's call to [facility] ([phone]) on June 29, 2020 regarding use of heated tubing and getting water in mask.  Unable to leave message due to no voicemail.    Per [employer] patient's humidity level is set to maximum 8 and tube temp is set to 81.      Added callback notes to [facility] recommending patient turn down humidity level.  If that does not help, turn up tube temp and/or place tubing under blanket while in-use. Also, if device is near an air conditioner, vent or fan, this will effect the sensors on the device.

## 2020-07-07 ENCOUNTER — MYC MEDICAL ADVICE (OUTPATIENT)
Dept: FAMILY MEDICINE | Facility: CLINIC | Age: 75
End: 2020-07-07

## 2020-07-08 ENCOUNTER — ANCILLARY PROCEDURE (OUTPATIENT)
Dept: MAMMOGRAPHY | Facility: CLINIC | Age: 75
End: 2020-07-08
Attending: FAMILY MEDICINE
Payer: COMMERCIAL

## 2020-07-08 DIAGNOSIS — E03.9 ACQUIRED HYPOTHYROIDISM: ICD-10-CM

## 2020-07-08 DIAGNOSIS — Z12.31 VISIT FOR SCREENING MAMMOGRAM: ICD-10-CM

## 2020-07-08 DIAGNOSIS — E78.5 HYPERLIPIDEMIA WITH TARGET LDL LESS THAN 130: ICD-10-CM

## 2020-07-08 DIAGNOSIS — E66.01 MORBIDLY OBESE (H): ICD-10-CM

## 2020-07-08 DIAGNOSIS — I10 HYPERTENSION GOAL BP (BLOOD PRESSURE) < 140/90: ICD-10-CM

## 2020-07-08 LAB
ANION GAP SERPL CALCULATED.3IONS-SCNC: 5 MMOL/L (ref 3–14)
BUN SERPL-MCNC: 24 MG/DL (ref 7–30)
CALCIUM SERPL-MCNC: 9.6 MG/DL (ref 8.5–10.1)
CHLORIDE SERPL-SCNC: 106 MMOL/L (ref 94–109)
CHOLEST SERPL-MCNC: 160 MG/DL
CO2 SERPL-SCNC: 28 MMOL/L (ref 20–32)
CREAT SERPL-MCNC: 0.86 MG/DL (ref 0.52–1.04)
GFR SERPL CREATININE-BSD FRML MDRD: 67 ML/MIN/{1.73_M2}
GLUCOSE SERPL-MCNC: 100 MG/DL (ref 70–99)
HDLC SERPL-MCNC: 39 MG/DL
LDLC SERPL CALC-MCNC: 93 MG/DL
NONHDLC SERPL-MCNC: 121 MG/DL
POTASSIUM SERPL-SCNC: 5 MMOL/L (ref 3.4–5.3)
SODIUM SERPL-SCNC: 139 MMOL/L (ref 133–144)
TRIGL SERPL-MCNC: 142 MG/DL
TSH SERPL DL<=0.005 MIU/L-ACNC: 2.66 MU/L (ref 0.4–4)

## 2020-07-08 PROCEDURE — 36415 COLL VENOUS BLD VENIPUNCTURE: CPT | Performed by: FAMILY MEDICINE

## 2020-07-08 PROCEDURE — 80048 BASIC METABOLIC PNL TOTAL CA: CPT | Performed by: FAMILY MEDICINE

## 2020-07-08 PROCEDURE — 80061 LIPID PANEL: CPT | Performed by: FAMILY MEDICINE

## 2020-07-08 PROCEDURE — 84443 ASSAY THYROID STIM HORMONE: CPT | Performed by: FAMILY MEDICINE

## 2020-07-08 PROCEDURE — 77067 SCR MAMMO BI INCL CAD: CPT | Mod: TC

## 2020-08-05 DIAGNOSIS — I10 HYPERTENSION GOAL BP (BLOOD PRESSURE) < 140/90: ICD-10-CM

## 2020-08-05 DIAGNOSIS — E03.9 ACQUIRED HYPOTHYROIDISM: ICD-10-CM

## 2020-08-05 RX ORDER — LISINOPRIL 20 MG/1
TABLET ORAL
Qty: 90 TABLET | Refills: 2 | Status: SHIPPED | OUTPATIENT
Start: 2020-08-05 | End: 2021-06-28

## 2020-08-05 RX ORDER — LEVOTHYROXINE SODIUM 137 UG/1
TABLET ORAL
Qty: 90 TABLET | Refills: 2 | Status: SHIPPED | OUTPATIENT
Start: 2020-08-05 | End: 2021-06-24

## 2020-12-13 ENCOUNTER — HEALTH MAINTENANCE LETTER (OUTPATIENT)
Age: 75
End: 2020-12-13

## 2020-12-13 ENCOUNTER — MYC MEDICAL ADVICE (OUTPATIENT)
Dept: FAMILY MEDICINE | Facility: CLINIC | Age: 75
End: 2020-12-13

## 2020-12-16 ENCOUNTER — MYC MEDICAL ADVICE (OUTPATIENT)
Dept: FAMILY MEDICINE | Facility: CLINIC | Age: 75
End: 2020-12-16

## 2020-12-16 NOTE — TELEPHONE ENCOUNTER
Please see [employer] message from patient. She read the message that you sent to her on 12/13 mychart encounter. Is it okay for her to wait until June or July for wellness exam? BMP, TSH, lipid panel last completed 07/08/2020.

## 2020-12-31 ENCOUNTER — MYC MEDICAL ADVICE (OUTPATIENT)
Dept: FAMILY MEDICINE | Facility: CLINIC | Age: 75
End: 2020-12-31

## 2021-01-04 ENCOUNTER — TELEPHONE (OUTPATIENT)
Dept: SLEEP MEDICINE | Facility: CLINIC | Age: 76
End: 2021-01-04

## 2021-01-04 DIAGNOSIS — G47.33 OSA (OBSTRUCTIVE SLEEP APNEA): Primary | ICD-10-CM

## 2021-01-04 NOTE — TELEPHONE ENCOUNTER
Reason for Call: Request for an order or referral:    Order or referral being requested: supplies     Date needed: as soon as possible    Has the patient been seen by the PCP for this problem? YES    Additional comments: patient states she needs a new prescription for supplies and that she doesn't need to been seen until 2022. Last visit she was inform to come back in two years    Phone number Patient can be reached at:  Cell number on file:    Telephone Information:   Mobile [phone]       Best Time:  any    Can we leave a detailed message on this number?  YES    Call taken on 1/4/2021 at 11:13 AM by Ebony Méndez

## 2021-02-18 ENCOUNTER — TRANSFERRED RECORDS (OUTPATIENT)
Dept: HEALTH INFORMATION MANAGEMENT | Facility: CLINIC | Age: 76
End: 2021-02-18

## 2021-03-23 ENCOUNTER — MYC MEDICAL ADVICE (OUTPATIENT)
Dept: FAMILY MEDICINE | Facility: CLINIC | Age: 76
End: 2021-03-23

## 2021-06-28 ENCOUNTER — OFFICE VISIT (OUTPATIENT)
Dept: FAMILY MEDICINE | Facility: CLINIC | Age: 76
End: 2021-06-28
Payer: COMMERCIAL

## 2021-06-28 VITALS
BODY MASS INDEX: 50.24 KG/M2 | SYSTOLIC BLOOD PRESSURE: 133 MMHG | TEMPERATURE: 98.5 F | OXYGEN SATURATION: 96 % | HEIGHT: 62 IN | DIASTOLIC BLOOD PRESSURE: 79 MMHG | HEART RATE: 92 BPM | WEIGHT: 273 LBS

## 2021-06-28 DIAGNOSIS — E78.5 HYPERLIPIDEMIA WITH TARGET LDL LESS THAN 130: ICD-10-CM

## 2021-06-28 DIAGNOSIS — J44.9 CHRONIC OBSTRUCTIVE PULMONARY DISEASE, UNSPECIFIED COPD TYPE (H): ICD-10-CM

## 2021-06-28 DIAGNOSIS — E66.01 MORBIDLY OBESE (H): ICD-10-CM

## 2021-06-28 DIAGNOSIS — L91.8 SKIN TAG: ICD-10-CM

## 2021-06-28 DIAGNOSIS — M20.42 HAMMERTOE OF LEFT FOOT: ICD-10-CM

## 2021-06-28 DIAGNOSIS — L98.9 SKIN LESION: ICD-10-CM

## 2021-06-28 DIAGNOSIS — M75.81 RIGHT ROTATOR CUFF TENDINITIS: ICD-10-CM

## 2021-06-28 DIAGNOSIS — Z00.00 ENCOUNTER FOR MEDICARE ANNUAL WELLNESS EXAM: Primary | ICD-10-CM

## 2021-06-28 DIAGNOSIS — I10 HYPERTENSION GOAL BP (BLOOD PRESSURE) < 140/90: ICD-10-CM

## 2021-06-28 DIAGNOSIS — M65.30 TRIGGER FINGER, ACQUIRED: ICD-10-CM

## 2021-06-28 DIAGNOSIS — R73.02 IMPAIRED GLUCOSE TOLERANCE: ICD-10-CM

## 2021-06-28 DIAGNOSIS — R20.8 DYSESTHESIA: ICD-10-CM

## 2021-06-28 DIAGNOSIS — E03.9 ACQUIRED HYPOTHYROIDISM: ICD-10-CM

## 2021-06-28 PROBLEM — Z87.891 PERSONAL HISTORY OF TOBACCO USE, PRESENTING HAZARDS TO HEALTH: Status: ACTIVE | Noted: 2021-06-28

## 2021-06-28 LAB
ALBUMIN UR-MCNC: NEGATIVE MG/DL
APPEARANCE UR: CLEAR
BACTERIA #/AREA URNS HPF: ABNORMAL /HPF
BASOPHILS # BLD AUTO: 0 10E9/L (ref 0–0.2)
BASOPHILS NFR BLD AUTO: 0.3 %
BILIRUB UR QL STRIP: NEGATIVE
COLOR UR AUTO: YELLOW
DIFFERENTIAL METHOD BLD: NORMAL
EOSINOPHIL # BLD AUTO: 0.1 10E9/L (ref 0–0.7)
EOSINOPHIL NFR BLD AUTO: 1.4 %
ERYTHROCYTE [DISTWIDTH] IN BLOOD BY AUTOMATED COUNT: 14.1 % (ref 10–15)
GLUCOSE UR STRIP-MCNC: NEGATIVE MG/DL
HBA1C MFR BLD: 5.6 % (ref 0–5.6)
HCT VFR BLD AUTO: 44.4 % (ref 35–47)
HGB BLD-MCNC: 14.1 G/DL (ref 11.7–15.7)
HGB UR QL STRIP: ABNORMAL
KETONES UR STRIP-MCNC: NEGATIVE MG/DL
LEUKOCYTE ESTERASE UR QL STRIP: NEGATIVE
LYMPHOCYTES # BLD AUTO: 1.4 10E9/L (ref 0.8–5.3)
LYMPHOCYTES NFR BLD AUTO: 14.3 %
MCH RBC QN AUTO: 28.9 PG (ref 26.5–33)
MCHC RBC AUTO-ENTMCNC: 31.8 G/DL (ref 31.5–36.5)
MCV RBC AUTO: 91 FL (ref 78–100)
MONOCYTES # BLD AUTO: 0.5 10E9/L (ref 0–1.3)
MONOCYTES NFR BLD AUTO: 5.2 %
NEUTROPHILS # BLD AUTO: 7.5 10E9/L (ref 1.6–8.3)
NEUTROPHILS NFR BLD AUTO: 78.8 %
NITRATE UR QL: NEGATIVE
NON-SQ EPI CELLS #/AREA URNS LPF: ABNORMAL /LPF
PH UR STRIP: 5.5 PH (ref 5–7)
PLATELET # BLD AUTO: 294 10E9/L (ref 150–450)
RBC # BLD AUTO: 4.88 10E12/L (ref 3.8–5.2)
RBC #/AREA URNS AUTO: ABNORMAL /HPF
SOURCE: ABNORMAL
SP GR UR STRIP: 1.01 (ref 1–1.03)
UROBILINOGEN UR STRIP-ACNC: 0.2 EU/DL (ref 0.2–1)
WBC # BLD AUTO: 9.5 10E9/L (ref 4–11)
WBC #/AREA URNS AUTO: ABNORMAL /HPF

## 2021-06-28 PROCEDURE — 80061 LIPID PANEL: CPT | Performed by: FAMILY MEDICINE

## 2021-06-28 PROCEDURE — 81001 URINALYSIS AUTO W/SCOPE: CPT | Performed by: FAMILY MEDICINE

## 2021-06-28 PROCEDURE — G0439 PPPS, SUBSEQ VISIT: HCPCS | Performed by: FAMILY MEDICINE

## 2021-06-28 PROCEDURE — 80048 BASIC METABOLIC PNL TOTAL CA: CPT | Performed by: FAMILY MEDICINE

## 2021-06-28 PROCEDURE — 82607 VITAMIN B-12: CPT | Performed by: FAMILY MEDICINE

## 2021-06-28 PROCEDURE — 90715 TDAP VACCINE 7 YRS/> IM: CPT | Performed by: FAMILY MEDICINE

## 2021-06-28 PROCEDURE — 84443 ASSAY THYROID STIM HORMONE: CPT | Performed by: FAMILY MEDICINE

## 2021-06-28 PROCEDURE — 36415 COLL VENOUS BLD VENIPUNCTURE: CPT | Performed by: FAMILY MEDICINE

## 2021-06-28 PROCEDURE — 83036 HEMOGLOBIN GLYCOSYLATED A1C: CPT | Performed by: FAMILY MEDICINE

## 2021-06-28 PROCEDURE — 90471 IMMUNIZATION ADMIN: CPT | Performed by: FAMILY MEDICINE

## 2021-06-28 PROCEDURE — 85025 COMPLETE CBC W/AUTO DIFF WBC: CPT | Performed by: FAMILY MEDICINE

## 2021-06-28 PROCEDURE — 99214 OFFICE O/P EST MOD 30 MIN: CPT | Mod: 25 | Performed by: FAMILY MEDICINE

## 2021-06-28 RX ORDER — ATORVASTATIN CALCIUM 40 MG/1
TABLET, FILM COATED ORAL
Qty: 90 TABLET | Refills: 3 | Status: SHIPPED | OUTPATIENT
Start: 2021-06-28 | End: 2022-08-24

## 2021-06-28 RX ORDER — LISINOPRIL 20 MG/1
TABLET ORAL
Qty: 90 TABLET | Refills: 3 | Status: SHIPPED | OUTPATIENT
Start: 2021-06-28 | End: 2022-08-24

## 2021-06-28 RX ORDER — LEVOTHYROXINE SODIUM 137 UG/1
TABLET ORAL
Qty: 90 TABLET | Refills: 3 | Status: SHIPPED | OUTPATIENT
Start: 2021-06-28 | End: 2022-08-24

## 2021-06-28 RX ORDER — LATANOPROST 50 UG/ML
1 SOLUTION/ DROPS OPHTHALMIC DAILY
COMMUNITY

## 2021-06-28 ASSESSMENT — ACTIVITIES OF DAILY LIVING (ADL): CURRENT_FUNCTION: NO ASSISTANCE NEEDED

## 2021-06-28 ASSESSMENT — MIFFLIN-ST. JEOR: SCORE: 1683.63

## 2021-06-28 NOTE — RESULT ENCOUNTER NOTE
Your results are normal.  Your final test results are pending.  Please check your chart again within 2 - 3 days. You will receive further instruction when your full test result panel is complete.     Verena Vee MD

## 2021-06-28 NOTE — PROGRESS NOTES
"SUBJECTIVE:   Gina Yeh is a 76 year old female  who presents for Preventive Visit.    Patient has been advised of split billing requirements and indicates understanding: Yes   Are you in the first 12 months of your Medicare coverage?  No    She sees pulmonology about COPD with uncontrolled symptoms. Hypertension well controlled on current medications without side effects. Hypercholesterolemia well controlled with current treatment plan without side effects. Patient also presents for follow-up of hypothyroidism, controlled on current medication.  Denies symptoms of hypo- or hyperthyroidism.     Meg also presents with dysesthesia of left 4th and 5th finger which is better at night. She has a persistent non pruritic skin lesion on her right index finger, skin tags on her back, chronic right shoulder pain, left toe deformity, and right finger that \"sticks\" at times, but less bothersome since using a splint.     Healthy Habits:    In general, how would you rate your overall health?  Very good    Frequency of exercise:  2-3 days/week    Duration of exercise:  30-45 minutes    Do you usually eat at least 4 servings of fruit and vegetables a day, include whole grains    & fiber and avoid regularly eating high fat or \"junk\" foods?  Yes    Taking medications regularly:  Yes    Medication side effects:  None    Ability to successfully perform activities of daily living:  No assistance needed    Home Safety:  No safety concerns identified    Hearing impairment symptoms: hearing aids.    In the past 6 months, have you been bothered by leaking of urine? Yes    In general, how would you rate your overall mental or emotional health?  Very good      PHQ-2 Total Score:    Additional concerns today:  Yes    Do you feel safe in your environment? Yes    Have you ever done Advance Care Planning? (For example, a Health Directive, POLST, or a discussion with a medical provider or your loved ones about your wishes): Yes, " patient states has an Advance Care Planning document and will bring a copy to the clinic.       Fall risk  Fallen 2 or more times in the past year?: No  Any fall with injury in the past year?: No    Cognitive Screening   1) Repeat 3 items (Leader, Season, Table)    2) Clock draw: NORMAL  3) 3 item recall: Recalls 3 objects  Results: 3 items recalled: COGNITIVE IMPAIRMENT LESS LIKELY    Mini-CogTM Copyright MARCE Kirby. Licensed by the author for use in [facility]; reprinted with permission (hali@[facility]). All rights reserved.      Do you have sleep apnea, excessive snoring or daytime drowsiness?: yes    Reviewed and updated as needed this visit by clinical staff  Tobacco  Allergies  Meds  Problems  Med Hx  Surg Hx  Fam Hx  Soc Hx          Reviewed and updated as needed this visit by Provider  Tobacco  Allergies  Meds  Problems  Med Hx  Surg Hx  Fam Hx         Social History     Tobacco Use     Smoking status: Former Smoker     Packs/day: 0.50     Years: 49.00     Pack years: 24.50     Types: Cigarettes     Start date: 5/1/1960     Quit date: 5/1/2011     Years since quitting: 10.1     Smokeless tobacco: Former User     Quit date: 4/25/2011   Substance Use Topics     Alcohol use: No     If you drink alcohol do you typically have >3 drinks per day or >7 drinks per week? No    Alcohol Use 6/28/2021   Prescreen: >3 drinks/day or >7 drinks/week? No     Current providers sharing in care for this patient include:    Patient Care Team:  Verena Vee MD as PCP - General  Verena Vee MD as Assigned PCP    The following health maintenance items are reviewed in Epic and correct as of today:  Health Maintenance Due   Topic Date Due     FALL RISK ASSESSMENT  07/16/2020     BMP  07/08/2021     LIPID  07/08/2021     TSH W/FREE T4 REFLEX  07/08/2021     Patient Active Problem List   Diagnosis     Hyperlipidemia with target LDL less than 130     Hypothyroid     Hypertension goal BP  (blood pressure) < 140/90     Advanced directives, counseling/discussion     History of cervical dysplasia     Morbidly obese (H)     Impaired glucose tolerance     MMT (medial meniscus tear)     JOSE (obstructive sleep apnea)     Sensorineural hearing loss     Restless legs syndrome (RLS)     Iron deficiency     Microscopic hematuria     COPD (chronic obstructive pulmonary disease) (H)     Personal history of tobacco use, presenting hazards to health     Past Surgical History:   Procedure Laterality Date     BIOPSY       BLADDER SURGERY  03/19/2018     CERVIX SURGERY  1988    cervical cone     COLONOSCOPY       LAPAROSCOPIC OOPHORECTOMY Right 03/19/2018    with hysteroscopy/EMB, and pubovaginal sling      TONSILLECTOMY & ADENOIDECTOMY         Social History     Tobacco Use     Smoking status: Former Smoker     Packs/day: 0.50     Years: 49.00     Pack years: 24.50     Types: Cigarettes     Start date: 5/1/1960     Quit date: 5/1/2011     Years since quitting: 10.1     Smokeless tobacco: Former User     Quit date: 4/25/2011   Substance Use Topics     Alcohol use: No     Family History   Problem Relation Age of Onset     Allergies Mother      Cardiovascular Mother      Respiratory Mother      Thyroid Disease Mother      Other Cancer Mother         skin     Arthritis Father      Hypertension Father      Hyperlipidemia Father      Cerebrovascular Disease Father      Cancer Maternal Grandmother         bone     Other Cancer Maternal Grandmother         bone     Cerebrovascular Disease Paternal Grandmother      Cancer Paternal Grandfather      Cerebrovascular Disease Paternal Grandfather      Cancer Brother         testicular      Gastrointestinal Disease Brother      Other Cancer Brother         testicular     Allergies Sister      Allergies Daughter      Gastrointestinal Disease Daughter      Depression Daughter      Anesthesia Reaction Daughter      Genitourinary Problems Brother      Allergies Daughter 10        metal  "    Diabetes No family hx of          Current Outpatient Medications   Medication Sig Dispense Refill     atorvastatin (LIPITOR) 40 MG tablet TAKE 1 TABLET EVERY DAY 90 tablet 3     Ferrous Sulfate (IRON) 325 (65 FE) MG tablet Take 1 tablet by mouth daily       latanoprost (XALATAN) 0.005 % ophthalmic solution 1 drop daily       levothyroxine (SYNTHROID/LEVOTHROID) 137 MCG tablet TAKE 1 TABLET EVERY DAY 90 tablet 3     lisinopril (ZESTRIL) 20 MG tablet TAKE 1 TABLET EVERY DAY 90 tablet 3     OMEGA-3 KRILL OIL PO        order for DME autoCPAP 7-12 cm 1 Units 0     STIOLTO RESPIMAT 2.5-2.5 MCG/ACT AERS 2 puffs daily       Vitamin D, Cholecalciferol, 1000 units CAPS Take 1 capsule by mouth daily       Allergies   Allergen Reactions     Nkda [No Known Drug Allergies]              Review of Systems  CONSTITUTIONAL:POSITIVE  for weight loss  INTEGUMENTARY/SKIN: skin lesion, skin tags   EYES: NEGATIVE for vision changes or irritation  ENT/MOUTH: NEGATIVE for ear, mouth and throat problems  RESP: NEGATIVE for significant cough or SOB  BREAST: NEGATIVE for masses, tenderness or discharge  CV: NEGATIVE for chest pain, palpitations or peripheral edema  GI: NEGATIVE for nausea, abdominal pain, heartburn, or change in bowel habits  : NEGATIVE for frequency, dysuria, or hematuria  MUSCULOSKELETAL:see HPI   NEURO: dysesthesias  ENDOCRINE: NEGATIVE for temperature intolerance, skin/hair changes  HEME: NEGATIVE for bleeding problems  PSYCHIATRIC: NEGATIVE for changes in mood or affect    OBJECTIVE:   /79   Pulse 92   Temp 98.5  F (36.9  C) (Oral)   Ht 1.562 m (5' 1.5\")   Wt 123.8 kg (273 lb)   SpO2 96%   Breastfeeding No   BMI 50.75 kg/m   Estimated body mass index is 50.75 kg/m  as calculated from the following:    Height as of this encounter: 1.562 m (5' 1.5\").    Weight as of this encounter: 123.8 kg (273 lb).  Physical Exam  GENERAL: alert, no distress and obese  EYES: Eyes grossly normal to inspection, PERRL and " conjunctivae and sclerae normal  HENT: normal cephalic/atraumatic, ear canals and TM's normal after cerumen removal by curette, nose and mouth without ulcers or lesions, oropharynx clear, oral mucous membranes moist and use of bilateral hearing aids  NECK: no adenopathy, no asymmetry, masses, or scars and thyroid normal to palpation  RESP: lungs clear to auscultation - no rales, rhonchi or wheezes  CV: regular rate and rhythm, normal S1 S2, no S3 or S4, no murmur, click or rub, no peripheral edema    ABDOMEN: soft, nontender, no hepatosplenomegaly, no masses and bowel sounds normal  MS: left 2nd hammertoe, normal gait; right shoulder with normal ROM and strength but positive impingement signs   SKIN: no suspicious lesions or rashes and purple papule on right dorsal index finger   NEURO: Normal strength and tone, mentation intact and speech normal  PSYCH: mentation appears normal, affect normal/bright    Diagnostic Test Results:  Labs reviewed in Epic    ASSESSMENT / PLAN:   (Z00.00) Encounter for Medicare annual wellness exam  (primary encounter diagnosis)    (J44.9) Chronic obstructive pulmonary disease, unspecified COPD type (H)  Plan: continue medication and follow-up with pulmonology. She has lung cancer screening tests elsewhere.     (E66.01) Morbidly obese (H)  (R73.02) Impaired glucose tolerance  Plan: Hemoglobin A1c, OFFICE/OUTPT VISIT,EST,LEVL IV        Counseled to make better food choices, exercise as tolerated, and lose weight.     (I10) Hypertension goal BP (blood pressure) < 140/90  Comment: Well controlled with medications without side effects.   Plan: BASIC METABOLIC PANEL, lisinopril (ZESTRIL) 20         MG tablet, *UA reflex to Microscopic and         Culture ([place] [facility] (except         [place] and [place]), OFFICE/OUTPT         VISIT,EST,LEVL IV          (E78.5) Hyperlipidemia with target LDL less than 130  Comment: Well controlled with medications without side effects.   Plan:  Lipid panel reflex to direct LDL Fasting,         atorvastatin (LIPITOR) 40 MG tablet,         OFFICE/OUTPT VISIT,EST,LEVL IV          (E03.9) Acquired hypothyroidism  Comment: euthyroid on replacement   Plan: TSH WITH FREE T4 REFLEX, levothyroxine         (SYNTHROID/LEVOTHROID) 137 MCG tablet,         OFFICE/OUTPT VISIT,EST,LEVL IV          (R20.8) Dysesthesia  Comment: Differential diagnoses would include: left ulnar neuropathy   Plan: EMG, NEUROLOGY ADULT REFERRAL, Hemoglobin A1c,         CBC with platelets and differential, Vitamin         B12, OFFICE/OUTPT VISIT,EST,LEVL IV          (L98.9) Skin lesion  Comment: Differential diagnoses would include: venous lake, dermatofibroma   Plan: ADULT DERMATOLOGY REFERRAL, OFFICE/OUTPT         VISIT,EST,LEVL IV           (L91.8) Skin tags  Plan: ADULT DERMATOLOGY REFERRAL, OFFICE/OUTPT         VISIT,EST,LEVL IV        The patient is reassured that these symptoms do not appear to represent a serious or threatening condition.     (M75.81) Right rotator cuff tendinitis  Comment: prior physical therapy; likely aggravated by bowling   Plan: Orthopedic  Referral, OFFICE/OUTPT         VISIT,EST,LEVL IV           (M20.42) Hammertoe of left foot  Plan: OFFICE/OUTPT VISIT,EST,LEVL IV        The patient is reassured that these symptoms do not appear to represent a serious or threatening condition. Offered podiatry referral and discussed wide based shoes and over-the-counter orthotics     (M65.30) Trigger finger, acquired  Comment: doing fine with splint   Plan: OFFICE/OUTPT VISIT,EST,LEVL IV        Offered orthopedic surgery referral       Patient has been advised of split billing requirements and indicates understanding: Yes  COUNSELING:  Reviewed preventive health counseling, as reflected in patient instructions  Special attention given to:       Regular exercise       Healthy diet/nutrition       Hearing screening       Osteoporosis prevention/bone health       Consider  "lung cancer screening for ages 55-80 years and 30 pack-year smoking history         Colon cancer screening       Hepatitis C screening       The 10-year ASCVD risk score (Jeancarlos SHELTON JrJuanito, et al., 2013) is: 20%    Values used to calculate the score:      Age: 76 years      Sex: Female      Is Non- : No      Diabetic: No      Tobacco smoker: No      Systolic Blood Pressure: 133 mmHg      Is BP treated: Yes      HDL Cholesterol: 39 mg/dL      Total Cholesterol: 160 mg/dL    Estimated body mass index is 50.75 kg/m  as calculated from the following:    Height as of this encounter: 1.562 m (5' 1.5\").    Weight as of this encounter: 123.8 kg (273 lb).    Weight management plan: Discussed healthy diet and exercise guidelines    She reports that she quit smoking about 10 years ago. Her smoking use included cigarettes. She started smoking about 61 years ago. She has a 24.50 pack-year smoking history. She quit smokeless tobacco use about 10 years ago.      Appropriate preventive services were discussed with this patient, including applicable screening as appropriate for cardiovascular disease, diabetes, osteopenia/osteoporosis, and glaucoma.  As appropriate for age/gender, discussed screening for colorectal cancer, prostate cancer, breast cancer, and cervical cancer. Checklist reviewing preventive services available has been given to the patient.    Reviewed patients plan of care and provided an AVS. The Intermediate Care Plan ( asthma action plan, low back pain action plan, and migraine action plan) for Gina meets the Care Plan requirement. This Care Plan has been established and reviewed with the Patient.    Counseling Resources:  ATP IV Guidelines  Pooled Cohorts Equation Calculator  Breast Cancer Risk Calculator  Breast Cancer: Medication to Reduce Risk  FRAX Risk Assessment  ICSI Preventive Guidelines  Dietary Guidelines for Americans, 2010  USDA's MyPlate  ASA Prophylaxis  Lung CA " Screening    Verena Vee MD  [facility]    Identified Health Risks:    Information on urinary incontinence and treatment options given to patient.

## 2021-06-28 NOTE — PATIENT INSTRUCTIONS
Schedule an EMG with neurology for tinging in left fingers. You may call the [facility] at [phone].        Patient Education   Personalized Prevention Plan  You are due for the preventive services outlined below.  Your care team is available to assist you in scheduling these services.  If you have already completed any of these items, please share that information with your care team to update in your medical record.  Health Maintenance Due   Topic Date Due     ANNUAL REVIEW OF HM ORDERS  Never done     FALL RISK ASSESSMENT  07/16/2020     Diptheria Tetanus Pertussis (DTAP/TDAP/TD) Vaccine (3 - Td) 03/23/2021     Basic Metabolic Panel  07/08/2021     Cholesterol Lab  07/08/2021     Thyroid Function Lab  07/08/2021       Urinary Incontinence, Female (Adult)   Urinary incontinence means loss of bladder control. This problem affects many women, especially as they get older. If you have incontinence, you may be embarrassed to ask for help. But know that this problem can be treated.   Types of Incontinence  There are different types of incontinence. Two of the main types are described here. You can have more than one type.     Stress incontinence. With this type, urine leaks when pressure (stress) is put on the bladder. This may happen when you cough, sneeze, or laugh. Stress incontinence most often occurs because the pelvic floor muscles that support the bladder and urethra are weak. This can happen after pregnancy and vaginal childbirth or a hysterectomy. It can also be due to excess body weight or hormone changes.    Urge incontinence (also called overactive bladder). With this type, a sudden urge to urinate is felt often. This may happen even though there may not be much urine in the bladder. The need to urinate often during the night is common. Urge incontinence most often occurs because of bladder spasms. This may be due to bladder irritation or infection. Damage to bladder nerves or  pelvic muscles, constipation, and certain medicines can also lead to urge incontinence.  Treatment depends on the cause. Further evaluation is needed to find the type you have. This will likely include an exam and certain tests. Based on the results, you and your healthcare provider can then plan treatment. Until a diagnosis is made, the home care tips below can help ease symptoms.   Home care    Do pelvic floor muscle exercises, if they are prescribed. The pelvic floor muscles help support the bladder and urethra. Many women find that their symptoms improve when doing special exercises that strengthen these muscles. To do the exercises, contract the muscles you would use to stop your stream of urine. But do this when you re not urinating. Hold for 10 seconds, then relax. Repeat 10 to 20 times in a row, at least 3 times a day. Your healthcare provider may give you other instructions for how to do the exercises and how often.    Keep a bladder diary. This helps track how often and how much you urinate over a set period of time. Bring this diary with you to your next visit with the provider. The information can help your provider learn more about your bladder problem.    Lose weight, if advised to by your provider. Extra weight puts pressure on the bladder. Your provider can help you create a weight-loss plan that s right for you. This may include exercising more and making certain diet changes.    Don't have foods and drinks that may irritate the bladder. These can include alcohol and caffeinated drinks.    Quit smoking. Smoking and other tobacco use can lead to a long-term (chronic) cough that strains the pelvic floor muscles. Smoking may also damage the bladder and urethra. Talk with your provider about treatments or methods you can use to quit smoking.    If drinking large amounts of fluid makes you have symptoms, you may be advised to limit your fluid intake. You may also be advised to drink most of your fluids  during the day and to limit fluids at night.    If you re worried about urine leakage or accidents, you may wear absorbent pads to catch urine. Change the pads often. This helps reduce discomfort. It may also reduce the risk of skin or bladder infections.    Follow-up care  Follow up with your healthcare provider, or as directed. It may take some to find the right treatment for your problem. But healthy lifestyle changes can be made right away. These include such things as exercising on a regular basis, eating a healthy diet, losing weight (if needed), and quitting smoking. Your treatment plan may include special therapies or medicines. Certain procedures or surgery may also be options. Talk about any questions you have with your provider.   When to seek medical advice  Call the healthcare provider right away if any of these occur:    Fever of 100.4 F (38 C) or higher, or as directed by your provider    Bladder pain or fullness    Belly swelling    Nausea or vomiting    Back pain    Weakness, dizziness, or fainting  Whitney last reviewed this educational content on 1/1/2020 2000-2021 [employer]. All rights reserved. This information is not intended as a substitute for professional medical care. Always follow your healthcare professional's instructions.

## 2021-06-29 LAB
ANION GAP SERPL CALCULATED.3IONS-SCNC: 6 MMOL/L (ref 3–14)
BUN SERPL-MCNC: 19 MG/DL (ref 7–30)
CALCIUM SERPL-MCNC: 9.2 MG/DL (ref 8.5–10.1)
CHLORIDE SERPL-SCNC: 103 MMOL/L (ref 94–109)
CHOLEST SERPL-MCNC: 185 MG/DL
CO2 SERPL-SCNC: 27 MMOL/L (ref 20–32)
CREAT SERPL-MCNC: 0.84 MG/DL (ref 0.52–1.04)
GFR SERPL CREATININE-BSD FRML MDRD: 68 ML/MIN/{1.73_M2}
GLUCOSE SERPL-MCNC: 105 MG/DL (ref 70–99)
HDLC SERPL-MCNC: 41 MG/DL
LDLC SERPL CALC-MCNC: 113 MG/DL
NONHDLC SERPL-MCNC: 144 MG/DL
POTASSIUM SERPL-SCNC: 4.2 MMOL/L (ref 3.4–5.3)
SODIUM SERPL-SCNC: 136 MMOL/L (ref 133–144)
TRIGL SERPL-MCNC: 155 MG/DL
TSH SERPL DL<=0.005 MIU/L-ACNC: 3.59 MU/L (ref 0.4–4)
VIT B12 SERPL-MCNC: 3150 PG/ML (ref 193–986)

## 2021-07-05 ENCOUNTER — OFFICE VISIT (OUTPATIENT)
Dept: ORTHOPEDICS | Facility: CLINIC | Age: 76
End: 2021-07-05
Attending: FAMILY MEDICINE
Payer: COMMERCIAL

## 2021-07-05 ENCOUNTER — ANCILLARY PROCEDURE (OUTPATIENT)
Dept: GENERAL RADIOLOGY | Facility: CLINIC | Age: 76
End: 2021-07-05
Attending: ORTHOPAEDIC SURGERY
Payer: COMMERCIAL

## 2021-07-05 VITALS
HEART RATE: 68 BPM | DIASTOLIC BLOOD PRESSURE: 72 MMHG | SYSTOLIC BLOOD PRESSURE: 117 MMHG | WEIGHT: 273 LBS | BODY MASS INDEX: 50.24 KG/M2 | HEIGHT: 62 IN | RESPIRATION RATE: 16 BRPM

## 2021-07-05 DIAGNOSIS — M75.81 RIGHT ROTATOR CUFF TENDINITIS: ICD-10-CM

## 2021-07-05 DIAGNOSIS — M75.101 ROTATOR CUFF SYNDROME OF RIGHT SHOULDER: Primary | ICD-10-CM

## 2021-07-05 PROCEDURE — 99204 OFFICE O/P NEW MOD 45 MIN: CPT | Performed by: ORTHOPAEDIC SURGERY

## 2021-07-05 PROCEDURE — 73030 X-RAY EXAM OF SHOULDER: CPT | Mod: RT | Performed by: RADIOLOGY

## 2021-07-05 ASSESSMENT — MIFFLIN-ST. JEOR: SCORE: 1683.63

## 2021-07-05 NOTE — LETTER
7/5/2021         RE: Gina Yeh  [address]  [place] [address]        Dear Colleague,    Thank you for referring your patient, Gina Yeh, to the [facility]. Please see a copy of my visit note below.    Gina Yeh is a 76 year old female who is seen in consultation at the request of Dr. Verena Vee  for right shoulder pain.  She has had some problems for years with the shoulder but getting gradually worse.  She feels it might be from overuse.  The arm is more sore if she uses it away from her body.  She has dull pain rated 4 out of 10.  She has found that rubbing the shoulder helps.  She still goes bowling about 2 times a week, but is having trouble with holding up her bowling ball.    X-ray of the shoulder on 7/5/2021 shows no glenohumeral arthritis.  There is some cystic changes around the tuberosity consistent with possible cuff abnormalities.    Past Medical History:   Diagnosis Date     Arthritis      Cervical dysplasia 1988     COPD (chronic obstructive pulmonary disease) (H)      Glaucoma      Hyperlipidemia LDL goal < 130      Hypertension goal BP (blood pressure) < 140/90      Hypothyroid      Impaired glucose tolerance 05/29/2012     Microscopic hematuria      MMT (medial meniscus tear) 09/21/2006    LT     Moderate recurrent major depression (H) 02/13/2014     Morbid obesity (H)      JOSE (obstructive sleep apnea) 07/24/2015    Doesn't use cpap     RLS (restless legs syndrome)      Sensorineural hearing loss      Vitamin D insufficiency        Past Surgical History:   Procedure Laterality Date     BIOPSY       BLADDER SURGERY  03/19/2018     CERVIX SURGERY  1988    cervical cone     COLONOSCOPY       LAPAROSCOPIC OOPHORECTOMY Right 03/19/2018    with hysteroscopy/EMB, and pubovaginal sling      TONSILLECTOMY & ADENOIDECTOMY         Family History   Problem Relation Age of Onset     Allergies Mother      Cardiovascular Mother      Respiratory  Mother      Thyroid Disease Mother      Other Cancer Mother         skin     Arthritis Father      Hypertension Father      Hyperlipidemia Father      Cerebrovascular Disease Father      Cancer Maternal Grandmother         bone     Other Cancer Maternal Grandmother         bone     Cerebrovascular Disease Paternal Grandmother      Cancer Paternal Grandfather      Cerebrovascular Disease Paternal Grandfather      Cancer Brother         testicular      Gastrointestinal Disease Brother      Other Cancer Brother         testicular     Allergies Sister      Allergies Daughter      Gastrointestinal Disease Daughter      Depression Daughter      Anesthesia Reaction Daughter      Genitourinary Problems Brother      Allergies Daughter 10        metal     Diabetes No family hx of        Social History     Socioeconomic History     Marital status:      Spouse name: Umberto     Number of children: 2     Years of education: 13     Highest education level: Not on file   Occupational History     Occupation: Tactile keeper      Employer: RETIRED   Social Needs     Financial resource strain: Not on file     Food insecurity     Worry: Not on file     Inability: Not on file     Transportation needs     Medical: Not on file     Non-medical: Not on file   Tobacco Use     Smoking status: Former Smoker     Packs/day: 0.50     Years: 49.00     Pack years: 24.50     Types: Cigarettes     Start date: 5/1/1960     Quit date: 5/1/2011     Years since quitting: 10.1     Smokeless tobacco: Former User     Quit date: 4/25/2011   Substance and Sexual Activity     Alcohol use: No     Drug use: No     Sexual activity: Not Currently     Partners: Male     Birth control/protection: Post-menopausal   Lifestyle     Physical activity     Days per week: Not on file     Minutes per session: Not on file     Stress: Not on file   Relationships     Social connections     Talks on phone: Not on file     Gets together: Not on file     Attends Mu-ism  service: Not on file     Active member of club or organization: Not on file     Attends meetings of clubs or organizations: Not on file     Relationship status: Not on file     Intimate partner violence     Fear of current or ex partner: Not on file     Emotionally abused: Not on file     Physically abused: Not on file     Forced sexual activity: Not on file   Other Topics Concern     Parent/sibling w/ CABG, MI or angioplasty before 65F 55M? No   Social History Narrative     Not on file       Current Outpatient Medications   Medication Sig Dispense Refill     atorvastatin (LIPITOR) 40 MG tablet TAKE 1 TABLET EVERY DAY 90 tablet 3     Ferrous Sulfate (IRON) 325 (65 FE) MG tablet Take 1 tablet by mouth daily       latanoprost (XALATAN) 0.005 % ophthalmic solution 1 drop daily       levothyroxine (SYNTHROID/LEVOTHROID) 137 MCG tablet TAKE 1 TABLET EVERY DAY 90 tablet 3     lisinopril (ZESTRIL) 20 MG tablet TAKE 1 TABLET EVERY DAY 90 tablet 3     OMEGA-3 KRILL OIL PO        order for DME autoCPAP 7-12 cm 1 Units 0     STIOLTO RESPIMAT 2.5-2.5 MCG/ACT AERS 2 puffs daily       Vitamin D, Cholecalciferol, 1000 units CAPS Take 1 capsule by mouth daily         Allergies   Allergen Reactions     Nkda [No Known Drug Allergies]        REVIEW OF SYSTEMS:  CONSTITUTIONAL:  NEGATIVE for fever, chills, change in weight, not feeling tired  SKIN:  NEGATIVE for worrisome rashes, no skin lumps, no skin ulcers and no non-healing wounds  EYES:  NEGATIVE for vision changes or irritation.  ENT/MOUTH:  NEGATIVE.  No hearing loss, no hoarseness, no difficulty swallowing.  RESP:  NEGATIVE. No cough or shortness of breath.  CV:  NEGATIVE for chest pain, palpitations or peripheral edema  GI:  NEGATIVE for nausea, abdominal pain, heartburn, or change in bowel habits  :  Negative. No dysuria, no hematuria  MUSCULOSKELETAL:  See HPI above  NEURO:  NEGATIVE . No headaches, no dizziness,  no numbness  ENDOCRINE:  NEGATIVE for temperature  "intolerance, skin/hair changes  HEME/ALLERGY/IMMUNE:  NEGATIVE for bleeding problems  PSYCHIATRIC:  NEGATIVE. no anxiety, no depression.     Exam:  Vitals: /72   Pulse 68   Resp 16   Ht 1.562 m (5' 1.5\")   Wt 123.8 kg (273 lb)   BMI 50.75 kg/m    BMI= Body mass index is 50.75 kg/m .  Constitutional:  healthy, alert and no distress  Neuro: Alert and Oriented x 3, Sensation grossly WNL.  Psych: Affect normal   Respiratory: Breathing not labored.  Cardiovascular: normal peripheral pulses  Lymph: no adenopathy  Skin: No rashes,worrisome lesions or skin problems  She has full range of motion of both shoulders.  There is no tenderness in the trapezius, acromioclavicular joint, subacromial space.  She indicates most pain is at the deltoid area, but she is not tender here.  She has definite pain and weakness of resisted external rotation on the right, negative on the left.  She has also pain and mild weakness of resisted abduction on the right, negative on the left.  She has no pain with resisted internal rotation.  She did have some pain with resisted blocking test and empty can test on the right.  There is also weakness on the empty can test on the right.    Assessment:  Rotator cuff syndrome on right with probable tear.  Plan:  MRI right shoulder to evaluate probable rotator cuff tear.  Return to clinic after MRI.      Again, thank you for allowing me to participate in the care of your patient.        Sincerely,        Krishna Santiago MD    "

## 2021-07-06 NOTE — PROGRESS NOTES
Gina Yeh is a 76 year old female who is seen in consultation at the request of Dr. Verena Vee  for right shoulder pain.  She has had some problems for years with the shoulder but getting gradually worse.  She feels it might be from overuse.  The arm is more sore if she uses it away from her body.  She has dull pain rated 4 out of 10.  She has found that rubbing the shoulder helps.  She still goes bowling about 2 times a week, but is having trouble with holding up her bowling ball.    X-ray of the shoulder on 7/5/2021 shows no glenohumeral arthritis.  There is some cystic changes around the tuberosity consistent with possible cuff abnormalities.    Past Medical History:   Diagnosis Date     Arthritis      Cervical dysplasia 1988     COPD (chronic obstructive pulmonary disease) (H)      Glaucoma      Hyperlipidemia LDL goal < 130      Hypertension goal BP (blood pressure) < 140/90      Hypothyroid      Impaired glucose tolerance 05/29/2012     Microscopic hematuria      MMT (medial meniscus tear) 09/21/2006    LT     Moderate recurrent major depression (H) 02/13/2014     Morbid obesity (H)      JOSE (obstructive sleep apnea) 07/24/2015    Doesn't use cpap     RLS (restless legs syndrome)      Sensorineural hearing loss      Vitamin D insufficiency        Past Surgical History:   Procedure Laterality Date     BIOPSY       BLADDER SURGERY  03/19/2018     CERVIX SURGERY  1988    cervical cone     COLONOSCOPY       LAPAROSCOPIC OOPHORECTOMY Right 03/19/2018    with hysteroscopy/EMB, and pubovaginal sling      TONSILLECTOMY & ADENOIDECTOMY         Family History   Problem Relation Age of Onset     Allergies Mother      Cardiovascular Mother      Respiratory Mother      Thyroid Disease Mother      Other Cancer Mother         skin     Arthritis Father      Hypertension Father      Hyperlipidemia Father      Cerebrovascular Disease Father      Cancer Maternal Grandmother         bone     Other Cancer Maternal  Grandmother         bone     Cerebrovascular Disease Paternal Grandmother      Cancer Paternal Grandfather      Cerebrovascular Disease Paternal Grandfather      Cancer Brother         testicular      Gastrointestinal Disease Brother      Other Cancer Brother         testicular     Allergies Sister      Allergies Daughter      Gastrointestinal Disease Daughter      Depression Daughter      Anesthesia Reaction Daughter      Genitourinary Problems Brother      Allergies Daughter 10        metal     Diabetes No family hx of        Social History     Socioeconomic History     Marital status:      Spouse name: Umberto     Number of children: 2     Years of education: 13     Highest education level: Not on file   Occupational History     Occupation: book keeper      Employer: RETIRED   Social Needs     Financial resource strain: Not on file     Food insecurity     Worry: Not on file     Inability: Not on file     Transportation needs     Medical: Not on file     Non-medical: Not on file   Tobacco Use     Smoking status: Former Smoker     Packs/day: 0.50     Years: 49.00     Pack years: 24.50     Types: Cigarettes     Start date: 5/1/1960     Quit date: 5/1/2011     Years since quitting: 10.1     Smokeless tobacco: Former User     Quit date: 4/25/2011   Substance and Sexual Activity     Alcohol use: No     Drug use: No     Sexual activity: Not Currently     Partners: Male     Birth control/protection: Post-menopausal   Lifestyle     Physical activity     Days per week: Not on file     Minutes per session: Not on file     Stress: Not on file   Relationships     Social connections     Talks on phone: Not on file     Gets together: Not on file     Attends Jainism service: Not on file     Active member of club or organization: Not on file     Attends meetings of clubs or organizations: Not on file     Relationship status: Not on file     Intimate partner violence     Fear of current or ex partner: Not on file      "Emotionally abused: Not on file     Physically abused: Not on file     Forced sexual activity: Not on file   Other Topics Concern     Parent/sibling w/ CABG, MI or angioplasty before 65F 55M? No   Social History Narrative     Not on file       Current Outpatient Medications   Medication Sig Dispense Refill     atorvastatin (LIPITOR) 40 MG tablet TAKE 1 TABLET EVERY DAY 90 tablet 3     Ferrous Sulfate (IRON) 325 (65 FE) MG tablet Take 1 tablet by mouth daily       latanoprost (XALATAN) 0.005 % ophthalmic solution 1 drop daily       levothyroxine (SYNTHROID/LEVOTHROID) 137 MCG tablet TAKE 1 TABLET EVERY DAY 90 tablet 3     lisinopril (ZESTRIL) 20 MG tablet TAKE 1 TABLET EVERY DAY 90 tablet 3     OMEGA-3 KRILL OIL PO        order for DME autoCPAP 7-12 cm 1 Units 0     STIOLTO RESPIMAT 2.5-2.5 MCG/ACT AERS 2 puffs daily       Vitamin D, Cholecalciferol, 1000 units CAPS Take 1 capsule by mouth daily         Allergies   Allergen Reactions     Nkda [No Known Drug Allergies]        REVIEW OF SYSTEMS:  CONSTITUTIONAL:  NEGATIVE for fever, chills, change in weight, not feeling tired  SKIN:  NEGATIVE for worrisome rashes, no skin lumps, no skin ulcers and no non-healing wounds  EYES:  NEGATIVE for vision changes or irritation.  ENT/MOUTH:  NEGATIVE.  No hearing loss, no hoarseness, no difficulty swallowing.  RESP:  NEGATIVE. No cough or shortness of breath.  CV:  NEGATIVE for chest pain, palpitations or peripheral edema  GI:  NEGATIVE for nausea, abdominal pain, heartburn, or change in bowel habits  :  Negative. No dysuria, no hematuria  MUSCULOSKELETAL:  See HPI above  NEURO:  NEGATIVE . No headaches, no dizziness,  no numbness  ENDOCRINE:  NEGATIVE for temperature intolerance, skin/hair changes  HEME/ALLERGY/IMMUNE:  NEGATIVE for bleeding problems  PSYCHIATRIC:  NEGATIVE. no anxiety, no depression.     Exam:  Vitals: /72   Pulse 68   Resp 16   Ht 1.562 m (5' 1.5\")   Wt 123.8 kg (273 lb)   BMI 50.75 kg/m    BMI= " Body mass index is 50.75 kg/m .  Constitutional:  healthy, alert and no distress  Neuro: Alert and Oriented x 3, Sensation grossly WNL.  Psych: Affect normal   Respiratory: Breathing not labored.  Cardiovascular: normal peripheral pulses  Lymph: no adenopathy  Skin: No rashes,worrisome lesions or skin problems  She has full range of motion of both shoulders.  There is no tenderness in the trapezius, acromioclavicular joint, subacromial space.  She indicates most pain is at the deltoid area, but she is not tender here.  She has definite pain and weakness of resisted external rotation on the right, negative on the left.  She has also pain and mild weakness of resisted abduction on the right, negative on the left.  She has no pain with resisted internal rotation.  She did have some pain with resisted blocking test and empty can test on the right.  There is also weakness on the empty can test on the right.    Assessment:  Rotator cuff syndrome on right with probable tear.  Plan:  MRI right shoulder to evaluate probable rotator cuff tear.  Return to clinic after MRI.

## 2021-07-12 ENCOUNTER — ANCILLARY PROCEDURE (OUTPATIENT)
Dept: MAMMOGRAPHY | Facility: CLINIC | Age: 76
End: 2021-07-12
Attending: FAMILY MEDICINE
Payer: COMMERCIAL

## 2021-07-12 DIAGNOSIS — Z12.31 VISIT FOR SCREENING MAMMOGRAM: ICD-10-CM

## 2021-07-12 PROCEDURE — 77067 SCR MAMMO BI INCL CAD: CPT | Mod: TC | Performed by: RADIOLOGY

## 2021-07-13 ENCOUNTER — TELEPHONE (OUTPATIENT)
Dept: ORTHOPEDICS | Facility: CLINIC | Age: 76
End: 2021-07-13

## 2021-07-13 NOTE — TELEPHONE ENCOUNTER
I called Gina Yeh on 7/13/2021 at 4:34 PM.  Seen 7/5/21.  Was scheduled for MRI shoulder, but cancelled today due to hip/groin pain.  She has had this before, and is pretty sure it's a muscle pull.  She meant to talk to me about it at clinic after we went to schedule MRI, but my assistants go back in with info, and she forgot.  She should rest, ice.  She should reschedule MRI.

## 2021-07-13 NOTE — TELEPHONE ENCOUNTER
[facility] Call Center    Phone Message    May a detailed message be left on voicemail: yes     Reason for Call: Other: This pt of Dr. Santiago's called to speak with Dr. Santiago or someone on his care team. This pt had an appt with Dr. Santiago on 7/13 for (R) Rotator Cuff. The pt has additionally been experiencing groin pain that has affected her ability to sleep and drive due to the pain. The pt brought up the issue with Dr. Santiago but did not have a chance to fully discuss it at the appt. The pt asked that Dr. Santiago or someone on his care team follow-up with the pt to discuss this concern.     Message taken by Karina Santiago - Please attend to this today, if possible.     Action Taken: Message routed to:  Other: [facility] Triage    Travel Screening: Not Applicable

## 2021-07-27 ENCOUNTER — ANCILLARY PROCEDURE (OUTPATIENT)
Dept: MRI IMAGING | Facility: CLINIC | Age: 76
End: 2021-07-27
Attending: ORTHOPAEDIC SURGERY
Payer: COMMERCIAL

## 2021-07-27 ENCOUNTER — TELEPHONE (OUTPATIENT)
Dept: ORTHOPEDICS | Facility: CLINIC | Age: 76
End: 2021-07-27

## 2021-07-27 DIAGNOSIS — M75.101 ROTATOR CUFF SYNDROME OF RIGHT SHOULDER: ICD-10-CM

## 2021-07-27 PROCEDURE — 73221 MRI JOINT UPR EXTREM W/O DYE: CPT | Mod: RT | Performed by: RADIOLOGY

## 2021-07-27 NOTE — TELEPHONE ENCOUNTER
Pt called to talk to  about MRI results and what to do next. Please call her back at [phone] thank you

## 2021-07-28 ENCOUNTER — HOSPITAL ENCOUNTER (OUTPATIENT)
Facility: AMBULATORY SURGERY CENTER | Age: 76
End: 2021-07-28
Attending: ORTHOPAEDIC SURGERY | Admitting: ORTHOPAEDIC SURGERY
Payer: COMMERCIAL

## 2021-07-28 ENCOUNTER — TELEPHONE (OUTPATIENT)
Dept: ORTHOPEDICS | Facility: CLINIC | Age: 76
End: 2021-07-28

## 2021-07-28 DIAGNOSIS — S46.211D TRAUMATIC PARTIAL TEAR OF RIGHT BICEPS TENDON, SUBSEQUENT ENCOUNTER: ICD-10-CM

## 2021-07-28 DIAGNOSIS — M19.011 ARTHRITIS OF RIGHT ACROMIOCLAVICULAR JOINT: ICD-10-CM

## 2021-07-28 DIAGNOSIS — M75.121 NONTRAUMATIC COMPLETE TEAR OF RIGHT ROTATOR CUFF: ICD-10-CM

## 2021-07-28 DIAGNOSIS — Z11.59 ENCOUNTER FOR SCREENING FOR OTHER VIRAL DISEASES: ICD-10-CM

## 2021-07-28 DIAGNOSIS — M75.121 NONTRAUMATIC COMPLETE TEAR OF RIGHT ROTATOR CUFF: Primary | ICD-10-CM

## 2021-07-28 NOTE — TELEPHONE ENCOUNTER
I called Gina ANGEL Yeh on 7/28/2021 at 11:00 AM.  She has large rotator cuff tear of supraspinatus and partial tearing of biceps.  We discussed surgery versus conservative treatment.  Possible reverse total shoulder arthroplasty in future.   She would like to proceed with rotator cuff repair, Emir Tobar, biceps tenodesis.  We will schedule this.

## 2021-07-28 NOTE — TELEPHONE ENCOUNTER
Type of surgery: arthrotomy,shoulder,with rotator cuff repair,acromioplasty,distal clavicle excision,biceps tenodesis (right)  CPT 99381, 23557, 39270, 66339  Rotator cuff syndrome of right shoulder M75.101    Location of surgery: [facility]  Date and time of surgery: 8-20-21  TBD  Surgeon: Dr Santiago  Pre-Op Appt Date: 8-6-21  Post-Op Appt Date: 8-30-21   Packet sent out: Yes  Pre-cert/Authorization completed:    No prior auth needed per [facility] online list  Date: 07/28/21    Thank you,   Sarita Holland   Prior Authorization Department  [phone]

## 2021-07-29 ENCOUNTER — TRANSFERRED RECORDS (OUTPATIENT)
Dept: HEALTH INFORMATION MANAGEMENT | Facility: CLINIC | Age: 76
End: 2021-07-29

## 2021-08-04 ENCOUNTER — OFFICE VISIT (OUTPATIENT)
Dept: NEUROLOGY | Facility: CLINIC | Age: 76
End: 2021-08-04
Attending: FAMILY MEDICINE
Payer: COMMERCIAL

## 2021-08-04 DIAGNOSIS — R20.8 DYSESTHESIA: ICD-10-CM

## 2021-08-04 PROCEDURE — 95886 MUSC TEST DONE W/N TEST COMP: CPT | Performed by: PSYCHIATRY & NEUROLOGY

## 2021-08-04 PROCEDURE — 95909 NRV CNDJ TST 5-6 STUDIES: CPT | Performed by: PSYCHIATRY & NEUROLOGY

## 2021-08-04 NOTE — PROGRESS NOTES
[facility]  Electrodiagnostic Laboratory    Nerve Conduction & EMG Report          Patient:       Gina Yeh  Patient ID:    [id number]  Gender:        Female  YOB: 1945  Age:           76 Years 5 Months        History & Examination:  Referred by Dr Birmingham for left upper extremity EMG. The patient has numbness mainly in the 4th and 5th digit    Techniques: Motor and sensory conduction studies were done with surface recording electrodes. Temperature was monitored and recorded throughout the study. Upper extremities were maintained at a temperature of 32 degrees Centigrade or higher.  EMG was done with a concentric needle electrode.        Results:  Sensory Studies:  Left ulnar antidromic study is low in amplitude, normal conduction velocity. Left antidromic median study is within normal limits.     Motor Studies:  Left ulnar studies recording over both the ADM and FDI reveal a slowed conduction velocity at the elbow. Normal distal latency and conduction velocity. Median and lumbrical studies are within normal limits.    Needle Examination:  Left FDI, ADM and FCU all reveal chronic neurogenic changes, mild. Fibrillations seen in the FDI. Remainder of the needle examination is normal.    Interpretation:    This is an abnormal EMG. 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 It appears to be moderately severe.     EMG Physician:   Courtney Barclay MD      Sensory NCS      Nerve / Sites Rec. Site Onset Peak NP Amp Ref. PP Amp Dist Ren Ref. Temp     ms ms  V  V  V cm m/s m/s  C   L MEDIAN - Dig II Anti      Wrist Dig II 2.34 3.23 19.0 10.0 30.2 14 59.7 48.0 33.5   L ULNAR - Dig V Anti      Wrist Dig V 2.34 3.02 3.0 8.0 3.3 12.5 53.3 48.0 34       Motor NCS      Nerve / Sites Rec. Site Lat Ref. Amp Ref. Rel Amp Dist Ren Ref. Dur. Area Temp.     ms ms mV mV % cm m/s m/s ms %  C   L MEDIAN - APB      Wrist APB 3.33 4.40 6.1 5.0 100 8   5.16 100 34       Elbow APB 7.08  5.7  94 21 56.0 48.0 5.52 97.6 34   L ULNAR - ADM      Wrist ADM 2.97 3.50 9.0 5.0 100 8   4.69 100 34.3      B.Elbow ADM 5.94  8.2  91 19 64.0 48.0 5.05 94.1 34.3      A.Elbow ADM 8.70  8.0  88.6 10 36.2 48.0 5.52 90.3 34.3   L MEDIAN - II Lumb      Median II Lumb 3.13  2.9  100 10   4.79 100 34      Ulnar Palm Int 3.44  2.3  79 10   4.43 70 34   L ULNAR - FDI      Wrist FDI 3.54  9.2  100    4.79 100 34.1      B.Elbow FDI 6.41  8.7  95 18 62.8  4.90 93.4 33.9      A.Elbow FDI 9.22  8.4  91.5 10 35.6  4.90 96.2 34       EMG Summary Table     Spontaneous Recruitment MUAP    IA Fib PSW Fasc H.F. Pattern Amp Dur. PPP   L. DELTOID N None None None None N N N N   L. TRICEPS N None None None None N N N N   L. FIRST D INTEROSS N None None None None N 1+ 1+ N   L. ABD DIG MIN (UL) N 1+ None None None Reduced 1+ 1+ N   L. FLEX CARPI ULN N None None None None Reduced 1+ 1+ N   L. FLEX CARPI RAD N None None None None N N N N

## 2021-08-04 NOTE — LETTER
8/4/2021       RE: Gina Yeh  [address]  [facility] [address]     Dear Colleague,    Thank you for referring your patient, Gina Yeh, to the [facility] EMG CLINIC [place] at [facility]. Please see a copy of my visit note below.    [facility]  Electrodiagnostic Laboratory    Nerve Conduction & EMG Report        Patient:       Gina Yeh  Patient ID:    [id number]  Gender:        Female  YOB: 1945  Age:           76 Years 5 Months        History & Examination:  Referred by Dr Birmingham for left upper extremity EMG. The patient has numbness mainly in the 4th and 5th digit    Techniques: Motor and sensory conduction studies were done with surface recording electrodes. Temperature was monitored and recorded throughout the study. Upper extremities were maintained at a temperature of 32 degrees Centigrade or higher.  EMG was done with a concentric needle electrode.        Results:  Sensory Studies:  Left ulnar antidromic study is low in amplitude, normal conduction velocity. Left antidromic median study is within normal limits.     Motor Studies:  Left ulnar studies recording over both the ADM and FDI reveal a slowed conduction velocity at the elbow. Normal distal latency and conduction velocity. Median and lumbrical studies are within normal limits.    Needle Examination:  Left FDI, ADM and FCU all reveal chronic neurogenic changes, mild. Fibrillations seen in the FDI. Remainder of the needle examination is normal.    Interpretation:    This is an abnormal EMG. 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 It appears to be moderately severe.     EMG Physician:   Courtney Barclay MD      Sensory NCS      Nerve / Sites Rec. Site Onset Peak NP Amp Ref. PP Amp Dist Ren Ref. Temp     ms ms  V  V  V cm m/s m/s  C   L MEDIAN - Dig II Anti      Wrist Dig II 2.34 3.23 19.0 10.0  30.2 14 59.7 48.0 33.5   L ULNAR - Dig V Anti      Wrist Dig V 2.34 3.02 3.0 8.0 3.3 12.5 53.3 48.0 34       Motor NCS      Nerve / Sites Rec. Site Lat Ref. Amp Ref. Rel Amp Dist Ren Ref. Dur. Area Temp.     ms ms mV mV % cm m/s m/s ms %  C   L MEDIAN - APB      Wrist APB 3.33 4.40 6.1 5.0 100 8   5.16 100 34      Elbow APB 7.08  5.7  94 21 56.0 48.0 5.52 97.6 34   L ULNAR - ADM      Wrist ADM 2.97 3.50 9.0 5.0 100 8   4.69 100 34.3      B.Elbow ADM 5.94  8.2  91 19 64.0 48.0 5.05 94.1 34.3      A.Elbow ADM 8.70  8.0  88.6 10 36.2 48.0 5.52 90.3 34.3   L MEDIAN - II Lumb      Median II Lumb 3.13  2.9  100 10   4.79 100 34      Ulnar Palm Int 3.44  2.3  79 10   4.43 70 34   L ULNAR - FDI      Wrist FDI 3.54  9.2  100    4.79 100 34.1      B.Elbow FDI 6.41  8.7  95 18 62.8  4.90 93.4 33.9      A.Elbow FDI 9.22  8.4  91.5 10 35.6  4.90 96.2 34       EMG Summary Table     Spontaneous Recruitment MUAP    IA Fib PSW Fasc H.F. Pattern Amp Dur. PPP   L. DELTOID N None None None None N N N N   L. TRICEPS N None None None None N N N N   L. FIRST D INTEROSS N None None None None N 1+ 1+ N   L. ABD DIG MIN (UL) N 1+ None None None Reduced 1+ 1+ N   L. FLEX CARPI ULN N None None None None Reduced 1+ 1+ N   L. FLEX CARPI RAD N None None None None N N N N                          Again, thank you for allowing me to participate in the care of your patient.      Sincerely,    Courtney Barclay MD

## 2021-08-05 ENCOUNTER — MYC MEDICAL ADVICE (OUTPATIENT)
Dept: ORTHOPEDICS | Facility: CLINIC | Age: 76
End: 2021-08-05

## 2021-08-05 NOTE — TELEPHONE ENCOUNTER
Forwarding to ortho team to address questions on patient's upcoming surgery. Jewell White RN, BSN [facility]

## 2021-08-06 NOTE — TELEPHONE ENCOUNTER
I have attempted to contact Gina ANGEL Yeh on 8/6/2021 at 10:18 AM by phone to return their call or discuss lab results, but there is no response.  Message left.    We will send her the avs info we normally give people if they are in clinic to discuss surgery.    Rotator cuff repair is done in same day surgery.    Preop physical with primary physician is needed within 30 days of the surgery.  Nothing to eat or drink for 8 hours before surgery.  For same day surgery you need a ride.  Someone should stay with you for 12 hours afterward.  Stop blood thinners 5 days before surgery (aspirin, warfarin, anti-inflammatories).      General anesthesia is used.  They often perform a pain block at the neck to help with pain.  Sutures are in the wound.  Keep wound dry until clinic appointment at 1 1/2 weeks.     Physical Therapy will start after first clinic visit.  0-3 weeks.  Arm in sling or immobilizer.  No reaching with operative arm.   Okay to have arm out to dangle or bend elbow.    3-6 weeks.  Discontinue immobilizer.  Start reaching.  No lifting over 1 lb.  6-12 weeks  Work on strengthening.  1 year to full recovery.  You will need a Covid test before surgery.  They will call you to schedule that.    Surgery schedulers will call you to schedule surgery.  If you don't get a call in the next few days, then call [phone] to schedule your surgery for [place] or [phone] to schedule for [place]..

## 2021-08-09 ENCOUNTER — OFFICE VISIT (OUTPATIENT)
Dept: FAMILY MEDICINE | Facility: CLINIC | Age: 76
End: 2021-08-09
Payer: COMMERCIAL

## 2021-08-09 VITALS
SYSTOLIC BLOOD PRESSURE: 108 MMHG | RESPIRATION RATE: 20 BRPM | OXYGEN SATURATION: 93 % | BODY MASS INDEX: 50.42 KG/M2 | TEMPERATURE: 98.5 F | HEART RATE: 94 BPM | WEIGHT: 274 LBS | DIASTOLIC BLOOD PRESSURE: 66 MMHG | HEIGHT: 62 IN

## 2021-08-09 DIAGNOSIS — J44.9 CHRONIC OBSTRUCTIVE PULMONARY DISEASE, UNSPECIFIED COPD TYPE (H): ICD-10-CM

## 2021-08-09 DIAGNOSIS — E78.5 HYPERLIPIDEMIA WITH TARGET LDL LESS THAN 130: ICD-10-CM

## 2021-08-09 DIAGNOSIS — I10 HYPERTENSION GOAL BP (BLOOD PRESSURE) < 140/90: ICD-10-CM

## 2021-08-09 DIAGNOSIS — E03.9 ACQUIRED HYPOTHYROIDISM: ICD-10-CM

## 2021-08-09 DIAGNOSIS — M75.121 NONTRAUMATIC COMPLETE TEAR OF RIGHT ROTATOR CUFF: ICD-10-CM

## 2021-08-09 DIAGNOSIS — Z01.818 PREOP GENERAL PHYSICAL EXAM: Primary | ICD-10-CM

## 2021-08-09 PROCEDURE — 93000 ELECTROCARDIOGRAM COMPLETE: CPT | Performed by: NURSE PRACTITIONER

## 2021-08-09 PROCEDURE — 99214 OFFICE O/P EST MOD 30 MIN: CPT | Performed by: NURSE PRACTITIONER

## 2021-08-09 ASSESSMENT — PAIN SCALES - GENERAL: PAINLEVEL: NO PAIN (0)

## 2021-08-09 ASSESSMENT — MIFFLIN-ST. JEOR: SCORE: 1688.17

## 2021-08-09 NOTE — PATIENT INSTRUCTIONS

## 2021-08-09 NOTE — PROGRESS NOTES
[facility]  [address] [place] [id number]  Phone: [phone]  Primary Provider: Verena Vee  Pre-op Performing Provider: AZ JIMÉNEZ      PREOPERATIVE EVALUATION:  Today's date: 8/9/2021    Gina Yeh is a 76 year old female who presents for a preoperative evaluation.    Surgical Information:  Surgery/Procedure: ARTHROTOMY, SHOULDER, WITH ROTATOR CUFF REPAIR, acromioplasty, distal clavicle excision, biceps tenodesis.  Surgery Location:  OR   Surgeon: Krishna Santiago MD  Surgery Date: 8/20/2021  Time of Surgery: 7:30AM  Where patient plans to recover: At home alone    Fax number for surgical facility: Note does not need to be faxed, will be available electronically in Epic.    Type of Anesthesia Anticipated: Combined General with Interscalene Block    Assessment & Plan     The proposed surgical procedure is considered INTERMEDIATE risk.    Preop general physical exam  - EKG 12-lead complete w/read - Clinics    Nontraumatic complete tear of right rotator cuff    Chronic obstructive pulmonary disease, unspecified COPD type (H)  Well controlled with medications without side effects.    Hypertension goal BP (blood pressure) < 140/90  Well controlled with medications without side effects.    Hyperlipidemia with target LDL less than 130  Well controlled with medications without side effects.    Acquired hypothyroidism  Well controlled with medications without side effects.    Risks and Recommendations:  The patient has the following additional risks and recommendations for perioperative complications:   - No identified additional risk factors other than previously addressed    Medication Instructions:  - Statin: Continue   - ACE/ARB: May be continued on the day of surgery.    - LABA, inhaled corticosteroid, long-acting anticholinergics: Continue without modification.    RECOMMENDATION:  APPROVAL GIVEN to proceed with proposed procedure, without  further diagnostic evaluation.    Subjective     HPI related to upcoming procedure: Patient with right shoulder pain, is a bowler and started bowling daily worsening her symptoms. Scheduled for right should arthrotomy.       Preop Questions 8/9/2021   1. Have you ever had a heart attack or stroke? No   2. Have you ever had surgery on your heart or blood vessels, such as a stent placement, a coronary artery bypass, or surgery on an artery in your head, neck, heart, or legs? No   3. Do you have chest pain with activity? No   4. Do you have a history of  heart failure? No   5. Do you currently have a cold, bronchitis or symptoms of other infection? No   6. Do you have a cough, shortness of breath, or wheezing? No   7. Do you or anyone in your family have previous history of blood clots? YES    8. Do you or does anyone in your family have a serious bleeding problem such as prolonged bleeding following surgeries or cuts? No   9. Have you ever had problems with anemia or been told to take iron pills? YES    10. Have you had any abnormal blood loss such as black, tarry or bloody stools, or abnormal vaginal bleeding? No   11. Have you ever had a blood transfusion? No   12. Are you willing to have a blood transfusion if it is medically needed before, during, or after your surgery? Yes   13. Have you or any of your relatives ever had problems with anesthesia? YES    14. Do you have sleep apnea, excessive snoring or daytime drowsiness? YES    14a. Do you have a CPAP machine? Yes   15. Do you have any artifical heart valves or other implanted medical devices like a pacemaker, defibrillator, or continuous glucose monitor? No   16. Do you have artificial joints? No   17. Are you allergic to latex? No   18. Is there any chance that you may be pregnant? -     Health Care Directive:  Patient has a Health Care Directive on file      Preoperative Review of :   reviewed - no record of controlled substances prescribed.  PDMP  Review       Value Time User    State PDMP site checked  Yes 8/9/2021 11:08 AM Susan Juares APRN CNP          Status of Chronic Conditions:  COPD - Patient has a longstanding history of moderate-severe COPD . Patient has been doing well overall noting NO SYMPTOMS and continues on medication regimen consisting of respimat without adverse reactions or side effects.    HYPERLIPIDEMIA - Patient has a long history of significant Hyperlipidemia requiring medication for treatment with recent fair control. Patient reports no problems or side effects with the medication.     HYPERTENSION - Patient has longstanding history of HTN , currently denies any symptoms referable to elevated blood pressure. Specifically denies chest pain, palpitations, dyspnea, orthopnea, PND or peripheral edema. Blood pressure readings have been in normal range. Current medication regimen is as listed below. Patient denies any side effects of medication.     HYPOTHYROIDISM - Patient has a longstanding history of chronic Hypothyroidism. Patient has been doing well, noting no tremor, insomnia, hair loss or changes in skin texture. Continues to take medications as directed, without adverse reactions or side effects. Last TSH   Lab Results   Component Value Date    TSH 3.59 06/28/2021   .      Review of Systems  Constitutional, neuro, ENT, endocrine, pulmonary, cardiac, gastrointestinal, genitourinary, musculoskeletal, integument and psychiatric systems are negative, except as otherwise noted.    Patient Active Problem List    Diagnosis Date Noted     Hypertension goal BP (blood pressure) < 140/90      Priority: High     Hyperlipidemia with target LDL less than 130      Priority: High     Nontraumatic complete tear of right rotator cuff 07/28/2021     Priority: Medium     Added automatically from request for surgery [id number]       Traumatic partial tear of right biceps tendon, subsequent encounter 07/28/2021     Priority: Medium     Added  automatically from request for surgery [id number]       Arthritis of right acromioclavicular joint 07/28/2021     Priority: Medium     Added automatically from request for surgery [id number]       Personal history of tobacco use, presenting hazards to health 06/28/2021     Priority: Medium     COPD (chronic obstructive pulmonary disease) (H)      Priority: Medium     Microscopic hematuria 10/26/2017     Priority: Medium     Urologic evaluation complete; yearly UA       Restless legs syndrome (RLS) 08/30/2017     Priority: Medium     Iron deficiency 08/30/2017     Priority: Medium     Noted Aug 2017.    Rec start on daily ferrous sulfate and recent in Nov/Dec.       Sensorineural hearing loss      Priority: Medium     JOSE (obstructive sleep apnea) 07/24/2015     Priority: Medium     Diagnostic Study 9/12/2017 - (271.0 lbs) AHI 78.7, RDI 91.3, Supine AHI 73.5, REM AHI -, Low O2 79.5%, Time Spent ?88% 22.9 minutes.  Titration Study: 9/26/2017- (271.0 lbs) The patient was titrated at pressures ranging from 5 cmH2O up to 8 cmH2O.  The optimal pressure achieved was 8 cmH2O with a residual AHI of 1.1 events per hour.  Time in REM supine on final pressure was 22.5 minutes.  PLM index was 25.6 movements per hour.  The PLM Arousal Index was 8.0 per hour.       MMT (medial meniscus tear) 10/13/2014     Priority: Medium     Morbidly obese (H) 05/29/2012     Priority: Medium     Bariatric surgery consult offered       Impaired glucose tolerance 05/29/2012     Priority: Medium     History of cervical dysplasia 05/28/2012     Priority: Medium     Advanced directives, counseling/discussion 06/02/2011     Priority: Medium     Advance Care Planning 1/7/2016: Receipt of ACP document:  Received: Health Care Directive which was witnessed or notarized on 11/25/15.  Document not previously scanned.  Validation form completed and sent with document to be scanned.  Code Status reflects choices in most recent ACP document.  Confirmed/documented  designated decision maker(s).  Added by Melina Griffin    Health Care Directive on file 11/25/15       Hypothyroid      Priority: Medium      Past Medical History:   Diagnosis Date     Arthritis      Cervical dysplasia 1988     COPD (chronic obstructive pulmonary disease) (H)      Glaucoma      Hyperlipidemia LDL goal < 130      Hypertension goal BP (blood pressure) < 140/90      Hypothyroid      Impaired glucose tolerance 05/29/2012     Microscopic hematuria      MMT (medial meniscus tear) 09/21/2006    LT     Moderate recurrent major depression (H) 02/13/2014     Morbid obesity (H)      JOSE (obstructive sleep apnea) 07/24/2015    Doesn't use cpap     RLS (restless legs syndrome)      Sensorineural hearing loss      Vitamin D insufficiency      Past Surgical History:   Procedure Laterality Date     BIOPSY       BLADDER SURGERY  03/19/2018     CERVIX SURGERY  1988    cervical cone     COLONOSCOPY       LAPAROSCOPIC OOPHORECTOMY Right 03/19/2018    with hysteroscopy/EMB, and pubovaginal sling      TONSILLECTOMY & ADENOIDECTOMY       Current Outpatient Medications   Medication Sig Dispense Refill     atorvastatin (LIPITOR) 40 MG tablet TAKE 1 TABLET EVERY DAY 90 tablet 3     Ferrous Sulfate (IRON) 325 (65 FE) MG tablet Take 1 tablet by mouth daily       latanoprost (XALATAN) 0.005 % ophthalmic solution 1 drop daily       levothyroxine (SYNTHROID/LEVOTHROID) 137 MCG tablet TAKE 1 TABLET EVERY DAY 90 tablet 3     lisinopril (ZESTRIL) 20 MG tablet TAKE 1 TABLET EVERY DAY 90 tablet 3     OMEGA-3 KRILL OIL PO        order for DME autoCPAP 7-12 cm 1 Units 0     STIOLTO RESPIMAT 2.5-2.5 MCG/ACT AERS 2 puffs daily       Vitamin D, Cholecalciferol, 1000 units CAPS Take 1 capsule by mouth daily         Allergies   Allergen Reactions     Nkda [No Known Drug Allergies]         Social History     Tobacco Use     Smoking status: Former Smoker     Packs/day: 0.50     Years: 49.00     Pack years: 24.50     Types: Cigarettes      "Start date: 5/1/1960     Quit date: 5/1/2011     Years since quitting: 10.2     Smokeless tobacco: Former User     Quit date: 4/25/2011   Substance Use Topics     Alcohol use: No       History   Drug Use No         Objective     /66   Pulse 94   Temp 98.5  F (36.9  C) (Oral)   Resp 20   Ht 1.562 m (5' 1.5\")   Wt 124.3 kg (274 lb)   SpO2 93%   BMI 50.93 kg/m      Physical Exam    GENERAL APPEARANCE: healthy, alert and no distress     EYES: EOMI, PERRL     HENT: ear canals and TM's normal and nose and mouth without ulcers or lesions     NECK: no adenopathy, no asymmetry, masses, or scars and thyroid normal to palpation     RESP: lungs clear to auscultation - no rales, rhonchi or wheezes     CV: regular rates and rhythm, normal S1 S2, no S3 or S4 and no murmur, click or rub, trace bilateral pedal edema     ABDOMEN:  soft, nontender, no HSM or masses and bowel sounds normal     MS: extremities normal- no gross deformities noted, no evidence of inflammation in joints, FROM in all extremities.     SKIN: no suspicious lesions or rashes     NEURO: Normal strength and tone, sensory exam grossly normal, mentation intact and speech normal     PSYCH: mentation appears normal. and affect normal/bright     LYMPHATICS: No cervical adenopathy    Recent Labs   Lab Test 06/28/21  1553 07/08/20  1032   HGB 14.1  --      --     139   POTASSIUM 4.2 5.0   CR 0.84 0.86   A1C 5.6  --       Diagnostics:    EKG: appears normal, NSR, normal axis, normal intervals, no acute ST/T changes c/w ischemia, no LVH by voltage criteria, unchanged from previous tracings    Revised Cardiac Risk Index (RCRI):  The patient has the following serious cardiovascular risks for perioperative complications:   - No serious cardiac risks = 0 points     RCRI Interpretation: 0 points: Class I (very low risk - 0.4% complication rate)         Signed Electronically by: ARY Coe CNP  Copy of this evaluation report is provided " to requesting physician.

## 2021-08-13 NOTE — TELEPHONE ENCOUNTER
Surgery has been canceled for 08/20/2021 at [facility] because patients bmi is over 50  Patient has been rescheduled for 09/01/2021 at [facility]  preop has been faxed to [facility]  Postop has been rescheduled to 09/20/2021

## 2021-08-18 NOTE — TELEPHONE ENCOUNTER
FUTURE VISIT INFORMATION      FUTURE VISIT INFORMATION:    Date: 9/15/2021    Time: 4pm    Location: [facility]  REFERRAL INFORMATION:    Referring provider:  Dr. Vee     Referring providers clinic:  [facility] [place]     Reason for visit/diagnosis  Dysesthesia     RECORDS REQUESTED FROM:       Clinic name Comments Records Status Imaging Status   Internal Dr. Vee-6/28/2021 [facility] N/A

## 2021-08-30 ENCOUNTER — LAB (OUTPATIENT)
Dept: LAB | Facility: CLINIC | Age: 76
End: 2021-08-30
Payer: COMMERCIAL

## 2021-08-30 DIAGNOSIS — Z11.59 ENCOUNTER FOR SCREENING FOR OTHER VIRAL DISEASES: ICD-10-CM

## 2021-08-30 LAB — SARS-COV-2 RNA RESP QL NAA+PROBE: NEGATIVE

## 2021-08-30 PROCEDURE — U0003 INFECTIOUS AGENT DETECTION BY NUCLEIC ACID (DNA OR RNA); SEVERE ACUTE RESPIRATORY SYNDROME CORONAVIRUS 2 (SARS-COV-2) (CORONAVIRUS DISEASE [COVID-19]), AMPLIFIED PROBE TECHNIQUE, MAKING USE OF HIGH THROUGHPUT TECHNOLOGIES AS DESCRIBED BY CMS-2020-01-R: HCPCS

## 2021-08-30 PROCEDURE — U0005 INFEC AGEN DETEC AMPLI PROBE: HCPCS

## 2021-09-10 ASSESSMENT — ENCOUNTER SYMPTOMS
BRUISES/BLEEDS EASILY: 0
TREMORS: 0
DIZZINESS: 0
LOSS OF CONSCIOUSNESS: 0
TINGLING: 1
SEIZURES: 0
SWOLLEN GLANDS: 0
DISTURBANCES IN COORDINATION: 0
MEMORY LOSS: 0
PARALYSIS: 0
HEADACHES: 0
NUMBNESS: 1
SPEECH CHANGE: 0
WEAKNESS: 0

## 2021-09-13 ENCOUNTER — OFFICE VISIT (OUTPATIENT)
Dept: ORTHOPEDICS | Facility: CLINIC | Age: 76
End: 2021-09-13
Payer: COMMERCIAL

## 2021-09-13 VITALS
RESPIRATION RATE: 18 BRPM | WEIGHT: 274 LBS | SYSTOLIC BLOOD PRESSURE: 134 MMHG | HEART RATE: 98 BPM | HEIGHT: 62 IN | BODY MASS INDEX: 50.42 KG/M2 | DIASTOLIC BLOOD PRESSURE: 65 MMHG

## 2021-09-13 DIAGNOSIS — M75.121 NONTRAUMATIC COMPLETE TEAR OF RIGHT ROTATOR CUFF: Primary | ICD-10-CM

## 2021-09-13 DIAGNOSIS — Z98.890 S/P COMPLETE REPAIR OF ROTATOR CUFF: ICD-10-CM

## 2021-09-13 PROCEDURE — 99024 POSTOP FOLLOW-UP VISIT: CPT | Performed by: ORTHOPAEDIC SURGERY

## 2021-09-13 ASSESSMENT — MIFFLIN-ST. JEOR: SCORE: 1688.17

## 2021-09-13 NOTE — PATIENT INSTRUCTIONS
Will start physical therapy for gentle passive range of motion, advance to active range of motion in 10 days.  Start scar massage with vitamin-E cream.   May stop using the sling or immobilizer in 10 days.  Medication renewal: None # .  Return to clinic 4 weeks to start strengthening.  
Detail Level: Zone

## 2021-09-13 NOTE — NURSING NOTE
Sutures removed today. Wound is healing well. No drainage, redness of the skin, fever, or any further signs of infection.

## 2021-09-13 NOTE — LETTER
9/13/2021         RE: Gina Yeh  [address]  [place] [address]        Dear Colleague,    Thank you for referring your patient, Gina Yeh, to the [facility]. Please see a copy of my visit note below.    Follow up right rotator cuff repair, Emir Tobar  on 9/1/21.  She had a large acute on chronic tear  Pain is mild.   Wound is healing well.  Suture removed.    Assessment:   right rotator cuff repair, Emir Tobar doing well.  Surgical findings discussed.   Plan:  Will start physical therapy for gentle passive range of motion, advance to active range of motion in 10 days.  Start scar massage with vitamin-E cream.   May stop using the sling or immobilizer in 10 days.  Medication renewal: None # .  Return to clinic 4 weeks to start strengthening.        Again, thank you for allowing me to participate in the care of your patient.        Sincerely,        Krishna Santiago MD

## 2021-09-13 NOTE — PROGRESS NOTES
Follow up right rotator cuff repair, Amadou Emir  on 9/1/21.  She had a large acute on chronic tear  Pain is mild.   Wound is healing well.  Suture removed.    Assessment:   right rotator cuff repair, Emir Tobar doing well.  Surgical findings discussed.   Plan:  Will start physical therapy for gentle passive range of motion, advance to active range of motion in 10 days.  Start scar massage with vitamin-E cream.   May stop using the sling or immobilizer in 10 days.  Medication renewal: None # .  Return to clinic 4 weeks to start strengthening.

## 2021-09-15 ENCOUNTER — PRE VISIT (OUTPATIENT)
Dept: NEUROLOGY | Facility: CLINIC | Age: 76
End: 2021-09-15

## 2021-09-15 ENCOUNTER — OFFICE VISIT (OUTPATIENT)
Dept: NEUROLOGY | Facility: CLINIC | Age: 76
End: 2021-09-15
Attending: FAMILY MEDICINE
Payer: COMMERCIAL

## 2021-09-15 VITALS
RESPIRATION RATE: 16 BRPM | HEART RATE: 85 BPM | OXYGEN SATURATION: 96 % | DIASTOLIC BLOOD PRESSURE: 77 MMHG | SYSTOLIC BLOOD PRESSURE: 133 MMHG

## 2021-09-15 DIAGNOSIS — G56.22 ULNAR NEUROPATHY AT ELBOW, LEFT: Primary | ICD-10-CM

## 2021-09-15 PROCEDURE — 99213 OFFICE O/P EST LOW 20 MIN: CPT | Mod: GC | Performed by: STUDENT IN AN ORGANIZED HEALTH CARE EDUCATION/TRAINING PROGRAM

## 2021-09-15 ASSESSMENT — PAIN SCALES - GENERAL: PAINLEVEL: NO PAIN (1)

## 2021-09-15 NOTE — LETTER
9/15/2021       RE: Gina Yeh  [address]  [place] [address]     Dear Colleague,    Thank you for referring your patient, Gina Yeh, to the [facility] NEUROLOGY [facility] [place] at [facility]. Please see a copy of my visit note below.    DEPARTMENT OF NEUROLOGY    Patient Name:  Gina Yeh  MRN:  [id number]    :  1945  Date of Clinic Visit:  September 15, 2021  Primary Care Provider:  Verena Vee    INITIAL APPOINTMENT    HPI:   Meg Yeh is a 76yr old female w/ PMHx JOSE, COPD, iron deficiency, HTN, HLD, hypothyroidism, RLS, R RTC complete tear s/p repair (21), and traumatic partial tear of the R bicep tendon who presents to clinic on 9/15/2021 after being referred by Dr Vee of [facility] for dysesthesia    Pt reports ongoing, constant tingling of the L 4th/5th digits without radiation into the palm, back of hand, or forearm. This has been present for ~1yr and has been stable since onset. Tingling is slightly better at night and in the morning, but is still present. Pt affirms that she tends to sleep with her arms curled up near her chest, but denies any recent weight loss/gain or that she often rests her weight on her elbows. Pt completed an EMG (2021) which noted findings consistent w/ moderate-severe L ulnar neuropathy at the elbow    Besides this, pt also mentions that she has had similar tingling in her bilateral toes at the very tips for ~3-4yrs. Has not progressed since onset and is not terribly bothersome to her. She denies any gait/balance issues or falls. Pt affirms a prior smoking hx, but denies the use of EtOH or drugs. Recently had HgbA1c (5.6), TSH (3.59), and Vit B12 (3150) checked on 2021. A BMP was also checked that exhibited normal renal function and no hypercalcemia.    Overall, denies any HA, memory concerns, vision changes, tinnitus, swallowing/voice changes,  "neck pain, back pain, lightheadedness, vertigo, balance/gait issus, dexterity issues, weakness, abnormal movements, bowel/bladder incontinence, rashes, flushing, weight gain/loss, or easy bruising    Vital signs:      BP: 133/77 Pulse: 85   Resp: 16 SpO2: 96 %          Estimated body mass index is 50.93 kg/m  as calculated from the following:    Height as of 9/13/21: 1.562 m (5' 1.5\").    Weight as of 9/13/21: 124.3 kg (274 lb).  /77   Pulse 85   Resp 16   SpO2 96%       Examination:     -General: Sitting comfortably on the chair. No acute distress    -HEENT: Normocephalic. PERRL. No skin discolorations noted    -Pulmonary: Symmetric chest rise, no increased work of breathing    -Abdomen: Soft, non-tender, obese    -Extremities: 1+ pitting peripheral edema in BLE. R arm in sling from recent RTC surgery, scar is appears to be healing well    -Neurological:     --MS: Patient is alert, attentive, and oriented x4. Speech is clear and fluid. Names normally. Registration, recall and remote memory intact. Able to provide ample history and follow commands    --CNs: Pupils are briskly reactive to light. Visual fields full. Ocular motility full without nystagmus, facial sensation intact, muscles of mastication and facial expression normal, hearing intact to conversation, palate elevation normal, tongue motions normal.      --Motor: Normal muscle tone and bulk. No atrophy of L hand noted. No abnormal movements. 5/5 muscle strength throughout LUE including shoulder abduction/adduction, elbow flexion/extension, wrist flexion/extension, finger extension/flexion/abduction/opposition, and distal phalanges flexion. 5/5 R . Deferred testing of the remainder of the RUE given recent RTC surgery. 5/5 strength throughoiut BLE    --Reflexes: 2+ reflexes at biceps and brachioradialis. 1+ at patellae. Mute at ankles. Down-going toes bilaterally. No clonus    --Sensory: Light touch, pinprick, temperature, and vibration intact " bilaterally in upper extremities. Reduced light touch throughout all toes. Reduced pinprick to level of mid-foot bilaterally. Reduced cold sensation to mid shin bilaterally. Reduced vibratory sense to level of knees bilaterally    --Coordination: Heel-shin and finger-nose-finger is intact. Negative Romberg.     --Gait: Stands with feet normally spaced. Gait is steady. Intact tandem      INVESTIGATIONS:  All available and relevant labs, imaging, and other procedures were reviewed with the patient at this visit. Those of particular significance are listed below:    Labs (6/28/2021)  - Vit B12: 3150  - HgbA1c: 5.6  - TSH: 3.59  - CBC: WBC 9.5; Hgb 14.1; Hct 44.4; Plt 294  - BMP: Na 136; K 4.2; Cl 103; CO2 37; BUN 19; Cr 0.84; Glucose 105; Ca 9.2    EMG (8/4/2021)  Interpretation:  This is an abnormal EMG. 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 It appears to be moderately severe.       IMPRESSION/RECOMMENDATIONS:   Meg Yeh is a 76yr old female w/ PMHx JOSE, COPD, iron deficiency, HTN, HLD, hypothyroidism, RLS, R RTC complete tear s/p repair (9/1/21), and traumatic partial tear of the R bicep tendon who presents with a L ulnar neuropathy at the elbow, confirmed by EMG. Discussed potential treatment with the pt and she wishes to begin with elbow pads and will follow-up as needed.     Otherwise, pt also endorsed symptoms suggestive of a peripheral neuropathy, which were borne out on exam. Etiology is unclear, but has not progressed in 3-4yrs. Possibly stemmed from her hypothyroidism, which is not being well-controlled. Pt has already undergone the majority of the standard work-up for this and would only recommend adding an SPEP w/ immunofixation. Discussed this with the pt who declined. I feel this is acceptable as it is highly unlikely that the pt has multiple myeloma w/ her normal Ca, normal renal function, and lack of hypercoagulability on history. Pt also stated she did  not wish to start any medications for her paresthesia and so will simply continue to monitor    Recommendations:  - Elbow pads for ulnar neuropathy  - Per pt's preference, will follow-up via MyChart after wearing the elbow pads for a few weeks. Should symptoms fail to recede or should they progress, will discuss further options at that time  - Follow-up prn     Patient has been seen with Dr. Svitlana Mallory who agrees with my assessment and plan    Shanna Navarrete MD  [facility] Department of Neurology PGY2  I saw and evaluated the patient with the resident and agree with the assessment and plan. I was present for discussion on assessment and plan.    Svitlana Mallory MD  Chief, Multiple Sclerosis Division  Department of Neurology  [facility]      Again, thank you for allowing me to participate in the care of your patient.      Sincerely,    Shanna Navarrete MD

## 2021-09-15 NOTE — PATIENT INSTRUCTIONS
- Try wearing elbow pads for the next few weeks and send me a MyChart message to let me know if things are improving  - Follow-up with me as needed should symptoms worsen or new ones arise

## 2021-09-15 NOTE — PROGRESS NOTES
DEPARTMENT OF NEUROLOGY    Patient Name:  Gina Yeh  MRN:  [id number]    :  1945  Date of Clinic Visit:  September 15, 2021  Primary Care Provider:  Verena Vee        INITIAL APPOINTMENT      HPI:   Meg Yeh is a 76yr old female w/ PMHx JOSE, COPD, iron deficiency, HTN, HLD, hypothyroidism, RLS, R RTC complete tear s/p repair (21), and traumatic partial tear of the R bicep tendon who presents to clinic on 9/15/2021 after being referred by Dr Vee of [facility] for dysesthesia    Pt reports ongoing, constant tingling of the L 4th/5th digits without radiation into the palm, back of hand, or forearm. This has been present for ~1yr and has been stable since onset. Tingling is slightly better at night and in the morning, but is still present. Pt affirms that she tends to sleep with her arms curled up near her chest, but denies any recent weight loss/gain or that she often rests her weight on her elbows. Pt completed an EMG (2021) which noted findings consistent w/ moderate-severe L ulnar neuropathy at the elbow    Besides this, pt also mentions that she has had similar tingling in her bilateral toes at the very tips for ~3-4yrs. Has not progressed since onset and is not terribly bothersome to her. She denies any gait/balance issues or falls. Pt affirms a prior smoking hx, but denies the use of EtOH or drugs. Recently had HgbA1c (5.6), TSH (3.59), and Vit B12 (3150) checked on 2021. A BMP was also checked that exhibited normal renal function and no hypercalcemia.    Overall, denies any HA, memory concerns, vision changes, tinnitus, swallowing/voice changes, neck pain, back pain, lightheadedness, vertigo, balance/gait issus, dexterity issues, weakness, abnormal movements, bowel/bladder incontinence, rashes, flushing, weight gain/loss, or easy bruising    Vital signs:      BP: 133/77 Pulse: 85   Resp: 16 SpO2: 96 %          Estimated body mass index is 50.93 kg/m  as  "calculated from the following:    Height as of 9/13/21: 1.562 m (5' 1.5\").    Weight as of 9/13/21: 124.3 kg (274 lb).  /77   Pulse 85   Resp 16   SpO2 96%       Examination:     -General: Sitting comfortably on the chair. No acute distress    -HEENT: Normocephalic. PERRL. No skin discolorations noted    -Pulmonary: Symmetric chest rise, no increased work of breathing    -Abdomen: Soft, non-tender, obese    -Extremities: 1+ pitting peripheral edema in BLE. R arm in sling from recent RTC surgery, scar is appears to be healing well    -Neurological:     --MS: Patient is alert, attentive, and oriented x4. Speech is clear and fluid. Names normally. Registration, recall and remote memory intact. Able to provide ample history and follow commands    --CNs: Pupils are briskly reactive to light. Visual fields full. Ocular motility full without nystagmus, facial sensation intact, muscles of mastication and facial expression normal, hearing intact to conversation, palate elevation normal, tongue motions normal.      --Motor: Normal muscle tone and bulk. No atrophy of L hand noted. No abnormal movements. 5/5 muscle strength throughout LUE including shoulder abduction/adduction, elbow flexion/extension, wrist flexion/extension, finger extension/flexion/abduction/opposition, and distal phalanges flexion. 5/5 R . Deferred testing of the remainder of the RUE given recent RTC surgery. 5/5 strength throughoiut BLE    --Reflexes: 2+ reflexes at biceps and brachioradialis. 1+ at patellae. Mute at ankles. Down-going toes bilaterally. No clonus    --Sensory: Light touch, pinprick, temperature, and vibration intact bilaterally in upper extremities. Reduced light touch throughout all toes. Reduced pinprick to level of mid-foot bilaterally. Reduced cold sensation to mid shin bilaterally. Reduced vibratory sense to level of knees bilaterally    --Coordination: Heel-shin and finger-nose-finger is intact. Negative Romberg. "     --Gait: Stands with feet normally spaced. Gait is steady. Intact tandem      INVESTIGATIONS:  All available and relevant labs, imaging, and other procedures were reviewed with the patient at this visit. Those of particular significance are listed below:    Labs (6/28/2021)  - Vit B12: 3150  - HgbA1c: 5.6  - TSH: 3.59  - CBC: WBC 9.5; Hgb 14.1; Hct 44.4; Plt 294  - BMP: Na 136; K 4.2; Cl 103; CO2 37; BUN 19; Cr 0.84; Glucose 105; Ca 9.2    EMG (8/4/2021)  Interpretation:  This is an abnormal EMG. 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 It appears to be moderately severe.       IMPRESSION/RECOMMENDATIONS:   Meg Yeh is a 76yr old female w/ PMHx JOSE, COPD, iron deficiency, HTN, HLD, hypothyroidism, RLS, R RTC complete tear s/p repair (9/1/21), and traumatic partial tear of the R bicep tendon who presents with a L ulnar neuropathy at the elbow, confirmed by EMG. Discussed potential treatment with the pt and she wishes to begin with elbow pads and will follow-up as needed.     Otherwise, pt also endorsed symptoms suggestive of a peripheral neuropathy, which were borne out on exam. Etiology is unclear, but has not progressed in 3-4yrs. Possibly stemmed from her hypothyroidism, which is not being well-controlled. Pt has already undergone the majority of the standard work-up for this and would only recommend adding an SPEP w/ immunofixation. Discussed this with the pt who declined. I feel this is acceptable as it is highly unlikely that the pt has multiple myeloma w/ her normal Ca, normal renal function, and lack of hypercoagulability on history. Pt also stated she did not wish to start any medications for her paresthesia and so will simply continue to monitor    Recommendations:  - Elbow pads for ulnar neuropathy  - Per pt's preference, will follow-up via MyChart after wearing the elbow pads for a few weeks. Should symptoms fail to recede or should they progress, will  discuss further options at that time  - Follow-up prn     Patient has been seen with Dr. Svitlana Mallory who agrees with my assessment and plan    Shanna Navarrete MD  [facility] Department of Neurology PGY2  I saw and evaluated the patient with the resident and agree with the assessment and plan. I was present for discussion on assessment and plan.    Svitlana Mallory MD  Chief, Multiple Sclerosis Division  Department of Neurology  [facility] [place]

## 2021-09-23 ENCOUNTER — THERAPY VISIT (OUTPATIENT)
Dept: PHYSICAL THERAPY | Facility: CLINIC | Age: 76
End: 2021-09-23
Payer: COMMERCIAL

## 2021-09-23 DIAGNOSIS — G89.29 CHRONIC RIGHT SHOULDER PAIN: ICD-10-CM

## 2021-09-23 DIAGNOSIS — M25.511 CHRONIC RIGHT SHOULDER PAIN: ICD-10-CM

## 2021-09-23 DIAGNOSIS — M75.121 NONTRAUMATIC COMPLETE TEAR OF RIGHT ROTATOR CUFF: ICD-10-CM

## 2021-09-23 DIAGNOSIS — Z98.890 S/P COMPLETE REPAIR OF ROTATOR CUFF: ICD-10-CM

## 2021-09-23 PROBLEM — M25.519 SHOULDER PAIN: Status: ACTIVE | Noted: 2021-09-23

## 2021-09-23 PROCEDURE — 97110 THERAPEUTIC EXERCISES: CPT | Mod: GP | Performed by: PHYSICAL THERAPIST

## 2021-09-23 PROCEDURE — 97530 THERAPEUTIC ACTIVITIES: CPT | Mod: GP | Performed by: PHYSICAL THERAPIST

## 2021-09-23 PROCEDURE — 97162 PT EVAL MOD COMPLEX 30 MIN: CPT | Mod: GP | Performed by: PHYSICAL THERAPIST

## 2021-09-23 NOTE — PROGRESS NOTES
[facility] Physical Therapy Initial Evaluation  9/23/2021     Precautions/Restrictions/MD instructions: Evaluate and treat; Passive to Active Range of Motion, RTC Post-Op. Begin passive range of motion, can advance to active range of motion in 7-10 days.    Therapist Assessment: Gina Yeh is a 76 year old female patient presenting to Physical Therapy with R shoulder pain. Patient demonstrates decreased ROM, decreased strength, impaired function. Signs and symptoms are consistent with shoulder pain s/p RC repair. These impairments limit their ability to reach, write, drive, lift. Skilled PT services are necessary in order to reduce impairments and improve independent function.    Subjective:   Chief Complaint: right shoulder pain after rotator cuff repair  Associated symptoms: stiffness  Onset date: date of surgery 9/1/21  JACKIE: traumatic vs insidious - large acute on chronic tear  Pain severity: 0/10 at rest; 0/10 current, 3/10 worst  Location of pain:  superior shoulder and lateral arm  Quality of Pain: aching, shooting   Better: tylenol as needed  Worse:  laying on sides, moving body side to side  Time of day: worse in PM  Progression of Symptoms since onset:  Since onset, these symptoms are improving  Previous treatment: has included none; effect was NA  Current Functional Status: impaired  Previous Functional Status:  fully functional prior to pain onset/injury.  SPADI score: 90%      General health as reported by patient: excellent.    PMH: HTN, overweight, sleep disorder, thyroid problems   Surgical history/trauma: None. She denies any significant current illness or recent hospital admissions. She denies any regional implanted devices.  Imaging: see chart  Medications: HTN, thyroid    Occupation: retired Job duties: taking care of home and self, visiting with her daughters   Current exercise regimen/Recreational activities: plays a lot of computer games  Barriers to treatment: none    Patient's  Goal(s): be able to take care of self, drive, cook with right arm, get back to bowling eventually (Dr. Santiago says this will take a year)    OBJECTIVE  Cervical Screen:   ROM: dec ext and bilat ROT without pain  Spurlings: NT  Compression: NT  Distraction: NT      Shoulder: + if reproductive of patient's primary complaint   PROM L PROM R AROM L AROM R MMT L MMT R   Flex 160 60 145 NT 4/5 NT   Abd 160 35 145 NT 4/5 NT   Extension         Adduction         IR 45 30 45 NT 4/5 NT   ER 45 0 45 NT 3+/5  NT   Ext/IR         Lower trap         Middle trap           Scapulothoraic Rhythm: NT    Special tests: NT d/t post-operative status    Palpation: NT d/t post-operative status      ASSESSMENT/PLAN  Patient is a 76 year old female with right side shoulder complaints.    Patient has the following significant findings with corresponding treatment plan.                Diagnosis 1:  Shoulder pain s/p RC repair    Pain -  hot/cold therapy, manual therapy, education and home program  Decreased ROM/flexibility - manual therapy, therapeutic exercise and home program  Decreased strength - therapeutic exercise, therapeutic activities and home program  Impaired muscle performance - neuro re-education and home program    Therapy Evaluation Codes:   1) History comprised of:   Personal factors that impact the plan of care:      Time since onset of symptoms.    Comorbidity factors that impact the plan of care are:      see above.     Medications impacting care: see above.  2) Examination of Body Systems comprised of:   Body structures and functions that impact the plan of care:      Shoulder.   Activity limitations that impact the plan of care are:      Bathing, Cooking, Driving, Dressing, Lifting and Reading/Computer work.  3) Clinical presentation characteristics are:   Evolving/Changing.  4) Decision-Making    Moderate complexity using standardized patient assessment instrument and/or measureable assessment of functional  outcome.  Cumulative Therapy Evaluation is: Moderate complexity.    Previous and current functional limitations:  (See Goal Flow Sheet for this information)    Short term and Long term goals: (See Goal Flow Sheet for this information)     Communication ability:  Patient appears to be able to clearly communicate and understand verbal and written communication and follow directions correctly.  Treatment Explanation - The following has been discussed with the patient:   RX ordered/plan of care  Anticipated outcomes  Possible risks and side effects  This patient would benefit from PT intervention to resume normal activities.   Rehab potential is good.    Frequency:  2 X week, once daily  Duration:  for 4 weeks tapering to 1 X a week over 8 weeks  Discharge Plan:  Achieve all LTG.  Independent in home treatment program.  Reach maximal therapeutic benefit.    Please refer to the daily flowsheet for treatment today, total treatment time and time spent performing 1:1 timed codes.

## 2021-09-26 ENCOUNTER — HEALTH MAINTENANCE LETTER (OUTPATIENT)
Age: 76
End: 2021-09-26

## 2021-09-27 ENCOUNTER — THERAPY VISIT (OUTPATIENT)
Dept: PHYSICAL THERAPY | Facility: CLINIC | Age: 76
End: 2021-09-27
Payer: COMMERCIAL

## 2021-09-27 DIAGNOSIS — Z98.890 S/P COMPLETE REPAIR OF ROTATOR CUFF: ICD-10-CM

## 2021-09-27 DIAGNOSIS — M25.511 CHRONIC RIGHT SHOULDER PAIN: ICD-10-CM

## 2021-09-27 DIAGNOSIS — M75.121 NONTRAUMATIC COMPLETE TEAR OF RIGHT ROTATOR CUFF: ICD-10-CM

## 2021-09-27 DIAGNOSIS — G89.29 CHRONIC RIGHT SHOULDER PAIN: ICD-10-CM

## 2021-09-27 PROCEDURE — 97110 THERAPEUTIC EXERCISES: CPT | Mod: GP | Performed by: PHYSICAL THERAPIST

## 2021-09-27 PROCEDURE — 97140 MANUAL THERAPY 1/> REGIONS: CPT | Mod: GP | Performed by: PHYSICAL THERAPIST

## 2021-09-29 ENCOUNTER — THERAPY VISIT (OUTPATIENT)
Dept: PHYSICAL THERAPY | Facility: CLINIC | Age: 76
End: 2021-09-29
Payer: COMMERCIAL

## 2021-09-29 DIAGNOSIS — M75.121 NONTRAUMATIC COMPLETE TEAR OF RIGHT ROTATOR CUFF: ICD-10-CM

## 2021-09-29 DIAGNOSIS — M25.511 CHRONIC RIGHT SHOULDER PAIN: ICD-10-CM

## 2021-09-29 DIAGNOSIS — G89.29 CHRONIC RIGHT SHOULDER PAIN: ICD-10-CM

## 2021-09-29 DIAGNOSIS — Z98.890 S/P COMPLETE REPAIR OF ROTATOR CUFF: ICD-10-CM

## 2021-09-29 PROCEDURE — 97140 MANUAL THERAPY 1/> REGIONS: CPT | Mod: GP | Performed by: PHYSICAL THERAPIST

## 2021-09-29 PROCEDURE — 97110 THERAPEUTIC EXERCISES: CPT | Mod: GP | Performed by: PHYSICAL THERAPIST

## 2021-10-04 ENCOUNTER — THERAPY VISIT (OUTPATIENT)
Dept: PHYSICAL THERAPY | Facility: CLINIC | Age: 76
End: 2021-10-04
Payer: COMMERCIAL

## 2021-10-04 DIAGNOSIS — Z98.890 S/P COMPLETE REPAIR OF ROTATOR CUFF: ICD-10-CM

## 2021-10-04 DIAGNOSIS — M75.121 NONTRAUMATIC COMPLETE TEAR OF RIGHT ROTATOR CUFF: ICD-10-CM

## 2021-10-04 DIAGNOSIS — M25.519 SHOULDER PAIN: ICD-10-CM

## 2021-10-04 PROCEDURE — 97140 MANUAL THERAPY 1/> REGIONS: CPT | Mod: GP | Performed by: PHYSICAL THERAPIST

## 2021-10-04 PROCEDURE — 97110 THERAPEUTIC EXERCISES: CPT | Mod: GP | Performed by: PHYSICAL THERAPIST

## 2021-10-07 ENCOUNTER — THERAPY VISIT (OUTPATIENT)
Dept: PHYSICAL THERAPY | Facility: CLINIC | Age: 76
End: 2021-10-07
Payer: COMMERCIAL

## 2021-10-07 DIAGNOSIS — M75.121 NONTRAUMATIC COMPLETE TEAR OF RIGHT ROTATOR CUFF: ICD-10-CM

## 2021-10-07 DIAGNOSIS — Z98.890 S/P COMPLETE REPAIR OF ROTATOR CUFF: ICD-10-CM

## 2021-10-07 DIAGNOSIS — M25.519 SHOULDER PAIN: ICD-10-CM

## 2021-10-07 PROCEDURE — 97140 MANUAL THERAPY 1/> REGIONS: CPT | Mod: GP

## 2021-10-07 PROCEDURE — 97110 THERAPEUTIC EXERCISES: CPT | Mod: GP

## 2021-10-11 ENCOUNTER — THERAPY VISIT (OUTPATIENT)
Dept: PHYSICAL THERAPY | Facility: CLINIC | Age: 76
End: 2021-10-11
Payer: COMMERCIAL

## 2021-10-11 ENCOUNTER — OFFICE VISIT (OUTPATIENT)
Dept: ORTHOPEDICS | Facility: CLINIC | Age: 76
End: 2021-10-11
Payer: COMMERCIAL

## 2021-10-11 VITALS
WEIGHT: 274 LBS | BODY MASS INDEX: 50.42 KG/M2 | SYSTOLIC BLOOD PRESSURE: 137 MMHG | HEART RATE: 118 BPM | RESPIRATION RATE: 16 BRPM | HEIGHT: 62 IN | DIASTOLIC BLOOD PRESSURE: 81 MMHG

## 2021-10-11 DIAGNOSIS — G89.29 CHRONIC RIGHT SHOULDER PAIN: ICD-10-CM

## 2021-10-11 DIAGNOSIS — Z98.890 S/P COMPLETE REPAIR OF ROTATOR CUFF: ICD-10-CM

## 2021-10-11 DIAGNOSIS — M75.121 NONTRAUMATIC COMPLETE TEAR OF RIGHT ROTATOR CUFF: ICD-10-CM

## 2021-10-11 DIAGNOSIS — M75.121 NONTRAUMATIC COMPLETE TEAR OF RIGHT ROTATOR CUFF: Primary | ICD-10-CM

## 2021-10-11 DIAGNOSIS — M19.011 ARTHRITIS OF RIGHT ACROMIOCLAVICULAR JOINT: ICD-10-CM

## 2021-10-11 DIAGNOSIS — M25.511 CHRONIC RIGHT SHOULDER PAIN: ICD-10-CM

## 2021-10-11 PROCEDURE — 99024 POSTOP FOLLOW-UP VISIT: CPT | Performed by: ORTHOPAEDIC SURGERY

## 2021-10-11 PROCEDURE — 97140 MANUAL THERAPY 1/> REGIONS: CPT | Mod: GP | Performed by: PHYSICAL THERAPIST

## 2021-10-11 PROCEDURE — 97110 THERAPEUTIC EXERCISES: CPT | Mod: GP | Performed by: PHYSICAL THERAPIST

## 2021-10-11 ASSESSMENT — MIFFLIN-ST. JEOR: SCORE: 1688.17

## 2021-10-11 NOTE — PATIENT INSTRUCTIONS
Continue Physical Therapy. Work on range of motion.   Add strengthening gently.  Return to clinic 6-8 weeks.

## 2021-10-11 NOTE — LETTER
10/11/2021         RE: Gina Infantemiciara  [address]  [place] [address]        Dear Colleague,    Thank you for referring your patient, Gina eYh, to the [facility]. Please see a copy of my visit note below.    Follow up right rotator cuff repair, Emir Tobar  on 9/1/21..    She complains of weakness.  She had a large acute on chronic tear  Pain is mild.   Wound: healing well  Range of Motion in Physical Therapy.  AROM R shoulder flexion 65, abd 45, E/IR to IC, ER 10. PROM R shoulder flex 155, abd 155, ER 30 with ERP, IR 40.      Medication renewal: None    Assessment/Plan:  right rotator cuff repair, Emir Tobar with good passive flexion, but poor active use yet. She is able to do active in supine position.    Will continue range of motion and start rotator cuff strengthening and scapular stabilization exercises with tubing.  Gentle strengthening.  Return to clinic 6-8 weeks.                Again, thank you for allowing me to participate in the care of your patient.        Sincerely,        Krishna Santiago MD

## 2021-10-11 NOTE — PROGRESS NOTES
PROGRESS  REPORT    Progress reporting period is from 9/23/21 to 10/11/21.       SUBJECTIVE  Subjective changes noted by patient: Shoulder is doing well. It hurt this morning for unknown reasons. Not wearing her sling at all. She has been carrying her purse, using R arm to help L do tasks, lifting only very light items, pulling up pants, doing other self-cares.     Current pain level is 2/10.     Previous pain level was  3/10.   Changes in function:  Yes (See Goal flowsheet attached for changes in current functional level)  Adverse reaction to treatment or activity: None    OBJECTIVE  Changes noted in objective findings: AROM R shoulder flexion 65, abd 45, E/IR to IC, ER 10. PROM R shoulder flex 155, abd 155, ER 30 with ERP, IR 40.     ASSESSMENT/PLAN  Updated problem list and treatment plan: Diagnosis 1:  Shoulder pain s/p RC repair    Pain -  hot/cold therapy, manual therapy, education and home program  Decreased ROM/flexibility - manual therapy, therapeutic exercise and home program  Decreased strength - therapeutic exercise, therapeutic activities and home program  Impaired muscle performance - neuro re-education and home program  STG/LTGs have been met or progress has been made towards goals:  Yes (See Goal flow sheet completed today.)  Assessment of Progress: The patient's condition is improving.  The patient's condition has potential to improve.  Self Management Plans:  Patient has been instructed in a home treatment program.  I have re-evaluated this patient and find that the nature, scope, duration and intensity of the therapy is appropriate for the medical condition of the patient.  Gina continues to require the following intervention to meet STG and LTG's:  PT    Recommendations:  This patient would benefit from continued therapy.     Frequency:  2 X week, once daily  Duration:  for 2 weeks tapering to 1 X a week over 8 weeks        Please refer to the daily flowsheet for treatment today, total  treatment time and time spent performing 1:1 timed codes.

## 2021-10-11 NOTE — PROGRESS NOTES
Follow up right rotator cuff repair, Emir Tobar  on 9/1/21..    She complains of weakness.  She had a large acute on chronic tear  Pain is mild.   Wound: healing well  Range of Motion in Physical Therapy.  AROM R shoulder flexion 65, abd 45, E/IR to IC, ER 10. PROM R shoulder flex 155, abd 155, ER 30 with ERP, IR 40.      Medication renewal: None    Assessment/Plan:  right rotator cuff repair, Emir Tobar with good passive flexion, but poor active use yet. She is able to do active in supine position.    Will continue range of motion and start rotator cuff strengthening and scapular stabilization exercises with tubing.  Gentle strengthening.  Return to clinic 6-8 weeks.

## 2021-10-13 ENCOUNTER — THERAPY VISIT (OUTPATIENT)
Dept: PHYSICAL THERAPY | Facility: CLINIC | Age: 76
End: 2021-10-13
Payer: COMMERCIAL

## 2021-10-13 DIAGNOSIS — Z98.890 S/P COMPLETE REPAIR OF ROTATOR CUFF: ICD-10-CM

## 2021-10-13 DIAGNOSIS — M75.121 NONTRAUMATIC COMPLETE TEAR OF RIGHT ROTATOR CUFF: ICD-10-CM

## 2021-10-13 DIAGNOSIS — G89.29 CHRONIC RIGHT SHOULDER PAIN: ICD-10-CM

## 2021-10-13 DIAGNOSIS — M25.511 CHRONIC RIGHT SHOULDER PAIN: ICD-10-CM

## 2021-10-13 PROCEDURE — 97140 MANUAL THERAPY 1/> REGIONS: CPT | Mod: GP

## 2021-10-13 PROCEDURE — 97110 THERAPEUTIC EXERCISES: CPT | Mod: GP

## 2021-10-18 ENCOUNTER — THERAPY VISIT (OUTPATIENT)
Dept: PHYSICAL THERAPY | Facility: CLINIC | Age: 76
End: 2021-10-18
Payer: COMMERCIAL

## 2021-10-18 DIAGNOSIS — G89.29 CHRONIC RIGHT SHOULDER PAIN: ICD-10-CM

## 2021-10-18 DIAGNOSIS — Z98.890 S/P COMPLETE REPAIR OF ROTATOR CUFF: ICD-10-CM

## 2021-10-18 DIAGNOSIS — M75.121 NONTRAUMATIC COMPLETE TEAR OF RIGHT ROTATOR CUFF: ICD-10-CM

## 2021-10-18 DIAGNOSIS — M25.511 CHRONIC RIGHT SHOULDER PAIN: ICD-10-CM

## 2021-10-18 PROCEDURE — 97110 THERAPEUTIC EXERCISES: CPT | Mod: GP | Performed by: PHYSICAL THERAPIST

## 2021-10-19 ENCOUNTER — MYC MEDICAL ADVICE (OUTPATIENT)
Dept: FAMILY MEDICINE | Facility: CLINIC | Age: 76
End: 2021-10-19

## 2021-10-19 DIAGNOSIS — E03.9 ACQUIRED HYPOTHYROIDISM: ICD-10-CM

## 2021-10-19 DIAGNOSIS — I10 HYPERTENSION GOAL BP (BLOOD PRESSURE) < 140/90: ICD-10-CM

## 2021-10-19 DIAGNOSIS — E78.5 HYPERLIPIDEMIA WITH TARGET LDL LESS THAN 130: ICD-10-CM

## 2021-10-20 ENCOUNTER — THERAPY VISIT (OUTPATIENT)
Dept: PHYSICAL THERAPY | Facility: CLINIC | Age: 76
End: 2021-10-20
Payer: COMMERCIAL

## 2021-10-20 DIAGNOSIS — M75.121 NONTRAUMATIC COMPLETE TEAR OF RIGHT ROTATOR CUFF: ICD-10-CM

## 2021-10-20 DIAGNOSIS — G89.29 CHRONIC RIGHT SHOULDER PAIN: ICD-10-CM

## 2021-10-20 DIAGNOSIS — Z98.890 S/P COMPLETE REPAIR OF ROTATOR CUFF: ICD-10-CM

## 2021-10-20 DIAGNOSIS — M25.511 CHRONIC RIGHT SHOULDER PAIN: ICD-10-CM

## 2021-10-20 PROCEDURE — 97110 THERAPEUTIC EXERCISES: CPT | Mod: GP | Performed by: PHYSICAL THERAPIST

## 2021-10-20 RX ORDER — LISINOPRIL 20 MG/1
TABLET ORAL
Qty: 90 TABLET | Refills: 3 | Status: CANCELLED | OUTPATIENT
Start: 2021-10-20

## 2021-10-20 RX ORDER — LEVOTHYROXINE SODIUM 137 UG/1
TABLET ORAL
Qty: 90 TABLET | Refills: 3 | Status: CANCELLED | OUTPATIENT
Start: 2021-10-20

## 2021-10-20 RX ORDER — ATORVASTATIN CALCIUM 40 MG/1
TABLET, FILM COATED ORAL
Qty: 90 TABLET | Refills: 3 | Status: CANCELLED | OUTPATIENT
Start: 2021-10-20

## 2021-10-22 NOTE — TELEPHONE ENCOUNTER
Year supply sent on 6/28/21 to [facility]. [employer] message sent to patient.   Anjelica Ashley RN

## 2021-10-25 ENCOUNTER — THERAPY VISIT (OUTPATIENT)
Dept: PHYSICAL THERAPY | Facility: CLINIC | Age: 76
End: 2021-10-25
Payer: COMMERCIAL

## 2021-10-25 DIAGNOSIS — Z98.890 S/P COMPLETE REPAIR OF ROTATOR CUFF: ICD-10-CM

## 2021-10-25 DIAGNOSIS — M75.121 NONTRAUMATIC COMPLETE TEAR OF RIGHT ROTATOR CUFF: ICD-10-CM

## 2021-10-25 DIAGNOSIS — M25.511 CHRONIC RIGHT SHOULDER PAIN: ICD-10-CM

## 2021-10-25 DIAGNOSIS — G89.29 CHRONIC RIGHT SHOULDER PAIN: ICD-10-CM

## 2021-10-25 PROCEDURE — 97110 THERAPEUTIC EXERCISES: CPT | Mod: GP | Performed by: PHYSICAL THERAPIST

## 2021-10-25 PROCEDURE — 97140 MANUAL THERAPY 1/> REGIONS: CPT | Mod: GP | Performed by: PHYSICAL THERAPIST

## 2021-10-26 NOTE — PROGRESS NOTES
SUBJECTIVE  Subjective changes as noted by pt: Shoulder is feeling worse - achy all day.  Current pain level: 6/10     Changes in function:  None     Adverse reaction to treatment or activity:  activity - progression to AROM/gravity elim strengthening    OBJECTIVE  Changes in objective findings:  None        ASSESSMENT  Gina continues to require intervention to meet STG and LTG's: PT  Patient has experienced an exacerbation of symptoms.  Response to therapy has shown lack of progress in  pain level  Progress made towards STG/LTG?  None    PLAN  Decrease frequency of AROM to 1 X day    PTA/ATC plan:  N/A    Please refer to the daily flowsheet for treatment today, total treatment time and time spent performing 1:1 timed codes.

## 2021-10-28 ENCOUNTER — THERAPY VISIT (OUTPATIENT)
Dept: PHYSICAL THERAPY | Facility: CLINIC | Age: 76
End: 2021-10-28
Payer: COMMERCIAL

## 2021-10-28 DIAGNOSIS — Z98.890 S/P COMPLETE REPAIR OF ROTATOR CUFF: ICD-10-CM

## 2021-10-28 DIAGNOSIS — G89.29 CHRONIC RIGHT SHOULDER PAIN: ICD-10-CM

## 2021-10-28 DIAGNOSIS — M75.121 NONTRAUMATIC COMPLETE TEAR OF RIGHT ROTATOR CUFF: ICD-10-CM

## 2021-10-28 DIAGNOSIS — M25.511 CHRONIC RIGHT SHOULDER PAIN: ICD-10-CM

## 2021-10-28 PROCEDURE — 97140 MANUAL THERAPY 1/> REGIONS: CPT | Mod: GP | Performed by: PHYSICAL THERAPIST

## 2021-10-28 PROCEDURE — 97110 THERAPEUTIC EXERCISES: CPT | Mod: GP | Performed by: PHYSICAL THERAPIST

## 2021-11-03 ENCOUNTER — THERAPY VISIT (OUTPATIENT)
Dept: PHYSICAL THERAPY | Facility: CLINIC | Age: 76
End: 2021-11-03
Payer: COMMERCIAL

## 2021-11-03 DIAGNOSIS — M25.511 CHRONIC RIGHT SHOULDER PAIN: ICD-10-CM

## 2021-11-03 DIAGNOSIS — G89.29 CHRONIC RIGHT SHOULDER PAIN: ICD-10-CM

## 2021-11-03 DIAGNOSIS — Z98.890 S/P COMPLETE REPAIR OF ROTATOR CUFF: ICD-10-CM

## 2021-11-03 DIAGNOSIS — M75.121 NONTRAUMATIC COMPLETE TEAR OF RIGHT ROTATOR CUFF: ICD-10-CM

## 2021-11-03 PROCEDURE — 97140 MANUAL THERAPY 1/> REGIONS: CPT | Mod: GP

## 2021-11-03 PROCEDURE — 97110 THERAPEUTIC EXERCISES: CPT | Mod: GP

## 2021-11-10 ENCOUNTER — THERAPY VISIT (OUTPATIENT)
Dept: PHYSICAL THERAPY | Facility: CLINIC | Age: 76
End: 2021-11-10
Payer: COMMERCIAL

## 2021-11-10 DIAGNOSIS — M25.511 CHRONIC RIGHT SHOULDER PAIN: ICD-10-CM

## 2021-11-10 DIAGNOSIS — Z98.890 S/P COMPLETE REPAIR OF ROTATOR CUFF: ICD-10-CM

## 2021-11-10 DIAGNOSIS — G89.29 CHRONIC RIGHT SHOULDER PAIN: ICD-10-CM

## 2021-11-10 DIAGNOSIS — M75.121 NONTRAUMATIC COMPLETE TEAR OF RIGHT ROTATOR CUFF: ICD-10-CM

## 2021-11-10 PROCEDURE — 97140 MANUAL THERAPY 1/> REGIONS: CPT | Mod: GP

## 2021-11-10 PROCEDURE — 97110 THERAPEUTIC EXERCISES: CPT | Mod: GP

## 2021-11-17 ENCOUNTER — THERAPY VISIT (OUTPATIENT)
Dept: PHYSICAL THERAPY | Facility: CLINIC | Age: 76
End: 2021-11-17
Payer: COMMERCIAL

## 2021-11-17 DIAGNOSIS — Z98.890 S/P COMPLETE REPAIR OF ROTATOR CUFF: ICD-10-CM

## 2021-11-17 DIAGNOSIS — G89.29 CHRONIC RIGHT SHOULDER PAIN: ICD-10-CM

## 2021-11-17 DIAGNOSIS — M25.511 CHRONIC RIGHT SHOULDER PAIN: ICD-10-CM

## 2021-11-17 DIAGNOSIS — M75.121 NONTRAUMATIC COMPLETE TEAR OF RIGHT ROTATOR CUFF: ICD-10-CM

## 2021-11-17 PROCEDURE — 97110 THERAPEUTIC EXERCISES: CPT | Mod: GP | Performed by: PHYSICAL THERAPIST

## 2021-11-29 ENCOUNTER — THERAPY VISIT (OUTPATIENT)
Dept: PHYSICAL THERAPY | Facility: CLINIC | Age: 76
End: 2021-11-29
Payer: COMMERCIAL

## 2021-11-29 DIAGNOSIS — G89.29 CHRONIC RIGHT SHOULDER PAIN: ICD-10-CM

## 2021-11-29 DIAGNOSIS — M25.511 CHRONIC RIGHT SHOULDER PAIN: ICD-10-CM

## 2021-11-29 DIAGNOSIS — M75.121 NONTRAUMATIC COMPLETE TEAR OF RIGHT ROTATOR CUFF: ICD-10-CM

## 2021-11-29 DIAGNOSIS — Z98.890 S/P COMPLETE REPAIR OF ROTATOR CUFF: ICD-10-CM

## 2021-11-29 PROCEDURE — 97110 THERAPEUTIC EXERCISES: CPT | Mod: GP | Performed by: PHYSICAL THERAPIST

## 2021-11-29 PROCEDURE — 97530 THERAPEUTIC ACTIVITIES: CPT | Mod: GP | Performed by: PHYSICAL THERAPIST

## 2021-11-30 NOTE — PROGRESS NOTES
PROGRESS  REPORT    Progress reporting period is from 10/11/21 to 11/29/21.       SUBJECTIVE  Subjective changes noted by patient: Patient reports her shoulder continues to progress. It is more sore if she doesn't do her exercises. Has continued to avoid lifting but has been doing her own hair, pushing a cart, and lifting light objects with help from her other hand.     Current pain level is 1/10.     Previous pain level was 0/10.   Changes in function:  Yes, improved ability to perform ADLs  Adverse reaction to treatment or activity: None    OBJECTIVE  Changes noted in objective findings: Before session AROM flexion 81, abd 77. End of session flexion 90, abd 85 both with moderate shoulder shrug.     ASSESSMENT/PLAN  Updated problem list and treatment plan: 1:  Shoulder pain s/p RC repair    Pain -  hot/cold therapy, manual therapy, education and home program  Decreased ROM/flexibility - manual therapy, therapeutic exercise and home program  Decreased strength - therapeutic exercise, therapeutic activities and home program  Impaired muscle performance - neuro re-education and home program  STG/LTGs have been met or progress has been made towards goals:  Yes (See Goal flow sheet completed today.)  Assessment of Progress: The patient's condition is improving.  The patient's condition has potential to improve.  Self Management Plans:  Patient has been instructed in a home treatment program.  I have re-evaluated this patient and find that the nature, scope, duration and intensity of the therapy is appropriate for the medical condition of the patient.  Gina continues to require the following intervention to meet STG and LTG's:  PT    Recommendations:  This patient would benefit from continued therapy.     Frequency:  2 X a month, once daily  Duration:  for 2 months        Please refer to the daily flowsheet for treatment today, total treatment time and time spent performing 1:1 timed codes.

## 2021-12-06 ENCOUNTER — OFFICE VISIT (OUTPATIENT)
Dept: ORTHOPEDICS | Facility: CLINIC | Age: 76
End: 2021-12-06
Payer: COMMERCIAL

## 2021-12-06 ENCOUNTER — THERAPY VISIT (OUTPATIENT)
Dept: PHYSICAL THERAPY | Facility: CLINIC | Age: 76
End: 2021-12-06
Payer: COMMERCIAL

## 2021-12-06 VITALS
BODY MASS INDEX: 50.42 KG/M2 | SYSTOLIC BLOOD PRESSURE: 124 MMHG | RESPIRATION RATE: 16 BRPM | HEIGHT: 62 IN | WEIGHT: 274 LBS | HEART RATE: 82 BPM | DIASTOLIC BLOOD PRESSURE: 68 MMHG

## 2021-12-06 DIAGNOSIS — Z98.890 S/P COMPLETE REPAIR OF ROTATOR CUFF: ICD-10-CM

## 2021-12-06 DIAGNOSIS — M25.511 CHRONIC RIGHT SHOULDER PAIN: ICD-10-CM

## 2021-12-06 DIAGNOSIS — M75.121 NONTRAUMATIC COMPLETE TEAR OF RIGHT ROTATOR CUFF: ICD-10-CM

## 2021-12-06 DIAGNOSIS — Z98.890 S/P COMPLETE REPAIR OF ROTATOR CUFF: Primary | ICD-10-CM

## 2021-12-06 DIAGNOSIS — G89.29 CHRONIC RIGHT SHOULDER PAIN: ICD-10-CM

## 2021-12-06 PROCEDURE — 97530 THERAPEUTIC ACTIVITIES: CPT | Mod: GP | Performed by: PHYSICAL THERAPIST

## 2021-12-06 PROCEDURE — 97110 THERAPEUTIC EXERCISES: CPT | Mod: GP | Performed by: PHYSICAL THERAPIST

## 2021-12-06 PROCEDURE — 99024 POSTOP FOLLOW-UP VISIT: CPT | Performed by: ORTHOPAEDIC SURGERY

## 2021-12-06 PROCEDURE — 97140 MANUAL THERAPY 1/> REGIONS: CPT | Mod: GP | Performed by: PHYSICAL THERAPIST

## 2021-12-06 ASSESSMENT — MIFFLIN-ST. JEOR: SCORE: 1688.17

## 2021-12-06 NOTE — LETTER
12/6/2021         RE: Gina Yeh  [address]  [place] [address]        Dear Colleague,    Thank you for referring your patient, Gina Yeh, to the [facility]. Please see a copy of my visit note below.    Follow up right rotator cuff repair, Emir Tobar  on 9/1/21.    She complains of persistent weakness actively.  She had a large acute on chronic tear.    Range of Motion  Active range of motion flexion 80 degrees, passive  degrees.  External rotation 50 degrees.  Internal rotation 45 degrees.  Strength:  Internal rotation: 5+ without pain  External rotation: 4+ with mild pain  Abduction: 4+ with mild pain       Assessment/Plan:   Rotator cuff repair doing well.  Physical therapy does want her to continue with them 2 x / month for 2 months.  Will continue range of motion and strengthening with tubing.  Avoid tugging with the arm.  Return to clinic 2 months.      Again, thank you for allowing me to participate in the care of your patient.        Sincerely,        Krishna Santiago MD

## 2021-12-06 NOTE — PROGRESS NOTES
Follow up right rotator cuff repair, Emir Tobar  on 9/1/21.    She complains of persistent weakness actively.  She had a large acute on chronic tear.    Range of Motion  Active range of motion flexion 80 degrees, passive  degrees.  External rotation 50 degrees.  Internal rotation 45 degrees.  Strength:  Internal rotation: 5+ without pain  External rotation: 4+ with mild pain  Abduction: 4+ with mild pain       Assessment/Plan:   Rotator cuff repair doing well.  Physical therapy does want her to continue with them 2 x / month for 2 months.  Will continue range of motion and strengthening with tubing.  Avoid tugging with the arm.  Return to clinic 2 months.

## 2021-12-20 ENCOUNTER — THERAPY VISIT (OUTPATIENT)
Dept: PHYSICAL THERAPY | Facility: CLINIC | Age: 76
End: 2021-12-20
Payer: COMMERCIAL

## 2021-12-20 DIAGNOSIS — M25.511 CHRONIC RIGHT SHOULDER PAIN: ICD-10-CM

## 2021-12-20 DIAGNOSIS — G89.29 CHRONIC RIGHT SHOULDER PAIN: ICD-10-CM

## 2021-12-20 DIAGNOSIS — M75.121 NONTRAUMATIC COMPLETE TEAR OF RIGHT ROTATOR CUFF: ICD-10-CM

## 2021-12-20 DIAGNOSIS — Z98.890 S/P COMPLETE REPAIR OF ROTATOR CUFF: ICD-10-CM

## 2021-12-20 PROCEDURE — 97140 MANUAL THERAPY 1/> REGIONS: CPT | Mod: GP

## 2021-12-20 PROCEDURE — 97110 THERAPEUTIC EXERCISES: CPT | Mod: GP

## 2022-01-28 ENCOUNTER — E-VISIT (OUTPATIENT)
Dept: FAMILY MEDICINE | Facility: CLINIC | Age: 77
End: 2022-01-28
Payer: COMMERCIAL

## 2022-01-28 ENCOUNTER — MYC MEDICAL ADVICE (OUTPATIENT)
Dept: FAMILY MEDICINE | Facility: CLINIC | Age: 77
End: 2022-01-28
Payer: COMMERCIAL

## 2022-01-28 DIAGNOSIS — R21 RASH: Primary | ICD-10-CM

## 2022-01-28 PROCEDURE — 99207 PR NON-BILLABLE SERV PER CHARTING: CPT | Performed by: FAMILY MEDICINE

## 2022-01-28 NOTE — PATIENT INSTRUCTIONS
Meg,    Thank you for choosing us for your care. If you are not able to send me a picture of your rash, I think an in-clinic visit would be best next steps based on your symptoms. Please schedule a clinic appointment; you won t be charged for this eVisit.      You can schedule an appointment right here in [facility], or call [phone].    Verena Vee MD

## 2022-01-28 NOTE — TELEPHONE ENCOUNTER
See other mychart encounter    Adriel Stewart RN  [facility] [place]

## 2022-02-09 ENCOUNTER — VIRTUAL VISIT (OUTPATIENT)
Dept: SLEEP MEDICINE | Facility: CLINIC | Age: 77
End: 2022-02-09
Payer: COMMERCIAL

## 2022-02-09 VITALS — WEIGHT: 275 LBS | HEIGHT: 62 IN | BODY MASS INDEX: 50.61 KG/M2

## 2022-02-09 DIAGNOSIS — G47.33 OSA (OBSTRUCTIVE SLEEP APNEA): ICD-10-CM

## 2022-02-09 DIAGNOSIS — G25.81 RESTLESS LEGS SYNDROME (RLS): ICD-10-CM

## 2022-02-09 DIAGNOSIS — E61.1 IRON DEFICIENCY: ICD-10-CM

## 2022-02-09 DIAGNOSIS — E66.01 MORBIDLY OBESE (H): ICD-10-CM

## 2022-02-09 DIAGNOSIS — G47.33 OBSTRUCTIVE SLEEP APNEA: Primary | ICD-10-CM

## 2022-02-09 PROCEDURE — 98968 PH1 ASSMT&MGMT NQHP 21-30: CPT | Performed by: PHYSICIAN ASSISTANT

## 2022-02-09 ASSESSMENT — SLEEP AND FATIGUE QUESTIONNAIRES
HOW LIKELY ARE YOU TO NOD OFF OR FALL ASLEEP WHILE SITTING QUIETLY AFTER LUNCH WITHOUT ALCOHOL: WOULD NEVER DOZE
HOW LIKELY ARE YOU TO NOD OFF OR FALL ASLEEP WHILE SITTING AND READING: MODERATE CHANCE OF DOZING
HOW LIKELY ARE YOU TO NOD OFF OR FALL ASLEEP WHILE SITTING INACTIVE IN A PUBLIC PLACE: WOULD NEVER DOZE
HOW LIKELY ARE YOU TO NOD OFF OR FALL ASLEEP WHILE SITTING AND TALKING TO SOMEONE: WOULD NEVER DOZE
HOW LIKELY ARE YOU TO NOD OFF OR FALL ASLEEP IN A CAR, WHILE STOPPED FOR A FEW MINUTES IN TRAFFIC: WOULD NEVER DOZE
HOW LIKELY ARE YOU TO NOD OFF OR FALL ASLEEP WHEN YOU ARE A PASSENGER IN A CAR FOR AN HOUR WITHOUT A BREAK: SLIGHT CHANCE OF DOZING
HOW LIKELY ARE YOU TO NOD OFF OR FALL ASLEEP WHILE WATCHING TV: HIGH CHANCE OF DOZING
HOW LIKELY ARE YOU TO NOD OFF OR FALL ASLEEP WHILE LYING DOWN TO REST IN THE AFTERNOON WHEN CIRCUMSTANCES PERMIT: SLIGHT CHANCE OF DOZING

## 2022-02-09 ASSESSMENT — MIFFLIN-ST. JEOR: SCORE: 1692.7

## 2022-02-09 ASSESSMENT — PAIN SCALES - GENERAL: PAINLEVEL: NO PAIN (0)

## 2022-02-09 NOTE — PATIENT INSTRUCTIONS
Your BMI is Body mass index is 51.12 kg/m .  Weight management is a personal decision.  If you are interested in exploring weight loss strategies, the following discussion covers the approaches that may be successful. Body mass index (BMI) is one way to tell whether you are at a healthy weight, overweight, or obese. It measures your weight in relation to your height.  A BMI of 18.5 to 24.9 is in the healthy range. A person with a BMI of 25 to 29.9 is considered overweight, and someone with a BMI of 30 or greater is considered obese. More than two-thirds of American adults are considered overweight or obese.  Being overweight or obese increases the risk for further weight gain. Excess weight may lead to heart disease and diabetes.  Creating and following plans for healthy eating and physical activity may help you improve your health.  Weight control is part of healthy lifestyle and includes exercise, emotional health, and healthy eating habits. Careful eating habits lifelong are the mainstay of weight control. Though there are significant health benefits from weight loss, long-term weight loss with diet alone may be very difficult to achieve- studies show long-term success with dietary management in less than 10% of people. Attaining a healthy weight may be especially difficult to achieve in those with severe obesity. In some cases, medications, devices and surgical management might be considered.  What can you do?  If you are overweight or obese and are interested in methods for weight loss, you should discuss this with your provider.     Consider reducing daily calorie intake by 500 calories.     Keep a food journal.     Avoiding skipping meals, consider cutting portions instead.    Diet combined with exercise helps maintain muscle while optimizing fat loss. Strength training is particularly important for building and maintaining muscle mass. Exercise helps reduce stress, increase energy, and improves fitness.  Increasing exercise without diet control, however, may not burn enough calories to loose weight.       Start walking three days a week 10-20 minutes at a time    Work towards walking thirty minutes five days a week     Eventually, increase the speed of your walking for 1-2 minutes at time    And look into health and wellness programs that may be available through your health insurance provider, employer, local community center, or bebeto club.

## 2022-02-09 NOTE — PROGRESS NOTES
"Gina Yeh is a 76 year old female being evaluated via a billable telephone visit.     \"This telephone visit will be conducted via a call between you and your physician/provider. We have found that certain health care needs can be provided without the need for an in-person visit or physical exam.  This service lets us provide the care you need with a telephone conversation.  If a prescription is necessary we can send it directly to your pharmacy.  If lab work is needed we can place an order for that and you can then stop by our lab to have the test done at a later time.\"    Telephone visits are billed at different rates depending on your insurance coverage.  Please reach out to your insurance provider with any questions.    Patient has given verbal consent for  a Telephone visit? Yes    What telephone number would you like your provider to contact at at:  [phone]    How would you like to obtain your AVS? MyChart      Telephone Visit Details:     Telephone Visit Start Time: 10:30 AM    Telephone Visit End Time:10:42 AM    Obstructive Sleep Apnea - PAP Follow-Up Visit:    Chief Complaint   Patient presents with     Video Visit     CPAP Follow up       Gina Yeh comes in today for follow-up of their severe sleep apnea, managed with CPAP.     Initially presented with snoring, snort arousals, witnessed apneas, enlarged neck girth >= 40 cm for female, and obesity with excessive daytime sleepiness. RLS.     Diagnostic Study 9/12/2017 - (271.0 lbs) AHI 78.7, RDI 91.3, Supine AHI 73.5, REM AHI -, Low O2 79.5%, Time Spent less than 88% 22.9 minutes.    Titration Study: 9/26/2017- (271.0 lbs) The patient was titrated at pressures ranging from 5 cmH2O up to 8 cmH2O. The optimal pressure achieved was 8 cmH2O with a residual AHI of 1.1 events per hour. Time in REM supine on final pressure was 22.5 minutes. PLM index was 25.6 movements per hour. The PLM Arousal Index was 8.0 per hour.     She denies " cardiovascular health changes since her last visit.     Overall, the patient rates her experience with PAP as good (0 poor, 10 great). The mask is comfortable. The mask is not leaking.  She is not snoring with the mask on. She is not having gasp arousals. She is not having any oral or nasal dryness. The pressure settings feels high.    Her PAP interface is Nasal mask.    Bedtime is typically 9-10 PM. Usually it takes about 15 minutes to fall asleep with the mask on. Wake time is typically 5:30 AM.  Patient is using PAP therapy 8 hours per night. The patient is usually getting 8 hours of sleep per night.    She does feel rested in the morning.    [place] Sleepiness Scale: 7/24    JONNY Total Score: 12    ResMed   Auto-PAP 7.0 - 12.0 cmH2O 30 day usage data:    100% of days with > 4 hours of use. 0/30 days with no use.   Average use 405 minutes per day.   95%ile Leak 13.35 L/min.   CPAP 95% pressure 11.9 cm.   AHI 1.39 events per hour.     No issues with RLS. She does take iron supplement.     Past medical/surgical history, family history, social history, medications and allergies were reviewed.      Problem List:  Patient Active Problem List    Diagnosis Date Noted     Hypertension goal BP (blood pressure) < 140/90      Priority: High     Hyperlipidemia with target LDL less than 130      Priority: High     Ulnar neuropathy at elbow, left      Priority: Medium     Shoulder pain 09/23/2021     Priority: Medium     S/P complete repair of rotator cuff 09/13/2021     Priority: Medium     Nontraumatic complete tear of right rotator cuff 07/28/2021     Priority: Medium     Added automatically from request for surgery [id number]       Traumatic partial tear of right biceps tendon, subsequent encounter 07/28/2021     Priority: Medium     Added automatically from request for surgery [id number]       Arthritis of right acromioclavicular joint 07/28/2021     Priority: Medium     Added automatically from request for surgery [id number]        "Personal history of tobacco use, presenting hazards to health 06/28/2021     Priority: Medium     COPD (chronic obstructive pulmonary disease) (H)      Priority: Medium     Microscopic hematuria 10/26/2017     Priority: Medium     Urologic evaluation complete; yearly UA       Restless legs syndrome (RLS) 08/30/2017     Priority: Medium     Iron deficiency 08/30/2017     Priority: Medium     Noted Aug 2017.    Rec start on daily ferrous sulfate and recent in Nov/Dec.       Sensorineural hearing loss      Priority: Medium     JOSE (obstructive sleep apnea) 07/24/2015     Priority: Medium     Diagnostic Study 9/12/2017 - (271.0 lbs) AHI 78.7, RDI 91.3, Supine AHI 73.5, REM AHI -, Low O2 79.5%, Time Spent ?88% 22.9 minutes.  Titration Study: 9/26/2017- (271.0 lbs) The patient was titrated at pressures ranging from 5 cmH2O up to 8 cmH2O.  The optimal pressure achieved was 8 cmH2O with a residual AHI of 1.1 events per hour.  Time in REM supine on final pressure was 22.5 minutes.  PLM index was 25.6 movements per hour.  The PLM Arousal Index was 8.0 per hour.       MMT (medial meniscus tear) 10/13/2014     Priority: Medium     Morbidly obese (H) 05/29/2012     Priority: Medium     Bariatric surgery consult offered       Impaired glucose tolerance 05/29/2012     Priority: Medium     History of cervical dysplasia 05/28/2012     Priority: Medium     Advanced directives, counseling/discussion 06/02/2011     Priority: Medium     Advance Care Planning 1/7/2016: Receipt of ACP document:  Received: Health Care Directive which was witnessed or notarized on 11/25/15.  Document not previously scanned.  Validation form completed and sent with document to be scanned.  Code Status reflects choices in most recent ACP document.  Confirmed/documented designated decision maker(s).  Added by Melina Griffin    Health Care Directive on file 11/25/15       Hypothyroid      Priority: Medium        Ht 1.562 m (5' 1.5\")   Wt 124.7 kg (275 lb)  "  BMI 51.12 kg/m      Impression/Plan:  Severe obstructive sleep apnea-  Excellent CPAP compliance and AHI is well controlled on CPAP 7-12 cm/H20. Daytime symptoms have improved.   Continue current treatment.   Comprehensive DME order placed.     Gina Yeh will follow up in about 1 year(s).     22 minutes spent on day of encounter doing chart review,  history and exam, counseling, coordinating plan of care, documentation and further activities as noted above.      Yazmin Jack PA-C

## 2022-02-10 NOTE — NURSING NOTE
Return in about 1 year (around 2/9/2023). For cpap follow up.    Nando Schrader, Visit facilitator

## 2022-02-18 ENCOUNTER — THERAPY VISIT (OUTPATIENT)
Dept: PHYSICAL THERAPY | Facility: CLINIC | Age: 77
End: 2022-02-18
Payer: COMMERCIAL

## 2022-02-18 DIAGNOSIS — M25.511 CHRONIC RIGHT SHOULDER PAIN: ICD-10-CM

## 2022-02-18 DIAGNOSIS — M75.121 NONTRAUMATIC COMPLETE TEAR OF RIGHT ROTATOR CUFF: ICD-10-CM

## 2022-02-18 DIAGNOSIS — Z98.890 S/P COMPLETE REPAIR OF ROTATOR CUFF: ICD-10-CM

## 2022-02-18 DIAGNOSIS — G89.29 CHRONIC RIGHT SHOULDER PAIN: ICD-10-CM

## 2022-02-18 PROCEDURE — 97112 NEUROMUSCULAR REEDUCATION: CPT | Mod: GP

## 2022-02-18 PROCEDURE — 97110 THERAPEUTIC EXERCISES: CPT | Mod: GP

## 2022-02-22 ENCOUNTER — OFFICE VISIT (OUTPATIENT)
Dept: FAMILY MEDICINE | Facility: CLINIC | Age: 77
End: 2022-02-22
Payer: COMMERCIAL

## 2022-02-22 VITALS
BODY MASS INDEX: 51.31 KG/M2 | HEART RATE: 99 BPM | TEMPERATURE: 99 F | WEIGHT: 276 LBS | DIASTOLIC BLOOD PRESSURE: 73 MMHG | OXYGEN SATURATION: 96 % | SYSTOLIC BLOOD PRESSURE: 139 MMHG

## 2022-02-22 DIAGNOSIS — E66.01 MORBIDLY OBESE (H): ICD-10-CM

## 2022-02-22 DIAGNOSIS — I10 HYPERTENSION GOAL BP (BLOOD PRESSURE) < 140/90: ICD-10-CM

## 2022-02-22 DIAGNOSIS — Z01.818 PREOP GENERAL PHYSICAL EXAM: Primary | ICD-10-CM

## 2022-02-22 DIAGNOSIS — Z76.89 POOR DENTITION REQUIRING REFERRAL TO DENTISTRY: ICD-10-CM

## 2022-02-22 DIAGNOSIS — J44.9 CHRONIC OBSTRUCTIVE PULMONARY DISEASE, UNSPECIFIED COPD TYPE (H): ICD-10-CM

## 2022-02-22 PROBLEM — M25.519 SHOULDER PAIN: Status: RESOLVED | Noted: 2021-09-23 | Resolved: 2022-02-22

## 2022-02-22 PROBLEM — M19.011 ARTHRITIS OF RIGHT ACROMIOCLAVICULAR JOINT: Status: RESOLVED | Noted: 2021-07-28 | Resolved: 2022-02-22

## 2022-02-22 PROBLEM — N39.3 FEMALE STRESS INCONTINENCE: Status: ACTIVE | Noted: 2022-02-22

## 2022-02-22 PROBLEM — M75.121 NONTRAUMATIC COMPLETE TEAR OF RIGHT ROTATOR CUFF: Status: RESOLVED | Noted: 2021-07-28 | Resolved: 2022-02-22

## 2022-02-22 PROBLEM — M19.011 ARTHRITIS OF RIGHT ACROMIOCLAVICULAR JOINT: Status: ACTIVE | Noted: 2021-07-28

## 2022-02-22 PROBLEM — Z98.890 S/P COMPLETE REPAIR OF ROTATOR CUFF: Status: RESOLVED | Noted: 2021-09-13 | Resolved: 2022-02-22

## 2022-02-22 PROBLEM — S46.211D: Status: RESOLVED | Noted: 2021-07-28 | Resolved: 2022-02-22

## 2022-02-22 LAB
ANION GAP SERPL CALCULATED.3IONS-SCNC: 5 MMOL/L (ref 3–14)
BUN SERPL-MCNC: 22 MG/DL (ref 7–30)
CALCIUM SERPL-MCNC: 9.8 MG/DL (ref 8.5–10.1)
CHLORIDE BLD-SCNC: 103 MMOL/L (ref 94–109)
CO2 SERPL-SCNC: 28 MMOL/L (ref 20–32)
CREAT SERPL-MCNC: 0.83 MG/DL (ref 0.52–1.04)
GFR SERPL CREATININE-BSD FRML MDRD: 74 ML/MIN/1.73M2
GLUCOSE BLD-MCNC: 108 MG/DL (ref 70–99)
HGB BLD-MCNC: 13.6 G/DL (ref 11.7–15.7)
HOLD SPECIMEN: NORMAL
POTASSIUM BLD-SCNC: 4.2 MMOL/L (ref 3.4–5.3)
SODIUM SERPL-SCNC: 136 MMOL/L (ref 133–144)

## 2022-02-22 PROCEDURE — 80048 BASIC METABOLIC PNL TOTAL CA: CPT | Performed by: FAMILY MEDICINE

## 2022-02-22 PROCEDURE — 99214 OFFICE O/P EST MOD 30 MIN: CPT | Performed by: FAMILY MEDICINE

## 2022-02-22 PROCEDURE — 36415 COLL VENOUS BLD VENIPUNCTURE: CPT | Performed by: FAMILY MEDICINE

## 2022-02-22 PROCEDURE — 85018 HEMOGLOBIN: CPT | Performed by: FAMILY MEDICINE

## 2022-02-22 NOTE — PATIENT INSTRUCTIONS
Preparing for Your Surgery  Getting started  A nurse will call you to review your health history and instructions. They will give you an arrival time based on your scheduled surgery time. Please be ready to share:    Your doctor's clinic name and phone number    Your medical, surgical and anesthesia history    A list of allergies and sensitivities    A list of medicines, including herbal treatments and over-the-counter drugs    Whether the patient has a legal guardian (ask how to send us the papers in advance)  Please tell us if you're pregnant--or if there's any chance you might be pregnant. Some surgeries may injure a fetus (unborn baby), so they require a pregnancy test. Surgeries that are safe for a fetus don't always need a test, and you can choose whether to have one.   If you have a child who's having surgery, please ask for a copy of Preparing for Your Child's Surgery.    Preparing for surgery    Within 30 days of surgery: Have a pre-op exam (sometimes called an H&P, or History and Physical). This can be done at a clinic or pre-operative center.  ? If you're having a , you may not need this exam. Talk to your care team.    At your pre-op exam, talk to your care team about all medicines you take. If you need to stop any medicines before surgery, ask when to start taking them again.  ? We do this for your safety. Many medicines can make you bleed too much during surgery. Some change how well surgery (anesthesia) drugs work.    Call your insurance company to let them know you're having surgery. (If you don't have insurance, call [phone].)    Call your clinic if there's any change in your health. This includes signs of a cold or flu (sore throat, runny nose, cough, rash, fever). It also includes a scrape or scratch near the surgery site.    If you have questions on the day of surgery, call your hospital or surgery center.  COVID testing  You may need to be tested for COVID-19 before having  surgery. If so, your surgical team will give you instructions for scheduling this test, separate from your preoperative history and physical.  Eating and drinking guidelines  For your safety: Unless your surgeon tells you otherwise, follow the guidelines below.    Eat and drink as usual until 8 hours before surgery. After that, no food or milk.    Drink clear liquids until 2 hours before surgery. These are liquids you can see through, like water, Gatorade and Propel Water. You may also have black coffee and tea (no cream or milk).    Nothing by mouth within 2 hours of surgery. This includes gum, candy and breath mints.    If you drink alcohol: Stop drinking it the night before surgery.    If your care team tells you to take medicine on the morning of surgery, it's okay to take it with a sip of water.  Preventing infection    Shower or bathe the night before and morning of your surgery. Follow the instructions your clinic gave you. (If no instructions, use regular soap.)    Don't shave or clip hair near your surgery site. We'll remove the hair if needed.    Don't smoke or vape the morning of surgery. You may chew nicotine gum up to 2 hours before surgery. A nicotine patch is okay.  ? Note: Some surgeries require you to completely quit smoking and nicotine. Check with your surgeon.    Your care team will make every effort to keep you safe from infection. We will:  ? Clean our hands often with soap and water (or an alcohol-based hand rub).  ? Clean the skin at your surgery site with a special soap that kills germs.  ? Give you a special gown to keep you warm. (Cold raises the risk of infection.)  ? Wear special hair covers, masks, gowns and gloves during surgery.  ? Give antibiotic medicine, if prescribed. Not all surgeries need antibiotics.  What to bring on the day of surgery    Photo ID and insurance card    Copy of your health care directive, if you have one    Glasses and hearing aides (bring cases)  ? You can't  wear contacts during surgery    Inhaler and eye drops, if you use them (tell us about these when you arrive)    CPAP machine or breathing device, if you use them    A few personal items, if spending the night    If you have . . .  ? A pacemaker, ICD (cardiac defibrillator) or other implant: Bring the ID card.  ? An implanted stimulator: Bring the remote control.  ? A legal guardian: Bring a copy of the certified (court-stamped) guardianship papers.  Please remove any jewelry, including body piercings. Leave jewelry and other valuables at home.  If you're going home the day of surgery    You must have a responsible adult drive you home. They should stay with you overnight as well.    If you don't have someone to stay with you, and you aren't safe to go home alone, we may keep you overnight. Insurance often won't pay for this.  After surgery  If it's hard to control your pain or you need more pain medicine, please call your surgeon's office.  Questions?   If you have any questions for your care team, list them here: _________________________________________________________________________________________________________________________________________________________________________ ____________________________________ ____________________________________ ____________________________________  For informational purposes only. Not to replace the advice of your health care provider. Copyright   2003, 2019 [facility]. All rights reserved. Clinically reviewed by Mahogany Hassan MD. [employer] A/S [id number] - REV 07/21.

## 2022-02-22 NOTE — PROGRESS NOTES
[facility]  [address] [place] [address]  [place] [address]  Phone: [phone]  Primary Provider: Verena Vee       PREOPERATIVE EVALUATION:  Today's date: 2/22/2022    Gina Yeh is a 76 year old female who presents for a preoperative evaluation.    Surgical Information:  Surgery/Procedure: Dental work  Surgery Location: [facility]  Surgeon: Dr. Pearson  Surgery Date: 3/9/22  Time of Surgery: 11:00  Where patient plans to recover: At home with family  Fax number for surgical facility: [phone]    Type of Anesthesia Anticipated: to be determined    Assessment & Plan     The proposed surgical procedure is considered LOW risk.    (Z01.818) Preop general physical exam  (primary encounter diagnosis)  (Z76.89) Poor dentition requiring referral to dentistry    (J44.9) Chronic obstructive pulmonary disease, unspecified COPD type (H)  (I10) Hypertension goal BP (blood pressure) < 140/90  (E66.01) Morbidly obese (H)  Risks and Recommendations:  The patient has the following additional risks and recommendations for perioperative complications:   - No identified additional risk factors other than previously addressed    Medication Instructions:  Patient is to take all scheduled medications on the day of surgery    RECOMMENDATION:  APPROVAL GIVEN to proceed with proposed procedure, without further diagnostic evaluation.      Subjective     HPI related to upcoming procedure: Gina Yeh is a 76 year old female who presents with need for dental surgery.    Preop Questions 2/21/2022   1. Have you ever had a heart attack or stroke? No   2. Have you ever had surgery on your heart or blood vessels, such as a stent placement, a coronary artery bypass, or surgery on an artery in your head, neck, heart, or legs? No   3. Do you have chest pain with activity? No   4. Do you have a history of  heart failure? No   5. Do you currently have a cold, bronchitis or symptoms of other infection? No    6. Do you have a cough, shortness of breath, or wheezing? No   7. Do you or anyone in your family have previous history of blood clots? No   8. Do you or does anyone in your family have a serious bleeding problem such as prolonged bleeding following surgeries or cuts? No   9. Have you ever had problems with anemia or been told to take iron pills? YES -     10. Have you had any abnormal blood loss such as black, tarry or bloody stools, or abnormal vaginal bleeding? No   11. Have you ever had a blood transfusion? No   12. Are you willing to have a blood transfusion if it is medically needed before, during, or after your surgery? Yes   13. Have you or any of your relatives ever had problems with anesthesia? YES -     14. Do you have sleep apnea, excessive snoring or daytime drowsiness? YES -     14a. Do you have a CPAP machine? Yes   15. Do you have any artifical heart valves or other implanted medical devices like a pacemaker, defibrillator, or continuous glucose monitor? No   16. Do you have artificial joints? No   17. Are you allergic to latex? No   18. Is there any chance that you may be pregnant? -       Health Care Directive:  Patient has a Health Care Directive on file      Preoperative Review of :   reviewed - controlled substances prescribed by other outside provider(s).       Status of Chronic Conditions:  See problem list for active medical problems.  Problems all longstanding and stable, except as noted/documented.  See ROS for pertinent symptoms related to these conditions.      Review of Systems  CONSTITUTIONAL: NEGATIVE for fever, chills, change in weight  INTEGUMENTARY/SKIN: recent shingles on thorax  EYES: NEGATIVE for vision changes or irritation  ENT/MOUTH: see HPI   RESP:Hx COPD  CV: NEGATIVE for chest pain, palpitations or peripheral edema  GI: NEGATIVE for nausea, abdominal pain, heartburn, or change in bowel habits   female: incontinence-stress  MUSCULOSKELETAL:recent right shoulder  surgery, reduced ROM  NEURO: NEGATIVE for weakness, dizziness or paresthesias  ENDOCRINE: NEGATIVE for temperature intolerance, skin/hair changes  HEME: NEGATIVE for bleeding problems  PSYCHIATRIC: NEGATIVE for changes in mood or affect  Constitutional, neuro, ENT, endocrine, pulmonary, cardiac, gastrointestinal, genitourinary, musculoskeletal, integument and psychiatric systems are negative, except as otherwise noted.    Patient Active Problem List    Diagnosis Date Noted     COPD (chronic obstructive pulmonary disease) (H)      Priority: High     Morbidly obese (H) 05/29/2012     Priority: High     Bariatric surgery consult offered       Hypertension goal BP (blood pressure) < 140/90      Priority: High     Hyperlipidemia with target LDL less than 130      Priority: High     Microscopic hematuria 10/26/2017     Priority: Medium     Urologic evaluation complete; yearly UA       Restless legs syndrome (RLS) 08/30/2017     Priority: Medium     Iron deficiency 08/30/2017     Priority: Medium     Noted Aug 2017.    Rec start on daily ferrous sulfate and recent in Nov/Dec.       JOSE (obstructive sleep apnea) 07/24/2015     Priority: Medium     Diagnostic Study 9/12/2017 - (271.0 lbs) AHI 78.7, RDI 91.3, Supine AHI 73.5, REM AHI -, Low O2 79.5%, Time Spent ?88% 22.9 minutes.  Titration Study: 9/26/2017- (271.0 lbs) The patient was titrated at pressures ranging from 5 cmH2O up to 8 cmH2O.  The optimal pressure achieved was 8 cmH2O with a residual AHI of 1.1 events per hour.  Time in REM supine on final pressure was 22.5 minutes.  PLM index was 25.6 movements per hour.  The PLM Arousal Index was 8.0 per hour.       Impaired glucose tolerance 05/29/2012     Priority: Medium     Hypothyroid      Priority: Medium     Ulnar neuropathy at elbow, left      Priority: Low     Personal history of tobacco use, presenting hazards to health 06/28/2021     Priority: Low     Sensorineural hearing loss      Priority: Low     MMT (medial  meniscus tear) 10/13/2014     Priority: Low     History of cervical dysplasia 05/28/2012     Priority: Low     Advanced directives, counseling/discussion 06/02/2011     Priority: Low     Advance Care Planning 1/7/2016: Receipt of ACP document:  Received: Health Care Directive which was witnessed or notarized on 11/25/15.  Document not previously scanned.  Validation form completed and sent with document to be scanned.  Code Status reflects choices in most recent ACP document.  Confirmed/documented designated decision maker(s).  Added by Melina Griffin    Health Care Directive on file 11/25/15        Past Medical History:   Diagnosis Date     Arthritis      Arthritis of right acromioclavicular joint 7/28/2021     Cervical dysplasia 1988     COPD (chronic obstructive pulmonary disease) (H)      Glaucoma      Hyperlipidemia LDL goal < 130      Hypertension goal BP (blood pressure) < 140/90      Hypothyroid      Impaired glucose tolerance 05/29/2012     Microscopic hematuria      MMT (medial meniscus tear) 09/21/2006    LT     Moderate recurrent major depression (H) 02/13/2014     Morbid obesity (H)      JOSE (obstructive sleep apnea) 07/24/2015    Doesn't use cpap     RLS (restless legs syndrome)      Sensorineural hearing loss      Shingles 01/27/2022     Ulnar neuropathy at elbow, left      Vitamin D insufficiency      Past Surgical History:   Procedure Laterality Date     BIOPSY       BLADDER SURGERY  03/19/2018     CERVIX SURGERY  1988    cervical cone     COLONOSCOPY       LAPAROSCOPIC OOPHORECTOMY Right 03/19/2018    with hysteroscopy/EMB, and pubovaginal sling      ROTATOR CUFF REPAIR RT/LT  09/01/2021     TONSILLECTOMY & ADENOIDECTOMY       Current Outpatient Medications   Medication Sig Dispense Refill     atorvastatin (LIPITOR) 40 MG tablet TAKE 1 TABLET EVERY DAY 90 tablet 3     Ferrous Sulfate (IRON) 325 (65 FE) MG tablet Take 1 tablet by mouth daily       latanoprost (XALATAN) 0.005 % ophthalmic solution  1 drop daily       levothyroxine (SYNTHROID/LEVOTHROID) 137 MCG tablet TAKE 1 TABLET EVERY DAY 90 tablet 3     lisinopril (ZESTRIL) 20 MG tablet TAKE 1 TABLET EVERY DAY 90 tablet 3     OMEGA-3 KRILL OIL PO        order for DME autoCPAP 7-12 cm 1 Units 0     STIOLTO RESPIMAT 2.5-2.5 MCG/ACT AERS 2 puffs daily       Vitamin D, Cholecalciferol, 1000 units CAPS Take 1 capsule by mouth daily         Allergies   Allergen Reactions     Nkda [No Known Drug Allergies]         Social History     Tobacco Use     Smoking status: Former Smoker     Packs/day: 0.50     Years: 49.00     Pack years: 24.50     Types: Cigarettes     Start date: 5/1/1960     Quit date: 5/1/2011     Years since quitting: 10.8     Smokeless tobacco: Former User     Quit date: 4/25/2011   Substance Use Topics     Alcohol use: No     Family History   Problem Relation Age of Onset     Allergies Mother      Cardiovascular Mother      Respiratory Mother      Thyroid Disease Mother      Other Cancer Mother         skin     Arthritis Father      Hypertension Father      Hyperlipidemia Father      Cerebrovascular Disease Father      Cancer Maternal Grandmother         bone     Other Cancer Maternal Grandmother         bone     Cerebrovascular Disease Paternal Grandmother      Cancer Paternal Grandfather      Cerebrovascular Disease Paternal Grandfather      Cancer Brother         testicular      Gastrointestinal Disease Brother      Other Cancer Brother         testicular     Allergies Sister      Allergies Daughter      Gastrointestinal Disease Daughter      Depression Daughter      Anesthesia Reaction Daughter      Genitourinary Problems Brother      Allergies Daughter 10        metal     Diabetes No family hx of      History   Drug Use No         Objective     /73 (BP Location: Left arm, Patient Position: Sitting, Cuff Size: Adult Large)   Pulse 99   Temp 99  F (37.2  C) (Oral)   Wt 125.2 kg (276 lb)   SpO2 96%   BMI 51.31 kg/m      Physical Exam     GENERAL APPEARANCE: alert, no distress, cooperative and obese     EYES: EOMI, PERRL     HENT: ear canals and TM's normal and nose and mouth without ulcers or lesions; hearing aids      NECK: no adenopathy, no asymmetry, masses, or scars and thyroid normal to palpation     RESP: lungs clear to auscultation - no rales, rhonchi or wheezes     CV: regular rates and rhythm, normal S1 S2, no S3 or S4 and no murmur, click or rub; trace bipedal edema      ABDOMEN:  soft, nontender, no HSM or masses and bowel sounds normal     MS: antalgic gait with use of cane; no deformity      SKIN: hyperpigmented macules in left thoracic dermatome      NEURO: Normal strength and tone, sensory exam grossly normal, mentation intact and speech normal     PSYCH: mentation appears normal. and affect normal/bright     LYMPHATICS: No cervical adenopathy    Recent Labs   Lab Test 06/28/21  1553 07/08/20  1032   HGB 14.1  --      --     139   POTASSIUM 4.2 5.0   CR 0.84 0.86   A1C 5.6  --         Diagnostics:  Labs pending at this time.  Results will be reviewed when available.   No EKG required, no history of coronary heart disease, significant arrhythmia, peripheral arterial disease or other structural heart disease.    Revised Cardiac Risk Index (RCRI):  The patient has the following serious cardiovascular risks for perioperative complications:   - No serious cardiac risks = 0 points     RCRI Interpretation: 0 points: Class I (very low risk - 0.4% complication rate)           Signed Electronically by: Verena Vee MD  Copy of this evaluation report is provided to requesting physician.

## 2022-02-24 ENCOUNTER — THERAPY VISIT (OUTPATIENT)
Dept: PHYSICAL THERAPY | Facility: CLINIC | Age: 77
End: 2022-02-24
Payer: COMMERCIAL

## 2022-02-24 DIAGNOSIS — M75.121 NONTRAUMATIC COMPLETE TEAR OF RIGHT ROTATOR CUFF: ICD-10-CM

## 2022-02-24 DIAGNOSIS — G89.29 CHRONIC RIGHT SHOULDER PAIN: Primary | ICD-10-CM

## 2022-02-24 DIAGNOSIS — M25.511 CHRONIC RIGHT SHOULDER PAIN: Primary | ICD-10-CM

## 2022-02-24 DIAGNOSIS — Z98.890 S/P COMPLETE REPAIR OF ROTATOR CUFF: ICD-10-CM

## 2022-02-24 PROCEDURE — 97112 NEUROMUSCULAR REEDUCATION: CPT | Mod: GP

## 2022-02-24 PROCEDURE — 97530 THERAPEUTIC ACTIVITIES: CPT | Mod: GP

## 2022-03-03 ENCOUNTER — THERAPY VISIT (OUTPATIENT)
Dept: PHYSICAL THERAPY | Facility: CLINIC | Age: 77
End: 2022-03-03
Payer: COMMERCIAL

## 2022-03-03 DIAGNOSIS — M25.511 CHRONIC RIGHT SHOULDER PAIN: Primary | ICD-10-CM

## 2022-03-03 DIAGNOSIS — M25.519 SHOULDER PAIN: ICD-10-CM

## 2022-03-03 DIAGNOSIS — M75.121 NONTRAUMATIC COMPLETE TEAR OF RIGHT ROTATOR CUFF: ICD-10-CM

## 2022-03-03 DIAGNOSIS — Z98.890 S/P COMPLETE REPAIR OF ROTATOR CUFF: ICD-10-CM

## 2022-03-03 DIAGNOSIS — G89.29 CHRONIC RIGHT SHOULDER PAIN: Primary | ICD-10-CM

## 2022-03-03 PROCEDURE — 97112 NEUROMUSCULAR REEDUCATION: CPT | Mod: GP

## 2022-03-03 PROCEDURE — 97110 THERAPEUTIC EXERCISES: CPT | Mod: GP

## 2022-03-03 NOTE — PROGRESS NOTES
DISCHARGE REPORT    Progress reporting period is from 11/29/21 to 3/3/22.       SUBJECTIVE  Subjective changes noted by patient:  Reports she has no problems with any ADL's. Still has some difficulty reaching up to cupboards (but also acknowledges she has very high cupboards).  Shoulder gets sore after cane exercises. But if she does not do her exercises her shoulder hurts. Current pain level is 0/10  .     Previous pain level was 0/10.   Changes in function:  Yes (See Goal flowsheet attached for changes in current functional level)  Adverse reaction to treatment or activity: None    OBJECTIVE  Changes noted in objective findings:  Yes  Objective: PROM R shoulder, flex: 140, abd: 140, IR 50, ER 30. AROM R shoulder flex 110, scaption 106, ER 25, IR L3.   MMT R shoulder Flexion 4-/5, Abd 3+/5, IR 5/5. ER 4-/5  MMT L shoulder flexion 4+/5, Abd 4/5, ER, 4/5, IR 5/5     ASSESSMENT/PLAN  Updated problem list and treatment plan: Diagnosis 1:  Shoulder pain s/p RC repair  Pain -  self management and home program  Decreased ROM/flexibility - home program  Decreased strength - home program  STG/LTGs have been met or progress has been made towards goals:  Yes (See Goal flow sheet completed today.)  Assessment of Progress: Patient is meeting short term goals and is progressing towards long term goals.  Self Management Plans:  Patient has been instructed in a home treatment program.  Patient is independent in a home treatment program.  Patient  has been instructed in self management of symptoms.  Patient is independent in self management of symptoms.  I have re-evaluated this patient and find that the nature, scope, duration and intensity of the therapy is appropriate for the medical condition of the patient.  Gina continues to require the following intervention to meet STG and LTG's:  PT intervention is no longer required to meet STG/LTG.    Recommendations:  This patient is ready to be discharged from therapy and continue  their home treatment program.    Please refer to the daily flowsheet for treatment today, total treatment time and time spent performing 1:1 timed codes.

## 2022-03-07 ENCOUNTER — OFFICE VISIT (OUTPATIENT)
Dept: ORTHOPEDICS | Facility: CLINIC | Age: 77
End: 2022-03-07
Payer: COMMERCIAL

## 2022-03-07 VITALS
HEIGHT: 62 IN | DIASTOLIC BLOOD PRESSURE: 69 MMHG | RESPIRATION RATE: 18 BRPM | BODY MASS INDEX: 50.61 KG/M2 | HEART RATE: 77 BPM | WEIGHT: 275 LBS | SYSTOLIC BLOOD PRESSURE: 149 MMHG

## 2022-03-07 DIAGNOSIS — Z98.890 S/P COMPLETE REPAIR OF ROTATOR CUFF: Primary | ICD-10-CM

## 2022-03-07 PROCEDURE — 99213 OFFICE O/P EST LOW 20 MIN: CPT | Performed by: ORTHOPAEDIC SURGERY

## 2022-03-07 NOTE — PROGRESS NOTES
Follow up right rotator cuff repair, Emir Tobar  on 9/1/21.    She complains of persistent weakness actively.  She cannot reach to top kitchen shelves.  Also has occasional pain.   She had a large acute on chronic tear.  Physical Therapy is ready to transition to home exercise program.    Range of Motion  Objective: PROM R shoulder, flex: 140, abd: 140, IR 50, ER 30. AROM R shoulder flex 110, scaption 106, ER 25, IR L3.   MMT R shoulder Flexion 4-/5, Abd 3+/5, IR 5/5. ER 4-/5  MMT L shoulder flexion 4+/5, Abd 4/5, ER, 4/5, IR 5/5        Assessment/Plan:   Rotator cuff repair doing well.  She has improved with pain and strength.  Will transition to home exercise program.  Return to clinic as needed.  She wants clinical check in 3-6 months.

## 2022-03-07 NOTE — LETTER
3/7/2022         RE: Gina Yeh  [address]  [place] [address]        Dear Colleague,    Thank you for referring your patient, Gina Yeh, to the [facility]. Please see a copy of my visit note below.    Follow up right rotator cuff repair, Emir Tobar  on 9/1/21.    She complains of persistent weakness actively.  She cannot reach to top kitchen shelves.  Also has occasional pain.   She had a large acute on chronic tear.  Physical Therapy is ready to transition to home exercise program.    Range of Motion  Objective: PROM R shoulder, flex: 140, abd: 140, IR 50, ER 30. AROM R shoulder flex 110, scaption 106, ER 25, IR L3.   MMT R shoulder Flexion 4-/5, Abd 3+/5, IR 5/5. ER 4-/5  MMT L shoulder flexion 4+/5, Abd 4/5, ER, 4/5, IR 5/5        Assessment/Plan:   Rotator cuff repair doing well.  She has improved with pain and strength.  Will transition to home exercise program.  Return to clinic as needed.  She wants clinical check in 3-6 months.      Again, thank you for allowing me to participate in the care of your patient.        Sincerely,        Kirshna Santiago MD

## 2022-06-22 PROBLEM — Z98.890 S/P COMPLETE REPAIR OF ROTATOR CUFF: Status: RESOLVED | Noted: 2021-09-13 | Resolved: 2022-06-22

## 2022-07-01 ASSESSMENT — KOOS JR
BENDING TO THE FLOOR TO PICK UP OBJECT: SEVERE
RISING FROM SITTING: MODERATE
GOING UP OR DOWN STAIRS: SEVERE
TWISING OR PIVOTING ON KNEE: EXTREME
STRAIGHTENING KNEE FULLY: MODERATE
KOOS JR SCORING: 36.93
STANDING UPRIGHT: MODERATE
HOW SEVERE IS YOUR KNEE STIFFNESS AFTER FIRST WAKING IN MORNING: EXTREME

## 2022-07-04 NOTE — PROGRESS NOTES
SUBJECTIVE:   Gina Yeh is a 77 year old female who is seen as self referral for evaluation of bilateral knee pain.  Duration: years  Worse the past 3 weeks on the right side.  Got a new sitting elliptical    Present symptoms: pain lateral right knee past 3 weeks. Left knee has swelling, occasional episodes of pain.     Treatments tried to this point: knee sleeve right, tylenol.     Past Medical History:   Past Medical History:   Diagnosis Date     Arthritis      Arthritis of right acromioclavicular joint 7/28/2021     Cervical dysplasia 1988     COPD (chronic obstructive pulmonary disease) (H)      Female stress incontinence 2/22/2022     Glaucoma      Hyperlipidemia LDL goal < 130      Hypertension goal BP (blood pressure) < 140/90      Hypothyroid      Impaired glucose tolerance 05/29/2012     Microscopic hematuria      MMT (medial meniscus tear) 09/21/2006    LT     Moderate recurrent major depression (H) 02/13/2014     Morbid obesity (H)      JOSE (obstructive sleep apnea) 07/24/2015    Doesn't use cpap     RLS (restless legs syndrome)      Sensorineural hearing loss      Shingles 01/27/2022     Ulnar neuropathy at elbow, left      Vitamin D insufficiency      Past Surgical History:   Past Surgical History:   Procedure Laterality Date     BIOPSY       BLADDER SURGERY  03/19/2018     CERVIX SURGERY  1988    cervical cone     COLONOSCOPY       LAPAROSCOPIC OOPHORECTOMY Right 03/19/2018    with hysteroscopy/EMB, and pubovaginal sling      ROTATOR CUFF REPAIR RT/LT  09/01/2021     TONSILLECTOMY & ADENOIDECTOMY       Family History:   Family History   Problem Relation Age of Onset     Allergies Mother      Cardiovascular Mother      Respiratory Mother      Thyroid Disease Mother      Other Cancer Mother         skin     Arthritis Father      Hypertension Father      Hyperlipidemia Father      Cerebrovascular Disease Father      Cancer Maternal Grandmother         bone     Other Cancer Maternal Grandmother       "   bone     Cerebrovascular Disease Paternal Grandmother      Cancer Paternal Grandfather      Cerebrovascular Disease Paternal Grandfather      Cancer Brother         testicular      Gastrointestinal Disease Brother      Other Cancer Brother         testicular     Allergies Sister      Allergies Daughter      Gastrointestinal Disease Daughter      Depression Daughter      Anesthesia Reaction Daughter      Genitourinary Problems Brother      Allergies Daughter 10        metal     Diabetes No family hx of      Social History:   Social History     Tobacco Use     Smoking status: Former Smoker     Packs/day: 0.50     Years: 49.00     Pack years: 24.50     Types: Cigarettes     Start date: 1960     Quit date: 2011     Years since quittin.1     Smokeless tobacco: Former User     Quit date: 2011   Substance Use Topics     Alcohol use: No     She is a bowler.      Review of Systems:  Constitutional:  NEGATIVE for fever, chills, change in weight  Integumentary/Skin:  NEGATIVE for worrisome rashes, moles or lesions  Eyes:  NEGATIVE for vision changes or irritation  ENT/Mouth:  NEGATIVE for ear, mouth and throat problems  Resp:  NEGATIVE for significant cough or SOB  Breast:  NEGATIVE for masses, tenderness or discharge  CV:  NEGATIVE for chest pain, palpitations or peripheral edema  GI:  NEGATIVE for nausea, abdominal pain, heartburn, or change in bowel habits  :  Negative   Musculoskeletal:  See HPI above  Neuro:  NEGATIVE for weakness, dizziness or paresthesias  Endocrine:  NEGATIVE for temperature intolerance, skin/hair changes  Heme/allergy/immune:  NEGATIVE for bleeding problems  Psychiatric:  NEGATIVE for changes in mood or affect      OBJECTIVE:  Physical Exam:  BP (!) 143/79 (BP Location: Left arm, Patient Position: Sitting, Cuff Size: Adult Regular)   Pulse 93   Ht 1.549 m (5' 1\")   Wt 124.7 kg (275 lb)   SpO2 96%   BMI 51.96 kg/m    General Appearance: healthy, alert and no distress   Skin: " no suspicious lesions or rashes  Neuro: Normal strength and tone, mentation intact and speech normal  Vascular: good pulses, and cappillary refill   Lymph: no lymphadenopathy   Psych:  mentation appears normal and affect normal/bright  Resp: no increased work of breathing     Right Knee Exam:  Gait: walks with antalgic gait favoring both sides  Alignment: mild valgus bilateral     Patellofemoral joint: mild crepitations in the patellofemoral jointss.  Effusion: mild bilateral   ROM: Right knee: 7 extension to 90 flexion, Left knee: 5 extension to 95 flexion  Tender: lateral joint line bilateral   Masses: none  Ligaments:   Lachman's: stable   Anterior/Posterior drawer: stable,   Varus/Valgus stress: stable to varus and valgus stress    X-rays:  Obtained today bilateral knees, reviewed in the office with the patient today: (left knee worse radiographically, right worse symptomatically).   On the right, moderate-severe lateral, mild medial and  moderate to severe patellofemoral compartment right knee  osteoarthritis. No acute right knee fracture or dislocation. No  appreciable right knee joint effusion.     On the left, moderate-severe lateral, moderate medial, and  moderate-severe patellofemoral compartment left knee osteoarthritis.  No acute left knee fracture or dislocation. No appreciable left knee  joint effusion.       ASSESSMENT:   Bilateral knee severe osteoarthritis right more symptomatic  (left knee worse radiographically, right worse symptomatically).       PLAN:   She needs bilateral total knee arthroplasty, but her BMI is quite high.   I explained to her the higher incidence of complications and goals with BMI over 40.  She is understanding of this.  Because she does not carry the majority of her body mass in her lower extremities, knee replacements still would be feasible on her at her current BMI level.  But after our discussion she wishes to wait and try to lose some more weight.  She does not want to  have a steroid injection at this time.  I would like her to follow-up in a few months, for further discussion about total knee arthroplasty.  She should work with primary care to optimize her diet and weight loss strategies.      PEREZ Farias MD  Dept. Orthopedic Surgery  [facility]

## 2022-07-05 ENCOUNTER — OFFICE VISIT (OUTPATIENT)
Dept: ORTHOPEDICS | Facility: CLINIC | Age: 77
End: 2022-07-05
Payer: COMMERCIAL

## 2022-07-05 VITALS
BODY MASS INDEX: 51.92 KG/M2 | HEART RATE: 93 BPM | SYSTOLIC BLOOD PRESSURE: 143 MMHG | DIASTOLIC BLOOD PRESSURE: 79 MMHG | HEIGHT: 61 IN | WEIGHT: 275 LBS | OXYGEN SATURATION: 96 %

## 2022-07-05 DIAGNOSIS — M25.562 PAIN IN BOTH KNEES, UNSPECIFIED CHRONICITY: Primary | ICD-10-CM

## 2022-07-05 DIAGNOSIS — M25.561 RIGHT KNEE PAIN, UNSPECIFIED CHRONICITY: ICD-10-CM

## 2022-07-05 DIAGNOSIS — M25.561 PAIN IN BOTH KNEES, UNSPECIFIED CHRONICITY: Primary | ICD-10-CM

## 2022-07-05 PROCEDURE — 99204 OFFICE O/P NEW MOD 45 MIN: CPT | Performed by: ORTHOPAEDIC SURGERY

## 2022-07-05 NOTE — LETTER
7/5/2022         RE: Gina Yeh  [address]  [place] [address]        Dear Colleague,    Thank you for referring your patient, Gina Yeh, to the [facility]. Please see a copy of my visit note below.    SUBJECTIVE:   Gina Yeh is a 77 year old female who is seen as self referral for evaluation of bilateral knee pain.  Duration: years  Worse the past 3 weeks on the right side.  Got a new sitting elliptical    Present symptoms: pain lateral right knee past 3 weeks. Left knee has swelling, occasional episodes of pain.     Treatments tried to this point: knee sleeve right, tylenol.     Past Medical History:   Past Medical History:   Diagnosis Date     Arthritis      Arthritis of right acromioclavicular joint 7/28/2021     Cervical dysplasia 1988     COPD (chronic obstructive pulmonary disease) (H)      Female stress incontinence 2/22/2022     Glaucoma      Hyperlipidemia LDL goal < 130      Hypertension goal BP (blood pressure) < 140/90      Hypothyroid      Impaired glucose tolerance 05/29/2012     Microscopic hematuria      MMT (medial meniscus tear) 09/21/2006    LT     Moderate recurrent major depression (H) 02/13/2014     Morbid obesity (H)      JOSE (obstructive sleep apnea) 07/24/2015    Doesn't use cpap     RLS (restless legs syndrome)      Sensorineural hearing loss      Shingles 01/27/2022     Ulnar neuropathy at elbow, left      Vitamin D insufficiency      Past Surgical History:   Past Surgical History:   Procedure Laterality Date     BIOPSY       BLADDER SURGERY  03/19/2018     CERVIX SURGERY  1988    cervical cone     COLONOSCOPY       LAPAROSCOPIC OOPHORECTOMY Right 03/19/2018    with hysteroscopy/EMB, and pubovaginal sling      ROTATOR CUFF REPAIR RT/LT  09/01/2021     TONSILLECTOMY & ADENOIDECTOMY       Family History:   Family History   Problem Relation Age of Onset     Allergies Mother      Cardiovascular Mother      Respiratory Mother       Thyroid Disease Mother      Other Cancer Mother         skin     Arthritis Father      Hypertension Father      Hyperlipidemia Father      Cerebrovascular Disease Father      Cancer Maternal Grandmother         bone     Other Cancer Maternal Grandmother         bone     Cerebrovascular Disease Paternal Grandmother      Cancer Paternal Grandfather      Cerebrovascular Disease Paternal Grandfather      Cancer Brother         testicular      Gastrointestinal Disease Brother      Other Cancer Brother         testicular     Allergies Sister      Allergies Daughter      Gastrointestinal Disease Daughter      Depression Daughter      Anesthesia Reaction Daughter      Genitourinary Problems Brother      Allergies Daughter 10        metal     Diabetes No family hx of      Social History:   Social History     Tobacco Use     Smoking status: Former Smoker     Packs/day: 0.50     Years: 49.00     Pack years: 24.50     Types: Cigarettes     Start date: 1960     Quit date: 2011     Years since quittin.1     Smokeless tobacco: Former User     Quit date: 2011   Substance Use Topics     Alcohol use: No     She is a bowler.      Review of Systems:  Constitutional:  NEGATIVE for fever, chills, change in weight  Integumentary/Skin:  NEGATIVE for worrisome rashes, moles or lesions  Eyes:  NEGATIVE for vision changes or irritation  ENT/Mouth:  NEGATIVE for ear, mouth and throat problems  Resp:  NEGATIVE for significant cough or SOB  Breast:  NEGATIVE for masses, tenderness or discharge  CV:  NEGATIVE for chest pain, palpitations or peripheral edema  GI:  NEGATIVE for nausea, abdominal pain, heartburn, or change in bowel habits  :  Negative   Musculoskeletal:  See HPI above  Neuro:  NEGATIVE for weakness, dizziness or paresthesias  Endocrine:  NEGATIVE for temperature intolerance, skin/hair changes  Heme/allergy/immune:  NEGATIVE for bleeding problems  Psychiatric:  NEGATIVE for changes in mood or  "affect      OBJECTIVE:  Physical Exam:  BP (!) 143/79 (BP Location: Left arm, Patient Position: Sitting, Cuff Size: Adult Regular)   Pulse 93   Ht 1.549 m (5' 1\")   Wt 124.7 kg (275 lb)   SpO2 96%   BMI 51.96 kg/m    General Appearance: healthy, alert and no distress   Skin: no suspicious lesions or rashes  Neuro: Normal strength and tone, mentation intact and speech normal  Vascular: good pulses, and cappillary refill   Lymph: no lymphadenopathy   Psych:  mentation appears normal and affect normal/bright  Resp: no increased work of breathing     Right Knee Exam:  Gait: walks with antalgic gait favoring both sides  Alignment: mild valgus bilateral     Patellofemoral joint: mild crepitations in the patellofemoral jointss.  Effusion: mild bilateral   ROM: Right knee: 7 extension to 90 flexion, Left knee: 5 extension to 95 flexion  Tender: lateral joint line bilateral   Masses: none  Ligaments:   Lachman's: stable   Anterior/Posterior drawer: stable,   Varus/Valgus stress: stable to varus and valgus stress    X-rays:  Obtained today bilateral knees, reviewed in the office with the patient today: (left knee worse radiographically, right worse symptomatically).   On the right, moderate-severe lateral, mild medial and  moderate to severe patellofemoral compartment right knee  osteoarthritis. No acute right knee fracture or dislocation. No  appreciable right knee joint effusion.     On the left, moderate-severe lateral, moderate medial, and  moderate-severe patellofemoral compartment left knee osteoarthritis.  No acute left knee fracture or dislocation. No appreciable left knee  joint effusion.       ASSESSMENT:   Bilateral knee severe osteoarthritis right more symptomatic  (left knee worse radiographically, right worse symptomatically).       PLAN:   She needs bilateral total knee arthroplasty, but her BMI is quite high.   I explained to her the higher incidence of complications and goals with BMI over 40.  She is " understanding of this.  Because she does not carry the majority of her body mass in her lower extremities, knee replacements still would be feasible on her at her current BMI level.  But after our discussion she wishes to wait and try to lose some more weight.  She does not want to have a steroid injection at this time.  I would like her to follow-up in a few months, for further discussion about total knee arthroplasty.  She should work with primary care to optimize her diet and weight loss strategies.      PEREZ Farias MD  Dept. Orthopedic Surgery  [facility]       Again, thank you for allowing me to participate in the care of your patient.        Sincerely,        Ronnie Farias MD

## 2022-07-14 ENCOUNTER — ANCILLARY PROCEDURE (OUTPATIENT)
Dept: MAMMOGRAPHY | Facility: CLINIC | Age: 77
End: 2022-07-14
Attending: FAMILY MEDICINE
Payer: COMMERCIAL

## 2022-07-14 DIAGNOSIS — Z12.31 VISIT FOR SCREENING MAMMOGRAM: ICD-10-CM

## 2022-07-14 PROCEDURE — 77067 SCR MAMMO BI INCL CAD: CPT | Mod: TC | Performed by: RADIOLOGY

## 2022-07-20 ENCOUNTER — THERAPY VISIT (OUTPATIENT)
Dept: PHYSICAL THERAPY | Facility: CLINIC | Age: 77
End: 2022-07-20
Attending: ORTHOPAEDIC SURGERY
Payer: COMMERCIAL

## 2022-07-20 DIAGNOSIS — G89.29 CHRONIC PAIN OF BOTH KNEES: ICD-10-CM

## 2022-07-20 DIAGNOSIS — M25.561 CHRONIC PAIN OF BOTH KNEES: ICD-10-CM

## 2022-07-20 DIAGNOSIS — M25.562 CHRONIC PAIN OF BOTH KNEES: ICD-10-CM

## 2022-07-20 DIAGNOSIS — M25.562 PAIN IN BOTH KNEES, UNSPECIFIED CHRONICITY: ICD-10-CM

## 2022-07-20 DIAGNOSIS — M25.561 PAIN IN BOTH KNEES, UNSPECIFIED CHRONICITY: ICD-10-CM

## 2022-07-20 PROCEDURE — 97161 PT EVAL LOW COMPLEX 20 MIN: CPT | Mod: GP | Performed by: PHYSICAL THERAPIST

## 2022-07-20 PROCEDURE — 97110 THERAPEUTIC EXERCISES: CPT | Mod: GP | Performed by: PHYSICAL THERAPIST

## 2022-07-20 ASSESSMENT — ACTIVITIES OF DAILY LIVING (ADL)
WEAKNESS: THE SYMPTOM AFFECTS MY ACTIVITY MODERATELY
SQUAT: I AM UNABLE TO DO THE ACTIVITY
GIVING WAY, BUCKLING OR SHIFTING OF KNEE: THE SYMPTOM AFFECTS MY ACTIVITY SLIGHTLY
KNEE_ACTIVITY_OF_DAILY_LIVING_SCORE: 47.69
HOW_WOULD_YOU_RATE_THE_OVERALL_FUNCTION_OF_YOUR_KNEE_DURING_YOUR_USUAL_DAILY_ACTIVITIES?: ABNORMAL
HOW_WOULD_YOU_RATE_THE_CURRENT_FUNCTION_OF_YOUR_KNEE_DURING_YOUR_USUAL_DAILY_ACTIVITIES_ON_A_SCALE_FROM_0_TO_100_WITH_100_BEING_YOUR_LEVEL_OF_KNEE_FUNCTION_PRIOR_TO_YOUR_INJURY_AND_0_BEING_THE_INABILITY_TO_PERFORM_ANY_OF_YOUR_USUAL_DAILY_ACTIVITIES?: 40
STAND: NOT ANSWERED
GO UP STAIRS: ACTIVITY IS FAIRLY DIFFICULT
SIT WITH YOUR KNEE BENT: ACTIVITY IS MINIMALLY DIFFICULT
KNEEL ON THE FRONT OF YOUR KNEE: I AM UNABLE TO DO THE ACTIVITY
LIMPING: THE SYMPTOM AFFECTS MY ACTIVITY SEVERELY
STIFFNESS: I HAVE THE SYMPTOM BUT IT DOES NOT AFFECT MY ACTIVITY
AS_A_RESULT_OF_YOUR_KNEE_INJURY,_HOW_WOULD_YOU_RATE_YOUR_CURRENT_LEVEL_OF_DAILY_ACTIVITY?: ABNORMAL
RAW_SCORE: 33.38
WALK: ACTIVITY IS SOMEWHAT DIFFICULT
GO DOWN STAIRS: ACTIVITY IS SOMEWHAT DIFFICULT
RISE FROM A CHAIR: ACTIVITY IS FAIRLY DIFFICULT
PAIN: THE SYMPTOM AFFECTS MY ACTIVITY MODERATELY
KNEE_ACTIVITY_OF_DAILY_LIVING_SUM: 31
SWELLING: I DO NOT HAVE THE SYMPTOM

## 2022-07-20 NOTE — PROGRESS NOTES
"Physical Therapy Initial Evaluation  Subjective:    Patient Health History  Gina Yeh being seen for Possible Knee joint exercises to help alleviate pain.     Problem began: 7/7/2022 (date of order).   Problem occurred: Wear and tear due to age   Pain is reported as 4/10 on pain scale.  General health as reported by patient is good.  Pertinent medical history includes: asthma, emphysema, high blood pressure, overweight, osteoarthritis, sleep disorder/apnea and thyroid problems.   Red flags:  None as reported by patient.  Medical allergies: none.   Surgeries include:  Orthopedic surgery and other. Other surgery history details: Tonsilectomy and partial hysterectomy.    Current medications:  High blood pressure medication, thyroid medication and other. Other medications details: Glaucoma, cholesterol,  iron deficit, COPD.    Current occupation is Retired.   Primary job tasks include:  Driving, prolonged sitting and repetitive tasks.                  Therapist Generated HPI Evaluation         Type of problem:  Bilateral knees.    This is a chronic condition.  Condition occurred with:  Degenerative joint disease.  Where condition occurred: for unknown reasons.  Patient reports pain:  In the joint and lateral.  Pain is described as aching (\"stiff\") and is constant.  Pain is the same all the time.  Since onset symptoms are gradually worsening.  Associated symptoms:  Loss of strength and loss of motion/stiffness. Symptoms are exacerbated by ascending stairs, descending stairs, walking, transfers and standing    Special tests included:  X-ray.    Barriers include:  None as reported by patient.                        Objective:    Gait:    Gait Type:  Antalgic   Weight Bearing Status:  WBAT   Assistive Devices:  Cane                                                        Knee Evaluation:  ROM:  Strength wnl knee: pt reported knee pain decreased with resisted knee ext B.  AROM      Extension:  Left: 16    Right:  " 14  Flexion: Left: 102    Right: 92        Strength:     Extension:  Left: 5/5    Pain:-      Right: 5/5    Pain:-  Flexion:  Left: 5/5    Pain:-      Right: 5/5    Pain:-            Palpation:    Left knee tenderness present at:  Medial Joint Line and Lateral Joint Line  Right knee tenderness present at:  Medial Joint Line and Lateral Joint Line            General     ROS    Assessment/Plan:    Patient is a 77 year old female with bilateral knee complaints.    Patient has the following significant findings with corresponding treatment plan.                Diagnosis 1:  Knee OA  Pain -  manual therapy, self management, education and home program  Decreased ROM/flexibility - manual therapy, therapeutic exercise and home program  Impaired gait - gait training and home program  Impaired muscle performance - neuro re-education and home program    Cumulative Therapy Evaluation is: Low complexity.    Previous and current functional limitations:  (See Goal Flow Sheet for this information)    Short term and Long term goals: (See Goal Flow Sheet for this information)     Communication ability:  Patient appears to be able to clearly communicate and understand verbal and written communication and follow directions correctly.  Treatment Explanation - The following has been discussed with the patient:   RX ordered/plan of care  Anticipated outcomes  Possible risks and side effects  This patient would benefit from PT intervention to resume normal activities.   Rehab potential is good.    Frequency:  1 X week, once daily  Duration:  for 8 weeks  Discharge Plan:  Achieve all LTG.  Independent in home treatment program.  Reach maximal therapeutic benefit.    Please refer to the daily flowsheet for treatment today, total treatment time and time spent performing 1:1 timed codes.

## 2022-07-20 NOTE — PROGRESS NOTES
[facility]    OUTPATIENT Physical Therapy ORTHOPEDIC EVALUATION  PLAN OF TREATMENT FOR OUTPATIENT REHABILITATION  (COMPLETE FOR INITIAL CLAIMS ONLY)  Patient's Last Name, First Name, M.I.  YOB: 1945  Gina Yeh    Provider s Name:  [facility]   Medical Record No.  [id number]   Start of Care Date:  07/20/22   Onset Date:   07/05/22 (date of order)   Type:     _X__PT   ___OT Medical Diagnosis:    Encounter Diagnoses   Name Primary?    Pain in both knees, unspecified chronicity     Chronic pain of both knees         Treatment Diagnosis:  B knee OA        Goals:     07/20/22 0500   Body Part   Goals listed below are for B knees   Goal #1   Goal #1 ambulation   Previous Functional Level No restrictions   Current Functional Level Minutes patient can walk   Performance Level 5 with cane   STG Target Performance Minutes patient will be able to walk   Performance Level 10 with cane   Rationale for safe community ambulation   Due Date 08/19/22    LTG Target Performance Minutes patient will be able to  walk   Performance Level 20 with cane   Rationale for safe community ambulation   Due Date 09/18/22       Therapy Frequency:  1x/wk  Predicted Duration of Therapy Intervention:  8 wks    Ronni Woodard, PT                 I CERTIFY THE NEED FOR THESE SERVICES FURNISHED UNDER        THIS PLAN OF TREATMENT AND WHILE UNDER MY CARE     (Physician attestation of this document indicates review and certification of the therapy plan).                     Certification Date From:  07/20/22   Certification Date To:  09/18/22    Referring Provider:  Ronnie Farias    Initial Assessment        See Epic Evaluation SOC Date: 07/20/22

## 2022-08-05 ENCOUNTER — THERAPY VISIT (OUTPATIENT)
Dept: PHYSICAL THERAPY | Facility: CLINIC | Age: 77
End: 2022-08-05
Payer: COMMERCIAL

## 2022-08-05 DIAGNOSIS — G89.29 CHRONIC PAIN OF BOTH KNEES: Primary | ICD-10-CM

## 2022-08-05 DIAGNOSIS — M25.561 CHRONIC PAIN OF BOTH KNEES: Primary | ICD-10-CM

## 2022-08-05 DIAGNOSIS — M25.562 CHRONIC PAIN OF BOTH KNEES: Primary | ICD-10-CM

## 2022-08-05 PROCEDURE — 97110 THERAPEUTIC EXERCISES: CPT | Mod: GP | Performed by: PHYSICAL THERAPIST

## 2022-08-16 ENCOUNTER — TRANSFERRED RECORDS (OUTPATIENT)
Dept: FAMILY MEDICINE | Facility: CLINIC | Age: 77
End: 2022-08-16

## 2022-08-17 ASSESSMENT — ENCOUNTER SYMPTOMS
CONSTIPATION: 0
ABDOMINAL PAIN: 0
COUGH: 0
HEMATURIA: 0
EYE PAIN: 0
DIARRHEA: 0
HEMATOCHEZIA: 0
FREQUENCY: 0
NAUSEA: 0
MYALGIAS: 0
PARESTHESIAS: 0
WEAKNESS: 0
SHORTNESS OF BREATH: 0
PALPITATIONS: 0
BREAST MASS: 0
HEADACHES: 0
FEVER: 0
CHILLS: 0
DIZZINESS: 0
SORE THROAT: 0
JOINT SWELLING: 0
ARTHRALGIAS: 1
DYSURIA: 0
HEARTBURN: 0
NERVOUS/ANXIOUS: 0

## 2022-08-17 ASSESSMENT — ACTIVITIES OF DAILY LIVING (ADL): CURRENT_FUNCTION: NO ASSISTANCE NEEDED

## 2022-08-24 ENCOUNTER — OFFICE VISIT (OUTPATIENT)
Dept: FAMILY MEDICINE | Facility: CLINIC | Age: 77
End: 2022-08-24
Payer: COMMERCIAL

## 2022-08-24 VITALS
HEIGHT: 62 IN | TEMPERATURE: 98.7 F | DIASTOLIC BLOOD PRESSURE: 83 MMHG | SYSTOLIC BLOOD PRESSURE: 130 MMHG | OXYGEN SATURATION: 96 % | HEART RATE: 82 BPM | BODY MASS INDEX: 48.4 KG/M2 | WEIGHT: 263 LBS

## 2022-08-24 DIAGNOSIS — I10 HYPERTENSION GOAL BP (BLOOD PRESSURE) < 140/90: ICD-10-CM

## 2022-08-24 DIAGNOSIS — E78.5 HYPERLIPIDEMIA WITH TARGET LDL LESS THAN 130: ICD-10-CM

## 2022-08-24 DIAGNOSIS — E03.9 ACQUIRED HYPOTHYROIDISM: ICD-10-CM

## 2022-08-24 DIAGNOSIS — J44.9 CHRONIC OBSTRUCTIVE PULMONARY DISEASE, UNSPECIFIED COPD TYPE (H): ICD-10-CM

## 2022-08-24 DIAGNOSIS — E66.01 MORBIDLY OBESE (H): ICD-10-CM

## 2022-08-24 DIAGNOSIS — R31.29 MICROSCOPIC HEMATURIA: ICD-10-CM

## 2022-08-24 DIAGNOSIS — Z00.00 ENCOUNTER FOR MEDICARE ANNUAL WELLNESS EXAM: Primary | ICD-10-CM

## 2022-08-24 PROBLEM — M25.561 CHRONIC PAIN OF BOTH KNEES: Status: RESOLVED | Noted: 2022-07-20 | Resolved: 2022-08-24

## 2022-08-24 PROBLEM — G89.29 CHRONIC PAIN OF BOTH KNEES: Status: RESOLVED | Noted: 2022-07-20 | Resolved: 2022-08-24

## 2022-08-24 PROBLEM — M17.0 PRIMARY OSTEOARTHRITIS OF BOTH KNEES: Status: ACTIVE | Noted: 2022-08-24

## 2022-08-24 PROBLEM — M25.562 CHRONIC PAIN OF BOTH KNEES: Status: RESOLVED | Noted: 2022-07-20 | Resolved: 2022-08-24

## 2022-08-24 LAB
ALBUMIN UR-MCNC: NEGATIVE MG/DL
ANION GAP SERPL CALCULATED.3IONS-SCNC: 3 MMOL/L (ref 3–14)
APPEARANCE UR: CLEAR
BACTERIA #/AREA URNS HPF: ABNORMAL /HPF
BILIRUB UR QL STRIP: NEGATIVE
BUN SERPL-MCNC: 23 MG/DL (ref 7–30)
CALCIUM SERPL-MCNC: 9.6 MG/DL (ref 8.5–10.1)
CHLORIDE BLD-SCNC: 105 MMOL/L (ref 94–109)
CHOLEST SERPL-MCNC: 191 MG/DL
CO2 SERPL-SCNC: 30 MMOL/L (ref 20–32)
COLOR UR AUTO: YELLOW
CREAT SERPL-MCNC: 0.89 MG/DL (ref 0.52–1.04)
FASTING STATUS PATIENT QL REPORTED: ABNORMAL
GFR SERPL CREATININE-BSD FRML MDRD: 67 ML/MIN/1.73M2
GLUCOSE BLD-MCNC: 101 MG/DL (ref 70–99)
GLUCOSE UR STRIP-MCNC: NEGATIVE MG/DL
HDLC SERPL-MCNC: 42 MG/DL
HGB UR QL STRIP: ABNORMAL
KETONES UR STRIP-MCNC: ABNORMAL MG/DL
LDLC SERPL CALC-MCNC: 122 MG/DL
LEUKOCYTE ESTERASE UR QL STRIP: NEGATIVE
NITRATE UR QL: NEGATIVE
NONHDLC SERPL-MCNC: 149 MG/DL
PH UR STRIP: 5.5 [PH] (ref 5–7)
POTASSIUM BLD-SCNC: 4.8 MMOL/L (ref 3.4–5.3)
RBC #/AREA URNS AUTO: ABNORMAL /HPF
SODIUM SERPL-SCNC: 138 MMOL/L (ref 133–144)
SP GR UR STRIP: 1.02 (ref 1–1.03)
SQUAMOUS #/AREA URNS AUTO: ABNORMAL /LPF
TRIGL SERPL-MCNC: 136 MG/DL
TSH SERPL DL<=0.005 MIU/L-ACNC: 3.46 MU/L (ref 0.4–4)
UROBILINOGEN UR STRIP-ACNC: 0.2 E.U./DL
WBC #/AREA URNS AUTO: ABNORMAL /HPF
YEAST #/AREA URNS HPF: ABNORMAL /HPF

## 2022-08-24 PROCEDURE — 99214 OFFICE O/P EST MOD 30 MIN: CPT | Mod: 25 | Performed by: FAMILY MEDICINE

## 2022-08-24 PROCEDURE — 36415 COLL VENOUS BLD VENIPUNCTURE: CPT | Performed by: FAMILY MEDICINE

## 2022-08-24 PROCEDURE — 84443 ASSAY THYROID STIM HORMONE: CPT | Performed by: FAMILY MEDICINE

## 2022-08-24 PROCEDURE — 80048 BASIC METABOLIC PNL TOTAL CA: CPT | Performed by: FAMILY MEDICINE

## 2022-08-24 PROCEDURE — 81001 URINALYSIS AUTO W/SCOPE: CPT | Performed by: FAMILY MEDICINE

## 2022-08-24 PROCEDURE — G0439 PPPS, SUBSEQ VISIT: HCPCS | Performed by: FAMILY MEDICINE

## 2022-08-24 PROCEDURE — 80061 LIPID PANEL: CPT | Performed by: FAMILY MEDICINE

## 2022-08-24 RX ORDER — LISINOPRIL 20 MG/1
TABLET ORAL
Qty: 90 TABLET | Refills: 3 | Status: SHIPPED | OUTPATIENT
Start: 2022-08-24 | End: 2023-08-25

## 2022-08-24 RX ORDER — ATORVASTATIN CALCIUM 40 MG/1
TABLET, FILM COATED ORAL
Qty: 90 TABLET | Refills: 3 | Status: SHIPPED | OUTPATIENT
Start: 2022-08-24 | End: 2023-08-21

## 2022-08-24 RX ORDER — LEVOTHYROXINE SODIUM 137 UG/1
TABLET ORAL
Qty: 90 TABLET | Refills: 3 | Status: SHIPPED | OUTPATIENT
Start: 2022-08-24 | End: 2023-08-25

## 2022-08-24 ASSESSMENT — ENCOUNTER SYMPTOMS
BREAST MASS: 0
CHILLS: 0
ARTHRALGIAS: 1
PARESTHESIAS: 0
PALPITATIONS: 0
COUGH: 0
EYE PAIN: 0
SORE THROAT: 0
NERVOUS/ANXIOUS: 0
DIZZINESS: 0
ABDOMINAL PAIN: 0
FEVER: 0
SHORTNESS OF BREATH: 0
DIARRHEA: 0
HEMATURIA: 0
JOINT SWELLING: 0
CONSTIPATION: 0
NAUSEA: 0
MYALGIAS: 0
HEMATOCHEZIA: 0
HEADACHES: 0
FREQUENCY: 0
DYSURIA: 0
HEARTBURN: 0
WEAKNESS: 0

## 2022-08-24 ASSESSMENT — ACTIVITIES OF DAILY LIVING (ADL): CURRENT_FUNCTION: NO ASSISTANCE NEEDED

## 2022-08-24 NOTE — PATIENT INSTRUCTIONS
Patient Education   Personalized Prevention Plan  You are due for the preventive services outlined below.  Your care team is available to assist you in scheduling these services.  If you have already completed any of these items, please share that information with your care team to update in your medical record.  Health Maintenance Due   Topic Date Due    LUNG CANCER SCREENING  Never done    Cholesterol Lab  06/28/2022    Thyroid Function Lab  06/28/2022    ANNUAL REVIEW OF HM ORDERS  06/28/2022    Colorectal Cancer Screening  07/28/2022       Urinary Incontinence, Female (Adult)   Urinary incontinence means loss of bladder control. This problem affects many women, especially as they get older. If you have incontinence, you may be embarrassed to ask for help. But know that this problem can be treated.   Types of Incontinence  There are different types of incontinence. Two of the main types are described here. You can have more than one type.   Stress incontinence. With this type, urine leaks when pressure (stress) is put on the bladder. This may happen when you cough, sneeze, or laugh. Stress incontinence most often occurs because the pelvic floor muscles that support the bladder and urethra are weak. This can happen after pregnancy and vaginal childbirth or a hysterectomy. It can also be due to excess body weight or hormone changes.  Urge incontinence (also called overactive bladder). With this type, a sudden urge to urinate is felt often. This may happen even though there may not be much urine in the bladder. The need to urinate often during the night is common. Urge incontinence most often occurs because of bladder spasms. This may be due to bladder irritation or infection. Damage to bladder nerves or pelvic muscles, constipation, and certain medicines can also lead to urge incontinence.  Treatment depends on the cause. Further evaluation is needed to find the type you have. This will likely include an exam and  certain tests. Based on the results, you and your healthcare provider can then plan treatment. Until a diagnosis is made, the home care tips below can help ease symptoms.   Home care  Do pelvic floor muscle exercises, if they are prescribed. The pelvic floor muscles help support the bladder and urethra. Many women find that their symptoms improve when doing special exercises that strengthen these muscles. To do the exercises, contract the muscles you would use to stop your stream of urine. But do this when you re not urinating. Hold for 10 seconds, then relax. Repeat 10 to 20 times in a row, at least 3 times a day. Your healthcare provider may give you other instructions for how to do the exercises and how often.  Keep a bladder diary. This helps track how often and how much you urinate over a set period of time. Bring this diary with you to your next visit with the provider. The information can help your provider learn more about your bladder problem.  Lose weight, if advised to by your provider. Extra weight puts pressure on the bladder. Your provider can help you create a weight-loss plan that s right for you. This may include exercising more and making certain diet changes.  Don't have foods and drinks that may irritate the bladder. These can include alcohol and caffeinated drinks.  Quit smoking. Smoking and other tobacco use can lead to a long-term (chronic) cough that strains the pelvic floor muscles. Smoking may also damage the bladder and urethra. Talk with your provider about treatments or methods you can use to quit smoking.  If drinking large amounts of fluid makes you have symptoms, you may be advised to limit your fluid intake. You may also be advised to drink most of your fluids during the day and to limit fluids at night.  If you re worried about urine leakage or accidents, you may wear absorbent pads to catch urine. Change the pads often. This helps reduce discomfort. It may also reduce the risk of  skin or bladder infections.    Follow-up care  Follow up with your healthcare provider, or as directed. It may take some to find the right treatment for your problem. But healthy lifestyle changes can be made right away. These include such things as exercising on a regular basis, eating a healthy diet, losing weight (if needed), and quitting smoking. Your treatment plan may include special therapies or medicines. Certain procedures or surgery may also be options. Talk about any questions you have with your provider.   When to seek medical advice  Call the healthcare provider right away if any of these occur:  Fever of 100.4 F (38 C) or higher, or as directed by your provider  Bladder pain or fullness  Belly swelling  Nausea or vomiting  Back pain  Weakness, dizziness, or fainting  [employer] last reviewed this educational content on 1/1/2020 2000-2021 [employer]. All rights reserved. This information is not intended as a substitute for professional medical care. Always follow your healthcare professional's instructions.           Lung Cancer Screening   Frequently Asked Questions  If you are at high-risk for lung cancer, getting screened with low-dose computed tomography (LDCT) every year can help save your life. This handout offers answers to some of the most common questions about lung cancer screening. If you have other questions, please call [phone]-[phone] ([phone]).     What is it?  Lung cancer screening uses special X-ray technology to create an image of your lung tissue. The exam is quick and easy and takes less than 10 seconds. We don t give you any medicine or use any needles. You can eat before and after the exam. You don t need to change your clothes as long as the clothing on your chest doesn t contain metal. But, you do need to be able to hold your breath for at least 6 seconds during the exam.    What is the goal of lung cancer screening?  The goal of lung cancer screening is  to save lives. Many times, lung cancer is not found until a person starts having physical symptoms. Lung cancer screening can help detect lung cancer in the earliest stages when it may be easier to treat.    Who should be screened for lung cancer?  We suggest lung cancer screening for anyone who is at high-risk for lung cancer. You are in the high-risk group if you:     are between the ages of 55 and 79, and   have smoked at least 1 pack of cigarettes a day for 20 or more years, and   still smoke or have quit within the past 15 years.    However, if you have a new cough or shortness of breath, you should talk to your doctor before being screened.    Why does it matter if I have symptoms?  Certain symptoms can be a sign that you have a condition in your lungs that should be checked and treated by your doctor. These symptoms include fever, chest pain, a new or changing cough, shortness of breath that you have never felt before, coughing up blood or unexplained weight loss. Having any of these symptoms can greatly affect the results of lung cancer screening.       Should all smokers get an LDCT lung cancer screening exam?  It depends. Lung cancer screening is for a very specific group of men and women who have a history of heavy smoking over a long period of time (see  Who should be screened for lung cancer  above).  I am in the high-risk group, but have been diagnosed with cancer in the past. Is LDCT lung cancer screening right for me?  In some cases, you should not have LDCT lung screening, such as when your doctor is already following your cancer with CT scan studies. Your doctor will help you decide if LDCT lung screening is right for you.  Do I need to have a screening exam every year?  Yes. If you are in the high-risk group described earlier, you should get an LDCT lung cancer screening exam every year until you are 79, or are no longer willing or able to undergo screening and possible procedures to diagnose and  treat lung cancer.  How effective is LDCT at preventing death from lung cancer?  Studies have shown that LDCT lung cancer screening can lower the risk of death from lung cancer by 20 percent in people who are at high-risk.  What are the risks?  There are some risks and limitations of LDCT lung cancer screening. We want to make sure you understand the risks and benefits, so please let us know if you have any questions. Your doctor may want to talk with you more about these risks.   Radiation exposure: As with any exam that uses radiation, there is a very small increased risk of cancer. The amount of radiation in LDCT is small--about the same amount a person would get from a mammogram. Your doctor orders the exam when he or she feels the potential benefits outweigh the risks.   False negatives: No test is perfect, including LDCT. It is possible that you may have a medical condition, including lung cancer, that is not found during your exam. This is called a false negative result.   False positives and more testing: LDCT very often finds something in the lung that could be cancer, but in fact is not. This is called a false positive result. False positive tests often cause anxiety. To make sure these findings are not cancer, you may need to have more tests. These tests will be done only if you give us permission. Sometimes patients need a treatment that can have side effects, such as a biopsy. For more information on false positives, see  What can I expect from the results?    Findings not related to lung cancer: Your LDCT exam also takes pictures of areas of your body next to your lungs. In a very small number of cases, the CT scan will show an abnormal finding in one of these areas, such as your kidneys, adrenal glands, liver or thyroid. This finding may not be serious, but you may need more tests. Your doctor can help you decide what other tests you may need, if any.  What can I expect from the results?  About 1 out  of 4 LDCT exams will find something that may need more tests. Most of the time, these findings are lung nodules. Lung nodules are very small collections of tissue in the lung. These nodules are very common, and the vast majority--more than 97 percent--are not cancer (benign). Most are normal lymph nodes or small areas of scarring from past infections.  But, if a small lung nodule is found to be cancer, the cancer can be cured more than 90 percent of the time. To know if the nodule is cancer, we may need to get more images before your next yearly screening exam. If the nodule has suspicious features (for example, it is large, has an odd shape or grows over time), we will refer you to a specialist for further testing.  Will my doctor also get the results?  Yes. Your doctor will get a copy of your results.  Is it okay to keep smoking now that there s a cancer screening exam?  No. Tobacco is one of the strongest cancer-causing agents. It causes not only lung cancer, but other cancers and cardiovascular (heart) diseases as well. The damage caused by smoking builds over time. This means that the longer you smoke, the higher your risk of disease. While it is never too late to quit, the sooner you quit, the better.  Where can I find help to quit smoking?  The best way to prevent lung cancer is to stop smoking. If you have already quit smoking, congratulations and keep it up! For help on quitting smoking, please call [employer] at 5-747-QUITNOW (1-510.332.4666) or the American [employer] at 1-968.228.7694 to find local resources near you.  One-on-one health coaching:  If you d prefer to work individually with a health care provider on tobacco cessation, we offer:     Medication Therapy Management:  Our specially trained pharmacists work closely with you and your doctor to help you quit smoking.  Call [phone] or [phone] (toll free).

## 2022-08-24 NOTE — PROGRESS NOTES
"SUBJECTIVE:   Gina Yeh is a 77 year old female  who presents for Preventive Visit.    Patient has been advised of split billing requirements and indicates understanding: Yes  Are you in the first 12 months of your Medicare coverage?  No    COPD well controlled with medications without dyspnea, wheezing or cough, also followed by pulmonology. She completed her yearly chest CT for lung cancer screening.    Patient also presents for follow-up of hypothyroidism, controlled on current medication.  Denies symptoms of hypo- or hyperthyroidism.  Hypercholesterolemia well controlled with current treatment plan without side effects. Hypertension well controlled on current medications without side effects, chest pain, or dyspnea.     Healthy Habits:     In general, how would you rate your overall health?  Good    Frequency of exercise:  6-7 days/week    Duration of exercise:  15-30 minutes    Do you usually eat at least 4 servings of fruit and vegetables a day, include whole grains    & fiber and avoid regularly eating high fat or \"junk\" foods?  Yes    Taking medications regularly:  Yes    Medication side effects:  None    Ability to successfully perform activities of daily living:  No assistance needed    Home Safety:  No safety concerns identified    Hearing Impairment:  No hearing concerns    In the past 6 months, have you been bothered by leaking of urine? Yes    In general, how would you rate your overall mental or emotional health?  Excellent      PHQ-2 Total Score: 0    Additional concerns today:  Yes    Do you feel safe in your environment? Yes    Have you ever done Advance Care Planning? (For example, a Health Directive, POLST, or a discussion with a medical provider or your loved ones about your wishes): Yes, advance care planning is on file.       Fall risk  Fallen 2 or more times in the past year?: No  Any fall with injury in the past year?: No    Cognitive Screening   1) Repeat 3 items (Leader, Season, " Table)    2) Clock draw: NORMAL  3) 3 item recall: Recalls 3 objects  Results: 3 items recalled: COGNITIVE IMPAIRMENT LESS LIKELY    Mini-CogTM Copyright S Mirta. Licensed by the author for use in [facility]; reprinted with permission (hali@[email or web address]). All rights reserved.      Do you have sleep apnea, excessive snoring or daytime drowsiness?: yes    Reviewed and updated as needed this visit by clinical staff   Tobacco  Allergies  Meds  Problems  Med Hx  Surg Hx  Fam Hx  Soc   Hx          Reviewed and updated as needed this visit by Provider   Tobacco  Allergies  Meds  Problems  Med Hx  Surg Hx  Fam Hx           Social History     Tobacco Use     Smoking status: Former Smoker     Packs/day: 0.50     Years: 49.00     Pack years: 24.50     Types: Cigarettes     Start date: 1960     Quit date: 2011     Years since quittin.3     Smokeless tobacco: Former User     Quit date: 2011   Substance Use Topics     Alcohol use: Yes     Comment: socially         Alcohol Use 2022   Prescreen: >3 drinks/day or >7 drinks/week? No   Prescreen: >3 drinks/day or >7 drinks/week? -     Current providers sharing in care for this patient include:   Patient Care Team:  Verena Vee MD as PCP - General  Verena Vee MD as Assigned PCP  Krishna Valerio MD as MD (Neurology)  Shanna Navarrete MD as Resident (Student in organized health care education/training program)  Krishna Santiago MD as Assigned Musculoskeletal Provider  Courtney Barclay MD as MD (Neurology)  Courtney Barclay MD as Assigned Neuroscience Provider    The following health maintenance items are reviewed in Epic and correct as of today:  Health Maintenance Due   Topic Date Due     LIPID  2022     TSH W/FREE T4 REFLEX  2022     COLORECTAL CANCER SCREENING  2022     Patient Active Problem List   Diagnosis     Hyperlipidemia with target LDL less than 130      Hypothyroid     Hypertension goal BP (blood pressure) < 140/90     Advanced directives, counseling/discussion     History of cervical dysplasia     Morbidly obese (H)     Impaired glucose tolerance     MMT (medial meniscus tear)     JOSE (obstructive sleep apnea)     Sensorineural hearing loss     Restless legs syndrome (RLS)     Iron deficiency     Microscopic hematuria     COPD (chronic obstructive pulmonary disease) (H)     Personal history of tobacco use, presenting hazards to health     Ulnar neuropathy at elbow, left     Female stress incontinence     Primary osteoarthritis of both knees     Past Surgical History:   Procedure Laterality Date     BIOPSY       BLADDER SURGERY  2018     CERVIX SURGERY  1988    cervical cone     COLONOSCOPY       LAPAROSCOPIC OOPHORECTOMY Right 2018    with hysteroscopy/EMB, and pubovaginal sling      ROTATOR CUFF REPAIR RT/LT Right 2021     TONSILLECTOMY & ADENOIDECTOMY         Social History     Tobacco Use     Smoking status: Former Smoker     Packs/day: 0.50     Years: 49.00     Pack years: 24.50     Types: Cigarettes     Start date: 1960     Quit date: 2011     Years since quittin.3     Smokeless tobacco: Former User     Quit date: 2011   Substance Use Topics     Alcohol use: Yes     Comment: socially     Family History   Problem Relation Age of Onset     Allergies Mother      Cardiovascular Mother      Respiratory Mother      Thyroid Disease Mother      Other Cancer Mother         skin     Arthritis Father      Hypertension Father      Hyperlipidemia Father      Cerebrovascular Disease Father      Cancer Maternal Grandmother         bone     Other Cancer Maternal Grandmother         bone     Cerebrovascular Disease Paternal Grandmother      Cancer Paternal Grandfather      Cerebrovascular Disease Paternal Grandfather      Cancer Brother         testicular      Gastrointestinal Disease Brother      Other Cancer Brother         testicular      Allergies Sister      Allergies Daughter      Gastrointestinal Disease Daughter      Depression Daughter      Anesthesia Reaction Daughter      Genitourinary Problems Brother      Allergies Daughter 10        metal     Diabetes No family hx of          Current Outpatient Medications   Medication Sig Dispense Refill     atorvastatin (LIPITOR) 40 MG tablet TAKE 1 TABLET EVERY DAY 90 tablet 3     Ferrous Sulfate (IRON) 325 (65 FE) MG tablet Take 1 tablet by mouth daily       latanoprost (XALATAN) 0.005 % ophthalmic solution 1 drop daily       levothyroxine (SYNTHROID/LEVOTHROID) 137 MCG tablet TAKE 1 TABLET EVERY DAY 90 tablet 3     lisinopril (ZESTRIL) 20 MG tablet TAKE 1 TABLET EVERY DAY 90 tablet 3     OMEGA-3 KRILL OIL PO        order for DME autoCPAP 7-12 cm 1 Units 0     STIOLTO RESPIMAT 2.5-2.5 MCG/ACT AERS 2 puffs daily       Vitamin D, Cholecalciferol, 1000 units CAPS Take 1 capsule by mouth daily       Allergies   Allergen Reactions     Nkda [No Known Drug Allergies]            Pertinent mammograms are reviewed under the imaging tab.    Review of Systems   Constitutional: Negative for chills and fever.   HENT: Negative for congestion, ear pain, hearing loss and sore throat.    Eyes: Negative for pain and visual disturbance.   Respiratory: Negative for cough and shortness of breath.    Cardiovascular: Negative for chest pain, palpitations and peripheral edema.   Gastrointestinal: Negative for abdominal pain, constipation, diarrhea, heartburn, hematochezia and nausea.   Breasts:  Negative for tenderness, breast mass and discharge.   Genitourinary: Negative for dysuria, frequency, genital sores, hematuria, pelvic pain, urgency, vaginal bleeding and vaginal discharge.   Musculoskeletal: Positive for arthralgias and gait problem. Negative for joint swelling and myalgias.   Skin: Negative for rash.   Neurological: Negative for dizziness, weakness, headaches and paresthesias.   Psychiatric/Behavioral: Negative for  "mood changes. The patient is not nervous/anxious.          OBJECTIVE:   /83 (BP Location: Right arm, Patient Position: Sitting, Cuff Size: Adult Large)   Pulse 82   Temp 98.7  F (37.1  C) (Oral)   Ht 1.57 m (5' 1.81\")   Wt 119.3 kg (263 lb)   SpO2 96%   BMI 48.40 kg/m   Estimated body mass index is 48.4 kg/m  as calculated from the following:    Height as of this encounter: 1.57 m (5' 1.81\").    Weight as of this encounter: 119.3 kg (263 lb).  Physical Exam  GENERAL: alert, no distress and obese  EYES: Eyes grossly normal to inspection, PERRL and conjunctivae and sclerae normal  HENT: ear canals and TM's normal, nose and mouth without ulcers or lesions  NECK: no adenopathy, no asymmetry, masses, or scars and thyroid normal to palpation  RESP: lungs clear to auscultation - no rales, rhonchi or wheezes  CV: regular rate and rhythm, normal S1 S2, no S3 or S4, no murmur, click or rub, no peripheral edema    ABDOMEN: soft, nontender, no hepatosplenomegaly, no masses and bowel sounds normal  MS: antalgic gait with use of walker; no deformities   SKIN: no suspicious lesions or rashes  NEURO: Normal strength and tone, mentation intact and speech normal  PSYCH: mentation appears normal, affect normal/bright    Diagnostic Test Results:  Labs reviewed in Epic    ASSESSMENT / PLAN:   (Z00.00) Encounter for Medicare annual wellness exam  (primary encounter diagnosis)    (J44.9) Chronic obstructive pulmonary disease, unspecified COPD type (H)  Comment: Well controlled with medications without side effects.   Plan: OFFICE/OUTPT VISIT,EST,LEVL IV        Follow-up with pulmonology as planned     (E03.9) Acquired hypothyroidism  Comment: euthyroid on replacement   Plan: TSH WITH FREE T4 REFLEX, levothyroxine         (SYNTHROID/LEVOTHROID) 137 MCG tablet,         OFFICE/OUTPT VISIT,EST,LEVL IV          (E78.5) Hyperlipidemia with target LDL less than 130  Comment: Well controlled with medications without side effects. "   Plan: Lipid panel reflex to direct LDL Fasting,         atorvastatin (LIPITOR) 40 MG tablet,         OFFICE/OUTPT VISIT,EST,LEVL IV          (I10) Hypertension goal BP (blood pressure) < 140/90  Comment: Well controlled with medications without side effects.   Plan: lisinopril (ZESTRIL) 20 MG tablet, OFFICE/OUTPT        VISIT,EST,LEVL IV, UA Macro with Reflex to         Micro and Culture - lab collect, Basic         metabolic panel  (Ca, Cl, CO2, Creat, Gluc, K,         Na, BUN)          (R31.29) Microscopic hematuria  Comment: prior evaluation; due for annual UA   Plan: UA Macro with Reflex to Micro and Culture - lab        collect          (E66.01) Morbidly obese (H)  Comment: applauded her recent weight loss   Plan: OFFICE/OUTPT VISIT,EST,LEVL IV, Basic metabolic        panel  (Ca, Cl, CO2, Creat, Gluc, K, Na, BUN)        Counseled to make better food choices, exercise as tolerated, and lose weight. Consider bariatric consult if she wants more support.       Patient has been advised of split billing requirements and indicates understanding: Yes    COUNSELING:  Reviewed preventive health counseling, as reflected in patient instructions  Special attention given to:       Regular exercise       Healthy diet/nutrition       Fall risk prevention       Osteoporosis prevention/bone health       Consider lung cancer screening for ages 55-80 years (77 for Medicare) and 20 pack-year smoking history          Colon cancer screening       Hepatitis C screening       The 10-year ASCVD risk score (Jeancarlos SHELTON Jr., et al., 2013) is: 21.4%    Values used to calculate the score:      Age: 77 years      Sex: Female      Is Non- : No      Diabetic: No      Tobacco smoker: No      Systolic Blood Pressure: 130 mmHg      Is BP treated: Yes      HDL Cholesterol: 41 mg/dL      Total Cholesterol: 185 mg/dL    Estimated body mass index is 48.4 kg/m  as calculated from the following:    Height as of this encounter:  "1.57 m (5' 1.81\").    Weight as of this encounter: 119.3 kg (263 lb).    Weight management plan: Discussed healthy diet and exercise guidelines    She reports that she quit smoking about 11 years ago. Her smoking use included cigarettes. She started smoking about 62 years ago. She has a 24.50 pack-year smoking history. She quit smokeless tobacco use about 11 years ago.      Appropriate preventive services were discussed with this patient, including applicable screening as appropriate for cardiovascular disease, diabetes, osteopenia/osteoporosis, and glaucoma.  As appropriate for age/gender, discussed screening for colorectal cancer, prostate cancer, breast cancer, and cervical cancer. Checklist reviewing preventive services available has been given to the patient.    Reviewed patients plan of care and provided an AVS. The Intermediate Care Plan ( asthma action plan, low back pain action plan, and migraine action plan) for Gina meets the Care Plan requirement. This Care Plan has been established and reviewed with the Patient.    Counseling Resources:  ATP IV Guidelines  Pooled Cohorts Equation Calculator  Breast Cancer Risk Calculator  Breast Cancer: Medication to Reduce Risk  FRAX Risk Assessment  ICSI Preventive Guidelines  Dietary Guidelines for Americans, 2010  [employer]'s MyPlate  ASA Prophylaxis  Lung CA Screening    Verena Vee MD  [facility]    Identified Health Risks:  Information on urinary incontinence and treatment options given to patient.  "

## 2022-09-12 ENCOUNTER — THERAPY VISIT (OUTPATIENT)
Dept: PHYSICAL THERAPY | Facility: CLINIC | Age: 77
End: 2022-09-12
Payer: COMMERCIAL

## 2022-09-12 DIAGNOSIS — M25.562 CHRONIC PAIN OF BOTH KNEES: Primary | ICD-10-CM

## 2022-09-12 DIAGNOSIS — G89.29 CHRONIC PAIN OF BOTH KNEES: Primary | ICD-10-CM

## 2022-09-12 DIAGNOSIS — M25.561 CHRONIC PAIN OF BOTH KNEES: Primary | ICD-10-CM

## 2022-09-12 PROCEDURE — 97530 THERAPEUTIC ACTIVITIES: CPT | Mod: GP | Performed by: PHYSICAL THERAPIST

## 2022-09-12 PROCEDURE — 97110 THERAPEUTIC EXERCISES: CPT | Mod: GP | Performed by: PHYSICAL THERAPIST

## 2022-09-12 NOTE — PROGRESS NOTES
SUBJECTIVE  Subjective: The exercises have been helpful in reducing the knee pain and feeling better with walking with her cane   Current Pain level: 4/10   Changes in function:  Yes (See Goal flowsheet attached for changes in current functional level)     Adverse reaction to treatment or activity:  None    OBJECTIVE  Objective: sit to stands from chair- Initially could only do with rocking many times and by end of session could perform on the first try     ASSESSMENT  Gina continues to require intervention to meet STG and LTG's: PT  Patient is progressing as expected.  Response to therapy has shown an improvement in  pain level and ROM   Progress made towards STG/LTG?  Yes (See Goal flowsheet attached for updates on achievement of STG and LTG)    PLAN  Current treatment program is being advanced to more complex exercises.    PTA/ATC plan:  N/A    Please refer to the daily flowsheet for treatment today, total treatment time and time spent performing 1:1 timed codes.

## 2022-10-07 ENCOUNTER — MYC MEDICAL ADVICE (OUTPATIENT)
Dept: FAMILY MEDICINE | Facility: CLINIC | Age: 77
End: 2022-10-07

## 2022-10-10 ASSESSMENT — KOOS JR
KOOS JR SCORING: 44.91
HOW SEVERE IS YOUR KNEE STIFFNESS AFTER FIRST WAKING IN MORNING: MODERATE
STANDING UPRIGHT: MILD
STRAIGHTENING KNEE FULLY: EXTREME
RISING FROM SITTING: SEVERE
TWISING OR PIVOTING ON KNEE: SEVERE
BENDING TO THE FLOOR TO PICK UP OBJECT: SEVERE
GOING UP OR DOWN STAIRS: MILD

## 2022-10-17 ENCOUNTER — OFFICE VISIT (OUTPATIENT)
Dept: ORTHOPEDICS | Facility: CLINIC | Age: 77
End: 2022-10-17
Payer: COMMERCIAL

## 2022-10-17 VITALS
HEIGHT: 63 IN | WEIGHT: 263 LBS | RESPIRATION RATE: 22 BRPM | DIASTOLIC BLOOD PRESSURE: 62 MMHG | BODY MASS INDEX: 46.6 KG/M2 | SYSTOLIC BLOOD PRESSURE: 114 MMHG | HEART RATE: 73 BPM

## 2022-10-17 DIAGNOSIS — M17.11 PRIMARY OSTEOARTHRITIS OF RIGHT KNEE: Primary | ICD-10-CM

## 2022-10-17 PROCEDURE — 99214 OFFICE O/P EST MOD 30 MIN: CPT | Performed by: ORTHOPAEDIC SURGERY

## 2022-10-17 NOTE — PROGRESS NOTES
HISTORY OF PRESENT ILLNESS    Gina Yeh is a 77 year old female who is seen as self referral for evaluation of  right knee pain that has been present approximately 6 years.   Initially hurt with bowling.      Present symptoms: pain laterally, pain tightness , moderate pain, no swelling, no catching/popping, no locking, +giving way.    Symptoms occur walking, pivoting, standing for long periods and at night.    The symptoms occur frequently.  The symptoms are increasing  Limitations of activities of daily living: She has difficulties with sleeping due to her symptoms, she notes that she has has to slow down and daily tasks take longer to complete   Fall risk?: Yes    Treatments tried to this point: NSAIDs and Physical Therapy  Response to treatment:   NSAIDs: She uses NSAIDs as need to help with her knee pain, she also uses topical ointments, Aspercreme and Volteren gel    Glucosamine: Not done   Corticosteroid injections: Not done   viscous supplementation injection: Not done   Arthroscopy: Not done   Physical Therapy: She completed physical therapy approximately 4 times, this did not help her knee symptoms, she       Past Medical History:   Diagnosis Date     Arthritis      Arthritis of right acromioclavicular joint 07/28/2021     Cervical dysplasia 1988     COPD (chronic obstructive pulmonary disease) (H)      Female stress incontinence 02/22/2022     Glaucoma      Hyperlipidemia LDL goal < 130      Hypertension goal BP (blood pressure) < 140/90      Hypothyroid      Impaired glucose tolerance 05/29/2012     Microscopic hematuria      MMT (medial meniscus tear) 09/21/2006    LT     Moderate recurrent major depression (H) 02/13/2014     Morbid obesity (H)      JOSE (obstructive sleep apnea) 07/24/2015    Doesn't use cpap     Primary osteoarthritis of both knees      RLS (restless legs syndrome)      Sensorineural hearing loss      Shingles 01/27/2022     Shingles      Ulnar neuropathy at elbow, left       Vitamin D insufficiency        Past Surgical History:   Procedure Laterality Date     BIOPSY       BLADDER SURGERY  2018     CERVIX SURGERY  1988    cervical cone     COLONOSCOPY       LAPAROSCOPIC OOPHORECTOMY Right 2018    with hysteroscopy/EMB, and pubovaginal sling      ROTATOR CUFF REPAIR RT/LT Right 2021     TONSILLECTOMY & ADENOIDECTOMY         Family History   Problem Relation Age of Onset     Allergies Mother      Cardiovascular Mother      Respiratory Mother      Thyroid Disease Mother      Other Cancer Mother         skin     Arthritis Father      Hypertension Father      Hyperlipidemia Father      Cerebrovascular Disease Father      Cancer Maternal Grandmother         bone     Other Cancer Maternal Grandmother         bone     Cerebrovascular Disease Paternal Grandmother      Cancer Paternal Grandfather      Cerebrovascular Disease Paternal Grandfather      Cancer Brother         testicular      Gastrointestinal Disease Brother      Other Cancer Brother         testicular     Allergies Sister      Allergies Daughter      Gastrointestinal Disease Daughter      Depression Daughter      Anesthesia Reaction Daughter      Genitourinary Problems Brother      Allergies Daughter 10        metal     Diabetes No family hx of        Social History     Socioeconomic History     Marital status:      Spouse name: Umberto     Number of children: 2     Years of education: 13     Highest education level: Not on file   Occupational History     Occupation: book keeper      Employer: RETIRED   Tobacco Use     Smoking status: Former     Packs/day: 0.50     Years: 49.00     Pack years: 24.50     Types: Cigarettes     Start date: 1960     Quit date: 2011     Years since quittin.4     Smokeless tobacco: Former     Quit date: 2011   Vaping Use     Vaping Use: Never used   Substance and Sexual Activity     Alcohol use: Yes     Comment: socially     Drug use: No     Sexual activity: Not  Currently     Partners: Male     Birth control/protection: Post-menopausal   Other Topics Concern     Parent/sibling w/ CABG, MI or angioplasty before 65F 55M? No   Social History Narrative     Not on file     Social Determinants of Health     Financial Resource Strain: Not on file   Food Insecurity: Not on file   Transportation Needs: Not on file   Physical Activity: Not on file   Stress: Not on file   Social Connections: Not on file   Intimate Partner Violence: Not on file   Housing Stability: Not on file       Current Outpatient Medications   Medication Sig Dispense Refill     atorvastatin (LIPITOR) 40 MG tablet TAKE 1 TABLET EVERY DAY 90 tablet 3     Ferrous Sulfate (IRON) 325 (65 FE) MG tablet Take 1 tablet by mouth daily       latanoprost (XALATAN) 0.005 % ophthalmic solution 1 drop daily       levothyroxine (SYNTHROID/LEVOTHROID) 137 MCG tablet TAKE 1 TABLET EVERY DAY 90 tablet 3     lisinopril (ZESTRIL) 20 MG tablet TAKE 1 TABLET EVERY DAY 90 tablet 3     OMEGA-3 KRILL OIL PO        order for DME autoCPAP 7-12 cm 1 Units 0     STIOLTO RESPIMAT 2.5-2.5 MCG/ACT AERS 2 puffs daily       Vitamin D, Cholecalciferol, 1000 units CAPS Take 1 capsule by mouth daily         Allergies   Allergen Reactions     Nkda [No Known Drug Allergies]        REVIEW OF SYSTEMS:  CONSTITUTIONAL:  NEGATIVE for fever, chills, change in weight, not feeling tired  SKIN:  NEGATIVE for worrisome rashes, no skin lumps, no skin ulcers and no non-healing wounds  EYES:  NEGATIVE for vision changes or irritation  ENT/MOUTH:  NEGATIVE  No hearing loss, no hoarseness, no difficulty swallowing.  RESP:  NEGATIVE for significant cough or SOB  BREAST:  NEGATIVE for masses, tenderness or discharge  CV:  NEGATIVE for chest pain, palpitations or peripheral edema  GI:  NEGATIVE for nausea, abdominal pain, heartburn, or change in bowel habits  :  Negative   MUSCULOSKELETAL:  See HPI above  NEURO:  NEGATIVE for weakness, dizziness or  "paresthesias  ENDOCRINE:  NEGATIVE for temperature intolerance, skin/hair changes  HEME/ALLERGY/IMMUNE:  NEGATIVE for bleeding problems  PSYCHIATRIC:  NEGATIVE for changes in mood or affect    PHYSICAL EXAM:  Vitals: /62   Pulse 73   Resp 22   Ht 1.6 m (5' 3\")   Wt 119.3 kg (263 lb)   BMI 46.59 kg/m    BMI= Body mass index is 46.59 kg/m .  Constitutional:  healthy, alert and no distress  Neuro: Alert and Oriented x 3, Sensation grossly WNL.  HEENT:  Atraumatic, EOMI  Neck:  Neck supple with no tenderness.  Psych: Affect normal   Respiratory: Breathing not labored.  Cardiovascular: normal peripheral pulses  Lymph: no adenopathy  Skin: No rashes,worrisome lesions or skin problems  Spine: straight, no straight leg raising pain.  Hips show full range of motion.  There is no tenderness over the sacro-iliac joints, sciatic notch, or greater trochanters.     KNEE EXAM:   Gait: walks with antalgic gait favoring both sides.  Right worse than left.   Alignment: Right: mild valgus, Left:  mild valgus  ROM: Right knee: 5 extension to 90 flexion, Left knee: 5 extension to 95 flexion  Effusion: Right: mild, Left: mild  Tender: Right: medial joint line, lateral joint line and lateral facet of the patella, Left: medial joint line, lateral joint line and lateral facet of the patella  Ligaments:  Lachman's Stable, Anterior and posterior drawer stable.  Right medial collateral ligament:  no laxity,  Lateral collateral ligament  no laxity.    Left medial collateral ligament:  no laxity, lateral collateral ligament:  no laxity.   moderate pain with Varus/Valgus stress testing.    Patellofemoral joint: moderate crepitations in the bilateral patellofemoral joint.    Apprehension: negative      X-RAY:  From today 10/17/2022: shows severe lateral joint space narrowing and shows moderate narrowing of the patellofemoral joint  There is bone-on-bone laterally by x-ray.       Impression: Right Knee: Osteoarthritis severe, with " lateral wear.  Left Knee: Osteoarthritis moderate with lateral wear.    Plan: Total Knee Arthroplasty: We talked about the patient's condition and diagnosis.  Because of severe arthritis, and failure of conservative measures, I have suggested total knee arthroplasty of the right  knee(s).  I've talked in depth about the procedure and the risks, which include, but are not limited to blood loss requiring transfusion, infection, neurovascular injury, DVT, PE, continued pain, (both perioperative and persistent post-recovery pain), numbness around the wound, instability, and potential anesthetic and perioperative medical complications, and the possibility of needing additional procedures. We also talked about recovery time, which differs from patient to patient.    Medical clearance will need to be obtained.      Special considerations: cemented.

## 2022-10-17 NOTE — PATIENT INSTRUCTIONS
Ms. Yeh and I talked about her condition and diagnosis.  Because of severe arthritis, and failure of conservative measures, we have decided to proceed with right  total knee arthroplasty.  I do this in [place].    We have talked in depth about the procedure and the risks, which include, but are not limited to:  * blood loss possibly requiring transfusion,   * blood clots with possible pulmonary embolism  * infection or wound healing problems  * nerve damage - there will always be a bit of numbness just to outside of knee,  * vascular circulation problems  * continued pain  * instability of the knee.  * Loosening or wear  * anesthetic risks  * The possibility of needing additional procedures.      We also talked about recovery time, which differs from patient to patient.    Generally 1 night in the hospital.  Most people can return home at that point.  You will be in Physical Therapy for 1-2 months until you have gained full range of motion.    Preop xrays have been ordered, and medical clearance will need to be obtained.    Preop physical with primary physician is needed within 30 days of the surgery.  Nothing to eat or drink for 8 hours before surgery.  Stop blood thinners 5 days before surgery (aspirin, warfarin, anti-inflammatories).      You will need a Covid test before surgery.     Surgery schedulers will call you to schedule surgery.  If you don't get a call in the next few days, then call [phone] to schedule for [place].

## 2022-10-17 NOTE — LETTER
10/17/2022         RE: Gina Yeh  [address]  [place] [address]        Dear Colleague,    Thank you for referring your patient, Gina Yeh, to the [facility]. Please see a copy of my visit note below.    HISTORY OF PRESENT ILLNESS    Gina Yeh is a 77 year old female who is seen as self referral for evaluation of  right knee pain that has been present approximately 6 years.   Initially hurt with bowling.      Present symptoms: pain laterally, pain tightness , moderate pain, no swelling, no catching/popping, no locking, +giving way.    Symptoms occur walking, pivoting, standing for long periods and at night.    The symptoms occur frequently.  The symptoms are increasing  Limitations of activities of daily living: She has difficulties with sleeping due to her symptoms, she notes that she has has to slow down and daily tasks take longer to complete   Fall risk?: Yes    Treatments tried to this point: NSAIDs and Physical Therapy  Response to treatment:   NSAIDs: She uses NSAIDs as need to help with her knee pain, she also uses topical ointments, Aspercreme and Volteren gel    Glucosamine: Not done   Corticosteroid injections: Not done   viscous supplementation injection: Not done   Arthroscopy: Not done   Physical Therapy: She completed physical therapy approximately 4 times, this did not help her knee symptoms, she       Past Medical History:   Diagnosis Date     Arthritis      Arthritis of right acromioclavicular joint 07/28/2021     Cervical dysplasia 1988     COPD (chronic obstructive pulmonary disease) (H)      Female stress incontinence 02/22/2022     Glaucoma      Hyperlipidemia LDL goal < 130      Hypertension goal BP (blood pressure) < 140/90      Hypothyroid      Impaired glucose tolerance 05/29/2012     Microscopic hematuria      MMT (medial meniscus tear) 09/21/2006    LT     Moderate recurrent major depression (H) 02/13/2014     Morbid obesity  (H)      JOSE (obstructive sleep apnea) 07/24/2015    Doesn't use cpap     Primary osteoarthritis of both knees      RLS (restless legs syndrome)      Sensorineural hearing loss      Shingles 01/27/2022     Shingles      Ulnar neuropathy at elbow, left      Vitamin D insufficiency        Past Surgical History:   Procedure Laterality Date     BIOPSY       BLADDER SURGERY  03/19/2018     CERVIX SURGERY  1988    cervical cone     COLONOSCOPY       LAPAROSCOPIC OOPHORECTOMY Right 03/19/2018    with hysteroscopy/EMB, and pubovaginal sling      ROTATOR CUFF REPAIR RT/LT Right 09/01/2021     TONSILLECTOMY & ADENOIDECTOMY         Family History   Problem Relation Age of Onset     Allergies Mother      Cardiovascular Mother      Respiratory Mother      Thyroid Disease Mother      Other Cancer Mother         skin     Arthritis Father      Hypertension Father      Hyperlipidemia Father      Cerebrovascular Disease Father      Cancer Maternal Grandmother         bone     Other Cancer Maternal Grandmother         bone     Cerebrovascular Disease Paternal Grandmother      Cancer Paternal Grandfather      Cerebrovascular Disease Paternal Grandfather      Cancer Brother         testicular      Gastrointestinal Disease Brother      Other Cancer Brother         testicular     Allergies Sister      Allergies Daughter      Gastrointestinal Disease Daughter      Depression Daughter      Anesthesia Reaction Daughter      Genitourinary Problems Brother      Allergies Daughter 10        metal     Diabetes No family hx of        Social History     Socioeconomic History     Marital status:      Spouse name: Umberto     Number of children: 2     Years of education: 13     Highest education level: Not on file   Occupational History     Occupation: book keeper      Employer: RETIRED   Tobacco Use     Smoking status: Former     Packs/day: 0.50     Years: 49.00     Pack years: 24.50     Types: Cigarettes     Start date: 5/1/1960     Quit  date: 2011     Years since quittin.4     Smokeless tobacco: Former     Quit date: 2011   Vaping Use     Vaping Use: Never used   Substance and Sexual Activity     Alcohol use: Yes     Comment: socially     Drug use: No     Sexual activity: Not Currently     Partners: Male     Birth control/protection: Post-menopausal   Other Topics Concern     Parent/sibling w/ CABG, MI or angioplasty before 65F 55M? No   Social History Narrative     Not on file     Social Determinants of Health     Financial Resource Strain: Not on file   Food Insecurity: Not on file   Transportation Needs: Not on file   Physical Activity: Not on file   Stress: Not on file   Social Connections: Not on file   Intimate Partner Violence: Not on file   Housing Stability: Not on file       Current Outpatient Medications   Medication Sig Dispense Refill     atorvastatin (LIPITOR) 40 MG tablet TAKE 1 TABLET EVERY DAY 90 tablet 3     Ferrous Sulfate (IRON) 325 (65 FE) MG tablet Take 1 tablet by mouth daily       latanoprost (XALATAN) 0.005 % ophthalmic solution 1 drop daily       levothyroxine (SYNTHROID/LEVOTHROID) 137 MCG tablet TAKE 1 TABLET EVERY DAY 90 tablet 3     lisinopril (ZESTRIL) 20 MG tablet TAKE 1 TABLET EVERY DAY 90 tablet 3     OMEGA-3 KRILL OIL PO        order for DME autoCPAP 7-12 cm 1 Units 0     STIOLTO RESPIMAT 2.5-2.5 MCG/ACT AERS 2 puffs daily       Vitamin D, Cholecalciferol, 1000 units CAPS Take 1 capsule by mouth daily         Allergies   Allergen Reactions     Nkda [No Known Drug Allergies]        REVIEW OF SYSTEMS:  CONSTITUTIONAL:  NEGATIVE for fever, chills, change in weight, not feeling tired  SKIN:  NEGATIVE for worrisome rashes, no skin lumps, no skin ulcers and no non-healing wounds  EYES:  NEGATIVE for vision changes or irritation  ENT/MOUTH:  NEGATIVE  No hearing loss, no hoarseness, no difficulty swallowing.  RESP:  NEGATIVE for significant cough or SOB  BREAST:  NEGATIVE for masses, tenderness or  "discharge  CV:  NEGATIVE for chest pain, palpitations or peripheral edema  GI:  NEGATIVE for nausea, abdominal pain, heartburn, or change in bowel habits  :  Negative   MUSCULOSKELETAL:  See HPI above  NEURO:  NEGATIVE for weakness, dizziness or paresthesias  ENDOCRINE:  NEGATIVE for temperature intolerance, skin/hair changes  HEME/ALLERGY/IMMUNE:  NEGATIVE for bleeding problems  PSYCHIATRIC:  NEGATIVE for changes in mood or affect    PHYSICAL EXAM:  Vitals: /62   Pulse 73   Resp 22   Ht 1.6 m (5' 3\")   Wt 119.3 kg (263 lb)   BMI 46.59 kg/m    BMI= Body mass index is 46.59 kg/m .  Constitutional:  healthy, alert and no distress  Neuro: Alert and Oriented x 3, Sensation grossly WNL.  HEENT:  Atraumatic, EOMI  Neck:  Neck supple with no tenderness.  Psych: Affect normal   Respiratory: Breathing not labored.  Cardiovascular: normal peripheral pulses  Lymph: no adenopathy  Skin: No rashes,worrisome lesions or skin problems  Spine: straight, no straight leg raising pain.  Hips show full range of motion.  There is no tenderness over the sacro-iliac joints, sciatic notch, or greater trochanters.     KNEE EXAM:   Gait: walks with antalgic gait favoring both sides.  Right worse than left.   Alignment: Right: mild valgus, Left:  mild valgus  ROM: Right knee: 5 extension to 90 flexion, Left knee: 5 extension to 95 flexion  Effusion: Right: mild, Left: mild  Tender: Right: medial joint line, lateral joint line and lateral facet of the patella, Left: medial joint line, lateral joint line and lateral facet of the patella  Ligaments:  Lachman's Stable, Anterior and posterior drawer stable.  Right medial collateral ligament:  no laxity,  Lateral collateral ligament  no laxity.    Left medial collateral ligament:  no laxity, lateral collateral ligament:  no laxity.   moderate pain with Varus/Valgus stress testing.    Patellofemoral joint: moderate crepitations in the bilateral patellofemoral joint.    Apprehension: " negative      X-RAY:  From today 10/17/2022: shows severe lateral joint space narrowing and shows moderate narrowing of the patellofemoral joint  There is bone-on-bone laterally by x-ray.       Impression: Right Knee: Osteoarthritis severe, with lateral wear.  Left Knee: Osteoarthritis moderate with lateral wear.    Plan: Total Knee Arthroplasty: We talked about the patient's condition and diagnosis.  Because of severe arthritis, and failure of conservative measures, I have suggested total knee arthroplasty of the right  knee(s).  I've talked in depth about the procedure and the risks, which include, but are not limited to blood loss requiring transfusion, infection, neurovascular injury, DVT, PE, continued pain, (both perioperative and persistent post-recovery pain), numbness around the wound, instability, and potential anesthetic and perioperative medical complications, and the possibility of needing additional procedures. We also talked about recovery time, which differs from patient to patient.    Medical clearance will need to be obtained.      Special considerations: cemented.          Again, thank you for allowing me to participate in the care of your patient.        Sincerely,        Krishna Santiago MD

## 2022-10-24 ENCOUNTER — TELEPHONE (OUTPATIENT)
Dept: ORTHOPEDICS | Facility: CLINIC | Age: 77
End: 2022-10-24

## 2022-10-24 NOTE — TELEPHONE ENCOUNTER
Type of surgery: ARTHROPLASTY, KNEE, TOTAL (Right)      Location of surgery: [facility]  Date and time of surgery: 02/15/2023  Surgeon: ANDIE  Pre-Op Appt Date: 02/06/2023  Post-Op Appt Date: 02/27/2023   Packet sent out: Yes  Pre-cert/Authorization completed:  No  Date:

## 2022-11-28 ENCOUNTER — MYC MEDICAL ADVICE (OUTPATIENT)
Dept: FAMILY MEDICINE | Facility: CLINIC | Age: 77
End: 2022-11-28

## 2022-12-14 ENCOUNTER — OFFICE VISIT (OUTPATIENT)
Dept: FAMILY MEDICINE | Facility: CLINIC | Age: 77
End: 2022-12-14
Payer: COMMERCIAL

## 2022-12-14 VITALS
DIASTOLIC BLOOD PRESSURE: 70 MMHG | WEIGHT: 269 LBS | SYSTOLIC BLOOD PRESSURE: 125 MMHG | HEIGHT: 62 IN | HEART RATE: 85 BPM | BODY MASS INDEX: 49.5 KG/M2 | TEMPERATURE: 98.7 F | OXYGEN SATURATION: 97 %

## 2022-12-14 DIAGNOSIS — G47.33 OSA (OBSTRUCTIVE SLEEP APNEA): Chronic | ICD-10-CM

## 2022-12-14 DIAGNOSIS — Z01.818 PREOP GENERAL PHYSICAL EXAM: Primary | ICD-10-CM

## 2022-12-14 DIAGNOSIS — E66.01 MORBIDLY OBESE (H): ICD-10-CM

## 2022-12-14 DIAGNOSIS — Z12.11 SCREEN FOR COLON CANCER: ICD-10-CM

## 2022-12-14 DIAGNOSIS — E61.1 IRON DEFICIENCY: ICD-10-CM

## 2022-12-14 DIAGNOSIS — I10 HYPERTENSION GOAL BP (BLOOD PRESSURE) < 140/90: ICD-10-CM

## 2022-12-14 DIAGNOSIS — J44.9 CHRONIC OBSTRUCTIVE PULMONARY DISEASE, UNSPECIFIED COPD TYPE (H): ICD-10-CM

## 2022-12-14 PROBLEM — G89.29 CHRONIC PAIN OF BOTH KNEES: Status: RESOLVED | Noted: 2022-07-20 | Resolved: 2022-12-14

## 2022-12-14 PROBLEM — M25.562 CHRONIC PAIN OF BOTH KNEES: Status: RESOLVED | Noted: 2022-07-20 | Resolved: 2022-12-14

## 2022-12-14 PROBLEM — M25.561 CHRONIC PAIN OF BOTH KNEES: Status: RESOLVED | Noted: 2022-07-20 | Resolved: 2022-12-14

## 2022-12-14 PROCEDURE — 99214 OFFICE O/P EST MOD 30 MIN: CPT | Performed by: FAMILY MEDICINE

## 2022-12-14 RX ORDER — ACYCLOVIR 800 MG/1
800 TABLET ORAL
COMMUNITY
Start: 2022-01-28 | End: 2023-02-06

## 2022-12-14 NOTE — PATIENT INSTRUCTIONS
For informational purposes only. Not to replace the advice of your health care provider. Copyright   2003,  [facility]. All rights reserved. Clinically reviewed by Mahogany Hassan MD. [employer]port[employer] [id number] - REV .  Preparing for Your Surgery  Getting started  A nurse will call you to review your health history and instructions. They will give you an arrival time based on your scheduled surgery time. Please be ready to share:  Your doctor's clinic name and phone number  Your medical, surgical, and anesthesia history  A list of allergies and sensitivities  A list of medicines, including herbal treatments and over-the-counter drugs  Whether the patient has a legal guardian (ask how to send us the papers in advance)  Please tell us if you're pregnant--or if there's any chance you might be pregnant. Some surgeries may injure a fetus (unborn baby), so they require a pregnancy test. Surgeries that are safe for a fetus don't always need a test, and you can choose whether to have one.   If you have a child who's having surgery, please ask for a copy of Preparing for Your Child's Surgery.    Preparing for surgery  Within 10 to 30 days of surgery: Have a pre-op exam (sometimes called an H&P, or History and Physical). This can be done at a clinic or pre-operative center.  If you're having a , you may not need this exam. Talk to your care team.  At your pre-op exam, talk to your care team about all medicines you take. If you need to stop any medicines before surgery, ask when to start taking them again.  We do this for your safety. Many medicines can make you bleed too much during surgery. Some change how well surgery (anesthesia) drugs work.  Call your insurance company to let them know you're having surgery. (If you don't have insurance, call [phone].)  Call your clinic if there's any change in your health. This includes signs of a cold or flu (sore throat, runny nose, cough, rash, fever). It  also includes a scrape or scratch near the surgery site.  If you have questions on the day of surgery, call your hospital or surgery center.  Eating and drinking guidelines  For your safety: Unless your surgeon tells you otherwise, follow the guidelines below.  Eat and drink as usual until 8 hours before you arrive for surgery. After that, no food or milk.  Drink clear liquids until 2 hours before you arrive. These are liquids you can see through, like water, Gatorade, and Propel Water. They also include plain black coffee and tea (no cream or milk), candy, and breath mints. You can spit out gum when you arrive.  If you drink alcohol: Stop drinking it the night before surgery.  If your care team tells you to take medicine on the morning of surgery, it's okay to take it with a sip of water.  Preventing infection  Shower or bathe the night before and morning of your surgery. Follow the instructions your clinic gave you. (If no instructions, use regular soap.)  Don't shave or clip hair near your surgery site. We'll remove the hair if needed.  Don't smoke or vape the morning of surgery. You may chew nicotine gum up to 2 hours before surgery. A nicotine patch is okay.  Note: Some surgeries require you to completely quit smoking and nicotine. Check with your surgeon.  Your care team will make every effort to keep you safe from infection. We will:  Clean our hands often with soap and water (or an alcohol-based hand rub).  Clean the skin at your surgery site with a special soap that kills germs.  Give you a special gown to keep you warm. (Cold raises the risk of infection.)  Wear special hair covers, masks, gowns and gloves during surgery.  Give antibiotic medicine, if prescribed. Not all surgeries need antibiotics.  What to bring on the day of surgery  Photo ID and insurance card  Copy of your health care directive, if you have one  Glasses and hearing aids (bring cases)  You can't wear contacts during surgery  Inhaler and  eye drops, if you use them (tell us about these when you arrive)  CPAP machine or breathing device, if you use them  A few personal items, if spending the night  If you have . . .  A pacemaker, ICD (cardiac defibrillator) or other implant: Bring the ID card.  An implanted stimulator: Bring the remote control.  A legal guardian: Bring a copy of the certified (court-stamped) guardianship papers.  Please remove any jewelry, including body piercings. Leave jewelry and other valuables at home.  If you're going home the day of surgery  You must have a responsible adult drive you home. They should stay with you overnight as well.  If you don't have someone to stay with you, and you aren't safe to go home alone, we may keep you overnight. Insurance often won't pay for this.  After surgery  If it's hard to control your pain or you need more pain medicine, please call your surgeon's office.  Questions?   If you have any questions for your care team, list them here: _________________________________________________________________________________________________________________________________________________________________________ ____________________________________ ____________________________________ ____________________________________    How to Take Your Medication Before Surgery  - HOLD (do not take) lisinopril the day of surgery  -HOLD (do not take) omega 3 krill now until after surgery

## 2022-12-14 NOTE — PROGRESS NOTES
[facility]  [address] [place] [address]  [place] [address]  Phone: [phone]  Primary Provider: Scar Vee  Pre-op Performing Provider: SCAR VEE       PREOPERATIVE EVALUATION:  Today's date: 12/14/2022    Gina Yeh is a 77 year old female who presents for a preoperative evaluation.    Surgical Information:  Surgery/Procedure: Colonoscopy  Surgery Location: [facility]  Surgeon: Santos Mckeon DO  Surgery Date: 12/21/22  Time of Surgery: 6:00 AM  Where patient plans to recover: At home with family  Fax number for surgical facility: [phone]    Type of Anesthesia Anticipated: to be determined    Assessment & Plan     The proposed surgical procedure is considered LOW risk.    Screen for colon cancer  Iron deficiency  Hypertension goal BP (blood pressure) < 140/90  JOSE (obstructive sleep apnea)  Morbidly obese (H)  Chronic obstructive pulmonary disease, unspecified COPD type (H)    Risks and Recommendations:  The patient has the following additional risks and recommendations for perioperative complications:   - Morbid obesity (BMI >40)    Medication Instructions:  Patient is to take all scheduled medications on the day of surgery EXCEPT for modifications listed below:  -OTC supplements and iron: HOLD   - ACE/ARB: HOLD      RECOMMENDATION:  APPROVAL GIVEN to proceed with proposed procedure, without further diagnostic evaluation.        Subjective     HPI related to upcoming procedure: patient is due for colon cancer screening; Hx of iron deficiency     Preop Questions 12/7/2022   1. Have you ever had a heart attack or stroke? No   2. Have you ever had surgery on your heart or blood vessels, such as a stent placement, a coronary artery bypass, or surgery on an artery in your head, neck, heart, or legs? No   3. Do you have chest pain with activity? No   4. Do you have a history of  heart failure? No   5. Do you currently have a cold,  bronchitis or symptoms of other infection? No   6. Do you have a cough, shortness of breath, or wheezing? No   7. Do you or anyone in your family have previous history of blood clots? No   8. Do you or does anyone in your family have a serious bleeding problem such as prolonged bleeding following surgeries or cuts? No   9. Have you ever had problems with anemia or been told to take iron pills? No   10. Have you had any abnormal blood loss such as black, tarry or bloody stools, or abnormal vaginal bleeding? No   11. Have you ever had a blood transfusion? No   12. Are you willing to have a blood transfusion if it is medically needed before, during, or after your surgery? Yes   13. Have you or any of your relatives ever had problems with anesthesia? No   14. Do you have sleep apnea, excessive snoring or daytime drowsiness? YES -     14a. Do you have a CPAP machine? Yes   15. Do you have any artifical heart valves or other implanted medical devices like a pacemaker, defibrillator, or continuous glucose monitor? No   16. Do you have artificial joints? No    17. Are you allergic to latex? No   18. Is there any chance that you may be pregnant? -       Health Care Directive:  Patient has a Health Care Directive on file      Preoperative Review of :   reviewed - controlled substances prescribed by other outside provider(s).       Status of Chronic Conditions:  See problem list for active medical problems.  Problems all longstanding and stable, except as noted/documented.  See ROS for pertinent symptoms related to these conditions.      Review of Systems  CONSTITUTIONAL: NEGATIVE for fever, chills, change in weight  INTEGUMENTARY/SKIN: dry skin on elbows  EYES: NEGATIVE for vision changes or irritation  ENT/MOUTH: NEGATIVE for ear, mouth and throat problems  RESP: NEGATIVE for significant cough or SOB  CV: NEGATIVE for chest pain, palpitations or peripheral edema  GI: NEGATIVE for nausea, abdominal pain, heartburn, or  change in bowel habits  : NEGATIVE for frequency, dysuria, or hematuria  MUSCULOSKELETAL:arthralgias and gait disturbance with use of walker  NEURO: NEGATIVE for weakness, dizziness or paresthesias  ENDOCRINE: NEGATIVE for temperature intolerance, skin/hair changes  HEME: NEGATIVE for bleeding problems  PSYCHIATRIC: NEGATIVE for changes in mood or affect  Constitutional, neuro, ENT, endocrine, pulmonary, cardiac, gastrointestinal, genitourinary, musculoskeletal, integument and psychiatric systems are negative, except as otherwise noted.    Patient Active Problem List    Diagnosis Date Noted     COPD (chronic obstructive pulmonary disease) (H)      Priority: High     Morbidly obese (H) 05/29/2012     Priority: High     Bariatric surgery consult offered       Hypertension goal BP (blood pressure) < 140/90      Priority: High     Hyperlipidemia with target LDL less than 130      Priority: High     Incontinence in female 12/14/2022     Priority: Medium     Primary osteoarthritis of both knees 08/24/2022     Priority: Medium     Female stress incontinence 02/22/2022     Priority: Medium     Microscopic hematuria 10/26/2017     Priority: Medium     Urologic evaluation complete; yearly UA       Iron deficiency 08/30/2017     Priority: Medium     Noted Aug 2017.    Rec start on daily ferrous sulfate and recent in Nov/Dec.       JOSE (obstructive sleep apnea) 07/24/2015     Priority: Medium     Diagnostic Study 9/12/2017 - (271.0 lbs) AHI 78.7, RDI 91.3, Supine AHI 73.5, REM AHI -, Low O2 79.5%, Time Spent ?88% 22.9 minutes.  Titration Study: 9/26/2017- (271.0 lbs) The patient was titrated at pressures ranging from 5 cmH2O up to 8 cmH2O.  The optimal pressure achieved was 8 cmH2O with a residual AHI of 1.1 events per hour.  Time in REM supine on final pressure was 22.5 minutes.  PLM index was 25.6 movements per hour.  The PLM Arousal Index was 8.0 per hour.       Impaired glucose tolerance 05/29/2012     Priority: Medium      Hypothyroid      Priority: Medium     Ulnar neuropathy at elbow, left      Priority: Low     Personal history of tobacco use, presenting hazards to health 06/28/2021     Priority: Low     Restless legs syndrome (RLS) 08/30/2017     Priority: Low     Sensorineural hearing loss      Priority: Low     MMT (medial meniscus tear) 10/13/2014     Priority: Low     History of cervical dysplasia 05/28/2012     Priority: Low     Advanced directives, counseling/discussion 06/02/2011     Priority: Low     Advance Care Planning 1/7/2016: Receipt of ACP document:  Received: Health Care Directive which was witnessed or notarized on 11/25/15.  Document not previously scanned.  Validation form completed and sent with document to be scanned.  Code Status reflects choices in most recent ACP document.  Confirmed/documented designated decision maker(s).  Added by Melina Griffin    Health Care Directive on file 11/25/15        Past Medical History:   Diagnosis Date     Arthritis      Arthritis of right acromioclavicular joint 07/28/2021     Cervical dysplasia 1988     COPD (chronic obstructive pulmonary disease) (H)      Female stress incontinence 02/22/2022     Glaucoma      Hyperlipidemia LDL goal < 130      Hypertension goal BP (blood pressure) < 140/90      Hypothyroid      Impaired glucose tolerance 05/29/2012     Microscopic hematuria      MMT (medial meniscus tear) 09/21/2006    LT     Moderate recurrent major depression (H) 02/13/2014     Morbid obesity (H)      JOSE (obstructive sleep apnea) 07/24/2015    Doesn't use cpap     Primary osteoarthritis of both knees      RLS (restless legs syndrome)      Sensorineural hearing loss      Shingles 01/27/2022     Shingles      Ulnar neuropathy at elbow, left      Vitamin D insufficiency      Past Surgical History:   Procedure Laterality Date     BIOPSY       BLADDER SURGERY  03/19/2018     CERVIX SURGERY  1988    cervical cone     COLONOSCOPY       LAPAROSCOPIC OOPHORECTOMY Right  2018    with hysteroscopy/EMB, and pubovaginal sling      ROTATOR CUFF REPAIR RT/LT Right 2021     TONSILLECTOMY & ADENOIDECTOMY       Current Outpatient Medications   Medication Sig Dispense Refill     atorvastatin (LIPITOR) 40 MG tablet TAKE 1 TABLET EVERY DAY 90 tablet 3     Ferrous Sulfate (IRON) 325 (65 FE) MG tablet Take 1 tablet by mouth daily       latanoprost (XALATAN) 0.005 % ophthalmic solution 1 drop daily       levothyroxine (SYNTHROID/LEVOTHROID) 137 MCG tablet TAKE 1 TABLET EVERY DAY 90 tablet 3     lisinopril (ZESTRIL) 20 MG tablet TAKE 1 TABLET EVERY DAY 90 tablet 3     OMEGA-3 KRILL OIL PO        order for DME autoCPAP 7-12 cm 1 Units 0     STIOLTO RESPIMAT 2.5-2.5 MCG/ACT AERS 2 puffs daily       Vitamin D, Cholecalciferol, 1000 units CAPS Take 1 capsule by mouth daily       acyclovir (ZOVIRAX) 800 MG tablet Take 800 mg by mouth 5 times daily         Allergies   Allergen Reactions     Nkda [No Known Drug Allergies]         Social History     Tobacco Use     Smoking status: Former     Packs/day: 0.50     Years: 49.00     Pack years: 24.50     Types: Cigarettes     Start date: 1960     Quit date: 2011     Years since quittin.6     Smokeless tobacco: Former     Quit date: 2011   Substance Use Topics     Alcohol use: Yes     Comment: socially     Family History   Problem Relation Age of Onset     Allergies Mother      Cardiovascular Mother      Respiratory Mother      Thyroid Disease Mother      Other Cancer Mother         skin     Arthritis Father      Hypertension Father      Hyperlipidemia Father      Cerebrovascular Disease Father      Cancer Maternal Grandmother         bone     Other Cancer Maternal Grandmother         bone     Cerebrovascular Disease Paternal Grandmother      Cancer Paternal Grandfather      Cerebrovascular Disease Paternal Grandfather      Cancer Brother         testicular      Gastrointestinal Disease Brother      Other Cancer Brother          "testicular     Allergies Sister      Allergies Daughter      Gastrointestinal Disease Daughter      Depression Daughter      Anesthesia Reaction Daughter      Genitourinary Problems Brother      Allergies Daughter 10        metal     Diabetes No family hx of      History   Drug Use No         Objective     /70 (BP Location: Right arm, Patient Position: Sitting, Cuff Size: Adult Large)   Pulse 85   Temp 98.7  F (37.1  C) (Oral)   Ht 1.57 m (5' 1.8\")   Wt 122 kg (269 lb)   SpO2 97%   BMI 49.52 kg/m      Physical Exam    GENERAL APPEARANCE: alert, no distress, cooperative and obese     EYES: EOMI, PERRL     HENT: ear canals and TM's normal, nose and mouth without ulcers or lesions and sensorineural hearing loss with use of hearing aids      NECK: no adenopathy, no asymmetry, masses, or scars and thyroid normal to palpation     RESP: lungs clear to auscultation - no rales, rhonchi or wheezes     CV: regular rates and rhythm, normal S1 S2, no S3 or S4 and no murmur, click or rub     ABDOMEN:  soft, nontender, no HSM or masses and bowel sounds normal     MS: no deformity; uses walker for gait disturbance      SKIN: dry skin on left elbow      NEURO: Normal strength and tone, sensory exam grossly normal, mentation intact and speech normal     PSYCH: mentation appears normal. and affect normal/bright     LYMPHATICS: No cervical adenopathy    Recent Labs   Lab Test 08/24/22  0818 02/22/22  1053 06/28/21  1553   HGB  --  13.6 14.1   PLT  --   --  294    136 136   POTASSIUM 4.8 4.2 4.2   CR 0.89 0.83 0.84   A1C  --   --  5.6        Diagnostics:  No labs were ordered during this visit.   No EKG required, no history of coronary heart disease, significant arrhythmia, peripheral arterial disease or other structural heart disease.    Revised Cardiac Risk Index (RCRI):  The patient has the following serious cardiovascular risks for perioperative complications:   - No serious cardiac risks = 0 points     RCRI " Interpretation: 0 points: Class I (very low risk - 0.4% complication rate)           Signed Electronically by: Verena Vee MD  Copy of this evaluation report is provided to requesting physician.

## 2022-12-21 ENCOUNTER — TRANSFERRED RECORDS (OUTPATIENT)
Dept: MULTI SPECIALTY CLINIC | Facility: CLINIC | Age: 77
End: 2022-12-21

## 2022-12-27 ENCOUNTER — TRANSFERRED RECORDS (OUTPATIENT)
Dept: HEALTH INFORMATION MANAGEMENT | Facility: CLINIC | Age: 77
End: 2022-12-27

## 2023-01-17 ENCOUNTER — MYC MEDICAL ADVICE (OUTPATIENT)
Dept: FAMILY MEDICINE | Facility: CLINIC | Age: 78
End: 2023-01-17

## 2023-01-26 PROBLEM — M17.11 PRIMARY OSTEOARTHRITIS OF RIGHT KNEE: Status: ACTIVE | Noted: 2023-01-26

## 2023-01-26 PROBLEM — Z96.651 HISTORY OF TOTAL RIGHT KNEE REPLACEMENT: Status: ACTIVE | Noted: 2023-01-26

## 2023-01-30 ENCOUNTER — OFFICE VISIT (OUTPATIENT)
Dept: ORTHOPEDICS | Facility: CLINIC | Age: 78
End: 2023-01-30
Payer: COMMERCIAL

## 2023-01-30 DIAGNOSIS — Z96.651 S/P TOTAL KNEE ARTHROPLASTY, RIGHT: Primary | ICD-10-CM

## 2023-01-30 DIAGNOSIS — Z53.9 ERRONEOUS ENCOUNTER--DISREGARD: Primary | ICD-10-CM

## 2023-01-30 DIAGNOSIS — M17.11 PRIMARY OSTEOARTHRITIS OF RIGHT KNEE: ICD-10-CM

## 2023-01-30 NOTE — LETTER
1/30/2023         RE: Gina Yeh  [address]  [place] [address]        Dear Colleague,    Thank you for referring your patient, Gina Yeh, to the [facility]. Please see a copy of my visit note below.    ERRONEOUS ENCOUNTER--DISREGARD      Again, thank you for allowing me to participate in the care of your patient.        Sincerely,        Krsihna Santiago MD

## 2023-01-31 ENCOUNTER — TELEPHONE (OUTPATIENT)
Dept: ORTHOPEDICS | Facility: CLINIC | Age: 78
End: 2023-01-31
Payer: COMMERCIAL

## 2023-01-31 DIAGNOSIS — M17.11 PRIMARY OSTEOARTHRITIS OF RIGHT KNEE: ICD-10-CM

## 2023-01-31 DIAGNOSIS — Z96.651 S/P TOTAL KNEE ARTHROPLASTY, RIGHT: Primary | ICD-10-CM

## 2023-01-31 NOTE — TELEPHONE ENCOUNTER
[facility] Call Center    Phone Message:      Pt called, requesting an order to be placed for [facility] Home Care.    Pt would like a CALL BACK to discuss her questions and to hear follow up on this request. Please CALL pt.  Thank you.    May a detailed message be left on voicemail: Yes     Reason for Call: Other: PT: [facility], Order Request     Action Taken: Message routed to:  Other: [facility]    Travel Screening: Not Applicable

## 2023-02-02 NOTE — TELEPHONE ENCOUNTER
Called patient and informed her home physical therapy orders are easiest when placed at time of discharge from the hospital following TKA. She was wondering if the order could be placed ahead of time, home health order placed for 2 weeks of home physical therapy following TKA. Patient was pleased with the help and will call to schedule home therapy.     Lakeshia Walters MS, ATC  Certified Athletic Trainer

## 2023-02-06 ENCOUNTER — OFFICE VISIT (OUTPATIENT)
Dept: FAMILY MEDICINE | Facility: CLINIC | Age: 78
End: 2023-02-06
Payer: COMMERCIAL

## 2023-02-06 ENCOUNTER — ANCILLARY PROCEDURE (OUTPATIENT)
Dept: GENERAL RADIOLOGY | Facility: CLINIC | Age: 78
End: 2023-02-06
Attending: FAMILY MEDICINE
Payer: COMMERCIAL

## 2023-02-06 VITALS
OXYGEN SATURATION: 98 % | SYSTOLIC BLOOD PRESSURE: 134 MMHG | HEIGHT: 61 IN | BODY MASS INDEX: 50.6 KG/M2 | WEIGHT: 268 LBS | DIASTOLIC BLOOD PRESSURE: 75 MMHG | TEMPERATURE: 98.4 F | HEART RATE: 85 BPM | RESPIRATION RATE: 20 BRPM

## 2023-02-06 DIAGNOSIS — M79.645 CHRONIC PAIN OF LEFT THUMB: ICD-10-CM

## 2023-02-06 DIAGNOSIS — G89.29 CHRONIC PAIN OF LEFT THUMB: ICD-10-CM

## 2023-02-06 DIAGNOSIS — Z01.818 PREOP GENERAL PHYSICAL EXAM: Primary | ICD-10-CM

## 2023-02-06 DIAGNOSIS — I10 HYPERTENSION GOAL BP (BLOOD PRESSURE) < 140/90: ICD-10-CM

## 2023-02-06 DIAGNOSIS — E66.01 MORBIDLY OBESE (H): ICD-10-CM

## 2023-02-06 DIAGNOSIS — G47.33 OSA (OBSTRUCTIVE SLEEP APNEA): Chronic | ICD-10-CM

## 2023-02-06 DIAGNOSIS — Z78.0 ASYMPTOMATIC POSTMENOPAUSAL STATUS: ICD-10-CM

## 2023-02-06 DIAGNOSIS — M17.11 PRIMARY OSTEOARTHRITIS OF RIGHT KNEE: ICD-10-CM

## 2023-02-06 DIAGNOSIS — J44.9 CHRONIC OBSTRUCTIVE PULMONARY DISEASE, UNSPECIFIED COPD TYPE (H): ICD-10-CM

## 2023-02-06 PROBLEM — Z96.651 HISTORY OF TOTAL RIGHT KNEE REPLACEMENT: Status: RESOLVED | Noted: 2023-01-26 | Resolved: 2023-02-06

## 2023-02-06 PROBLEM — M19.042 DEGENERATIVE JOINT DISEASE OF HAND, LEFT: Status: ACTIVE | Noted: 2023-02-06

## 2023-02-06 LAB
ALBUMIN UR-MCNC: NEGATIVE MG/DL
ANION GAP SERPL CALCULATED.3IONS-SCNC: 7 MMOL/L (ref 3–14)
APPEARANCE UR: CLEAR
BACTERIA #/AREA URNS HPF: ABNORMAL /HPF
BILIRUB UR QL STRIP: NEGATIVE
BUN SERPL-MCNC: 25 MG/DL (ref 7–30)
CALCIUM SERPL-MCNC: 9.5 MG/DL (ref 8.5–10.1)
CHLORIDE BLD-SCNC: 107 MMOL/L (ref 94–109)
CO2 SERPL-SCNC: 25 MMOL/L (ref 20–32)
COLOR UR AUTO: YELLOW
CREAT SERPL-MCNC: 0.84 MG/DL (ref 0.52–1.04)
ERYTHROCYTE [DISTWIDTH] IN BLOOD BY AUTOMATED COUNT: 14.9 % (ref 10–15)
GFR SERPL CREATININE-BSD FRML MDRD: 72 ML/MIN/1.73M2
GLUCOSE BLD-MCNC: 105 MG/DL (ref 70–99)
GLUCOSE UR STRIP-MCNC: NEGATIVE MG/DL
HCT VFR BLD AUTO: 38.6 % (ref 35–47)
HGB BLD-MCNC: 12.2 G/DL (ref 11.7–15.7)
HGB UR QL STRIP: ABNORMAL
HOLD SPECIMEN: NORMAL
KETONES UR STRIP-MCNC: NEGATIVE MG/DL
LEUKOCYTE ESTERASE UR QL STRIP: ABNORMAL
MCH RBC QN AUTO: 28 PG (ref 26.5–33)
MCHC RBC AUTO-ENTMCNC: 31.6 G/DL (ref 31.5–36.5)
MCV RBC AUTO: 89 FL (ref 78–100)
NITRATE UR QL: NEGATIVE
PH UR STRIP: 5 [PH] (ref 5–7)
PLATELET # BLD AUTO: 279 10E3/UL (ref 150–450)
POTASSIUM BLD-SCNC: 4.6 MMOL/L (ref 3.4–5.3)
RBC # BLD AUTO: 4.36 10E6/UL (ref 3.8–5.2)
RBC #/AREA URNS AUTO: ABNORMAL /HPF
SODIUM SERPL-SCNC: 139 MMOL/L (ref 133–144)
SP GR UR STRIP: >=1.03 (ref 1–1.03)
SQUAMOUS #/AREA URNS AUTO: ABNORMAL /LPF
UROBILINOGEN UR STRIP-ACNC: 0.2 E.U./DL
WBC # BLD AUTO: 7.9 10E3/UL (ref 4–11)
WBC #/AREA URNS AUTO: ABNORMAL /HPF

## 2023-02-06 PROCEDURE — 81001 URINALYSIS AUTO W/SCOPE: CPT | Performed by: FAMILY MEDICINE

## 2023-02-06 PROCEDURE — 82728 ASSAY OF FERRITIN: CPT | Performed by: FAMILY MEDICINE

## 2023-02-06 PROCEDURE — 73140 X-RAY EXAM OF FINGER(S): CPT | Mod: TC | Performed by: RADIOLOGY

## 2023-02-06 PROCEDURE — 83540 ASSAY OF IRON: CPT | Performed by: FAMILY MEDICINE

## 2023-02-06 PROCEDURE — 80048 BASIC METABOLIC PNL TOTAL CA: CPT | Performed by: FAMILY MEDICINE

## 2023-02-06 PROCEDURE — 83550 IRON BINDING TEST: CPT | Performed by: FAMILY MEDICINE

## 2023-02-06 PROCEDURE — 87086 URINE CULTURE/COLONY COUNT: CPT | Performed by: FAMILY MEDICINE

## 2023-02-06 PROCEDURE — 36415 COLL VENOUS BLD VENIPUNCTURE: CPT | Performed by: FAMILY MEDICINE

## 2023-02-06 PROCEDURE — 85027 COMPLETE CBC AUTOMATED: CPT | Performed by: FAMILY MEDICINE

## 2023-02-06 PROCEDURE — 99214 OFFICE O/P EST MOD 30 MIN: CPT | Performed by: FAMILY MEDICINE

## 2023-02-06 NOTE — PROGRESS NOTES
[facility]  [address] [place] [address]  [place] [address]  Phone: [phone]  Primary Provider: Scar Vee  Pre-op Performing Provider: SCAR VEE       PREOPERATIVE EVALUATION:  Today's date: 2/6/2023    Gina Yeh is a 77 year old female who presents for a preoperative evaluation.    Surgical Information:  Surgery/Procedure: ARTHROPLASTY, RIGHT KNEE, TOTAL   Surgery Location: [facility]  Surgeon: Krishna Santiago MD   Surgery Date: 2/15/23  Time of Surgery: 7:30 AM  Where patient plans to recover: At home with family  Fax number for surgical facility: Note does not need to be faxed, will be available electronically in Epic.    Type of Anesthesia Anticipated: to be determined    Assessment & Plan     The proposed surgical procedure is considered INTERMEDIATE risk.    Preop general physical exam  Primary osteoarthritis of right knee  Morbidly obese (H)  Hypertension goal BP (blood pressure) < 140/90  JOSE (obstructive sleep apnea)  Chronic obstructive pulmonary disease, unspecified COPD type (H)     Risks and Recommendations:  The patient has the following additional risks and recommendations for perioperative complications:    Medication Instructions:  -hold ACEi on day of surgery  -hold fish oil the week before surgery  -continue other medications     RECOMMENDATION:  APPROVAL GIVEN to proceed with proposed procedure, without further diagnostic evaluation.        Subjective     HPI related to upcoming procedure: Gian Yeh is a 77 year old female who presents with knee pain. Symptom onset has been gradual, worsening for a time period of years. Severity is described as moderate. Course of her symptoms over time is worsening.     Preop Questions 1/30/2023   1. Have you ever had a heart attack or stroke? No   2. Have you ever had surgery on your heart or blood vessels, such as a stent placement, a coronary artery bypass, or surgery on an  artery in your head, neck, heart, or legs? No   3. Do you have chest pain with activity? No   4. Do you have a history of  heart failure? No   5. Do you currently have a cold, bronchitis or symptoms of other infection? No   6. Do you have a cough, shortness of breath, or wheezing? No   7. Do you or anyone in your family have previous history of blood clots? No   8. Do you or does anyone in your family have a serious bleeding problem such as prolonged bleeding following surgeries or cuts? No   9. Have you ever had problems with anemia or been told to take iron pills? YES -     10. Have you had any abnormal blood loss such as black, tarry or bloody stools, or abnormal vaginal bleeding? No   11. Have you ever had a blood transfusion? No   12. Are you willing to have a blood transfusion if it is medically needed before, during, or after your surgery? Yes   13. Have you or any of your relatives ever had problems with anesthesia? YES -     14. Do you have sleep apnea, excessive snoring or daytime drowsiness? YES -     14a. Do you have a CPAP machine? Yes   15. Do you have any artifical heart valves or other implanted medical devices like a pacemaker, defibrillator, or continuous glucose monitor? No   16. Do you have artificial joints? No    17. Are you allergic to latex? No   18. Is there any chance that you may be pregnant? -       Health Care Directive:  Patient has a Health Care Directive on file      Preoperative Review of :   reviewed - no record of controlled substances prescribed.       Status of Chronic Conditions:  See problem list for active medical problems.  Problems all longstanding and stable, except as noted/documented.  See ROS for pertinent symptoms related to these conditions.      Review of Systems  CONSTITUTIONAL: NEGATIVE for fever, chills, change in weight  INTEGUMENTARY/SKIN: NEGATIVE for worrisome rashes, moles or lesions  EYES: NEGATIVE for vision changes or irritation  ENT/MOUTH: hearing  loss  RESP:NEGATIVE for significant cough or SOB and Hx COPD  CV: NEGATIVE for chest pain, palpitations or peripheral edema  GI: NEGATIVE for nausea, abdominal pain, heartburn, or change in bowel habits  : NEGATIVE for frequency, dysuria, or hematuria  MUSCULOSKELETAL:arthralgias - knees, thumb, sciatica  NEURO: NEGATIVE for weakness, dizziness or paresthesias  ENDOCRINE: NEGATIVE for temperature intolerance, skin/hair changes  HEME: NEGATIVE for bleeding problems  PSYCHIATRIC: NEGATIVE for changes in mood or affect  Constitutional, neuro, ENT, endocrine, pulmonary, cardiac, gastrointestinal, genitourinary, musculoskeletal, integument and psychiatric systems are negative, except as otherwise noted.    Patient Active Problem List    Diagnosis Date Noted     COPD (chronic obstructive pulmonary disease) (H)      Priority: High     Morbidly obese (H) 05/29/2012     Priority: High     Bariatric surgery consult offered       Hypertension goal BP (blood pressure) < 140/90      Priority: High     Hyperlipidemia with target LDL less than 130      Priority: High     Primary osteoarthritis of right knee 01/26/2023     Priority: Medium     Incontinence in female 12/14/2022     Priority: Medium     Primary osteoarthritis of both knees 08/24/2022     Priority: Medium     Female stress incontinence 02/22/2022     Priority: Medium     Microscopic hematuria 10/26/2017     Priority: Medium     Urologic evaluation complete; yearly UA       Iron deficiency 08/30/2017     Priority: Medium     Noted Aug 2017.    Rec start on daily ferrous sulfate and recent in Nov/Dec.       JOSE (obstructive sleep apnea) 07/24/2015     Priority: Medium     Diagnostic Study 9/12/2017 - (271.0 lbs) AHI 78.7, RDI 91.3, Supine AHI 73.5, REM AHI -, Low O2 79.5%, Time Spent ?88% 22.9 minutes.  Titration Study: 9/26/2017- (271.0 lbs) The patient was titrated at pressures ranging from 5 cmH2O up to 8 cmH2O.  The optimal pressure achieved was 8 cmH2O with a  residual AHI of 1.1 events per hour.  Time in REM supine on final pressure was 22.5 minutes.  PLM index was 25.6 movements per hour.  The PLM Arousal Index was 8.0 per hour.       Impaired glucose tolerance 05/29/2012     Priority: Medium     Hypothyroid      Priority: Medium     Ulnar neuropathy at elbow, left      Priority: Low     Personal history of tobacco use, presenting hazards to health 06/28/2021     Priority: Low     Restless legs syndrome (RLS) 08/30/2017     Priority: Low     Sensorineural hearing loss      Priority: Low     MMT (medial meniscus tear) 10/13/2014     Priority: Low     History of cervical dysplasia 05/28/2012     Priority: Low      Past Medical History:   Diagnosis Date     Arthritis      Arthritis of right acromioclavicular joint 07/28/2021     Cervical dysplasia 1988     COPD (chronic obstructive pulmonary disease) (H)      Female stress incontinence 02/22/2022     Glaucoma      Hyperlipidemia LDL goal < 130      Hypertension goal BP (blood pressure) < 140/90      Hypothyroid      Impaired glucose tolerance 05/29/2012     Microscopic hematuria      MMT (medial meniscus tear) 09/21/2006    LT     Moderate recurrent major depression (H) 02/13/2014     Morbid obesity (H)      JOSE (obstructive sleep apnea) 07/24/2015    Doesn't use cpap     Primary osteoarthritis of both knees      RLS (restless legs syndrome)      Sensorineural hearing loss      Shingles 01/27/2022     Ulnar neuropathy at elbow, left      Vitamin D insufficiency      Past Surgical History:   Procedure Laterality Date     BIOPSY       BLADDER SURGERY  03/19/2018     CERVIX SURGERY  1988    cervical cone     COLONOSCOPY       LAPAROSCOPIC OOPHORECTOMY Right 03/19/2018    with hysteroscopy/EMB, and pubovaginal sling      ROTATOR CUFF REPAIR RT/LT Right 09/01/2021     TONSILLECTOMY & ADENOIDECTOMY       Current Outpatient Medications   Medication Sig Dispense Refill     atorvastatin (LIPITOR) 40 MG tablet TAKE 1 TABLET EVERY  DAY 90 tablet 3     Ferrous Sulfate (IRON) 325 (65 FE) MG tablet Take 1 tablet by mouth daily       latanoprost (XALATAN) 0.005 % ophthalmic solution 1 drop daily       levothyroxine (SYNTHROID/LEVOTHROID) 137 MCG tablet TAKE 1 TABLET EVERY DAY 90 tablet 3     lisinopril (ZESTRIL) 20 MG tablet TAKE 1 TABLET EVERY DAY 90 tablet 3     OMEGA-3 KRILL OIL PO        STIOLTO RESPIMAT 2.5-2.5 MCG/ACT AERS 2 puffs daily       Vitamin D, Cholecalciferol, 1000 units CAPS Take 1 capsule by mouth daily         Allergies   Allergen Reactions     Nkda [No Known Drug Allergies]         Social History     Tobacco Use     Smoking status: Former     Packs/day: 0.50     Years: 49.00     Pack years: 24.50     Types: Cigarettes     Start date: 1960     Quit date: 2011     Years since quittin.7     Smokeless tobacco: Former     Quit date: 2011   Substance Use Topics     Alcohol use: Yes     Comment: socially     Family History   Problem Relation Age of Onset     Allergies Mother      Cardiovascular Mother      Respiratory Mother      Thyroid Disease Mother      Other Cancer Mother         skin     Arthritis Father      Hypertension Father      Hyperlipidemia Father      Cerebrovascular Disease Father      Cancer Maternal Grandmother         bone     Other Cancer Maternal Grandmother         bone     Cerebrovascular Disease Paternal Grandmother      Cancer Paternal Grandfather      Cerebrovascular Disease Paternal Grandfather      Cancer Brother         testicular      Gastrointestinal Disease Brother      Other Cancer Brother         testicular     Allergies Sister      Allergies Daughter      Gastrointestinal Disease Daughter      Depression Daughter      Anesthesia Reaction Daughter      Genitourinary Problems Brother      Allergies Daughter 10        metal     Diabetes No family hx of      History   Drug Use No         Objective     /75 (BP Location: Right arm, Patient Position: Sitting, Cuff Size: Adult Large)   " Pulse 85   Temp 98.4  F (36.9  C) (Oral)   Resp 20   Ht 1.556 m (5' 1.26\")   Wt 121.6 kg (268 lb)   SpO2 98%   BMI 50.21 kg/m      Physical Exam    GENERAL APPEARANCE: alert, no distress and cooperative     EYES: EOMI, PERRL     HENT: ear canals and TM's normal and nose and mouth without ulcers or lesions     NECK: no adenopathy, no asymmetry, masses, or scars and thyroid normal to palpation     RESP: lungs clear to auscultation - no rales, rhonchi or wheezes     CV: regular rates and rhythm, normal S1 S2, no S3 or S4 and no murmur, click or rub     ABDOMEN:  soft, nontender, no HSM or masses and bowel sounds normal     MS: antalgic gait with use of walker     SKIN: no suspicious lesions or rashes     NEURO: Normal strength and tone, sensory exam grossly normal, mentation intact and speech normal     PSYCH: mentation appears normal. and affect normal/bright     LYMPHATICS: No cervical adenopathy    Recent Labs   Lab Test 08/24/22  0818 02/22/22  1053 06/28/21  1553   HGB  --  13.6 14.1   PLT  --   --  294    136 136   POTASSIUM 4.8 4.2 4.2   CR 0.89 0.83 0.84   A1C  --   --  5.6        Diagnostics:  Labs pending at this time.  Results will be reviewed when available.   No EKG required, no history of coronary heart disease, significant arrhythmia, peripheral arterial disease or other structural heart disease.    Revised Cardiac Risk Index (RCRI):  The patient has the following serious cardiovascular risks for perioperative complications:   - No serious cardiac risks = 0 points     RCRI Interpretation: 0 points: Class I (very low risk - 0.4% complication rate)           Signed Electronically by: Verena Vee MD  Copy of this evaluation report is provided to requesting physician.       "

## 2023-02-06 NOTE — H&P (VIEW-ONLY)
[facility]  [address] [place] [address]  [place] [address]  Phone: [phone]  Primary Provider: Scar Vee  Pre-op Performing Provider: SCAR VEE       PREOPERATIVE EVALUATION:  Today's date: 2/6/2023    Gina Yeh is a 77 year old female who presents for a preoperative evaluation.    Surgical Information:  Surgery/Procedure: ARTHROPLASTY, RIGHT KNEE, TOTAL   Surgery Location: [facility]  Surgeon: Krishna Santiago MD   Surgery Date: 2/15/23  Time of Surgery: 7:30 AM  Where patient plans to recover: At home with family  Fax number for surgical facility: Note does not need to be faxed, will be available electronically in Epic.    Type of Anesthesia Anticipated: to be determined    Assessment & Plan     The proposed surgical procedure is considered INTERMEDIATE risk.    Preop general physical exam  Primary osteoarthritis of right knee  Morbidly obese (H)  Hypertension goal BP (blood pressure) < 140/90  JOSE (obstructive sleep apnea)  Chronic obstructive pulmonary disease, unspecified COPD type (H)     Risks and Recommendations:  The patient has the following additional risks and recommendations for perioperative complications:    Medication Instructions:  -hold ACEi on day of surgery  -hold fish oil the week before surgery  -continue other medications     RECOMMENDATION:  APPROVAL GIVEN to proceed with proposed procedure, without further diagnostic evaluation.        Subjective     HPI related to upcoming procedure: Gina Yeh is a 77 year old female who presents with knee pain. Symptom onset has been gradual, worsening for a time period of years. Severity is described as moderate. Course of her symptoms over time is worsening.     Preop Questions 1/30/2023   1. Have you ever had a heart attack or stroke? No   2. Have you ever had surgery on your heart or blood vessels, such as a stent placement, a coronary artery bypass, or surgery on an  artery in your head, neck, heart, or legs? No   3. Do you have chest pain with activity? No   4. Do you have a history of  heart failure? No   5. Do you currently have a cold, bronchitis or symptoms of other infection? No   6. Do you have a cough, shortness of breath, or wheezing? No   7. Do you or anyone in your family have previous history of blood clots? No   8. Do you or does anyone in your family have a serious bleeding problem such as prolonged bleeding following surgeries or cuts? No   9. Have you ever had problems with anemia or been told to take iron pills? YES -     10. Have you had any abnormal blood loss such as black, tarry or bloody stools, or abnormal vaginal bleeding? No   11. Have you ever had a blood transfusion? No   12. Are you willing to have a blood transfusion if it is medically needed before, during, or after your surgery? Yes   13. Have you or any of your relatives ever had problems with anesthesia? YES -     14. Do you have sleep apnea, excessive snoring or daytime drowsiness? YES -     14a. Do you have a CPAP machine? Yes   15. Do you have any artifical heart valves or other implanted medical devices like a pacemaker, defibrillator, or continuous glucose monitor? No   16. Do you have artificial joints? No    17. Are you allergic to latex? No   18. Is there any chance that you may be pregnant? -       Health Care Directive:  Patient has a Health Care Directive on file      Preoperative Review of :   reviewed - no record of controlled substances prescribed.       Status of Chronic Conditions:  See problem list for active medical problems.  Problems all longstanding and stable, except as noted/documented.  See ROS for pertinent symptoms related to these conditions.      Review of Systems  CONSTITUTIONAL: NEGATIVE for fever, chills, change in weight  INTEGUMENTARY/SKIN: NEGATIVE for worrisome rashes, moles or lesions  EYES: NEGATIVE for vision changes or irritation  ENT/MOUTH: hearing  loss  RESP:NEGATIVE for significant cough or SOB and Hx COPD  CV: NEGATIVE for chest pain, palpitations or peripheral edema  GI: NEGATIVE for nausea, abdominal pain, heartburn, or change in bowel habits  : NEGATIVE for frequency, dysuria, or hematuria  MUSCULOSKELETAL:arthralgias - knees, thumb, sciatica  NEURO: NEGATIVE for weakness, dizziness or paresthesias  ENDOCRINE: NEGATIVE for temperature intolerance, skin/hair changes  HEME: NEGATIVE for bleeding problems  PSYCHIATRIC: NEGATIVE for changes in mood or affect  Constitutional, neuro, ENT, endocrine, pulmonary, cardiac, gastrointestinal, genitourinary, musculoskeletal, integument and psychiatric systems are negative, except as otherwise noted.    Patient Active Problem List    Diagnosis Date Noted     COPD (chronic obstructive pulmonary disease) (H)      Priority: High     Morbidly obese (H) 05/29/2012     Priority: High     Bariatric surgery consult offered       Hypertension goal BP (blood pressure) < 140/90      Priority: High     Hyperlipidemia with target LDL less than 130      Priority: High     Primary osteoarthritis of right knee 01/26/2023     Priority: Medium     Incontinence in female 12/14/2022     Priority: Medium     Primary osteoarthritis of both knees 08/24/2022     Priority: Medium     Female stress incontinence 02/22/2022     Priority: Medium     Microscopic hematuria 10/26/2017     Priority: Medium     Urologic evaluation complete; yearly UA       Iron deficiency 08/30/2017     Priority: Medium     Noted Aug 2017.    Rec start on daily ferrous sulfate and recent in Nov/Dec.       JOSE (obstructive sleep apnea) 07/24/2015     Priority: Medium     Diagnostic Study 9/12/2017 - (271.0 lbs) AHI 78.7, RDI 91.3, Supine AHI 73.5, REM AHI -, Low O2 79.5%, Time Spent ?88% 22.9 minutes.  Titration Study: 9/26/2017- (271.0 lbs) The patient was titrated at pressures ranging from 5 cmH2O up to 8 cmH2O.  The optimal pressure achieved was 8 cmH2O with a  residual AHI of 1.1 events per hour.  Time in REM supine on final pressure was 22.5 minutes.  PLM index was 25.6 movements per hour.  The PLM Arousal Index was 8.0 per hour.       Impaired glucose tolerance 05/29/2012     Priority: Medium     Hypothyroid      Priority: Medium     Ulnar neuropathy at elbow, left      Priority: Low     Personal history of tobacco use, presenting hazards to health 06/28/2021     Priority: Low     Restless legs syndrome (RLS) 08/30/2017     Priority: Low     Sensorineural hearing loss      Priority: Low     MMT (medial meniscus tear) 10/13/2014     Priority: Low     History of cervical dysplasia 05/28/2012     Priority: Low      Past Medical History:   Diagnosis Date     Arthritis      Arthritis of right acromioclavicular joint 07/28/2021     Cervical dysplasia 1988     COPD (chronic obstructive pulmonary disease) (H)      Female stress incontinence 02/22/2022     Glaucoma      Hyperlipidemia LDL goal < 130      Hypertension goal BP (blood pressure) < 140/90      Hypothyroid      Impaired glucose tolerance 05/29/2012     Microscopic hematuria      MMT (medial meniscus tear) 09/21/2006    LT     Moderate recurrent major depression (H) 02/13/2014     Morbid obesity (H)      JOSE (obstructive sleep apnea) 07/24/2015    Doesn't use cpap     Primary osteoarthritis of both knees      RLS (restless legs syndrome)      Sensorineural hearing loss      Shingles 01/27/2022     Ulnar neuropathy at elbow, left      Vitamin D insufficiency      Past Surgical History:   Procedure Laterality Date     BIOPSY       BLADDER SURGERY  03/19/2018     CERVIX SURGERY  1988    cervical cone     COLONOSCOPY       LAPAROSCOPIC OOPHORECTOMY Right 03/19/2018    with hysteroscopy/EMB, and pubovaginal sling      ROTATOR CUFF REPAIR RT/LT Right 09/01/2021     TONSILLECTOMY & ADENOIDECTOMY       Current Outpatient Medications   Medication Sig Dispense Refill     atorvastatin (LIPITOR) 40 MG tablet TAKE 1 TABLET EVERY  DAY 90 tablet 3     Ferrous Sulfate (IRON) 325 (65 FE) MG tablet Take 1 tablet by mouth daily       latanoprost (XALATAN) 0.005 % ophthalmic solution 1 drop daily       levothyroxine (SYNTHROID/LEVOTHROID) 137 MCG tablet TAKE 1 TABLET EVERY DAY 90 tablet 3     lisinopril (ZESTRIL) 20 MG tablet TAKE 1 TABLET EVERY DAY 90 tablet 3     OMEGA-3 KRILL OIL PO        STIOLTO RESPIMAT 2.5-2.5 MCG/ACT AERS 2 puffs daily       Vitamin D, Cholecalciferol, 1000 units CAPS Take 1 capsule by mouth daily         Allergies   Allergen Reactions     Nkda [No Known Drug Allergies]         Social History     Tobacco Use     Smoking status: Former     Packs/day: 0.50     Years: 49.00     Pack years: 24.50     Types: Cigarettes     Start date: 1960     Quit date: 2011     Years since quittin.7     Smokeless tobacco: Former     Quit date: 2011   Substance Use Topics     Alcohol use: Yes     Comment: socially     Family History   Problem Relation Age of Onset     Allergies Mother      Cardiovascular Mother      Respiratory Mother      Thyroid Disease Mother      Other Cancer Mother         skin     Arthritis Father      Hypertension Father      Hyperlipidemia Father      Cerebrovascular Disease Father      Cancer Maternal Grandmother         bone     Other Cancer Maternal Grandmother         bone     Cerebrovascular Disease Paternal Grandmother      Cancer Paternal Grandfather      Cerebrovascular Disease Paternal Grandfather      Cancer Brother         testicular      Gastrointestinal Disease Brother      Other Cancer Brother         testicular     Allergies Sister      Allergies Daughter      Gastrointestinal Disease Daughter      Depression Daughter      Anesthesia Reaction Daughter      Genitourinary Problems Brother      Allergies Daughter 10        metal     Diabetes No family hx of      History   Drug Use No         Objective     /75 (BP Location: Right arm, Patient Position: Sitting, Cuff Size: Adult Large)   " Pulse 85   Temp 98.4  F (36.9  C) (Oral)   Resp 20   Ht 1.556 m (5' 1.26\")   Wt 121.6 kg (268 lb)   SpO2 98%   BMI 50.21 kg/m      Physical Exam    GENERAL APPEARANCE: alert, no distress and cooperative     EYES: EOMI, PERRL     HENT: ear canals and TM's normal and nose and mouth without ulcers or lesions     NECK: no adenopathy, no asymmetry, masses, or scars and thyroid normal to palpation     RESP: lungs clear to auscultation - no rales, rhonchi or wheezes     CV: regular rates and rhythm, normal S1 S2, no S3 or S4 and no murmur, click or rub     ABDOMEN:  soft, nontender, no HSM or masses and bowel sounds normal     MS: antalgic gait with use of walker     SKIN: no suspicious lesions or rashes     NEURO: Normal strength and tone, sensory exam grossly normal, mentation intact and speech normal     PSYCH: mentation appears normal. and affect normal/bright     LYMPHATICS: No cervical adenopathy    Recent Labs   Lab Test 08/24/22  0818 02/22/22  1053 06/28/21  1553   HGB  --  13.6 14.1   PLT  --   --  294    136 136   POTASSIUM 4.8 4.2 4.2   CR 0.89 0.83 0.84   A1C  --   --  5.6        Diagnostics:  Labs pending at this time.  Results will be reviewed when available.   No EKG required, no history of coronary heart disease, significant arrhythmia, peripheral arterial disease or other structural heart disease.    Revised Cardiac Risk Index (RCRI):  The patient has the following serious cardiovascular risks for perioperative complications:   - No serious cardiac risks = 0 points     RCRI Interpretation: 0 points: Class I (very low risk - 0.4% complication rate)           Signed Electronically by: Verena Vee MD  Copy of this evaluation report is provided to requesting physician.       "

## 2023-02-06 NOTE — RESULT ENCOUNTER NOTE
Meg,    The xray confirms left thumb arthritis. Continue using the thumb splint as you find helpful and over-the-counter pain medication as needed. You can use over-the-counter topicals like lidocaine gel as well.     Verena Vee MD

## 2023-02-06 NOTE — PATIENT INSTRUCTIONS
Do not take lisinopril the day of surgery     Do not take fish oil the week before the surgery     Please schedule a DEXA bone scan for osteoporosis testing at your convenience.  Call our appointment line at [phone] or go to [email or web address].    Verena Vee MD       For informational purposes only. Not to replace the advice of your health care provider. Copyright   ,  [facility]. All rights reserved. Clinically reviewed by Mahogany Hassan MD. [employer] [id number] - REV .  Preparing for Your Surgery  Getting started  A nurse will call you to review your health history and instructions. They will give you an arrival time based on your scheduled surgery time. Please be ready to share:  Your doctor's clinic name and phone number  Your medical, surgical, and anesthesia history  A list of allergies and sensitivities  A list of medicines, including herbal treatments and over-the-counter drugs  Whether the patient has a legal guardian (ask how to send us the papers in advance)  Please tell us if you're pregnant--or if there's any chance you might be pregnant. Some surgeries may injure a fetus (unborn baby), so they require a pregnancy test. Surgeries that are safe for a fetus don't always need a test, and you can choose whether to have one.   If you have a child who's having surgery, please ask for a copy of Preparing for Your Child's Surgery.    Preparing for surgery  Within 10 to 30 days of surgery: Have a pre-op exam (sometimes called an H&P, or History and Physical). This can be done at a clinic or pre-operative center.  If you're having a , you may not need this exam. Talk to your care team.  At your pre-op exam, talk to your care team about all medicines you take. If you need to stop any medicines before surgery, ask when to start taking them again.  We do this for your safety. Many medicines can make you bleed too much during surgery. Some change how well surgery (anesthesia)  drugs work.  Call your insurance company to let them know you're having surgery. (If you don't have insurance, call [phone].)  Call your clinic if there's any change in your health. This includes signs of a cold or flu (sore throat, runny nose, cough, rash, fever). It also includes a scrape or scratch near the surgery site.  If you have questions on the day of surgery, call your hospital or surgery center.  Eating and drinking guidelines  For your safety: Unless your surgeon tells you otherwise, follow the guidelines below.  Eat and drink as usual until 8 hours before you arrive for surgery. After that, no food or milk.  Drink clear liquids until 2 hours before you arrive. These are liquids you can see through, like water, Gatorade, and Propel Water. They also include plain black coffee and tea (no cream or milk), candy, and breath mints. You can spit out gum when you arrive.  If you drink alcohol: Stop drinking it the night before surgery.  If your care team tells you to take medicine on the morning of surgery, it's okay to take it with a sip of water.  Preventing infection  Shower or bathe the night before and morning of your surgery. Follow the instructions your clinic gave you. (If no instructions, use regular soap.)  Don't shave or clip hair near your surgery site. We'll remove the hair if needed.  Don't smoke or vape the morning of surgery. You may chew nicotine gum up to 2 hours before surgery. A nicotine patch is okay.  Note: Some surgeries require you to completely quit smoking and nicotine. Check with your surgeon.  Your care team will make every effort to keep you safe from infection. We will:  Clean our hands often with soap and water (or an alcohol-based hand rub).  Clean the skin at your surgery site with a special soap that kills germs.  Give you a special gown to keep you warm. (Cold raises the risk of infection.)  Wear special hair covers, masks, gowns and gloves during surgery.  Give  antibiotic medicine, if prescribed. Not all surgeries need antibiotics.  What to bring on the day of surgery  Photo ID and insurance card  Copy of your health care directive, if you have one  Glasses and hearing aids (bring cases)  You can't wear contacts during surgery  Inhaler and eye drops, if you use them (tell us about these when you arrive)  CPAP machine or breathing device, if you use them  A few personal items, if spending the night  If you have . . .  A pacemaker, ICD (cardiac defibrillator) or other implant: Bring the ID card.  An implanted stimulator: Bring the remote control.  A legal guardian: Bring a copy of the certified (court-stamped) guardianship papers.  Please remove any jewelry, including body piercings. Leave jewelry and other valuables at home.  If you're going home the day of surgery  You must have a responsible adult drive you home. They should stay with you overnight as well.  If you don't have someone to stay with you, and you aren't safe to go home alone, we may keep you overnight. Insurance often won't pay for this.  After surgery  If it's hard to control your pain or you need more pain medicine, please call your surgeon's office.  Questions?   If you have any questions for your care team, list them here: _________________________________________________________________________________________________________________________________________________________________________ ____________________________________ ____________________________________ ____________________________________

## 2023-02-07 ENCOUNTER — DOCUMENTATION ONLY (OUTPATIENT)
Dept: OTHER | Facility: CLINIC | Age: 78
End: 2023-02-07
Payer: COMMERCIAL

## 2023-02-07 LAB
BACTERIA UR CULT: NORMAL
FERRITIN SERPL-MCNC: 19 NG/ML (ref 8–252)
IRON SATN MFR SERPL: 14 % (ref 15–46)
IRON SERPL-MCNC: 53 UG/DL (ref 35–180)
TIBC SERPL-MCNC: 386 UG/DL (ref 240–430)

## 2023-02-13 ENCOUNTER — TELEPHONE (OUTPATIENT)
Dept: ORTHOPEDICS | Facility: CLINIC | Age: 78
End: 2023-02-13

## 2023-02-13 NOTE — TELEPHONE ENCOUNTER
Called and answered all of the patients questions. She was extremely thankful for the assistance.     We will do a cortisone injection for the other knee at her first post op appointment, she was OK with the plan. Home physical therapy order will be addressed at time of discharge.    Lakeshia Walters MS, ATC  Certified Athletic Trainer

## 2023-02-13 NOTE — TELEPHONE ENCOUNTER
[facility] Call Center    Phone Message    May a detailed message be left on voicemail: yes     Reason for Call: Other: Patient is requesting a call back from the staff of Dr Santiago, she has several questions concerning covid testing and home care after the surgery. Please call her back to advise     Action Taken: Message routed to:  [facility] ([facility] roxi    Travel Screening: Not Applicable

## 2023-02-14 ENCOUNTER — ANESTHESIA EVENT (OUTPATIENT)
Dept: SURGERY | Facility: CLINIC | Age: 78
End: 2023-02-14
Payer: COMMERCIAL

## 2023-02-14 ASSESSMENT — COPD QUESTIONNAIRES: COPD: 1

## 2023-02-14 ASSESSMENT — LIFESTYLE VARIABLES: TOBACCO_USE: 1

## 2023-02-14 NOTE — ANESTHESIA PREPROCEDURE EVALUATION
Anesthesia Pre-Procedure Evaluation    Patient: Gina Yeh   MRN: [id number] : 1945        Procedure : Procedure(s):  ARTHROPLASTY, KNEE, TOTAL          Past Medical History:   Diagnosis Date     Arthritis      Arthritis of right acromioclavicular joint 2021     Cervical dysplasia      COPD (chronic obstructive pulmonary disease) (H)      Degenerative joint disease of hand, left      Female stress incontinence 2022     Glaucoma      Hyperlipidemia LDL goal < 130      Hypertension goal BP (blood pressure) < 140/90      Hypothyroid      Impaired glucose tolerance 2012     Microscopic hematuria      MMT (medial meniscus tear) 2006    LT     Moderate recurrent major depression (H) 2014     Morbid obesity (H)      JOSE (obstructive sleep apnea) 2015    Doesn't use cpap     Primary osteoarthritis of both knees      RLS (restless legs syndrome)      Sensorineural hearing loss      Shingles 2022     Ulnar neuropathy at elbow, left      Vitamin D insufficiency       Past Surgical History:   Procedure Laterality Date     BIOPSY       BLADDER SURGERY  2018     CERVIX SURGERY  1988    cervical cone     COLONOSCOPY       LAPAROSCOPIC OOPHORECTOMY Right 2018    with hysteroscopy/EMB, and pubovaginal sling      ROTATOR CUFF REPAIR RT/LT Right 2021     TONSILLECTOMY & ADENOIDECTOMY        Allergies   Allergen Reactions     Nkda [No Known Drug Allergies]       Social History     Tobacco Use     Smoking status: Former     Packs/day: 0.50     Years: 49.00     Pack years: 24.50     Types: Cigarettes     Start date: 1960     Quit date: 2011     Years since quittin.8     Smokeless tobacco: Former     Quit date: 2011   Substance Use Topics     Alcohol use: Yes     Comment: socially      Wt Readings from Last 1 Encounters:   23 121.6 kg (268 lb)        Anesthesia Evaluation   Pt has had prior anesthetic. Type: General and MAC.         ROS/MED HX  ENT/Pulmonary:     (+) sleep apnea, uses CPAP, tobacco use, Past use, COPD,     Neurologic:  - neg neurologic ROS     Cardiovascular:     (+) Dyslipidemia hypertension-----Previous cardiac testing   Echo: Date: 4/19 Results:  Normal LV size and function at rest. The LVEF is 55-60% and increases  appropriately to 70-75% with dobutamine infusion.  Stress Test: Date: Results:    ECG Reviewed: Date: 8/21 Results:  NSR    Cath: Date: Results:      METS/Exercise Tolerance:     Hematologic:     (+) anemia,     Musculoskeletal:   (+) arthritis,     GI/Hepatic:  - neg GI/hepatic ROS     Renal/Genitourinary:  - neg Renal ROS     Endo:     (+) thyroid problem, Obesity,     Psychiatric/Substance Use:     (+) psychiatric history depression     Infectious Disease:  - neg infectious disease ROS     Malignancy:  - neg malignancy ROS     Other:  - neg other ROS          Physical Exam    Airway  airway exam normal      Mallampati: II   TM distance: > 3 FB   Neck ROM: full   Mouth opening: > 3 cm    Respiratory Devices and Support         Dental           Cardiovascular   cardiovascular exam normal          Pulmonary   pulmonary exam normal                OUTSIDE LABS:  CBC:   Lab Results   Component Value Date    WBC 7.9 02/06/2023    WBC 9.5 06/28/2021    HGB 12.2 02/06/2023    HGB 13.6 02/22/2022    HCT 38.6 02/06/2023    HCT 44.4 06/28/2021     02/06/2023     06/28/2021     BMP:   Lab Results   Component Value Date     02/06/2023     08/24/2022    POTASSIUM 4.6 02/06/2023    POTASSIUM 4.8 08/24/2022    CHLORIDE 107 02/06/2023    CHLORIDE 105 08/24/2022    CO2 25 02/06/2023    CO2 30 08/24/2022    BUN 25 02/06/2023    BUN 23 08/24/2022    CR 0.84 02/06/2023    CR 0.89 08/24/2022     (H) 02/06/2023     (H) 08/24/2022     COAGS: No results found for: PTT, INR, FIBR  POC: No results found for: BGM, HCG, HCGS  HEPATIC:   Lab Results   Component Value Date    ALBUMIN 3.7  10/25/2017    PROTTOTAL 8.5 10/25/2017    ALT 31 10/25/2017    AST 12 10/25/2017    ALKPHOS 121 10/25/2017    BILITOTAL 0.5 10/25/2017     OTHER:   Lab Results   Component Value Date    A1C 5.6 06/28/2021    ALISSON 9.5 02/06/2023    TSH 3.46 08/24/2022    T4 0.93 05/23/2012       Anesthesia Plan    ASA Status:  3   NPO Status:  NPO Appropriate    Anesthesia Type: Spinal.              Consents    Anesthesia Plan(s) and associated risks, benefits, and realistic alternatives discussed. Questions answered and patient/representative(s) expressed understanding.     - Discussed: Risks, Benefits and Alternatives for BOTH SEDATION and the PROCEDURE were discussed     - Discussed with:  Patient      - Extended Intubation/Ventilatory Support Discussed: No.      - Patient is DNR/DNI Status: No         Postoperative Care    Pain management: Multi-modal analgesia, Peripheral nerve block (Single Shot).   PONV prophylaxis: Ondansetron (or other 5HT-3), Background Propofol Infusion     Comments:                Adrian Garza

## 2023-02-15 ENCOUNTER — ANESTHESIA (OUTPATIENT)
Dept: SURGERY | Facility: CLINIC | Age: 78
End: 2023-02-15
Payer: COMMERCIAL

## 2023-02-15 ENCOUNTER — APPOINTMENT (OUTPATIENT)
Dept: PHYSICAL THERAPY | Facility: CLINIC | Age: 78
End: 2023-02-15
Attending: ORTHOPAEDIC SURGERY
Payer: COMMERCIAL

## 2023-02-15 ENCOUNTER — HOSPITAL ENCOUNTER (OUTPATIENT)
Facility: CLINIC | Age: 78
Discharge: HOME | End: 2023-02-16
Attending: ORTHOPAEDIC SURGERY | Admitting: ORTHOPAEDIC SURGERY
Payer: COMMERCIAL

## 2023-02-15 DIAGNOSIS — Z96.651 HISTORY OF TOTAL RIGHT KNEE REPLACEMENT: Primary | ICD-10-CM

## 2023-02-15 PROCEDURE — 360N000077 HC SURGERY LEVEL 4, PER MIN: Performed by: ORTHOPAEDIC SURGERY

## 2023-02-15 PROCEDURE — 999N000141 HC STATISTIC PRE-PROCEDURE NURSING ASSESSMENT: Performed by: ORTHOPAEDIC SURGERY

## 2023-02-15 PROCEDURE — 250N000013 HC RX MED GY IP 250 OP 250 PS 637: Performed by: ORTHOPAEDIC SURGERY

## 2023-02-15 PROCEDURE — C1776 JOINT DEVICE (IMPLANTABLE): HCPCS | Performed by: ORTHOPAEDIC SURGERY

## 2023-02-15 PROCEDURE — 250N000011 HC RX IP 250 OP 636: Performed by: ORTHOPAEDIC SURGERY

## 2023-02-15 PROCEDURE — 258N000003 HC RX IP 258 OP 636: Performed by: NURSE ANESTHETIST, CERTIFIED REGISTERED

## 2023-02-15 PROCEDURE — 370N000017 HC ANESTHESIA TECHNICAL FEE, PER MIN: Performed by: ORTHOPAEDIC SURGERY

## 2023-02-15 PROCEDURE — 97162 PT EVAL MOD COMPLEX 30 MIN: CPT | Mod: GP | Performed by: PHYSICAL THERAPIST

## 2023-02-15 PROCEDURE — 250N000011 HC RX IP 250 OP 636: Performed by: NURSE ANESTHETIST, CERTIFIED REGISTERED

## 2023-02-15 PROCEDURE — 97530 THERAPEUTIC ACTIVITIES: CPT | Mod: GP | Performed by: PHYSICAL THERAPIST

## 2023-02-15 PROCEDURE — 250N000009 HC RX 250: Performed by: ORTHOPAEDIC SURGERY

## 2023-02-15 PROCEDURE — 710N000010 HC RECOVERY PHASE 1, LEVEL 2, PER MIN: Performed by: ORTHOPAEDIC SURGERY

## 2023-02-15 PROCEDURE — C1713 ANCHOR/SCREW BN/BN,TIS/BN: HCPCS | Performed by: ORTHOPAEDIC SURGERY

## 2023-02-15 PROCEDURE — 258N000001 HC RX 258: Performed by: ORTHOPAEDIC SURGERY

## 2023-02-15 PROCEDURE — 27447 TOTAL KNEE ARTHROPLASTY: CPT | Mod: RT | Performed by: ORTHOPAEDIC SURGERY

## 2023-02-15 PROCEDURE — 258N000003 HC RX IP 258 OP 636: Performed by: ORTHOPAEDIC SURGERY

## 2023-02-15 PROCEDURE — 97110 THERAPEUTIC EXERCISES: CPT | Mod: GP | Performed by: PHYSICAL THERAPIST

## 2023-02-15 PROCEDURE — 271N000001 HC OR GENERAL SUPPLY NON-STERILE: Performed by: ORTHOPAEDIC SURGERY

## 2023-02-15 PROCEDURE — 27447 TOTAL KNEE ARTHROPLASTY: CPT | Mod: AS | Performed by: PHYSICIAN ASSISTANT

## 2023-02-15 PROCEDURE — 272N000001 HC OR GENERAL SUPPLY STERILE: Performed by: ORTHOPAEDIC SURGERY

## 2023-02-15 PROCEDURE — 250N000009 HC RX 250: Performed by: NURSE ANESTHETIST, CERTIFIED REGISTERED

## 2023-02-15 DEVICE — IMP PLUG TAPER NEXGEN ZIM 00-5960-099-00: Type: IMPLANTABLE DEVICE | Site: KNEE | Status: FUNCTIONAL

## 2023-02-15 DEVICE — CEMENT SURGICAL TOBRAMYCIN 6197-9-001: Type: IMPLANTABLE DEVICE | Site: KNEE | Status: FUNCTIONAL

## 2023-02-15 DEVICE — IMP COMP PATELLA ZIM NEXGEN 8.5X32MM 00-5972-065-32: Type: IMPLANTABLE DEVICE | Site: KNEE | Status: FUNCTIONAL

## 2023-02-15 DEVICE — IMPLANTABLE DEVICE: Type: IMPLANTABLE DEVICE | Site: KNEE | Status: FUNCTIONAL

## 2023-02-15 DEVICE — IMP COMP TIBIAL STEMMED ZIM NEXGEN SIZE 3 5980-37-01: Type: IMPLANTABLE DEVICE | Site: KNEE | Status: FUNCTIONAL

## 2023-02-15 RX ORDER — SODIUM CHLORIDE, SODIUM LACTATE, POTASSIUM CHLORIDE, CALCIUM CHLORIDE 600; 310; 30; 20 MG/100ML; MG/100ML; MG/100ML; MG/100ML
INJECTION, SOLUTION INTRAVENOUS CONTINUOUS
Status: DISCONTINUED | OUTPATIENT
Start: 2023-02-15 | End: 2023-02-15 | Stop reason: HOSPADM

## 2023-02-15 RX ORDER — FENTANYL CITRATE 50 UG/ML
INJECTION, SOLUTION INTRAMUSCULAR; INTRAVENOUS PRN
Status: DISCONTINUED | OUTPATIENT
Start: 2023-02-15 | End: 2023-02-15

## 2023-02-15 RX ORDER — NALOXONE HYDROCHLORIDE 0.4 MG/ML
0.4 INJECTION, SOLUTION INTRAMUSCULAR; INTRAVENOUS; SUBCUTANEOUS
Status: DISCONTINUED | OUTPATIENT
Start: 2023-02-15 | End: 2023-02-16 | Stop reason: HOSPADM

## 2023-02-15 RX ORDER — LISINOPRIL 10 MG/1
20 TABLET ORAL DAILY
Status: DISCONTINUED | OUTPATIENT
Start: 2023-02-15 | End: 2023-02-16 | Stop reason: HOSPADM

## 2023-02-15 RX ORDER — OXYCODONE HCL 10 MG/1
10 TABLET, FILM COATED, EXTENDED RELEASE ORAL EVERY 8 HOURS
Status: COMPLETED | OUTPATIENT
Start: 2023-02-15 | End: 2023-02-15

## 2023-02-15 RX ORDER — ACETAMINOPHEN 325 MG/1
975 TABLET ORAL EVERY 8 HOURS PRN
Qty: 100 TABLET | Refills: 1 | Status: SHIPPED | OUTPATIENT
Start: 2023-02-15 | End: 2023-04-10

## 2023-02-15 RX ORDER — CEFAZOLIN SODIUM/WATER 3 G/30 ML
3 SYRINGE (ML) INTRAVENOUS
Status: DISCONTINUED | OUTPATIENT
Start: 2023-02-15 | End: 2023-02-15 | Stop reason: HOSPADM

## 2023-02-15 RX ORDER — ACETAMINOPHEN 325 MG/1
975 TABLET ORAL EVERY 8 HOURS
Status: DISCONTINUED | OUTPATIENT
Start: 2023-02-15 | End: 2023-02-16 | Stop reason: HOSPADM

## 2023-02-15 RX ORDER — FENTANYL CITRATE 50 UG/ML
50 INJECTION, SOLUTION INTRAMUSCULAR; INTRAVENOUS EVERY 5 MIN PRN
Status: DISCONTINUED | OUTPATIENT
Start: 2023-02-15 | End: 2023-02-15 | Stop reason: HOSPADM

## 2023-02-15 RX ORDER — SODIUM CHLORIDE, SODIUM LACTATE, POTASSIUM CHLORIDE, CALCIUM CHLORIDE 600; 310; 30; 20 MG/100ML; MG/100ML; MG/100ML; MG/100ML
INJECTION, SOLUTION INTRAVENOUS CONTINUOUS PRN
Status: DISCONTINUED | OUTPATIENT
Start: 2023-02-15 | End: 2023-02-15

## 2023-02-15 RX ORDER — HYDROMORPHONE HCL IN WATER/PF 6 MG/30 ML
0.4 PATIENT CONTROLLED ANALGESIA SYRINGE INTRAVENOUS
Status: DISCONTINUED | OUTPATIENT
Start: 2023-02-15 | End: 2023-02-16 | Stop reason: HOSPADM

## 2023-02-15 RX ORDER — ACETAMINOPHEN 325 MG/1
975 TABLET ORAL ONCE
Status: COMPLETED | OUTPATIENT
Start: 2023-02-15 | End: 2023-02-15

## 2023-02-15 RX ORDER — HYDROMORPHONE HYDROCHLORIDE 1 MG/ML
0.4 INJECTION, SOLUTION INTRAMUSCULAR; INTRAVENOUS; SUBCUTANEOUS EVERY 5 MIN PRN
Status: DISCONTINUED | OUTPATIENT
Start: 2023-02-15 | End: 2023-02-15 | Stop reason: HOSPADM

## 2023-02-15 RX ORDER — HYDROMORPHONE HCL IN WATER/PF 6 MG/30 ML
0.2 PATIENT CONTROLLED ANALGESIA SYRINGE INTRAVENOUS
Status: DISCONTINUED | OUTPATIENT
Start: 2023-02-15 | End: 2023-02-16 | Stop reason: HOSPADM

## 2023-02-15 RX ORDER — POLYETHYLENE GLYCOL 3350 17 G/17G
17 POWDER, FOR SOLUTION ORAL DAILY
Status: DISCONTINUED | OUTPATIENT
Start: 2023-02-16 | End: 2023-02-16 | Stop reason: HOSPADM

## 2023-02-15 RX ORDER — CELECOXIB 100 MG/1
400 CAPSULE ORAL ONCE
Status: COMPLETED | OUTPATIENT
Start: 2023-02-15 | End: 2023-02-15

## 2023-02-15 RX ORDER — ATORVASTATIN CALCIUM 40 MG/1
40 TABLET, FILM COATED ORAL DAILY
Status: DISCONTINUED | OUTPATIENT
Start: 2023-02-15 | End: 2023-02-16 | Stop reason: HOSPADM

## 2023-02-15 RX ORDER — ACETAMINOPHEN 325 MG/1
650 TABLET ORAL EVERY 4 HOURS PRN
Status: DISCONTINUED | OUTPATIENT
Start: 2023-02-18 | End: 2023-02-16 | Stop reason: HOSPADM

## 2023-02-15 RX ORDER — BUPIVACAINE HYDROCHLORIDE AND EPINEPHRINE 5; 5 MG/ML; UG/ML
INJECTION, SOLUTION PERINEURAL PRN
Status: DISCONTINUED | OUTPATIENT
Start: 2023-02-15 | End: 2023-02-15

## 2023-02-15 RX ORDER — ONDANSETRON 2 MG/ML
4 INJECTION INTRAMUSCULAR; INTRAVENOUS EVERY 30 MIN PRN
Status: DISCONTINUED | OUTPATIENT
Start: 2023-02-15 | End: 2023-02-15 | Stop reason: HOSPADM

## 2023-02-15 RX ORDER — LATANOPROST 50 UG/ML
1 SOLUTION/ DROPS OPHTHALMIC AT BEDTIME
Status: DISCONTINUED | OUTPATIENT
Start: 2023-02-15 | End: 2023-02-16 | Stop reason: HOSPADM

## 2023-02-15 RX ORDER — OXYCODONE HYDROCHLORIDE 5 MG/1
10 TABLET ORAL EVERY 4 HOURS PRN
Status: DISCONTINUED | OUTPATIENT
Start: 2023-02-15 | End: 2023-02-15 | Stop reason: HOSPADM

## 2023-02-15 RX ORDER — OXYCODONE HYDROCHLORIDE 5 MG/1
5 TABLET ORAL EVERY 4 HOURS PRN
Status: DISCONTINUED | OUTPATIENT
Start: 2023-02-15 | End: 2023-02-15 | Stop reason: HOSPADM

## 2023-02-15 RX ORDER — OXYCODONE HYDROCHLORIDE 5 MG/1
5 TABLET ORAL EVERY 4 HOURS PRN
Status: DISCONTINUED | OUTPATIENT
Start: 2023-02-15 | End: 2023-02-16 | Stop reason: HOSPADM

## 2023-02-15 RX ORDER — HYDROXYZINE HYDROCHLORIDE 10 MG/1
10 TABLET, FILM COATED ORAL EVERY 6 HOURS PRN
Status: DISCONTINUED | OUTPATIENT
Start: 2023-02-15 | End: 2023-02-16 | Stop reason: HOSPADM

## 2023-02-15 RX ORDER — PROPOFOL 10 MG/ML
INJECTION, EMULSION INTRAVENOUS CONTINUOUS PRN
Status: DISCONTINUED | OUTPATIENT
Start: 2023-02-15 | End: 2023-02-15

## 2023-02-15 RX ORDER — METOPROLOL TARTRATE 1 MG/ML
1-2 INJECTION, SOLUTION INTRAVENOUS EVERY 5 MIN PRN
Status: DISCONTINUED | OUTPATIENT
Start: 2023-02-15 | End: 2023-02-15 | Stop reason: HOSPADM

## 2023-02-15 RX ORDER — LIDOCAINE 40 MG/G
CREAM TOPICAL
Status: DISCONTINUED | OUTPATIENT
Start: 2023-02-15 | End: 2023-02-16 | Stop reason: HOSPADM

## 2023-02-15 RX ORDER — ONDANSETRON 2 MG/ML
4 INJECTION INTRAMUSCULAR; INTRAVENOUS EVERY 6 HOURS PRN
Status: DISCONTINUED | OUTPATIENT
Start: 2023-02-15 | End: 2023-02-16 | Stop reason: HOSPADM

## 2023-02-15 RX ORDER — ONDANSETRON 4 MG/1
4 TABLET, ORALLY DISINTEGRATING ORAL EVERY 30 MIN PRN
Status: DISCONTINUED | OUTPATIENT
Start: 2023-02-15 | End: 2023-02-15 | Stop reason: HOSPADM

## 2023-02-15 RX ORDER — ONDANSETRON 4 MG/1
4 TABLET, ORALLY DISINTEGRATING ORAL EVERY 6 HOURS PRN
Status: DISCONTINUED | OUTPATIENT
Start: 2023-02-15 | End: 2023-02-16 | Stop reason: HOSPADM

## 2023-02-15 RX ORDER — OXYCODONE HYDROCHLORIDE 5 MG/1
5-10 TABLET ORAL EVERY 4 HOURS PRN
Qty: 30 TABLET | Refills: 0 | Status: SHIPPED | OUTPATIENT
Start: 2023-02-15 | End: 2023-03-06

## 2023-02-15 RX ORDER — BISACODYL 10 MG
10 SUPPOSITORY, RECTAL RECTAL DAILY PRN
Status: DISCONTINUED | OUTPATIENT
Start: 2023-02-15 | End: 2023-02-16 | Stop reason: HOSPADM

## 2023-02-15 RX ORDER — OXYCODONE HYDROCHLORIDE 5 MG/1
10 TABLET ORAL EVERY 4 HOURS PRN
Status: DISCONTINUED | OUTPATIENT
Start: 2023-02-15 | End: 2023-02-16 | Stop reason: HOSPADM

## 2023-02-15 RX ORDER — LIDOCAINE 40 MG/G
CREAM TOPICAL
Status: DISCONTINUED | OUTPATIENT
Start: 2023-02-15 | End: 2023-02-15 | Stop reason: HOSPADM

## 2023-02-15 RX ORDER — BUPIVACAINE HYDROCHLORIDE 7.5 MG/ML
INJECTION, SOLUTION INTRASPINAL PRN
Status: DISCONTINUED | OUTPATIENT
Start: 2023-02-15 | End: 2023-02-15

## 2023-02-15 RX ORDER — NALOXONE HYDROCHLORIDE 0.4 MG/ML
0.2 INJECTION, SOLUTION INTRAMUSCULAR; INTRAVENOUS; SUBCUTANEOUS
Status: DISCONTINUED | OUTPATIENT
Start: 2023-02-15 | End: 2023-02-16 | Stop reason: HOSPADM

## 2023-02-15 RX ORDER — HYDROMORPHONE HYDROCHLORIDE 1 MG/ML
0.2 INJECTION, SOLUTION INTRAMUSCULAR; INTRAVENOUS; SUBCUTANEOUS EVERY 5 MIN PRN
Status: DISCONTINUED | OUTPATIENT
Start: 2023-02-15 | End: 2023-02-15 | Stop reason: HOSPADM

## 2023-02-15 RX ORDER — ONDANSETRON 2 MG/ML
INJECTION INTRAMUSCULAR; INTRAVENOUS PRN
Status: DISCONTINUED | OUTPATIENT
Start: 2023-02-15 | End: 2023-02-15

## 2023-02-15 RX ORDER — CEFAZOLIN SODIUM/WATER 3 G/30 ML
3 SYRINGE (ML) INTRAVENOUS SEE ADMIN INSTRUCTIONS
Status: DISCONTINUED | OUTPATIENT
Start: 2023-02-15 | End: 2023-02-15 | Stop reason: HOSPADM

## 2023-02-15 RX ORDER — FENTANYL CITRATE 50 UG/ML
25 INJECTION, SOLUTION INTRAMUSCULAR; INTRAVENOUS EVERY 5 MIN PRN
Status: DISCONTINUED | OUTPATIENT
Start: 2023-02-15 | End: 2023-02-15 | Stop reason: HOSPADM

## 2023-02-15 RX ORDER — DIMENHYDRINATE 50 MG/ML
25 INJECTION, SOLUTION INTRAMUSCULAR; INTRAVENOUS
Status: DISCONTINUED | OUTPATIENT
Start: 2023-02-15 | End: 2023-02-15 | Stop reason: HOSPADM

## 2023-02-15 RX ORDER — PROCHLORPERAZINE MALEATE 5 MG
5 TABLET ORAL EVERY 6 HOURS PRN
Status: DISCONTINUED | OUTPATIENT
Start: 2023-02-15 | End: 2023-02-16 | Stop reason: HOSPADM

## 2023-02-15 RX ORDER — AMOXICILLIN 250 MG
1 CAPSULE ORAL 2 TIMES DAILY
Status: DISCONTINUED | OUTPATIENT
Start: 2023-02-15 | End: 2023-02-16 | Stop reason: HOSPADM

## 2023-02-15 RX ORDER — KETOROLAC TROMETHAMINE 15 MG/ML
15 INJECTION, SOLUTION INTRAMUSCULAR; INTRAVENOUS EVERY 6 HOURS
Status: COMPLETED | OUTPATIENT
Start: 2023-02-15 | End: 2023-02-16

## 2023-02-15 RX ORDER — TRANEXAMIC ACID 650 MG/1
1950 TABLET ORAL ONCE
Status: COMPLETED | OUTPATIENT
Start: 2023-02-15 | End: 2023-02-15

## 2023-02-15 RX ORDER — SODIUM CHLORIDE, SODIUM LACTATE, POTASSIUM CHLORIDE, CALCIUM CHLORIDE 600; 310; 30; 20 MG/100ML; MG/100ML; MG/100ML; MG/100ML
INJECTION, SOLUTION INTRAVENOUS CONTINUOUS
Status: DISCONTINUED | OUTPATIENT
Start: 2023-02-15 | End: 2023-02-16 | Stop reason: HOSPADM

## 2023-02-15 RX ORDER — CEFAZOLIN SODIUM/WATER 2 G/20 ML
2 SYRINGE (ML) INTRAVENOUS EVERY 8 HOURS
Status: COMPLETED | OUTPATIENT
Start: 2023-02-15 | End: 2023-02-16

## 2023-02-15 RX ORDER — AMOXICILLIN 250 MG
1-2 CAPSULE ORAL 2 TIMES DAILY
Qty: 20 TABLET | Refills: 1 | Status: SHIPPED | OUTPATIENT
Start: 2023-02-15 | End: 2023-04-10

## 2023-02-15 RX ADMIN — FENTANYL CITRATE 50 MCG: 50 INJECTION INTRAMUSCULAR; INTRAVENOUS at 11:16

## 2023-02-15 RX ADMIN — BUPIVACAINE HYDROCHLORIDE IN DEXTROSE 1.8 ML: 7.5 INJECTION, SOLUTION SUBARACHNOID at 07:47

## 2023-02-15 RX ADMIN — ACETAMINOPHEN 975 MG: 325 TABLET ORAL at 23:52

## 2023-02-15 RX ADMIN — CELECOXIB 400 MG: 100 CAPSULE ORAL at 06:19

## 2023-02-15 RX ADMIN — ONDANSETRON 4 MG: 2 INJECTION INTRAMUSCULAR; INTRAVENOUS at 10:07

## 2023-02-15 RX ADMIN — ACETAMINOPHEN 975 MG: 325 TABLET ORAL at 06:18

## 2023-02-15 RX ADMIN — KETOROLAC TROMETHAMINE 15 MG: 15 INJECTION, SOLUTION INTRAMUSCULAR; INTRAVENOUS at 18:52

## 2023-02-15 RX ADMIN — BUPIVACAINE HYDROCHLORIDE AND EPINEPHRINE BITARTRATE 20 ML: 5; .005 INJECTION, SOLUTION PERINEURAL at 11:25

## 2023-02-15 RX ADMIN — SODIUM CHLORIDE, POTASSIUM CHLORIDE, SODIUM LACTATE AND CALCIUM CHLORIDE: 600; 310; 30; 20 INJECTION, SOLUTION INTRAVENOUS at 13:13

## 2023-02-15 RX ADMIN — SENNOSIDES AND DOCUSATE SODIUM 1 TABLET: 50; 8.6 TABLET ORAL at 20:36

## 2023-02-15 RX ADMIN — FENTANYL CITRATE 50 MCG: 50 INJECTION, SOLUTION INTRAMUSCULAR; INTRAVENOUS at 07:35

## 2023-02-15 RX ADMIN — LISINOPRIL 20 MG: 10 TABLET ORAL at 13:15

## 2023-02-15 RX ADMIN — FENTANYL CITRATE 25 MCG: 50 INJECTION, SOLUTION INTRAMUSCULAR; INTRAVENOUS at 10:08

## 2023-02-15 RX ADMIN — FENTANYL CITRATE 50 MCG: 50 INJECTION INTRAMUSCULAR; INTRAVENOUS at 11:27

## 2023-02-15 RX ADMIN — PHENYLEPHRINE HYDROCHLORIDE 0.4 MCG/KG/MIN: 10 INJECTION INTRAVENOUS at 07:50

## 2023-02-15 RX ADMIN — MIDAZOLAM 1 MG: 1 INJECTION INTRAMUSCULAR; INTRAVENOUS at 08:52

## 2023-02-15 RX ADMIN — SENNOSIDES AND DOCUSATE SODIUM 1 TABLET: 50; 8.6 TABLET ORAL at 13:15

## 2023-02-15 RX ADMIN — SODIUM CHLORIDE, POTASSIUM CHLORIDE, SODIUM LACTATE AND CALCIUM CHLORIDE: 600; 310; 30; 20 INJECTION, SOLUTION INTRAVENOUS at 07:04

## 2023-02-15 RX ADMIN — Medication 2 G: at 16:50

## 2023-02-15 RX ADMIN — ASPIRIN 325 MG: 325 TABLET ORAL at 18:52

## 2023-02-15 RX ADMIN — SODIUM CHLORIDE, POTASSIUM CHLORIDE, SODIUM LACTATE AND CALCIUM CHLORIDE: 600; 310; 30; 20 INJECTION, SOLUTION INTRAVENOUS at 07:35

## 2023-02-15 RX ADMIN — TRANEXAMIC ACID 1950 MG: 650 TABLET ORAL at 06:17

## 2023-02-15 RX ADMIN — LATANOPROST 1 DROP: 50 SOLUTION/ DROPS OPHTHALMIC at 22:45

## 2023-02-15 RX ADMIN — Medication 3 G: at 07:35

## 2023-02-15 RX ADMIN — ATORVASTATIN CALCIUM 40 MG: 40 TABLET, FILM COATED ORAL at 13:16

## 2023-02-15 RX ADMIN — MIDAZOLAM 1 MG: 1 INJECTION INTRAMUSCULAR; INTRAVENOUS at 07:32

## 2023-02-15 RX ADMIN — PROPOFOL 75 MCG/KG/MIN: 10 INJECTION, EMULSION INTRAVENOUS at 07:48

## 2023-02-15 RX ADMIN — FENTANYL CITRATE 25 MCG: 50 INJECTION, SOLUTION INTRAMUSCULAR; INTRAVENOUS at 07:47

## 2023-02-15 RX ADMIN — ACETAMINOPHEN 975 MG: 325 TABLET ORAL at 13:16

## 2023-02-15 RX ADMIN — OXYCODONE HYDROCHLORIDE 10 MG: 10 TABLET, FILM COATED, EXTENDED RELEASE ORAL at 06:16

## 2023-02-15 ASSESSMENT — ACTIVITIES OF DAILY LIVING (ADL)
ADLS_ACUITY_SCORE: 33
ADLS_ACUITY_SCORE: 35
ADLS_ACUITY_SCORE: 33
ADLS_ACUITY_SCORE: 35
ADLS_ACUITY_SCORE: 33

## 2023-02-15 NOTE — OR NURSING
Verbal report received from  OR RN and Archana ESPARZA. Phase 1 recovery assumed by Georgina ARRIAGA. Pt post-op spinal for R TKA per .

## 2023-02-15 NOTE — BRIEF OP NOTE
[facility]    Brief Operative Note    Pre-operative diagnosis: Primary osteoarthritis of right knee [M17.11]  Post-operative diagnosis Same as pre-operative diagnosis    Procedure: Procedure(s):  ARTHROPLASTY, KNEE, TOTAL RIGHT  Surgeon: Surgeon(s) and Role:     * Krishna Santiago MD - Primary     * Hasmukh Hawley PA-C - Assisting  Anesthesia: Spinal with Block   Estimated Blood Loss: 200 mL from 2/15/2023  7:34 AM to 2/15/2023 11:07 AM      Drains: Hemovac with one deep tube and one superficial tube   Specimens:   ID Type Source Tests Collected by Time Destination   A : RIGHT Femoral, Tibial, and Patella bone fragments discarded. Bone Fragments Bone OR DOCUMENTATION ONLY Krishna Santiago MD 2/15/2023  8:34 AM      Findings:   right knee osteoarthritis.  Complications: None.  Implants:   Implant Name Type Inv. Item Serial No.  Lot No. LRB No. Used Action   CEMENT SURGICAL TOBRAMYCIN 6197-9-001 - [id number] Cement, Bone CEMENT SURGICAL TOBRAMYCIN 6197-9-001  [employer] [id number] Right 2 Implanted   IMP COMP FEMORAL ZIM SIZE D RT -222-52 - [id number] Total Joint Component/Insert IMP COMP FEMORAL ZIM SIZE D RT -115-52  [employer] [id number] Right 1 Implanted   IMP COMP PATELLA ZIM NEXGEN 8.5X32MM -680-32 - [id number] Total Joint Component/Insert IMP COMP PATELLA ZIM NEXGEN 8.5X32MM -382-32  [employer] [id number] Right 1 Implanted   IMP COMP TIBIAL STEMMED ZIM NEXGEN SIZE 3 5980-37-01 - [id number] Total Joint Component/Insert IMP COMP TIBIAL STEMMED ZIM NEXGEN SIZE 3 5980-37-01  [employer] [id number] Right 1 Implanted   IMP PLUG TAPER NEXGEN ZIM -361-00 - [id number] Metallic Hardware/[place] IMP PLUG TAPER NEXGEN ZIM -981-00  [employer] [id number] Right 1 Implanted   IMP ART SURFACE ZIM NEXGEN LPS CD 3-4 14MM -313-14 - [id number] Total Joint Component/Insert IMP ART SURFACE ZIM NEXGEN LPS CD 3-4 14MM  -834-14  [employer] [id number] Right 1 Implanted

## 2023-02-15 NOTE — PROGRESS NOTES
S-(situation): Patient arrives to room 249 via cart from PACU    B-(background): Right TKA    A-(assessment): Afebrile. VSS. On room air. Lungs are diminished. Skin intact. Red Bloody drainage to dressing for drain, Marked. A&Ox4. Minimal pain to right knee. Scheduled tylenol given.     R-(recommendations): Orders reviewed with patient. Will monitor patient per MD orders.     Inpatient nursing criteria listed below were met:    Health care directives status obtained and documented: Yes  VTE ordered/documented: Yes  Skin issues/needs documented:NA  Isolation addressed and Signage used: NA  Fall Prevention: Care plan updated NA Education given and documented NA  Care Plan initiated and Co-Morbidities added: Yes  Education Assessment documented:Yes  Admission Education Documented: Yes  If present CAUTI/CLABI Education done: NA  New medication patient education completed and documented (Possible Side Effects of Common Medications handout): Yes  Allergies Reviewed: Yes  Admission Medication Reconciliation completed: Yes  Home medications if not able to send immediately home with family stored here: NA  Reminder note placed in discharge instructions regarding home meds: NA  Individualized care needs/preferences addressed and charted: Yes  Provider Notified that patient has arrived to the unit: Yes

## 2023-02-15 NOTE — OR NURSING
Transfer from  PACU to Room 249  Transferred to bed via [facility] Mat     S: 78 y/o female  S/P right TKA       Anesthesia Type:  spinal       Surgeon:  Dr. Santiago       Allergies:  See Medication Reconciliation Record       DNR: NO       B:  Pertinent Medical History:   Past Medical History:   Diagnosis Date     Arthritis      Arthritis of right acromioclavicular joint 07/28/2021     Cervical dysplasia 1988     COPD (chronic obstructive pulmonary disease) (H)      Degenerative joint disease of hand, left      Female stress incontinence 02/22/2022     Glaucoma      Hyperlipidemia LDL goal < 130      Hypertension goal BP (blood pressure) < 140/90      Hypothyroid      Impaired glucose tolerance 05/29/2012     Microscopic hematuria      MMT (medial meniscus tear) 09/21/2006    LT     Moderate recurrent major depression (H) 02/13/2014     Morbid obesity (H)      JOSE (obstructive sleep apnea) 07/24/2015    Doesn't use cpap     Primary osteoarthritis of both knees      RLS (restless legs syndrome)      Sensorineural hearing loss      Shingles 01/27/2022     Ulnar neuropathy at elbow, left      Vitamin D insufficiency                Surgical History:    Past Surgical History:   Procedure Laterality Date     BIOPSY       BLADDER SURGERY  03/19/2018     CERVIX SURGERY  1988    cervical cone     COLONOSCOPY       LAPAROSCOPIC OOPHORECTOMY Right 03/19/2018    with hysteroscopy/EMB, and pubovaginal sling      ROTATOR CUFF REPAIR RT/LT Right 09/01/2021     TONSILLECTOMY & ADENOIDECTOMY         A:  EBL: 200 ml        IVF:  1000 ml LR        UOP:  None; void last at 0730        NPO:  ___Yes __x_No; ice chips         Vomiting:  ___Yes _x__No; denies c/o nausea         Drainage: sanguinous noted in drain.        Skin Integrity: normal x/ incision and drain                RFO: __x_Yes___No; hemovac drain R knee                 Brace/sling/equipment:  _x__Yes___No; pneumoboots         See PACU record for ongoing assessment, vital  signs and pain assessment.    R: Post-Op vitals and assessments as ordered/indicated per patient's condition.       Follow Post-Op orders and notify Physician prn.       Continue to involve patient/family in plan of care and discharge planning.       Reinforce Pre-Operative education.       Implement skin safety interventions as appropriate.  Report given to Nathan GARCÍA RN, CN, med/surg    Name: Georgina Collado RN, PACU

## 2023-02-15 NOTE — INTERVAL H&P NOTE
"I have reviewed the surgical (or preoperative) H&P that is linked to this encounter, and examined the patient. There are no significant changes    Clinical Conditions Present on Arrival:  Clinically Significant Risk Factors Present on Admission                    # Severe Obesity: Estimated body mass index is 50.21 kg/m  as calculated from the following:    Height as of 2/6/23: 1.556 m (5' 1.26\").    Weight as of 2/6/23: 121.6 kg (268 lb).       "

## 2023-02-15 NOTE — PROGRESS NOTES
Gina Yeh is a 77 year old female with severe right knee osteoarthritis.  Xray shows severe wear.  She has failed conservative treatment.  Range of motion is 5-90 degrees.  Presents for right total knee arthroplasty.  This H&P has been reviewed and there are no clinically significant changes in the patient s condition.  The patient was evaluated by myself as well as (H&P provider) prior to surgery. The patient is approved for the surgery and the stated surgical procedure is still clinically indicated.  Risks, benefits, potential complications and alternatives were discussed.    Krishna Santiago M.D.  Department of Orthopaedic Surgery  [facility]  Pager: [phone]

## 2023-02-15 NOTE — PROGRESS NOTES
02/15/23 1430   Appointment Info   Signing Clinician's Name / Credentials (PT) Sarah Beth Schofield PT, DPT, ATC, LAT   Rehab Comments (PT) R TKA, brace on when up. 200mL EBL. Spinal block.   Quick Adds   Quick Adds Certification       Present no   Living Environment   People in Home alone   Current Living Arrangements apartment   Home Accessibility no concerns  (elevator access)   Number of Stairs, Within Home, Primary none   Transportation Anticipated family or friend will provide  (4 door sedan)   Living Environment Comments family plans to stay with patient as long as she needs the help. walk in shower.   Self-Care   Usual Activity Tolerance moderate   Current Activity Tolerance fair   Regular Exercise No   Equipment Currently Used at Home cane, straight;walker, rolling;grab bar, tub/shower;grab bar, toilet;shower chair;raised toilet seat  (handheld shower. reacher. sock aide)   Fall history within last six months no   General Information   Onset of Illness/Injury or Date of Surgery 02/15/23   Referring Physician Dr. Santiago   Patient/Family Therapy Goals Statement (PT) home with OP PT   Pertinent History of Current Problem (include personal factors and/or comorbidities that impact the POC) Patient is a 77 year old female, day 0 status post right total knee arthroplasty by Dr. Santiago, 200mL EBL, spinal block. Patient with a previous medical history of JOSE, obesity, HTN, COPD, restless leg syndrome, sensorineural hearing loss.   Existing Precautions/Restrictions fall;brace worn when out of bed   Weight-Bearing Status - LUE full weight-bearing   Weight-Bearing Status - RUE full weight-bearing   Weight-Bearing Status - LLE full weight-bearing   Weight-Bearing Status - RLE weight-bearing as tolerated   General Observations PT eval and treat post op knee. Activity orders: ambulate, up with assit, up in chair, ice, elevate, IS, WBAT, immobilizer on when up.   Cognition   Affect/Mental Status  (Cognition) WFL   Orientation Status (Cognition) oriented x 4   Follows Commands (Cognition) WFL   Integumentary/Edema   Integumentary/Edema Comments post op dressing with shadowing at left medial knee YANCY drain site   Posture    Posture Forward head position   Range of Motion (ROM)   ROM Comment 0-70 left knee actively   Strength (Manual Muscle Testing)   Strength (Manual Muscle Testing) Able to perform R SLR;Able to perform L SLR   Bed Mobility   Bed Mobility supine-sit   Supine-Sit [place] (Bed Mobility) verbal cues;supervision   Bed Mobility Limitations decreased ability to use legs for bridging/pushing;decreased ability to use arms for pushing/pulling   Impairments Contributing to Impaired Bed Mobility decreased strength   Assistive Device (Bed Mobility) bed rails   Transfers   Comment, (Transfers) did not progress due to bleeding   Balance   Balance Comments IND short sitting balance.   Sensory Examination   Sensory Perception Comments diminished RLE   Coordination   Coordination no deficits were identified   Muscle Tone   Muscle Tone Comments right calf cramp throughout session   Clinical Impression   Criteria for Skilled Therapeutic Intervention Yes, treatment indicated   PT Diagnosis (PT) impaired mobility, muscle weakness, joint stiffness   Influenced by the following impairments day 0 status post right total knee arthroplasty   Functional limitations due to impairments pain, use of walker and immobilizer, family assistance for cares   Clinical Presentation (PT Evaluation Complexity) Evolving/Changing   Clinical Presentation Rationale post op status, clinical judgement, medical history   Clinical Decision Making (Complexity) moderate complexity   Planned Therapy Interventions (PT) balance training;gait training;cryotherapy;home exercise program;ROM (range of motion);strengthening;stair training;patient/family education;transfer training;home program guidelines   Anticipated Equipment Needs at Discharge  (PT)   (walker from home)   Risk & Benefits of therapy have been explained evaluation/treatment results reviewed;participants voiced agreement with care plan;participants included;patient   Clinical Impression Comments Patient presents with functional mobility limitations consistent with post operative total knee arthroplasty. Patient from apartment alone, walker baseline and no stairs. She has plans for family to stay with her as long as is needed. Currenlty her mobility is limited by post op acuity and bleeding. Anticipate with medical management she will make good progress towards discharging home tomorrow. She would benefit from outpatient physical therapy to address post op recovery in accordance with her surgeon's protocol.   PT Total Evaluation Time   PT Eval, Moderate Complexity Minutes (46414) 15   Therapy Certification   Start of care date 02/15/23   Certification date from 02/15/23   Certification date to 02/16/23   Medical Diagnosis status post total knee arthroplasty   Physical Therapy Goals   PT Frequency Daily   PT Predicted Duration/Target Date for Goal Attainment 02/16/23   PT Goals Bed Mobility;Transfers;Gait;Stairs   PT: Bed Mobility Modified independent;Supine to/from sit   PT: Transfers Modified independent;Sit to/from stand;Assistive device   PT: Gait Supervision/stand-by assist;Rolling walker;100 feet;Within precautions   PT: Stairs Minimal assist;2 stairs;Rail on both sides;Within precautions   Interventions   Interventions Quick Adds Therapeutic Activity;Therapeutic Procedure   Therapeutic Procedure/Exercise   Ther. Procedure: strength, endurance, ROM, flexibillity Minutes (68461) 15   Symptoms Noted During/After Treatment increased pain;fatigue   Treatment Detail/Skilled Intervention Provided post op HEP with written education reminders. Completed x10 ankle pumps, quad sets, glute sets, hamstring sets, heel slides, SAQ, hip abd to adduction.   Therapeutic Activity   Therapeutic Activities:  dynamic activities to improve functional performance Minutes (01644) 25   Symptoms Noted During/After Treatment Fatigue   Treatment Detail/Skilled Intervention PT: patient lying very uncomfortable in the bed upon PT arrival, family present. Patient requests to move her limbs, and this improved her status some. Educated patient and  in post op precaution with immobilizer, WBAT, activity instructions, IS, OP PT, car transfer with immobilizer, set up in bed, elevation, icing, recliner set up, knee extension stretching and position at rest. Following HEP patient with right knee drainage through her previously reinforced YANCY drain site. PT marked shadowing and to prevent additional bleeding PT deferred mobility to once nursing had time to address the limb. PT instructed patient and  in knee immobilizer application, support, purpose and DC guidelines. Patient completed supine to right side sitting EOB with use of walker at her bedside to simulate her bedside table. Patient IND short sitting. Due to patient's height and hip width unable to acquire proper fitting chair, agreeable to sitting EOB. Provide tray in front. Instructed in IS use and completed with good mechanics. Reviewed activity instructions following surgery, encouraged elevation however patient noting improved comfort and well being in sitting. Left sitting EOB with needs within reach and family present.   PT Discharge Planning   PT Plan ambulate, 1 stair, brace management   PT Discharge Recommendation (DC Rec) home with assist;home with outpatient physical therapy   PT Rationale for DC Rec Patient from apartment alone, walker baseline and no stairs. She has plans for family to stay with her as long as is needed. Currenlty her mobility is limited by post op acuity and bleeding. Anticipate with medical management she will make good progress towards discharging home tomorrow. She would benefit from outpatient physical therapy to address post op recovery  in accordance with her surgeon's protocol.   PT Brief overview of current status Right knee 0-70 degrees with pain. SBA supine to sitting EOB. IND short sitting.   Total Session Time   Timed Code Treatment Minutes 40   Total Session Time (sum of timed and untimed services) 55     M [facility]  OUTPATIENT PHYSICAL THERAPY EVALUATION  PLAN OF TREATMENT FOR OUTPATIENT REHABILITATION  (COMPLETE FOR INITIAL CLAIMS ONLY)  Patient's Last Name, First Name, M.I.  YOB: 1945  NareshGina tucker                        Provider's Name  [facility] Medical Record No.  [id number]                             Onset Date:  02/15/23   Start of Care Date:  02/15/23   Type:     _X_PT   ___OT   ___SLP Medical Diagnosis:  status post total knee arthroplasty              PT Diagnosis:  impaired mobility, muscle weakness, joint stiffness Visits from SOC:  1     See note for plan of treatment, functional goals and certification details    I CERTIFY THE NEED FOR THESE SERVICES FURNISHED UNDER        THIS PLAN OF TREATMENT AND WHILE UNDER MY CARE     (Physician co-signature of this document indicates review and certification of the therapy plan).                Occupational Therapy: Orders received. Chart reviewed and discussed with care team.? Occupational Therapy not indicated due to patient with good DME set up at home, family able to support at discharge and no cognition concerns.? Defer discharge recommendations to PT.? Will complete orders.     Thank you for your referral.  Sarah Beth Schofield, PT, DPT, ATC, LAT    [facility]  O: [phone]  E: [email or web address]

## 2023-02-15 NOTE — OP NOTE
Krishna Chaves MD Physician   Procedures    -   DATE OF SERVICE:  2/15/2023    SURGEON: KRISHNA CHAVES MD   ASSISTANT: Hasmukh Hawley PA-C     PREOPERATIVE DIAGNOSIS: Right knee osteoarthritis.   POSTOPERATIVE DIAGNOSIS: Right knee osteoarthritis.   PROCEDURE PERFORMED: Right total knee arthroplasty.     COMPONENTS IMPLANTED:  Alexis      Femur LPS size D      Tibia size 3 keeled       Patella size 32     Spacer 14 LPS    HISTORY   This is a 77 year old  female with severe right knee osteoarthritis.   She has failed conservative treatment.   She presents now   for right total knee arthroplasty. She has motion from 5-90 degrees.   Assistance of Hasmukh Hawley PA-C was medically necessary given the technical complexity of the case; with assistance with patient positioning, retraction and knee joint exposure, limb manipulation for femoral and tibial osteotomies, assistance with implant placement, trial and final arthroplasty knee implant reduction, and wound closure.    DESCRIPTION OF PROCEDURE   After a smooth Spinal anesthetic, the patient's right leg was prepped and   draped in sterile fashion. A pause was performed for patient verification.   The leg was exsanguinated, tourniquet inflated to 300 mm Hg. A longitudinal   incision was made centered over the patella, carried down through   subcutaneous tissue. A medial parapatellar capsulotomy was performed. Fat pad   was excised. Anterior cruciate ligament excised. There was significant wear   noted on the knee, primarily lateral . The Alexis NexGen   system was utilized. An intramedullary hole was made in the femur. The anterior femur was   cut at 3 degrees external rotation, distal femur cut at 5 degrees valgus.   Sizing was performed to size  D and finishing cuts made to the size D   component.   Medial and lateral menisci were then removed with electrocautery. An intramedullary hole   was made in the tibia and the tibia cut at 7 degrees posterior slope.  We  trimmed bone prominence from the posterior femoral condyles.  Sizing   was performed to a 3 component.  With the 10 mm spacer in, it was tight lateral.  I did a lateral retinacular release and fenestrated the iliotibial band.  With this I got a 12 spacer in, but it was still very loose medially at 20 degrees flexion.  It balanced in extension.  In flexion, I felt the posterior cruciate ligament was tight.  I removed the posterior cruciate ligament and cut for the LPS box.  I also released the lateral collateral ligament and popliteus to get a 14 spacer in. The extension balance at 0 was great.  There was symmetric mild laxity at 30 degrees.  In full flexion the lateral side was now loose.  I felt this was overall our best balance.  The tibia was prepared to the 3 keeled tibia. The patella   was osteotomized from 20 to 13 mm and fit for a 32 mm patella. Bone surfaces   were then prepared with jet lavage and drying. The tibia had a nub extension placed.  All components were then cemented into place   with tobramycin-impregnated cement. Excess cement was removed and cement   hardened. Joint block was injected.  After searching for all excess cement, the wound was thoroughly   Irrigated with dilute betadyne followed by antibiotic jet lavage.   The size 14 LPS implant tibial spacer was inserted.  The tourniquet was deflated, and bleeding was controlled with   electrocautery. We did do a large lateral retinacular release adjacent to the   patella. The patellar tracking was now good.   The quadriceps mechanism was closed over a medium Hemovac with interrupted #2 Ethibond and #2  Vicryl suture. A second Hemovac was placed and the subcutaneous   tissue closed with interrupted 2-0 Vicryl suture. Skin edges were closed   with a running 3-0 Monocryl subcuticular suture. Dermabond was applied.   Sterile dressings   were applied.   The patient was taken to the recovery room in stable condition.     ALICIA CHAVES MD     cc:    Verena Vee   PATIENT: Gina Gomezmillicentgarrett   MR#: [id number]   : 1945   ADMIT DATE: 2/15/2023

## 2023-02-15 NOTE — PROGRESS NOTES
Patient vital signs are at baseline: Yes  Patient able to ambulate as they were prior to admission or with assist devices provided by therapies during their stay:  Yes  Patient MUST void prior to discharge:  Yes  Patient able to tolerate oral intake:  Yes  Pain has adequate pain control using Oral analgesics:  Yes  Does patient have an identified :  Yes  Has goal D/C date and time been discussed with patient:  Yes     Scheduled tylenol given for minimal pain to right knee. Released suction for 1 hour from 1755 to 1855 per MD. Dressing to drain reinforced and slight bleeding noted to dressing. Teds and SCDs in place. No nausea or dizziness reported.

## 2023-02-15 NOTE — ANESTHESIA CARE TRANSFER NOTE
Patient: Gina Yeh    Procedure: Procedure(s):  ARTHROPLASTY, KNEE, TOTAL RIGHT       Diagnosis: Primary osteoarthritis of right knee [M17.11]  Diagnosis Additional Information: No value filed.    Anesthesia Type:   Spinal     Note:    Oropharynx: oropharynx clear of all foreign objects and spontaneously breathing  Level of Consciousness: drowsy  Oxygen Supplementation: face mask    Independent Airway: airway patency satisfactory and stable  Dentition: dentition unchanged  Vital Signs Stable: post-procedure vital signs reviewed and stable  Report to RN Given: handoff report given  Patient transferred to: PACU    Handoff Report: Identifed the Patient, Identified the Reponsible Provider, Reviewed the pertinent medical history, Discussed the surgical course, Reviewed Intra-OP anesthesia mangement and issues during anesthesia, Set expectations for post-procedure period and Allowed opportunity for questions and acknowledgement of understanding      Vitals:  Vitals Value Taken Time   /77 02/15/23 1220   Temp 98.2  F (36.8  C) 02/15/23 1150   Pulse 87 02/15/23 1205   Resp 20 02/15/23 1220   SpO2 92 % 02/15/23 1225   Vitals shown include unvalidated device data.    Electronically Signed By: ARY Valdes CRNA  February 15, 2023  2:48 PM

## 2023-02-15 NOTE — ANESTHESIA PROCEDURE NOTES
"Intrathecal catheter Procedure Note    Pre-Procedure   Staff -        Resident/Fellow: Adrian Garza       CRNA: Archana Booker APRN CRNA       Performed By: CRNA and SRNA       Pre-Anesthestic Checklist: patient identified, IV checked, risks and benefits discussed, informed consent, monitors and equipment checked, pre-op evaluation, at physician/surgeon's request and post-op pain management  Timeout:       Correct Patient: Yes        Correct Procedure: Yes        Correct Site: Yes        Correct Position: Yes   Procedure Documentation  Procedure: intrathecal catheter       # of attempts: 2 and  # of redirects:  2    Assessment/Narrative         Sensory Level: T10     Comments:  1 attempt per Adrian MARTELL.  2nd successful attempt per Archana Booker CRNA with a 4.69 in 25 gangue Joe pencil point needle without difficulty.       FOR [facility] ([facility]) ONLY:   Pain Team Contact information: please page the Pain Team Via [employer]ne[employer]. Search \"Pain\". During daytime hours, please page the attending first. At night please page the resident first.    "

## 2023-02-15 NOTE — ANESTHESIA PROCEDURE NOTES
Adductor canal Procedure Note    Pre-Procedure   Staff -        Resident/Fellow: Adrian Garza       CRNA: Archana Booker APRN CRNA       Performed By: CRNA and JAYSHREE       Location: post-op       Pre-Anesthestic Checklist: patient identified, IV checked, site marked, risks and benefits discussed, informed consent, monitors and equipment checked, pre-op evaluation, at physician/surgeon's request and post-op pain management  Timeout:       Correct Patient: Yes        Correct Procedure: Yes        Correct Site: Yes        Correct Position: Yes        Correct Laterality: Yes        Site Marked: Yes  Procedure Documentation  Procedure: Adductor canal       Laterality: right       Patient Position: supine       Patient Prep/Sterile Barriers: sterile gloves, mask       Skin prep: Chloraprep       Local skin infiltrated with 1 mL of 1% lidocaine.        Needle Type: insulated       Needle Gauge: 22.        Needle Length (millimeters): 100        Ultrasound guided       1. Ultrasound was used to identify targeted nerve, plexus, vascular marker, or fascial plane and place a needle adjacent to it in real-time.       2. Ultrasound was used to visualize the spread of anesthetic in close proximity to the above referenced structure.       3. A permanent image is entered into the patient's record.       Nerve Stim: Initial Level 0.05 mA.  Lowest motor response mA.    Assessment/Narrative         The placement was negative for: blood aspirated, painful injection and site bleeding       Paresthesias: No.       Test dose of mL at.         Test dose negative, 3 minutes after injection, for signs of intravascular, subdural, or intrathecal injection.       Bolus given via needle..        Secured via.        Insertion/Infusion Method: Single Shot       Complications: none       Injection made incrementally with aspirations every 5 mL.     Comments:  Pt tolerated procedure well.  Patient's pain decreased to8/ 10 from 010.  No complications  "noted.  Will follow if needed. Placed per JAYSHREE Velez under the direct supervision of Archana Booker CRNA without difficulty.       FOR [facility] ([facility] ONLY:   Pain Team Contact information: please page the Pain Team Via [employer]. Search \"Pain\". During daytime hours, please page the attending first. At night please page the resident first.    "

## 2023-02-16 ENCOUNTER — APPOINTMENT (OUTPATIENT)
Dept: PHYSICAL THERAPY | Facility: CLINIC | Age: 78
End: 2023-02-16
Attending: ORTHOPAEDIC SURGERY
Payer: COMMERCIAL

## 2023-02-16 VITALS
DIASTOLIC BLOOD PRESSURE: 53 MMHG | HEIGHT: 61 IN | WEIGHT: 268.96 LBS | RESPIRATION RATE: 18 BRPM | SYSTOLIC BLOOD PRESSURE: 129 MMHG | HEART RATE: 86 BPM | TEMPERATURE: 98.9 F | BODY MASS INDEX: 50.78 KG/M2 | OXYGEN SATURATION: 95 %

## 2023-02-16 LAB
ANION GAP SERPL CALCULATED.3IONS-SCNC: 10 MMOL/L (ref 7–15)
BUN SERPL-MCNC: 35.7 MG/DL (ref 8–23)
CALCIUM SERPL-MCNC: 9.2 MG/DL (ref 8.8–10.2)
CHLORIDE SERPL-SCNC: 102 MMOL/L (ref 98–107)
CREAT SERPL-MCNC: 0.98 MG/DL (ref 0.51–0.95)
DEPRECATED HCO3 PLAS-SCNC: 24 MMOL/L (ref 22–29)
GFR SERPL CREATININE-BSD FRML MDRD: 60 ML/MIN/1.73M2
GLUCOSE SERPL-MCNC: 132 MG/DL (ref 70–99)
HGB BLD-MCNC: 10 G/DL (ref 11.7–15.7)
POTASSIUM SERPL-SCNC: 4.9 MMOL/L (ref 3.4–5.3)
SODIUM SERPL-SCNC: 136 MMOL/L (ref 136–145)

## 2023-02-16 PROCEDURE — 97530 THERAPEUTIC ACTIVITIES: CPT | Mod: GP | Performed by: PHYSICAL THERAPIST

## 2023-02-16 PROCEDURE — 97110 THERAPEUTIC EXERCISES: CPT | Mod: GP | Performed by: PHYSICAL THERAPIST

## 2023-02-16 PROCEDURE — 36415 COLL VENOUS BLD VENIPUNCTURE: CPT | Performed by: ORTHOPAEDIC SURGERY

## 2023-02-16 PROCEDURE — 85018 HEMOGLOBIN: CPT | Performed by: ORTHOPAEDIC SURGERY

## 2023-02-16 PROCEDURE — 250N000013 HC RX MED GY IP 250 OP 250 PS 637: Performed by: ORTHOPAEDIC SURGERY

## 2023-02-16 PROCEDURE — 80048 BASIC METABOLIC PNL TOTAL CA: CPT | Performed by: ORTHOPAEDIC SURGERY

## 2023-02-16 PROCEDURE — 250N000011 HC RX IP 250 OP 636: Performed by: ORTHOPAEDIC SURGERY

## 2023-02-16 RX ADMIN — ACETAMINOPHEN 975 MG: 325 TABLET ORAL at 08:49

## 2023-02-16 RX ADMIN — KETOROLAC TROMETHAMINE 15 MG: 15 INJECTION, SOLUTION INTRAMUSCULAR; INTRAVENOUS at 06:21

## 2023-02-16 RX ADMIN — KETOROLAC TROMETHAMINE 15 MG: 15 INJECTION, SOLUTION INTRAMUSCULAR; INTRAVENOUS at 11:21

## 2023-02-16 RX ADMIN — LEVOTHYROXINE SODIUM 137 MCG: 25 TABLET ORAL at 08:49

## 2023-02-16 RX ADMIN — ATORVASTATIN CALCIUM 40 MG: 40 TABLET, FILM COATED ORAL at 08:49

## 2023-02-16 RX ADMIN — POLYETHYLENE GLYCOL 3350 17 G: 17 POWDER, FOR SOLUTION ORAL at 08:50

## 2023-02-16 RX ADMIN — KETOROLAC TROMETHAMINE 15 MG: 15 INJECTION, SOLUTION INTRAMUSCULAR; INTRAVENOUS at 00:01

## 2023-02-16 RX ADMIN — Medication 2 G: at 00:01

## 2023-02-16 RX ADMIN — ASPIRIN 325 MG: 325 TABLET ORAL at 08:49

## 2023-02-16 RX ADMIN — SENNOSIDES AND DOCUSATE SODIUM 1 TABLET: 50; 8.6 TABLET ORAL at 08:49

## 2023-02-16 RX ADMIN — LISINOPRIL 20 MG: 10 TABLET ORAL at 08:49

## 2023-02-16 ASSESSMENT — ACTIVITIES OF DAILY LIVING (ADL)
ADLS_ACUITY_SCORE: 34
ADLS_ACUITY_SCORE: 34
DEPENDENT_IADLS:: INDEPENDENT
ADLS_ACUITY_SCORE: 34

## 2023-02-16 NOTE — PROGRESS NOTES
Care Management Discharge Note    Discharge Date: 02/16/2023       Discharge Disposition: Home, Home Care    Discharge Services: Other (see comment) (Home Care PT)    Discharge DME:  Walker     Discharge Transportation: family or friend will provide (4 door sedan)    Private pay costs discussed: Not applicable    Patient/family educated on Medicare website which has current facility and service quality ratings:  Yes     Education Provided on the Discharge Plan:  Yes     Persons Notified of Discharge Plans: Patient, Daughter, Lita     Patient/Family in Agreement with the Plan: yes    Handoff Referral Completed: Yes      EZIO Jeffers  [facility]   [phone]

## 2023-02-16 NOTE — PROGRESS NOTES
"ORTHO PROGRESS NOTE    POD # right   Procedure(s):  ARTHROPLASTY, KNEE, TOTAL RIGHT - on 2/15/2023    Pain:  mild    /53 (BP Location: Right arm)   Pulse 86   Temp 98.9  F (37.2  C) (Oral)   Resp 18   Ht 1.549 m (5' 1\")   Wt 122 kg (268 lb 15.4 oz)   SpO2 95%   BMI 50.82 kg/m      Temp (24hrs), Av.3  F (36.8  C), Min:97.2  F (36.2  C), Max:99.1  F (37.3  C)      Recent Labs   Lab Test 23  0536 23  0933 22  1053   HGB 10.0* 12.2 13.6         Drains: hemovac 725 total.  Discontinued           Circulation intact.   Sensation intact.   Calves soft and nontender.   Incision is covered      PT  Walked 250 feet.  Range of motion 0-50 degrees          ASSESSMENT:  Right total knee arthroplasty doing well.    PLAN:  Discharge today.  Aspirin: Take 1 tablet (325 mg) by mouth twice daily for blood thinning for 6 weeks.   Tylenol 1000 mg three times daily.  Oxycodone as needed for pain.  Wear graduated compression stockings for 1 month.  After 2 weeks they may be off at night.   Weight bearing as tolerated.  Use walker.  You do not need the knee immobilizer.  Return to clinic 10-14 days.    Krishna Santiago M.D.  Department of Orthopaedic Surgery  [facility]  Pager: [phone]      "

## 2023-02-16 NOTE — PLAN OF CARE
Physical Therapy Discharge Summary    Reason for therapy discharge:    Discharged to home with home therapy.  All goals and outcomes met, no further needs identified.    Progress towards therapy goal(s). See goals on Care Plan in [facility] electronic health record for goal details.  Goals met    Therapy recommendation(s):    Continued therapy is recommended.  Rationale/Recommendations:   . Patient would benefit from continued skilled therapeutic intervention in order to progress them towards a higher level of function in accordance with their surgeon's protocol.    Thank you for your referral.  Sarah Beth Schofield, PT, DPT, ATC, [facility]  O: [phone]  E: [email or web address]

## 2023-02-16 NOTE — ANESTHESIA POSTPROCEDURE EVALUATION
Patient: Gina Yeh    Procedure: Procedure(s):  ARTHROPLASTY, KNEE, TOTAL RIGHT       Anesthesia Type:  Spinal    Note:  Disposition: Inpatient   Postop Pain Control: Uneventful            Sign Out: Well controlled pain   PONV: No   Neuro/Psych: Uneventful            Sign Out: Acceptable/Baseline neuro status   Airway/Respiratory: Uneventful            Sign Out: Acceptable/Baseline resp. status   CV/Hemodynamics: Uneventful            Sign Out: Acceptable CV status   Other NRE: NONE   DID A NON-ROUTINE EVENT OCCUR? No    Event details/Postop Comments:  Pt already discharged from hospital, called patient on her cell phone, pt was happy with anesthesia care.  No complications.  Pt aware to follow up if any new concerns/questions.           Last vitals:  Vitals Value Taken Time   /77 02/15/23 1220   Temp 98.2  F (36.8  C) 02/15/23 1150   Pulse 87 02/15/23 1205   Resp 20 02/15/23 1220   SpO2 92 % 02/15/23 1225   Vitals shown include unvalidated device data.    Electronically Signed By: ARY Zaman CRNA  February 16, 2023  3:43 PM

## 2023-02-16 NOTE — PROGRESS NOTES
"PRIMARY DIAGNOSIS: \"GENERIC\" NURSING  OUTPATIENT/OBSERVATION GOALS TO BE MET BEFORE DISCHARGE:  1. ADLs back to baseline: No    2. Activity and level of assistance: Up with standby assistance.    3. Pain status: Improved but still requiring IV narcotics.    4. Return to near baseline physical activity: No     Discharge Planner Nurse   Safe discharge environment identified: Yes  Barriers to discharge: Yes- Pain management       Entered by: Gia Cerna RN 02/16/2023 12:31 AM     Please review provider order for any additional goals.   Nurse to notify provider when observation goals have been met and patient is ready for discharge.      "

## 2023-02-16 NOTE — PLAN OF CARE
Goal Outcome Evaluation:      Plan of Care Reviewed With: patient, family       S-(situation): Patient discharged to home via wheelchair with daughter at 1350.    B-(background): Right TKA    A-(assessment): VSS. Afebrile. Drain pulled with minimal output today. Lungs are clear. Walking in halls. IV removed.     R-(recommendations): Discharge instructions reviewed with patient and daughter. Listed belongings gathered and returned to patient.        Discharge Nursing Criteria:     Care Plan and Patient education resolved: Yes    New Medications- pt has been educated about purpose and side effects: Yes    Vaccines  Influenza status verified at discharge:  Yes      MISC  Prescriptions if needed, hard copies sent with patient  NA  Home medications returned to patient: NA  Medication Bin checked and emptied on discharge Yes  Patient reports post-discharge pain management plan is effective: Yes    TKA/MARK ONLY:   Discharged with AYANA Stockings Yes  AYANA stocking Education Completed Yes

## 2023-02-16 NOTE — DISCHARGE SUMMARY
DISCHARGE SUMMARY    Primary MD: Verena Vee    ADMIT DATE: 2/15/2023  DISCHARGE DATE: 2/16/2023    DISCHARGE DIAGNOSIS:  Right knee osteoarthritis.    PROCEDURE:   Right total knee arthroplasty.    HISTORY:  Gina Yeh is a 77 year old female with severe osteoarthritis of the right knee.  She has failed conservative treatment and presents for total knee arthroplasty.    HOSPITAL COURSE:  On 2/15/2023 Ms. Yeh underwent total knee arthroplasty without complications.  The hemovac was discontinued on POD # 1.  Her hemoglobin dropped to a low of 10.0.   Progress with physical therapy was very good .  She walked 250 feet by POD # 1.  Range of motion was 0-50 degrees.  She is discharged on POD # 1 with wound healing well.  She will start Aspirin for blood thinning.  For pain control a prescription for oxycodone  was written.  RTC 1 1/2 weeks for xray of the knee.      Recent Labs   Lab Test 02/16/23  0536 02/06/23  0933 02/22/22  1053 06/28/21  1553 04/10/19  0841 02/28/18  0746   HGB 10.0* 12.2 13.6 14.1   < > 13.6   PLT  --  279  --  294  --  263    < > = values in this interval not displayed.     Recent Labs   Lab Test 02/16/23  0536 02/06/23  0933 08/24/22  0818   POTASSIUM 4.9 4.6 4.8   BUN 35.7* 25 23   CR 0.98* 0.84 0.89     No results for input(s): INR in the last 60508 hours.    Allergies: Nkda [no known drug allergies]            Discharge Medications:     Current Discharge Medication List      START taking these medications    Details   acetaminophen (TYLENOL) 325 MG tablet Take 3 tablets (975 mg) by mouth every 8 hours as needed for other (mild pain)  Qty: 100 tablet, Refills: 1    Associated Diagnoses: History of total right knee replacement      aspirin (ASA) 325 MG EC tablet Take 1 tablet (325 mg) by mouth 2 times daily (with meals) for 40 days  Qty: 80 tablet, Refills: 0    Associated Diagnoses: History of total right knee replacement      oxyCODONE (ROXICODONE) 5 MG tablet Take 1-2  tablets (5-10 mg) by mouth every 4 hours as needed for moderate to severe pain  Qty: 30 tablet, Refills: 0    Associated Diagnoses: History of total right knee replacement      senna-docusate (SENOKOT-S/PERICOLACE) 8.6-50 MG tablet Take 1-2 tablets by mouth 2 times daily Take while on oral narcotics to prevent or treat constipation.  Qty: 20 tablet, Refills: 1    Comments: While taking narcotics  Associated Diagnoses: History of total right knee replacement         CONTINUE these medications which have NOT CHANGED    Details   atorvastatin (LIPITOR) 40 MG tablet TAKE 1 TABLET EVERY DAY  Qty: 90 tablet, Refills: 3    Associated Diagnoses: Hyperlipidemia with target LDL less than 130      Ferrous Sulfate (IRON) 325 (65 FE) MG tablet Take 1 tablet by mouth daily      levothyroxine (SYNTHROID/LEVOTHROID) 137 MCG tablet TAKE 1 TABLET EVERY DAY  Qty: 90 tablet, Refills: 3    Associated Diagnoses: Acquired hypothyroidism      lisinopril (ZESTRIL) 20 MG tablet TAKE 1 TABLET EVERY DAY  Qty: 90 tablet, Refills: 3    Associated Diagnoses: Hypertension goal BP (blood pressure) < 140/90      OMEGA-3 KRILL OIL PO       Vitamin D, Cholecalciferol, 1000 units CAPS Take 1 capsule by mouth daily    Associated Diagnoses: Vitamin D insufficiency      latanoprost (XALATAN) 0.005 % ophthalmic solution 1 drop daily      STIOLTO RESPIMAT 2.5-2.5 MCG/ACT AERS 2 puffs daily             Krishna Santiago M.D.  Department of Orthopaedic Surgery  [facility]

## 2023-02-16 NOTE — CONSULTS
Care Management Initial Consult    General Information  Assessment completed with: Patient, Children, Patient and daughter- Lita  Type of CM/SW Visit: Initial Assessment    Primary Care Provider verified and updated as needed: Yes   Readmission within the last 30 days: no previous admission in last 30 days      Reason for Consult: discharge planning  Advance Care Planning: Advance Care Planning Reviewed: present on chart          Communication Assessment  Patient's communication style: spoken language (English or Bilingual)    Hearing Difficulty or Deaf: yes        Cognitive  Cognitive/Neuro/Behavioral: WDL  Level of Consciousness: alert  Arousal Level: opens eyes spontaneously  Orientation: oriented x 4             Living Environment:   People in home: child(surinder), adult  Lita  Current living Arrangements: apartment      Able to return to prior arrangements: yes       Family/Social Support:  Care provided by: self  Provides care for: no one  Marital Status:   Children, Sibling(s)          Description of Support System: Supportive, Involved    Support Assessment: Adequate family and caregiver support, Adequate social supports    Current Resources:   Patient receiving home care services: No     Community Resources: None  Equipment currently used at home: cane, straight, walker, rolling, grab bar, tub/shower, grab bar, toilet, shower chair, raised toilet seat (handheld shower. reacher. sock aide)  Supplies currently used at home: None    Employment/Financial:  Employment Status: retired        Financial Concerns: No concerns identified           Lifestyle & Psychosocial Needs:  Social Determinants of Health     Tobacco Use: Medium Risk     Smoking Tobacco Use: Former     Smokeless Tobacco Use: Former     Passive Exposure: Not on file   Alcohol Use: Not on file   Financial Resource Strain: Not on file   Food Insecurity: Not on file   Transportation Needs: Not on file   Physical Activity: Not on file    Stress: Not on file   Social Connections: Not on file   Intimate Partner Violence: Not on file   Depression: Not at risk     PHQ-2 Score: 0   Housing Stability: Not on file       Functional Status:  Prior to admission patient needed assistance:   Dependent ADLs:: Ambulation-cane  Dependent IADLs:: Independent  Assesssment of Functional Status: Not at baseline with mobility    Mental Health Status:  Mental Health Status: No Current Concerns       Chemical Dependency Status:  Chemical Dependency Status: No Current Concerns             Values/Beliefs:  Spiritual, Cultural Beliefs, Evangelical Practices, Values that affect care:            Values/Beliefs Comment: Unknown    Additional Information:  Care Management has been consulted for discharge planning and home care.     Writer has visited with patient and daughterLita.  Reviewed the information above.  Patient plans to return home with support from family and home care.  Discussed recommendation for home health PT services.  Patient in agreement.  Referral sent to [facility] (Phone: [phone]).  Patient has been accepted for home PT services.      Patient denies having any other home care needs.      DaughterLita, to transport patient home at discharge.      EZIO Jeffers  [facility]   [phone]

## 2023-02-16 NOTE — PROGRESS NOTES
"PRIMARY DIAGNOSIS: \"GENERIC\" NURSING  OUTPATIENT/OBSERVATION GOALS TO BE MET BEFORE DISCHARGE:  1. ADLs back to baseline: No    2. Activity and level of assistance: Up with standby assistance.    3. Pain status: Improved but still requiring IV narcotics.    4. Return to near baseline physical activity: No     Discharge Planner Nurse   Safe discharge environment identified: Yes  Barriers to discharge: No       Entered by: Gia Cerna RN 02/16/2023 3:20 AM     Please review provider order for any additional goals.   Nurse to notify provider when observation goals have been met and patient is ready for discharge.      "

## 2023-02-17 ENCOUNTER — NURSE TRIAGE (OUTPATIENT)
Dept: NURSING | Facility: CLINIC | Age: 78
End: 2023-02-17
Payer: COMMERCIAL

## 2023-02-17 NOTE — TELEPHONE ENCOUNTER
Call back from Dr Santiago at 7:25 am- provider advised to reinforce dressing and make sure to keep the petar stockings up above her knee. Patient is to continue to ice and elevate as well to help.     Called and spoke with patient and she verbalizes understanding. Declines additional questions at this time.     Keke Siegel RN BSN 2/17/2023 7:31 AM

## 2023-02-17 NOTE — TELEPHONE ENCOUNTER
"Nurse Triage SBAR    Is this a 2nd Level Triage? YES, LICENSED PRACTITIONER REVIEW IS REQUIRED    Situation: Patient calling with increased drainage from her drain site- states not coming from her incision    Background:   Surgery 2/15/23- Right total knee:   \"On 2/15/2023 Ms. Yeh underwent total knee arthroplasty without complications.  The hemovac was discontinued on POD # 1.  Her hemoglobin dropped to a low of 10.0.   Progress with physical therapy was very good .  She walked 250 feet by POD # 1.  Range of motion was 0-50 degrees.  She is discharged on POD # 1 with wound healing well.  She will start Aspirin for blood thinning.  For pain control a prescription for oxycodone  was written.  RTC 1 1/2 weeks for xray of the knee.\"    Assessment: Drain site having increased drainage  Compression and gauze back on and taped it- 8x8 dressing - ABD pad  States they changed the bandage around 4 pm yesterday  Up to go to the bathroom a couple times over night the dressing was saturated around 5 am  Drainage is red like blood  Drain site if dry currently- compression with the tap and has a petar sock over it as well  Denies any pain- taking medications as ordered  Denies redness  States she has been icing as ordered but not elevating as much- states she slept with her leg on the bed last night and not elevated on pillows and will at times let her leg hang off the bed as well.     Protocol Recommended Disposition:   Call PCP Now    Recommendation:   Advised patient to elevate her leg and apply ice as ordered while waiting for the provider to return the call.  Page to the on call for Ortho Surgery- Dr Santiago- paged at 7:15 am- Awaiting call back      Paged to provider    Does the patient meet one of the following criteria for ADS visit consideration? 16+ years old, with an [facility] PCP       Reason for Disposition    Dressing soaked with blood or body fluid (e.g., drainage)    Additional Information    Negative: [1] Major " abdominal surgical incision AND [2] wound gaping open AND [3] visible internal organs    Negative: Sounds like a life-threatening emergency to the triager    Negative: Patient has a Negative Pressure Wound Therapy device    Negative: Patient is followed by a wound clinic or wound specialist for this wound    Negative: [1] Bleeding from incision AND [2] won't stop after 10 minutes of direct pressure    Negative: [1] Bleeding (more than a few drops) from incision AND [2] blood vessel surgery (e.g., carotid endarterectomy, femoral bypass graft, kidney dialysis fistula, tracheostomy)    Negative: [1] Widespread rash AND [2] bright red, sunburn-like    Negative: Severe pain in the incision    Negative: [1] Incision gaping open AND [2] < 48 hours since wound re-opened    Negative: [1] Incision gaping open AND [2] length of opening > 2 inches (5 cm)    Negative: Patient sounds very sick or weak to the triager    Negative: Sounds like a serious complication to the triager    Negative: Fever > 100.4 F (38.0 C)    Negative: [1] Incision looks infected (spreading redness, pain) AND [2] fever > 99.5 F (37.5 C)    Negative: [1] Incision looks infected (spreading redness, pain) AND [2] large red area (> 2 in. or 5 cm)    Negative: [1] Incision looks infected (spreading redness, pain) AND [2] face wound    Negative: [1] Red streak runs from the incision AND [2] longer than 1 inch (2.5 cm)    Negative: [1] Pus or bad-smelling fluid draining from incision AND [2] no fever    Negative: [1] Post-op pain AND [2] not controlled with pain medications    Protocols used: POST-OP INCISION SYMPTOMS AND [id number]

## 2023-02-20 ENCOUNTER — TELEPHONE (OUTPATIENT)
Dept: FAMILY MEDICINE | Facility: CLINIC | Age: 78
End: 2023-02-20
Payer: COMMERCIAL

## 2023-02-20 ASSESSMENT — KOOS JR
RISING FROM SITTING: MODERATE
GOING UP OR DOWN STAIRS: MODERATE
TWISING OR PIVOTING ON KNEE: MODERATE
HOW SEVERE IS YOUR KNEE STIFFNESS AFTER FIRST WAKING IN MORNING: MODERATE
BENDING TO THE FLOOR TO PICK UP OBJECT: MILD
KOOS JR SCORING: 61.58
STRAIGHTENING KNEE FULLY: MILD

## 2023-02-20 NOTE — TELEPHONE ENCOUNTER
Spoke with lizzeth and gave PCP ok to delay start of care to today    Melina Silva RN  [facility]

## 2023-02-20 NOTE — TELEPHONE ENCOUNTER
Reason for Call:  Other call back    Detailed comments: Omar ARRIAGA from [facility] called today.    States delay in care for PT home care till today per patient.    Please contact him.  Thank you.    Phone Number Patient can be reached at: Other phone number:  [phone]*    Best Time: any    Can we leave a detailed message on this number? YES    Call taken on 2/20/2023 at 7:47 AM by Angela Bravo

## 2023-02-20 NOTE — TELEPHONE ENCOUNTER
Order/Referral Request    Who is requesting: Ronnie from [facility]     Orders being requested: PT for strength, balance, and gait training. 1 x weekly for 4 wks, then every other week for 30 days. Also wound care: wash incision with soap and water. Occupational therapy for upper extremity safety also needed.     Reason service is needed/diagnosis: Medically necessary    When are orders needed by: ASAP    Has this been discussed with Provider: Yes    Does patient have a preference on a Group/Provider/Facility? Open    Does patient have an appointment scheduled?: No    Where to send orders: Place orders within Epic    Could we send this information to you in [facility] or would you prefer to receive a phone call?:   Ronnie would prefer a return phone call.  Okay to leave a detailed message?: Yes at Other phone number:  [phone]*

## 2023-02-21 NOTE — TELEPHONE ENCOUNTER
Called Ronnie with [facility] home care and left a detailed voice message notifying that Dr. Vee has approved verbal orders as requested.     Jes Lara RN   [facility]

## 2023-02-27 ENCOUNTER — OFFICE VISIT (OUTPATIENT)
Dept: ORTHOPEDICS | Facility: CLINIC | Age: 78
End: 2023-02-27
Payer: COMMERCIAL

## 2023-02-27 ENCOUNTER — ANCILLARY PROCEDURE (OUTPATIENT)
Dept: GENERAL RADIOLOGY | Facility: CLINIC | Age: 78
End: 2023-02-27
Attending: ORTHOPAEDIC SURGERY
Payer: COMMERCIAL

## 2023-02-27 VITALS
BODY MASS INDEX: 50.6 KG/M2 | HEIGHT: 61 IN | WEIGHT: 268 LBS | HEART RATE: 96 BPM | DIASTOLIC BLOOD PRESSURE: 74 MMHG | SYSTOLIC BLOOD PRESSURE: 132 MMHG | RESPIRATION RATE: 18 BRPM

## 2023-02-27 DIAGNOSIS — Z96.651 S/P TOTAL KNEE ARTHROPLASTY, RIGHT: ICD-10-CM

## 2023-02-27 DIAGNOSIS — L03.119 CELLULITIS OF KNEE: Primary | ICD-10-CM

## 2023-02-27 PROCEDURE — 87075 CULTR BACTERIA EXCEPT BLOOD: CPT | Performed by: ORTHOPAEDIC SURGERY

## 2023-02-27 PROCEDURE — 87070 CULTURE OTHR SPECIMN AEROBIC: CPT | Performed by: ORTHOPAEDIC SURGERY

## 2023-02-27 PROCEDURE — 87205 SMEAR GRAM STAIN: CPT | Performed by: ORTHOPAEDIC SURGERY

## 2023-02-27 PROCEDURE — 99024 POSTOP FOLLOW-UP VISIT: CPT | Performed by: ORTHOPAEDIC SURGERY

## 2023-02-27 PROCEDURE — 73562 X-RAY EXAM OF KNEE 3: CPT | Mod: TC | Performed by: RADIOLOGY

## 2023-02-27 PROCEDURE — 20610 DRAIN/INJ JOINT/BURSA W/O US: CPT | Mod: 58 | Performed by: ORTHOPAEDIC SURGERY

## 2023-02-27 RX ORDER — LIDOCAINE HYDROCHLORIDE 10 MG/ML
5 INJECTION, SOLUTION INFILTRATION; PERINEURAL
Status: DISCONTINUED | OUTPATIENT
Start: 2023-02-27 | End: 2023-09-13

## 2023-02-27 RX ORDER — CEPHALEXIN 500 MG/1
500 CAPSULE ORAL 4 TIMES DAILY
Qty: 40 CAPSULE | Refills: 0 | Status: SHIPPED | OUTPATIENT
Start: 2023-02-27 | End: 2023-03-09

## 2023-02-27 RX ADMIN — LIDOCAINE HYDROCHLORIDE 5 ML: 10 INJECTION, SOLUTION INFILTRATION; PERINEURAL at 09:00

## 2023-02-27 NOTE — LETTER
2/27/2023         RE: Gina Yeh  [address]  [place] [address]        Dear Colleague,    Thank you for referring your patient, Gina Yeh, to the [facility]. Please see a copy of my visit note below.    Follow up right total knee arthroplasty on 2/15/23.   She complains of increased pain the past 2 days.  Also has redness at lower wound since removing the dressing.  Pain: moderate  Wound is healing well.   Erythema: moderate on lower wound and shin.  Increased warmth: mild   Tenderness: mild at lower half of wound.  Effusion: mild.  Significant edema in entire leg.  Range of motion 3-85 degrees  Stability: good     Xray: today's images were reviewed with the patient.  This shows excellent position of total knee arthroplasty components    Assessment/Plan: right total knee arthroplasty with possible cellulitis.  Increased pain and erythema is concerning.  We elected to proceed with aspiration.  After sterile prep, I injected lidocaine at lateral knee.  Aspiration then performed beneath patella with removal 35 ml bloody fluid.  Sent for cultures.    Plan:  Continue physical therapy 2-3 x week for 4 weeks.   Elevate legs to bring down swelling.  Keflex 500 four times daily.  Use TEDS for 2 more weeks.  They may be off at night.  Use aspirin for 4 more weeks.  Return to clinic 1 week    Medication renewal:  None           Large Joint Injection/Arthocentesis: R knee joint    Date/Time: 2/27/2023 9:00 AM  Performed by: Krishna Santiago MD  Authorized by: Krishna Santiago MD     Indications:  Pain  Needle Size:  22 G  Guidance: landmark guided    Approach:  Lateral  Location:  Knee      Medications:  5 mL lidocaine 1 %  Aspirate amount (mL):  35  Aspirate:  Bloody  Aspirate analysis: sent for lab analysis    Outcome:  Tolerated well, no immediate complications  Procedure discussed: discussed risks, benefits, and alternatives    Consent Given by:   Patient  Timeout: timeout called immediately prior to procedure    Prep: patient was prepped and draped in usual sterile fashion              Again, thank you for allowing me to participate in the care of your patient.        Sincerely,        Krishna Santiago MD

## 2023-02-27 NOTE — PROGRESS NOTES
Large Joint Injection/Arthocentesis: R knee joint    Date/Time: 2/27/2023 9:00 AM  Performed by: Krishna Santiago MD  Authorized by: Krishna Santiago MD     Indications:  Pain  Needle Size:  22 G  Guidance: landmark guided    Approach:  Lateral  Location:  Knee      Medications:  5 mL lidocaine 1 %  Aspirate amount (mL):  35  Aspirate:  Bloody  Aspirate analysis: sent for lab analysis    Outcome:  Tolerated well, no immediate complications  Procedure discussed: discussed risks, benefits, and alternatives    Consent Given by:  Patient  Timeout: timeout called immediately prior to procedure    Prep: patient was prepped and draped in usual sterile fashion

## 2023-02-27 NOTE — PATIENT INSTRUCTIONS
Continue physical therapy 2-3 x week for 4 weeks.   Elevate legs to bring down swelling.  Keflex 500 four times daily.  Use TEDS for 2 more weeks.  They may be off at night.  Use aspirin for 4 more weeks.  Return to clinic 1 week

## 2023-02-27 NOTE — PROGRESS NOTES
Follow up right total knee arthroplasty on 2/15/23.   She complains of increased pain the past 2 days.  Also has redness at lower wound since removing the dressing.  Pain: moderate  Wound is healing well.   Erythema: moderate on lower wound and shin.  Increased warmth: mild   Tenderness: mild at lower half of wound.  Effusion: mild.  Significant edema in entire leg.  Range of motion 3-85 degrees  Stability: good     Xray: today's images were reviewed with the patient.  This shows excellent position of total knee arthroplasty components    Assessment/Plan: right total knee arthroplasty with possible cellulitis.  Increased pain and erythema is concerning.  We elected to proceed with aspiration.  After sterile prep, I injected lidocaine at lateral knee.  Aspiration then performed beneath patella with removal 35 ml bloody fluid.  Sent for cultures.    Plan:  Continue physical therapy 2-3 x week for 4 weeks.   Elevate legs to bring down swelling.  Keflex 500 four times daily.  Use TEDS for 2 more weeks.  They may be off at night.  Use aspirin for 4 more weeks.  Return to clinic 1 week    Medication renewal:  None

## 2023-03-02 ENCOUNTER — TELEPHONE (OUTPATIENT)
Dept: FAMILY MEDICINE | Facility: CLINIC | Age: 78
End: 2023-03-02
Payer: COMMERCIAL

## 2023-03-04 LAB
BACTERIA SNV CULT: NO GROWTH
GRAM STAIN RESULT: NORMAL
GRAM STAIN RESULT: NORMAL

## 2023-03-06 ENCOUNTER — TELEPHONE (OUTPATIENT)
Dept: FAMILY MEDICINE | Facility: CLINIC | Age: 78
End: 2023-03-06

## 2023-03-06 ENCOUNTER — TELEPHONE (OUTPATIENT)
Dept: ORTHOPEDICS | Facility: CLINIC | Age: 78
End: 2023-03-06

## 2023-03-06 DIAGNOSIS — Z96.651 HISTORY OF TOTAL RIGHT KNEE REPLACEMENT: ICD-10-CM

## 2023-03-06 RX ORDER — OXYCODONE HYDROCHLORIDE 5 MG/1
5 TABLET ORAL EVERY 4 HOURS PRN
Qty: 15 TABLET | Refills: 0 | Status: SHIPPED | OUTPATIENT
Start: 2023-03-06 | End: 2023-03-15

## 2023-03-06 ASSESSMENT — KOOS JR
TWISING OR PIVOTING ON KNEE: MODERATE
STRAIGHTENING KNEE FULLY: MODERATE
HOW SEVERE IS YOUR KNEE STIFFNESS AFTER FIRST WAKING IN MORNING: MODERATE
BENDING TO THE FLOOR TO PICK UP OBJECT: MILD
KOOS JR SCORING: 63.78
RISING FROM SITTING: MODERATE

## 2023-03-06 NOTE — TELEPHONE ENCOUNTER
Medication Question or Refill        What medication are you calling about (include dose and sig)?: oxyCODONE (ROXICODONE) 5 MG tablet     Preferred Pharmacy:     [id number] - [place], [place] - [address]  [address] [address]  Phone: [phone] Fax: [phone]      Controlled Substance Agreement on file:   CSA -- Patient Level:    CSA: None found at the patient level.       Who prescribed the medication?: Dr Santoyo    Do you need a refill? Yes    When did you use the medication last? 3.6.23    Patient offered an appointment? No    Do you have any questions or concerns?  No      Could we send this information to you in [facility] or would you prefer to receive a phone call?:   Patient would prefer a phone call   Okay to leave a detailed message?: Yes at Cell number on file:    Telephone Information:   Mobile [phone]

## 2023-03-06 NOTE — TELEPHONE ENCOUNTER
Order/Referral Request    Who is requesting: Ruma from [facility]    Orders being requested: 1 additional PT visit this week and 1 RN evaluation for increased pain in knee    Reason service is needed/diagnosis: as above    When are orders needed by: ASAP    Has this been discussed with Provider: Yes    Does patient have a preference on a Group/Provider/Facility? No    Does patient have an appointment scheduled?: Yes:     Where to send orders: Place orders within Epic    Could we send this information to you in [facility] or would you prefer to receive a phone call?:   Ruma would prefer a phone call   Okay to leave a detailed message?: Yes at {PHONE: [phone]

## 2023-03-06 NOTE — TELEPHONE ENCOUNTER
Called and left detailed message for Ruma ([facility]) with requested orders below:    -1 additional PT visit this week and 1 RN evaluation for increased pain in knee    CHAZ AcevedoN RN  [facility]

## 2023-03-08 ENCOUNTER — TELEPHONE (OUTPATIENT)
Dept: FAMILY MEDICINE | Facility: CLINIC | Age: 78
End: 2023-03-08
Payer: COMMERCIAL

## 2023-03-08 NOTE — TELEPHONE ENCOUNTER
Rosa Is[facility] care RN called regarding extension for home care SN.     Continues to have pain in knee post surgery and swelling. Per RN swelling is going down. The RN would like to focus on pain mngt.     1x/wk for 4 wks  Every other wk for next 4 wks    , ok detailed vm.     Thanks,  CORINA Hinojosa  [facility]

## 2023-03-13 ENCOUNTER — OFFICE VISIT (OUTPATIENT)
Dept: ORTHOPEDICS | Facility: CLINIC | Age: 78
End: 2023-03-13
Payer: COMMERCIAL

## 2023-03-13 VITALS
SYSTOLIC BLOOD PRESSURE: 119 MMHG | DIASTOLIC BLOOD PRESSURE: 70 MMHG | WEIGHT: 268 LBS | RESPIRATION RATE: 22 BRPM | BODY MASS INDEX: 50.6 KG/M2 | HEART RATE: 86 BPM | HEIGHT: 61 IN

## 2023-03-13 DIAGNOSIS — Z96.651 HISTORY OF TOTAL RIGHT KNEE REPLACEMENT: Primary | ICD-10-CM

## 2023-03-13 LAB — BACTERIA SNV CULT: NORMAL

## 2023-03-13 PROCEDURE — 99024 POSTOP FOLLOW-UP VISIT: CPT | Performed by: ORTHOPAEDIC SURGERY

## 2023-03-13 NOTE — LETTER
3/13/2023         RE: Gina Yeh  [address]  [place] [address]        Dear Colleague,    Thank you for referring your patient, Gina Yeh, to the [facility]. Please see a copy of my visit note below.    Follow up right total knee arthroplasty on 2/15/23.   She had cellulitis last visit.  She had aspiration, which shows no growth on cultures.  Treatment with Keflex.  Now has less erythema and warmth, but still present.  Pain: mild  Wound is healing well.   Erythema: mildon lower wound and shin.  Increased warmth: mild   Tenderness: mild   Effusion: mild.  Significant edema in entire leg.  Range of motion 3-95 degrees  Stability: mild medial collateral ligament laxity.    Xray:  images were reviewed with the patient.  This shows excellent position of total knee arthroplasty components    Assessment/Plan: right total knee arthroplasty with treated cellulitis.    Plan:  Continue physical therapy 2-3 x week for 4 weeks.   Elevate legs to bring down swelling.  graduated compression stockings optional  Use aspirin for 2 more weeks.  Return to clinic 4 weeks    Medication renewal:  None           Again, thank you for allowing me to participate in the care of your patient.        Sincerely,        Krishna Santiago MD

## 2023-03-13 NOTE — PROGRESS NOTES
Follow up right total knee arthroplasty on 2/15/23.   She had cellulitis last visit.  She had aspiration, which shows no growth on cultures.  Treatment with Keflex.  Now has less erythema and warmth, but still present.  Pain: mild  Wound is healing well.   Erythema: mildon lower wound and shin.  Increased warmth: mild   Tenderness: mild   Effusion: mild.  Significant edema in entire leg.  Range of motion 3-95 degrees  Stability: mild medial collateral ligament laxity.    Xray:  images were reviewed with the patient.  This shows excellent position of total knee arthroplasty components    Assessment/Plan: right total knee arthroplasty with treated cellulitis.    Plan:  Continue physical therapy 2-3 x week for 4 weeks.   Elevate legs to bring down swelling.  graduated compression stockings optional  Use aspirin for 2 more weeks.  Return to clinic 4 weeks    Medication renewal:  None

## 2023-03-14 ASSESSMENT — SLEEP AND FATIGUE QUESTIONNAIRES
HOW LIKELY ARE YOU TO NOD OFF OR FALL ASLEEP WHILE LYING DOWN TO REST IN THE AFTERNOON WHEN CIRCUMSTANCES PERMIT: MODERATE CHANCE OF DOZING
HOW LIKELY ARE YOU TO NOD OFF OR FALL ASLEEP WHILE WATCHING TV: SLIGHT CHANCE OF DOZING
HOW LIKELY ARE YOU TO NOD OFF OR FALL ASLEEP WHILE SITTING AND READING: SLIGHT CHANCE OF DOZING
HOW LIKELY ARE YOU TO NOD OFF OR FALL ASLEEP WHEN YOU ARE A PASSENGER IN A CAR FOR AN HOUR WITHOUT A BREAK: SLIGHT CHANCE OF DOZING
HOW LIKELY ARE YOU TO NOD OFF OR FALL ASLEEP WHILE SITTING QUIETLY AFTER LUNCH WITHOUT ALCOHOL: WOULD NEVER DOZE
HOW LIKELY ARE YOU TO NOD OFF OR FALL ASLEEP WHILE SITTING AND TALKING TO SOMEONE: WOULD NEVER DOZE
HOW LIKELY ARE YOU TO NOD OFF OR FALL ASLEEP WHILE SITTING INACTIVE IN A PUBLIC PLACE: WOULD NEVER DOZE
HOW LIKELY ARE YOU TO NOD OFF OR FALL ASLEEP IN A CAR, WHILE STOPPED FOR A FEW MINUTES IN TRAFFIC: WOULD NEVER DOZE

## 2023-03-15 ENCOUNTER — ANCILLARY PROCEDURE (OUTPATIENT)
Dept: BONE DENSITY | Facility: CLINIC | Age: 78
End: 2023-03-15
Attending: FAMILY MEDICINE
Payer: COMMERCIAL

## 2023-03-15 DIAGNOSIS — Z78.0 ASYMPTOMATIC POSTMENOPAUSAL STATUS: ICD-10-CM

## 2023-03-15 DIAGNOSIS — Z96.651 HISTORY OF TOTAL RIGHT KNEE REPLACEMENT: ICD-10-CM

## 2023-03-15 PROCEDURE — 77080 DXA BONE DENSITY AXIAL: CPT | Performed by: INTERNAL MEDICINE

## 2023-03-15 RX ORDER — OXYCODONE HYDROCHLORIDE 5 MG/1
5 TABLET ORAL EVERY 4 HOURS PRN
Qty: 15 TABLET | Refills: 0 | Status: SHIPPED | OUTPATIENT
Start: 2023-03-15 | End: 2023-04-10

## 2023-03-21 ENCOUNTER — VIRTUAL VISIT (OUTPATIENT)
Dept: SLEEP MEDICINE | Facility: CLINIC | Age: 78
End: 2023-03-21
Payer: COMMERCIAL

## 2023-03-21 VITALS
HEIGHT: 61 IN | SYSTOLIC BLOOD PRESSURE: 119 MMHG | WEIGHT: 268 LBS | DIASTOLIC BLOOD PRESSURE: 70 MMHG | BODY MASS INDEX: 50.6 KG/M2

## 2023-03-21 DIAGNOSIS — G47.33 OSA (OBSTRUCTIVE SLEEP APNEA): Primary | ICD-10-CM

## 2023-03-21 PROCEDURE — 99443 PR PHYSICIAN TELEPHONE EVALUATION 21-30 MIN: CPT | Mod: 95 | Performed by: PHYSICIAN ASSISTANT

## 2023-03-21 ASSESSMENT — PAIN SCALES - GENERAL: PAINLEVEL: NO PAIN (0)

## 2023-03-21 NOTE — PROGRESS NOTES
Video-Visit Details    Type of service:  Telephone Visit    Call Start Time (time call started): 10:26 AM    Call End Time (time call stopped): 10:39 AM    Originating Location (pt. Location): Home        Distant Location (provider location):  On-site    Mode of Communication:  Video Conference via [facility]    Sleep Apnea - Follow-up Visit:    Impression/Plan:  Severe obstructive sleep apnea-  Excellent CPAP compliance and AHI is well controlled on CPAP 7-12 cm/H20. Daytime symptoms are improved.   Continue current treatment.   Comprehensive DME order placed.    Restless leg syndrome-  Resolved on iron supplementation.   Ferritin was 19 ng/ml on 2/6/2023     Gina ORTIZ Jasonmillicentgarrett will follow up in about 1 year or sooner if any concerns    22 minutes spent on day of encounter doing chart review,  history and exam, counseling, coordinating plan of care, documentation and further activities as noted above.      Yazmin Jack PA-C  Sleep Medicine    History of Present Illness:  Chief Complaint   Patient presents with     CPAP Follow Up     annual       Gina Infantemiciara presents for follow-up of their severe sleep apnea, managed with CPAP.     Initially presented with snoring, snort arousals, witnessed apneas, enlarged neck girth >= 40 cm for female, and obesity with excessive daytime sleepiness. RLS.     Diagnostic Study 9/12/2017 - (271.0 lbs) AHI 78.7, RDI 91.3, Supine AHI 73.5, REM AHI -, Low O2 79.5%, Time Spent less than 88% 22.9 minutes.    Titration Study: 9/26/2017- (271.0 lbs) The patient was titrated at pressures ranging from 5 cmH2O up to 8 cmH2O. The optimal pressure achieved was 8 cmH2O with a residual AHI of 1.1 events per hour. Time in REM supine on final pressure was 22.5 minutes. PLM index was 25.6 movements per hour. The PLM Arousal Index was 8.0 per hour.    [facility]    Do you use a CPAP Machine at home: Yes  Overall, on a scale of 0-10 how would you rate your CPAP (0 poor, 10  great): 9    What type of mask do you use: Nasal Mask  Is your mask comfortable: Yes  If not, why:      Is your mask leaking: No  If yes, where do you feel it:    How many night per week does the mask leak (0-7):      Do you notice snoring with mask on: No  Do you notice gasping arousals with mask on: No  Are you having significant oral or nasal dryness: No  Is the pressure setting comfortable: Yes  If not, why:      What is your typical bedtime: 9pm  How long does it take you to go to sleep on PAP therapy: 10 to 15 min  What time do you typically get out of bed for the day: 5am  How many hours on average per night are you using PAP therapy: 7 to 8  How many hours are you sleeping per night: 6  Do you feel well rested in the morning: No      ResMed    Auto-PAP 7.0 - 12.0 cmH2O 30 day usage data:    96% of days with > 4 hours of use. 0/30 days with no use.   Average use 428 minutes per day.   95%ile Leak 7.51 L/min.   CPAP 95% pressure 11.8 cm.   AHI 0.53 events per hour.      Restless leg syndrome: Resolved. She is on iron supplementation.     EPWORTH SLEEPINESS SCALE      [place] Sleepiness Scale ( CONNER Rich  [id number]<br>ESS - [place]/English - Final version - 21 Nov 07 - [facility] [place] [facility].) 3/14/2023   Sitting and reading Slight chance of dozing   Watching TV Slight chance of dozing   Sitting, inactive in a public place (e.g. a theatre or a meeting) Would never doze   As a passenger in a car for an hour without a break Slight chance of dozing   Lying down to rest in the afternoon when circumstances permit Moderate chance of dozing   Sitting and talking to someone Would never doze   Sitting quietly after a lunch without alcohol Would never doze   In a car, while stopped for a few minutes in traffic Would never doze   [place] Score (MC) 5   [place] Score (Sleep) 5       INSOMNIA SEVERITY INDEX (JONNY)      Insomnia Severity Index (JONNY) 3/14/2023   Difficulty falling asleep 1   Difficulty staying asleep 2    Problems waking up too early 2   How SATISFIED/DISSATISFIED are you with your CURRENT sleep pattern? 2   How NOTICEABLE to others do you think your sleep problem is in terms of impairing the quality of your life? 0   How WORRIED/DISTRESSED are you about your current sleep problem? 1   To what extent do you consider your sleep problem to INTERFERE with your daily functioning (e.g. daytime fatigue, mood, ability to function at work/daily chores, concentration, memory, mood, etc.) CURRENTLY? 1   JONNY Total Score 9       Guidelines for Scoring/Interpretation:  Total score categories:  0-7 = No clinically significant insomnia   8-14 = Subthreshold insomnia   15-21 = Clinical insomnia (moderate severity)  22-28 = Clinical insomnia (severe)  Used via courtesy of www.[employer].va.gov with permission from Mac Naik PhD., [facility]        Past medical/surgical history, family history, social history, medications and allergies were reviewed.        Problem List:  Patient Active Problem List    Diagnosis Date Noted     Hypertension goal BP (blood pressure) < 140/90      Priority: High     Hyperlipidemia with target LDL less than 130      Priority: High     Degenerative joint disease of hand, left 02/06/2023     Priority: Medium     Primary osteoarthritis of right knee 01/26/2023     Priority: Medium     History of total right knee replacement 01/26/2023     Priority: Medium     Incontinence in female 12/14/2022     Priority: Medium     Primary osteoarthritis of both knees 08/24/2022     Priority: Medium     Female stress incontinence 02/22/2022     Priority: Medium     Ulnar neuropathy at elbow, left      Priority: Medium     Personal history of tobacco use, presenting hazards to health 06/28/2021     Priority: Medium     COPD (chronic obstructive pulmonary disease) (H)      Priority: Medium     Microscopic hematuria 10/26/2017     Priority: Medium     Urologic evaluation complete; yearly UA       Restless legs  "syndrome (RLS) 08/30/2017     Priority: Medium     Iron deficiency 08/30/2017     Priority: Medium     Noted Aug 2017.    Rec start on daily ferrous sulfate and recent in Nov/Dec.       Sensorineural hearing loss      Priority: Medium     JOSE (obstructive sleep apnea) 07/24/2015     Priority: Medium     Diagnostic Study 9/12/2017 - (271.0 lbs) AHI 78.7, RDI 91.3, Supine AHI 73.5, REM AHI -, Low O2 79.5%, Time Spent ?88% 22.9 minutes.  Titration Study: 9/26/2017- (271.0 lbs) The patient was titrated at pressures ranging from 5 cmH2O up to 8 cmH2O.  The optimal pressure achieved was 8 cmH2O with a residual AHI of 1.1 events per hour.  Time in REM supine on final pressure was 22.5 minutes.  PLM index was 25.6 movements per hour.  The PLM Arousal Index was 8.0 per hour.       MMT (medial meniscus tear) 10/13/2014     Priority: Medium     Morbidly obese (H) 05/29/2012     Priority: Medium     Bariatric surgery consult offered       Impaired glucose tolerance 05/29/2012     Priority: Medium     History of cervical dysplasia 05/28/2012     Priority: Medium     Hypothyroid      Priority: Medium        /70   Ht 1.556 m (5' 1.25\")   Wt 121.6 kg (268 lb)   BMI 50.23 kg/m          "

## 2023-03-21 NOTE — NURSING NOTE
Is the patient currently in the state of [place]? YES    Visit mode:TELEPHONE    If the visit is dropped, the patient can be reconnected by: TELEPHONE VISIT: Phone number: [phone]    Will anyone else be joining the visit? NO      How would you like to obtain your AVS? MyChart    Are changes needed to the allergy or medication list? NO    Reason for visit: c pap follow up - annual

## 2023-03-21 NOTE — PATIENT INSTRUCTIONS
Your Body mass index is 50.23 kg/m .  Weight management is a personal decision.  If you are interested in exploring weight loss strategies, the following discussion covers the approaches that may be successful. Body mass index (BMI) is one way to tell whether you are at a healthy weight, overweight, or obese. It measures your weight in relation to your height.  A BMI of 18.5 to 24.9 is in the healthy range. A person with a BMI of 25 to 29.9 is considered overweight, and someone with a BMI of 30 or greater is considered obese. More than two-thirds of American adults are considered overweight or obese.  Being overweight or obese increases the risk for further weight gain. Excess weight may lead to heart disease and diabetes.  Creating and following plans for healthy eating and physical activity may help you improve your health.  Weight control is part of healthy lifestyle and includes exercise, emotional health, and healthy eating habits. Careful eating habits lifelong are the mainstay of weight control. Though there are significant health benefits from weight loss, long-term weight loss with diet alone may be very difficult to achieve- studies show long-term success with dietary management in less than 10% of people. Attaining a healthy weight may be especially difficult to achieve in those with severe obesity. In some cases, medications, devices and surgical management might be considered.  What can you do?  If you are overweight or obese and are interested in methods for weight loss, you should discuss this with your provider.     Consider reducing daily calorie intake by 500 calories.     Keep a food journal.     Avoiding skipping meals, consider cutting portions instead.    Diet combined with exercise helps maintain muscle while optimizing fat loss. Strength training is particularly important for building and maintaining muscle mass. Exercise helps reduce stress, increase energy, and improves fitness.  Increasing exercise without diet control, however, may not burn enough calories to loose weight.       Start walking three days a week 10-20 minutes at a time    Work towards walking thirty minutes five days a week     Eventually, increase the speed of your walking for 1-2 minutes at time    In addition, we recommend that you review healthy lifestyles and methods for weight loss available through the National Institutes of Health patient information sites:  http://win.niddk.nih.gov/publications/index.htm    And look into health and wellness programs that may be available through your health insurance provider, employer, local community center, or bebeto club.

## 2023-03-22 NOTE — NURSING NOTE
Sent 1 year appointment reminder to be sent out via [facility] on 1/1/2024.    [facility] Facilitator

## 2023-04-03 ASSESSMENT — KOOS JR
RISING FROM SITTING: MILD
STRAIGHTENING KNEE FULLY: MILD
GOING UP OR DOWN STAIRS: MILD
KOOS JR SCORING: 70.7
TWISING OR PIVOTING ON KNEE: MILD
HOW SEVERE IS YOUR KNEE STIFFNESS AFTER FIRST WAKING IN MORNING: MODERATE

## 2023-04-10 ENCOUNTER — OFFICE VISIT (OUTPATIENT)
Dept: ORTHOPEDICS | Facility: CLINIC | Age: 78
End: 2023-04-10
Payer: COMMERCIAL

## 2023-04-10 VITALS
BODY MASS INDEX: 50.6 KG/M2 | HEIGHT: 61 IN | SYSTOLIC BLOOD PRESSURE: 118 MMHG | WEIGHT: 268 LBS | RESPIRATION RATE: 20 BRPM | DIASTOLIC BLOOD PRESSURE: 72 MMHG | HEART RATE: 96 BPM

## 2023-04-10 DIAGNOSIS — Z96.651 HISTORY OF TOTAL RIGHT KNEE REPLACEMENT: Primary | ICD-10-CM

## 2023-04-10 PROCEDURE — 99024 POSTOP FOLLOW-UP VISIT: CPT | Performed by: ORTHOPAEDIC SURGERY

## 2023-04-10 NOTE — PROGRESS NOTES
Follow up right total knee arthroplasty on 2/15/23.  She complains of low back pain..    Pain: mild  Range of motion 2-115 degrees  Good stability.  There is no effusion.  There is no erythema    Assessment/Plan:  Doing well status post right total knee arthroplasty.  Continue physical therapy 1 x week for 6 weeks.   Discontinue aspirin.  Scar massage.  Return to clinic 4-6 weeks to check Koos  score.  Medication renewal:.  None # .

## 2023-04-10 NOTE — LETTER
4/10/2023         RE: Gina Yeh  [address]  [place] [address]        Dear Colleague,    Thank you for referring your patient, Gina Yeh, to the [facility]. Please see a copy of my visit note below.    Follow up right total knee arthroplasty on 2/15/23.  She complains of low back pain..    Pain: mild  Range of motion 2-115 degrees  Good stability.  There is no effusion.  There is no erythema    Assessment/Plan:  Doing well status post right total knee arthroplasty.  Continue physical therapy 1 x week for 6 weeks.   Discontinue aspirin.  Scar massage.  Return to clinic 4-6 weeks to check Koos  score.  Medication renewal:.  None # .        Again, thank you for allowing me to participate in the care of your patient.        Sincerely,        Krishna Santiago MD

## 2023-04-10 NOTE — PATIENT INSTRUCTIONS
Doing well status post right total knee arthroplasty.  Continue physical therapy 1 x week for 6 weeks.   Discontinue aspirin.  Scar massage.  Return to clinic 4-6 weeks to check Koos  score.

## 2023-04-15 NOTE — DISCHARGE INSTRUCTIONS
Total Knee Replacement Discharge Instructions    [phone]  Bone and Joint Service Line for issues or concerns      General Care:  After surgery you may feel tired/sleepy. This is normal. If you have any question along the way please contact the office. If you feel it is an issue cannot wait for normal office hours, contact the on-call physician. You should not drive or operate machines/equipment until released by your physician to do so.     Bandages:   It s normal to have some blood-tinged fluid on your bandages, this may occur for a few days after being sent home from the hospital. This is a water resistant dressing so you may shower over this. Leave the dressing placed until follow up.  If you would like, you can also take it off on the 10th day, and then do daily dressing changes with gauze and tape. You may also notice a few drops of blood from your incision as you begin to move more this is normal. Keep the area clean and dry.  With your drain dressing (two poke holes), you can do daily dressing changes with a bigger Band-Aid until two days of no drainage then you do not need a dressing.       Bathing:  Do not submerge your incision in water such as a bath or pool. It is ok to shower when you get home over the aquacell water resistant bandage.  You may find in comfortable to get a shower chair for the first month while you continue to heal and get stronger.     Follow up:  Your follow up appointment should already be scheduled. If it s not, please call the office to verify an appointment 10-14 days after surgery.    Diet:  You may not have a full appetite at discharge this should get better. Progress to bland foods such as crackers and bread and finally to your normal diet if you have no problems.  Avoid alcohol when taking narcotic pain medications.      Pain control:  Take your pain medications as prescribed. These medications may make you sleepy. Do not drive, operate equipment, or drink alcohol when  taking these.  You may take Tylenol (Generic name is acetaminophen) as directed on the bottle for additional relief or in place of the prescribed pain medications as your pain gets better.  If the medications cause a reaction such as nausea or skin rash, stop taking them and contact your doctor. Please plan accordingly, pain medications will not be re-filled on the weekends or at night. Call the office during the day if you need more medications.    Blood thinner:  It is very important to take the Aspirin 325 mg twice a day to help prevent blood clots. No medication is perfect, so if you notice a sudden onset of pain/swelling in your calf area call your doctor. If you notice a sudden onset of troubles breathing and/or chest pain call 911.     Icing:  It is common for some swelling, aching and stiffness to occur for awhile after a knee replacement. Apply ice for 20 minutes at a time. For the first 1-2 weeks apply ice 2-3 x a day or more after therapy/exercises.    Walker/crutches:  Use a walker/crutches when you go home. You will transition to the use of a cane and finally to no additional support.     Physical Therapy:  The success of your knee replacement is based on doing physical therapy. You will have some pain and discomfort along the way. If you feel your pain is limiting your progress make sure to take some pain medication prior to your therapy session. If your pain medications are not working talk to your surgeon.   For the first 1-2 weeks after going home you will have in-home physical therapy. The goal is to work on walking and feeling steady.  Usually after your first post-operative follow up you will move to out-patient physical therapy.   If you are going to transitional care, they will work on physical therapy there then you will have home physical therapy when discharged to home, then outpatient physical therapy after a few weeks as explained above.     Activity:  Unless otherwise instructed, you can  weight bear as tolerated with a walker/cane.     Normal findings after surgery:  Numbness and tenderness around the incisions.   You may have bruising around the incisions and down the thigh.   Low grade fevers less than 100.5 degrees Fahrenheit are normal.   You will have some increased pain after your therapy sessions.     When to call the Office:  Temperature greater than 101.5 degrees Fahreheit.  Fever, chills, and increasing pain in the hip.  Excessive drainage from the incisions that include bright red blood.  Drainage from the incisions sites that appear yellow, pus-like, or foul smelling.  Increasing pain the hip not relieved by the prescribed pain medications or ice.  Persistent nausea or vomiting not helped by the Zofran.  Increased pain or swelling in your calf area (in back above your ankle) that wasn t there when in the hospital.  Any other effects you feel are significant.  Call 911 if you experience any chest pain and/or shortness or breath.       Implemented All Universal Safety Interventions:  [place] to call system. Call bell, personal items and telephone within reach. Instruct patient to call for assistance. Room bathroom lighting operational. Non-slip footwear when patient is off stretcher. Physically safe environment: no spills, clutter or unnecessary equipment. Stretcher in lowest position, wheels locked, appropriate side rails in place.

## 2023-04-21 ENCOUNTER — THERAPY VISIT (OUTPATIENT)
Dept: PHYSICAL THERAPY | Facility: CLINIC | Age: 78
End: 2023-04-21
Attending: ORTHOPAEDIC SURGERY
Payer: COMMERCIAL

## 2023-04-21 DIAGNOSIS — Z96.651 HISTORY OF TOTAL RIGHT KNEE REPLACEMENT: Primary | ICD-10-CM

## 2023-04-21 PROCEDURE — 97162 PT EVAL MOD COMPLEX 30 MIN: CPT | Mod: GP | Performed by: PHYSICAL THERAPIST

## 2023-04-21 PROCEDURE — 97110 THERAPEUTIC EXERCISES: CPT | Mod: GP | Performed by: PHYSICAL THERAPIST

## 2023-04-21 ASSESSMENT — ACTIVITIES OF DAILY LIVING (ADL)
RAW_SCORE: 56
WEAKNESS: I DO NOT HAVE THE SYMPTOM
LIMPING: I DO NOT HAVE THE SYMPTOM
KNEE_ACTIVITY_OF_DAILY_LIVING_SUM: 56
SQUAT: ACTIVITY IS MINIMALLY DIFFICULT
STAND: ACTIVITY IS NOT DIFFICULT
AS_A_RESULT_OF_YOUR_KNEE_INJURY,_HOW_WOULD_YOU_RATE_YOUR_CURRENT_LEVEL_OF_DAILY_ACTIVITY?: NEARLY NORMAL
STIFFNESS: I HAVE THE SYMPTOM BUT IT DOES NOT AFFECT MY ACTIVITY
SIT WITH YOUR KNEE BENT: ACTIVITY IS NOT DIFFICULT
GO UP STAIRS: ACTIVITY IS SOMEWHAT DIFFICULT
SWELLING: I HAVE THE SYMPTOM BUT IT DOES NOT AFFECT MY ACTIVITY
HOW_WOULD_YOU_RATE_THE_OVERALL_FUNCTION_OF_YOUR_KNEE_DURING_YOUR_USUAL_DAILY_ACTIVITIES?: NEARLY NORMAL
PAIN: I DO NOT HAVE THE SYMPTOM
KNEEL ON THE FRONT OF YOUR KNEE: I AM UNABLE TO DO THE ACTIVITY
KNEE_ACTIVITY_OF_DAILY_LIVING_SCORE: 80
WALK: ACTIVITY IS MINIMALLY DIFFICULT
RISE FROM A CHAIR: ACTIVITY IS MINIMALLY DIFFICULT
HOW_WOULD_YOU_RATE_THE_CURRENT_FUNCTION_OF_YOUR_KNEE_DURING_YOUR_USUAL_DAILY_ACTIVITIES_ON_A_SCALE_FROM_0_TO_100_WITH_100_BEING_YOUR_LEVEL_OF_KNEE_FUNCTION_PRIOR_TO_YOUR_INJURY_AND_0_BEING_THE_INABILITY_TO_PERFORM_ANY_OF_YOUR_USUAL_DAILY_ACTIVITIES?: 90
GIVING WAY, BUCKLING OR SHIFTING OF KNEE: I DO NOT HAVE THE SYMPTOM
GO DOWN STAIRS: ACTIVITY IS SOMEWHAT DIFFICULT

## 2023-04-21 NOTE — PROGRESS NOTES
[facility]    OUTPATIENT Physical Therapy ORTHOPEDIC EVALUATION  PLAN OF TREATMENT FOR OUTPATIENT REHABILITATION  (COMPLETE FOR INITIAL CLAIMS ONLY)  Patient's Last Name, First Name, M.I.  YOB: 1945  Gina Yeh    Provider s Name:  [facility]   Medical Record No.  [id number]   Start of Care Date:  04/21/23   Onset Date:  02/15/23    Treatment Diagnosis:  s/p R TKA 2/15/23 Medical Diagnosis:  History of total right knee replacement       Goals:     04/21/23 0500   Body Part   Goals listed below are for R knee TKA   Goal #1   Goal #1 ambulation   Previous Functional Level Minutes patient could walk;with walker  (>30-45 minutes pain free, less SOB)   Current Functional Level with walker  (<30 minutes, increased SOB)   STG Target Performance with cane  (<30 minutes with controlled SOB)   Rationale for safe household ambulation;for safe outdoor household ambulation;for safe community ambulation;for safe work place ambulation;to maintain proper body mechanics/posture while ambulating to avoid additional compensatory injury due to improper gait mechanics;to promote a healthy and active lifestyle   Due Date 05/19/23    LTG Target Performance Minutes patient will be able to  walk  (with no AD, 30-60 minutes, with controlled SOB)   Rationale to promote a healthy and active lifestyle;to maintain proper body mechanics/posture while ambulating to avoid additional compensatory injury due to improper gait mechanics;for safe work place ambulation;for safe community ambulation;for safe outdoor household ambulation;for safe household ambulation   Due Date 06/30/23         Therapy Frequency:  1x per week  Predicted Duration of Therapy Intervention:  10 weeks    Chelsie Peralta, PT                 I CERTIFY THE NEED FOR THESE SERVICES FURNISHED UNDER        THIS PLAN OF  TREATMENT AND WHILE UNDER MY CARE     (Physician attestation of this document indicates review and certification of the therapy plan).                     Certification Date From:  04/21/23   Certification Date To:  06/30/23    Referring Provider:  Krishna Santiago    Initial Assessment        See Epic Evaluation SOC Date: 04/21/23

## 2023-04-21 NOTE — PROGRESS NOTES
Physical Therapy Initial Evaluation  Subjective:  The history is provided by the patient. No  was used.   Patient Health History  Gina Yeh being seen for Physical therapy on right knee.     Problem began: 7/6/2021.   Problem occurred: Bowling   Pain is reported as 1/10 on pain scale.  General health as reported by patient is good.  Pertinent medical history includes: emphysema, high blood pressure, implanted device, overweight, osteoarthritis, sleep disorder/apnea and thyroid problems.     Medical allergies: none.   Surgeries include:  Orthopedic surgery and other. Other surgery history details: Ovarectomy, tonsillectomy.    Current medications:  High blood pressure medication, thyroid medication and other. Other medications details: Cholesterol,  glaucoma, COPD inhaler.    Current occupation is Retired.   Primary job tasks include:  Other.   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                Therapist Generated HPI Evaluation  Problem details: Pt is a 78 year old female presenting to physical therapy following a R TKA 2/15/2023 (9 weeks and 2 days ago) by Dr. Santiago. Pt has had home health PT since surgery and brought current exercises with her today to discuss. Denied any pain in either knees but mentioned having more shortness of breath with walking in which makes her feel like she needs to use her 4WW for. Feels current  exercises are very easy. Has used SPC at home and would like to keep working on using less assistive devices. Recently had difficulty getting out of a seat at a restaurant as she felt weak requiring a peer to help her stand up. States she has had restless sleep for a very long time due to her CPAP and ongoing COPD. Has tried to elevate RLE for swelling with pillows in bed which has not been successful. Would like to return to social outings with peers including her love for bowling.   .         Type of problem:  Right knee.    Chronicity:  2/15/2023.  Occurance: TKA.  Where condition occurred: other.  Site of Pain: no pain, dyspnea on light exertion.      Since onset symptoms are gradually improving.  Associated symptoms:  Edema and loss of motion/stiffness. Symptoms are exacerbated by certain positions, ascending stairs, descending stairs, transfers, standing, walking and bending/squatting  and relieved by activity/movement.    Previous treatment includes physical therapy. There was moderate improvement following previous treatment.  Work activity restrictions: Retired CPA.  Barriers include:  None as reported by patient.                        Objective:    Gait:    Assistive Devices:  Walker  Deviations:  Lumbar:  Trunk flexionHip:  Trendelenberg L and Trendelenberg RGeneral Deviations:  Stride length decr                                                      Knee Evaluation:  ROM:    AROM      Extension: Left:    Right:  3  Flexion: Left:   Right: 110  PROM      Extension: Left:   Right:  0  Flexion: Left:   Right:  113              Edema:  Edema of the knee: 1 sec capillary refill throughout R lower leg vs <1 sec capillary refill on lower L extremity.    Mobility Testing:      Patellofemoral Medial:  Left: hypomobile      Patellofemoral Lateral:  Left: hypomobile      Patellofemoral Superior:  Left: hypomobile      Patellofemoral Inferior:  Left: hypomobile            General     ROS    Assessment/Plan:    Patient is a 78 year old female with right side knee complaints.    Patient has the following significant findings with corresponding treatment plan.                Diagnosis 1:  S/p R TKA 2/15/23  Decreased ROM/flexibility - manual therapy and therapeutic exercise  Decreased joint mobility - manual therapy and therapeutic exercise  Decreased strength - therapeutic exercise and therapeutic activities  Impaired balance - neuro re-education and therapeutic activities  Decreased proprioception - neuro re-education and therapeutic activities  Edema  - self management/home program  Impaired gait - gait training  Impaired muscle performance - neuro re-education  Decreased function - therapeutic activities  Impaired posture - neuro re-education    Therapy Evaluation Codes:   1) History comprised of:   Personal factors that impact the plan of care:      Age.    Comorbidity factors that impact the plan of care are:      Dizziness, Emphysema, High blood pressure, Osteoarthritis, Overweight and Sleep disorder/apnea.     Medications impacting care: High blood pressure.  2) Examination of Body Systems comprised of:   Body structures and functions that impact the plan of care:      Hip, Knee and Lumbar spine.   Activity limitations that impact the plan of care are:      Bathing, Bending, Cooking, Driving, Lifting, Sitting, Squatting/kneeling, Stairs, Standing, Walking, Sleeping and Urinary incontinence.  3) Clinical presentation characteristics are:   Evolving/Changing.  4) Decision-Making    Moderate complexity using standardized patient assessment instrument and/or measureable assessment of functional outcome.  Cumulative Therapy Evaluation is: Moderate complexity.    Previous and current functional limitations:  (See Goal Flow Sheet for this information)    Short term and Long term goals: (See Goal Flow Sheet for this information)     Communication ability:  Patient appears to be able to clearly communicate and understand verbal and written communication and follow directions correctly.  Treatment Explanation - The following has been discussed with the patient:   RX ordered/plan of care  Anticipated outcomes  Possible risks and side effects  This patient would benefit from PT intervention to resume normal activities.   Rehab potential is good.    Frequency:  1 X week, once daily  Duration:  for 8-10 weeks  Discharge Plan:  Achieve all LTG.  Independent in home treatment program.  Reach maximal therapeutic benefit.    Please refer to the daily flowsheet for treatment  today, total treatment time and time spent performing 1:1 timed codes.

## 2023-05-10 ENCOUNTER — THERAPY VISIT (OUTPATIENT)
Dept: PHYSICAL THERAPY | Facility: CLINIC | Age: 78
End: 2023-05-10
Payer: COMMERCIAL

## 2023-05-10 DIAGNOSIS — M17.11 PRIMARY OSTEOARTHRITIS OF RIGHT KNEE: Primary | ICD-10-CM

## 2023-05-10 PROCEDURE — 97140 MANUAL THERAPY 1/> REGIONS: CPT | Mod: GP | Performed by: PHYSICAL THERAPIST

## 2023-05-10 PROCEDURE — 97110 THERAPEUTIC EXERCISES: CPT | Mod: GP | Performed by: PHYSICAL THERAPIST

## 2023-05-17 ENCOUNTER — THERAPY VISIT (OUTPATIENT)
Dept: PHYSICAL THERAPY | Facility: CLINIC | Age: 78
End: 2023-05-17
Payer: COMMERCIAL

## 2023-05-17 DIAGNOSIS — Z96.651 HISTORY OF TOTAL RIGHT KNEE REPLACEMENT: Primary | ICD-10-CM

## 2023-05-17 PROCEDURE — 97110 THERAPEUTIC EXERCISES: CPT | Mod: GP | Performed by: PHYSICAL THERAPIST

## 2023-05-24 ENCOUNTER — THERAPY VISIT (OUTPATIENT)
Dept: PHYSICAL THERAPY | Facility: CLINIC | Age: 78
End: 2023-05-24
Payer: COMMERCIAL

## 2023-05-24 DIAGNOSIS — Z96.651 HISTORY OF TOTAL RIGHT KNEE REPLACEMENT: Primary | ICD-10-CM

## 2023-05-24 PROCEDURE — 97140 MANUAL THERAPY 1/> REGIONS: CPT | Mod: GP | Performed by: PHYSICAL THERAPIST

## 2023-05-24 PROCEDURE — 97110 THERAPEUTIC EXERCISES: CPT | Mod: GP | Performed by: PHYSICAL THERAPIST

## 2023-06-05 ASSESSMENT — KOOS JR
RISING FROM SITTING: MILD
HOW SEVERE IS YOUR KNEE STIFFNESS AFTER FIRST WAKING IN MORNING: MODERATE
KOOS JR SCORING: 79.91

## 2023-06-08 ENCOUNTER — THERAPY VISIT (OUTPATIENT)
Dept: PHYSICAL THERAPY | Facility: CLINIC | Age: 78
End: 2023-06-08
Payer: COMMERCIAL

## 2023-06-08 DIAGNOSIS — M17.11 PRIMARY OSTEOARTHRITIS OF RIGHT KNEE: Primary | ICD-10-CM

## 2023-06-08 PROCEDURE — 97110 THERAPEUTIC EXERCISES: CPT | Mod: GP | Performed by: PHYSICAL THERAPIST

## 2023-06-12 ENCOUNTER — OFFICE VISIT (OUTPATIENT)
Dept: ORTHOPEDICS | Facility: CLINIC | Age: 78
End: 2023-06-12
Payer: COMMERCIAL

## 2023-06-12 ENCOUNTER — TELEPHONE (OUTPATIENT)
Dept: ORTHOPEDICS | Facility: CLINIC | Age: 78
End: 2023-06-12

## 2023-06-12 VITALS
RESPIRATION RATE: 18 BRPM | DIASTOLIC BLOOD PRESSURE: 68 MMHG | BODY MASS INDEX: 50.6 KG/M2 | HEIGHT: 61 IN | WEIGHT: 268 LBS | SYSTOLIC BLOOD PRESSURE: 105 MMHG | HEART RATE: 79 BPM

## 2023-06-12 DIAGNOSIS — Z96.651 HISTORY OF TOTAL RIGHT KNEE REPLACEMENT: ICD-10-CM

## 2023-06-12 DIAGNOSIS — M17.12 PRIMARY OSTEOARTHRITIS OF LEFT KNEE: Primary | ICD-10-CM

## 2023-06-12 PROCEDURE — 99214 OFFICE O/P EST MOD 30 MIN: CPT | Performed by: ORTHOPAEDIC SURGERY

## 2023-06-12 NOTE — LETTER
6/12/2023         RE: Gina Yeh  [address]  [place] [id number]        Dear Colleague,    Thank you for referring your patient, Gina Yeh, to the [facility]. Please see a copy of my visit note below.    HISTORY OF PRESENT ILLNESS    Gina Yeh is a 78 year old female who is seen in follow up of right total knee arthroplasty from 2/15/23.  She has 0-115 degrees range of motion now.  Still has occasional pain in shin and knee.  Has edema of both legs, right > left.    She now wants left total knee arthroplasty.  Left knee pain has been present approximately several years.      Present symptoms: pain laterally, pain dull/achy , moderate pain, swelling, mild swelling, +catching/popping, no locking, +giving way.    Symptoms occur kneeling, pivoting, getting up from seated or lying-down position , sitting for long periods, going up and down stairs, at night, flexing knee and extending knee.    The symptoms occur frequently.  The symptoms are increasing  Limitations of activities of daily living: patient has difficulties with getting into and out of the car, she is unable to sit for long periods of time, she does have great difficulties, without use of an ambulatory aid walking distances is greater than 100 feet is difficult for patient   Fall risk?: No    Treatments tried to this point: NSAIDs and topicals, Corticosteroid injections, viscous supplementation injection and Physical Therapy  Response to treatment:   NSAIDs: patient uses NSAIDs as needed for her knee symptoms, she does use topical agents daily; these only provide her with mild, temporary relief   Glucosamine: Not done   Corticosteroid injections: Not done   viscous supplementation injection: Not done   Arthroscopy: Not done   Physical Therapy: Physical Therapy completed recently following right total knee arthroplasty on 2/15/23, this has minimally helped with her felt knee symptoms       Past  Medical History:   Diagnosis Date     Arthritis      Arthritis of right acromioclavicular joint 07/28/2021     Cervical dysplasia 1988     COPD (chronic obstructive pulmonary disease) (H)      Degenerative joint disease of hand, left      Female stress incontinence 02/22/2022     Glaucoma      Hyperlipidemia LDL goal < 130      Hypertension goal BP (blood pressure) < 140/90      Hypothyroid      Impaired glucose tolerance 05/29/2012     Microscopic hematuria      MMT (medial meniscus tear) 09/21/2006    LT     Moderate recurrent major depression (H) 02/13/2014     Morbid obesity (H)      JOSE (obstructive sleep apnea) 07/24/2015    Doesn't use cpap     Primary osteoarthritis of both knees      RLS (restless legs syndrome)      Sensorineural hearing loss      Shingles 01/27/2022     Ulnar neuropathy at elbow, left      Vitamin D insufficiency        Past Surgical History:   Procedure Laterality Date     ARTHROPLASTY KNEE Right 2/15/2023    Procedure: ARTHROPLASTY, KNEE, TOTAL RIGHT;  Surgeon: Krishna Santiago MD;  Location: [facility] OR     BIOPSY       BLADDER SURGERY  03/19/2018     CERVIX SURGERY  1988    cervical cone     COLONOSCOPY       LAPAROSCOPIC OOPHORECTOMY Right 03/19/2018    with hysteroscopy/EMB, and pubovaginal sling      ROTATOR CUFF REPAIR RT/LT Right 09/01/2021     TONSILLECTOMY & ADENOIDECTOMY         Family History   Problem Relation Age of Onset     Allergies Mother      Cardiovascular Mother      Respiratory Mother      Thyroid Disease Mother      Other Cancer Mother         skin     Arthritis Father      Hypertension Father      Hyperlipidemia Father      Cerebrovascular Disease Father      Cancer Maternal Grandmother         bone     Other Cancer Maternal Grandmother         bone     Cerebrovascular Disease Paternal Grandmother      Cancer Paternal Grandfather      Cerebrovascular Disease Paternal Grandfather      Cancer Brother         testicular      Gastrointestinal Disease Brother      Other  Cancer Brother         testicular     Allergies Sister      Allergies Daughter      Gastrointestinal Disease Daughter      Depression Daughter      Anesthesia Reaction Daughter      Genitourinary Problems Brother      Allergies Daughter 10        metal     Diabetes No family hx of        Social History     Socioeconomic History     Marital status:      Spouse name: Umberto     Number of children: 2     Years of education: 13     Highest education level: Not on file   Occupational History     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 No   Social History Narrative     Not on file     Social Determinants of Health     Financial Resource Strain: Not on file   Food Insecurity: Not on file   Transportation Needs: Not on file   Physical Activity: Not on file   Stress: Not on file   Social Connections: Not on file   Intimate Partner Violence: Not on file   Housing Stability: Not on file       Current Outpatient Medications   Medication Sig Dispense Refill     atorvastatin (LIPITOR) 40 MG tablet TAKE 1 TABLET EVERY DAY 90 tablet 3     Ferrous Sulfate (IRON) 325 (65 FE) MG tablet Take 1 tablet by mouth daily       latanoprost (XALATAN) 0.005 % ophthalmic solution Place 1 drop into both eyes daily       levothyroxine (SYNTHROID/LEVOTHROID) 137 MCG tablet TAKE 1 TABLET EVERY DAY 90 tablet 3     lisinopril (ZESTRIL) 20 MG tablet TAKE 1 TABLET EVERY DAY  "90 tablet 3     OMEGA-3 KRILL OIL PO        STIOLTO RESPIMAT 2.5-2.5 MCG/ACT AERS Inhale 2 puffs into the lungs daily       Vitamin D, Cholecalciferol, 1000 units CAPS Take 1 capsule by mouth daily         Allergies   Allergen Reactions     Nkda [No Known Drug Allergy]        REVIEW OF SYSTEMS:  CONSTITUTIONAL:  NEGATIVE for fever, chills, change in weight, not feeling tired  SKIN:  NEGATIVE for worrisome rashes, no skin lumps, no skin ulcers and no non-healing wounds  EYES:  NEGATIVE for vision changes or irritation  ENT/MOUTH:  NEGATIVE  No hearing loss, no hoarseness, no difficulty swallowing.  RESP:  NEGATIVE for significant cough or SOB  BREAST:  NEGATIVE for masses, tenderness or discharge  CV:  NEGATIVE for chest pain, palpitations or peripheral edema  GI:  NEGATIVE for nausea, abdominal pain, heartburn, or change in bowel habits  :  Negative   MUSCULOSKELETAL:  See HPI above  NEURO:  NEGATIVE for weakness, dizziness or paresthesias  ENDOCRINE:  NEGATIVE for temperature intolerance, skin/hair changes  HEME/ALLERGY/IMMUNE:  NEGATIVE for bleeding problems  PSYCHIATRIC:  NEGATIVE for changes in mood or affect    PHYSICAL EXAM:  Vitals: /68   Pulse 79   Resp 18   Ht 1.556 m (5' 1.25\")   Wt 121.6 kg (268 lb)   BMI 50.23 kg/m    BMI= Body mass index is 50.23 kg/m .  Constitutional:  healthy, alert and no distress  Neuro: Alert and Oriented x 3, Sensation grossly WNL.  HEENT:  Atraumatic, EOMI  Neck:  Neck supple with no tenderness.  Psych: Affect normal   Respiratory: Breathing not labored.  Cardiovascular: normal peripheral pulses  Lymph: no adenopathy  Skin: No rashes,worrisome lesions or skin problems  Spine: straight, no straight leg raising pain.  Hips show full range of motion.  There is no tenderness over the sacro-iliac joints, sciatic notch, or greater trochanters.     KNEE EXAM:   Gait: walks with antalgic gait favoring left side   Alignment: Right: normal , Left:  Mild valgus  ROM: Right " knee: 0 extension to 115 flexion, Left knee: 0 extension to 110 flexion  Effusion: Right: none, Left: mild  Tender: Right: lateral joint line, Left: lateral joint line and lateral facet of the patella  Ligaments:  Lachman's Stable, Anterior and posterior drawer stable.  Right medial collateral ligament:  no laxity,  Lateral collateral ligament  no laxity.    Left medial collateral ligament:  no laxity, lateral collateral ligament:  no laxity.   mild pain with Varus/Valgus stress testing on left     Patellofemoral joint: mild crepitations in the left patellofemoral joint.    Apprehension: negative      X-RAY:  From  7/5/22: shows severe lateral joint space narrowing on left   There is bone-on-bone laterally by x-ray.       Impression: Right Knee: total knee arthroplasty doing well.  Left Knee: Osteoarthritis severe.    Plan: Total Knee Arthroplasty: We talked about the patient's condition and diagnosis.  Because of severe arthritis, and failure of conservative measures, I have suggested total knee arthroplasty of the left knee(s).  I've talked in depth about the procedure and the risks, which include, but are not limited to blood loss requiring transfusion, infection, neurovascular injury, DVT, PE, continued pain, (both perioperative and persistent post-recovery pain), numbness around the wound, instability, and potential anesthetic and perioperative medical complications, and the possibility of needing additional procedures. We also talked about recovery time, which differs from patient to patient.    Medical clearance will need to be obtained.      Special considerations: cemented.  Right side on 2/15/23 used Femur D LPS, tibia 3 keeled, 32 patella, 14 LPS spacer.  She wants this in the fall.  We should repeat left knee x-ray a month before surgery.          Again, thank you for allowing me to participate in the care of your patient.        Sincerely,        Krishna Santiago MD

## 2023-06-12 NOTE — PROGRESS NOTES
HISTORY OF PRESENT ILLNESS    Gina Yeh is a 78 year old female who is seen in follow up of right total knee arthroplasty from 2/15/23.  She has 0-115 degrees range of motion now.  Still has occasional pain in shin and knee.  Has edema of both legs, right > left.    She now wants left total knee arthroplasty.  Left knee pain has been present approximately several years.      Present symptoms: pain laterally, pain dull/achy , moderate pain, swelling, mild swelling, +catching/popping, no locking, +giving way.    Symptoms occur kneeling, pivoting, getting up from seated or lying-down position , sitting for long periods, going up and down stairs, at night, flexing knee and extending knee.    The symptoms occur frequently.  The symptoms are increasing  Limitations of activities of daily living: patient has difficulties with getting into and out of the car, she is unable to sit for long periods of time, she does have great difficulties, without use of an ambulatory aid walking distances is greater than 100 feet is difficult for patient   Fall risk?: No    Treatments tried to this point: NSAIDs and topicals, Corticosteroid injections, viscous supplementation injection and Physical Therapy  Response to treatment:   NSAIDs: patient uses NSAIDs as needed for her knee symptoms, she does use topical agents daily; these only provide her with mild, temporary relief   Glucosamine: Not done   Corticosteroid injections: Not done   viscous supplementation injection: Not done   Arthroscopy: Not done   Physical Therapy: Physical Therapy completed recently following right total knee arthroplasty on 2/15/23, this has minimally helped with her felt knee symptoms       Past Medical History:   Diagnosis Date     Arthritis      Arthritis of right acromioclavicular joint 07/28/2021     Cervical dysplasia 1988     COPD (chronic obstructive pulmonary disease) (H)      Degenerative joint disease of hand, left      Female stress  incontinence 02/22/2022     Glaucoma      Hyperlipidemia LDL goal < 130      Hypertension goal BP (blood pressure) < 140/90      Hypothyroid      Impaired glucose tolerance 05/29/2012     Microscopic hematuria      MMT (medial meniscus tear) 09/21/2006    LT     Moderate recurrent major depression (H) 02/13/2014     Morbid obesity (H)      JOSE (obstructive sleep apnea) 07/24/2015    Doesn't use cpap     Primary osteoarthritis of both knees      RLS (restless legs syndrome)      Sensorineural hearing loss      Shingles 01/27/2022     Ulnar neuropathy at elbow, left      Vitamin D insufficiency        Past Surgical History:   Procedure Laterality Date     ARTHROPLASTY KNEE Right 2/15/2023    Procedure: ARTHROPLASTY, KNEE, TOTAL RIGHT;  Surgeon: Krishna Santiago MD;  Location: [facility]     BIOPSY       BLADDER SURGERY  03/19/2018     CERVIX SURGERY  1988    cervical cone     COLONOSCOPY       LAPAROSCOPIC OOPHORECTOMY Right 03/19/2018    with hysteroscopy/EMB, and pubovaginal sling      ROTATOR CUFF REPAIR RT/LT Right 09/01/2021     TONSILLECTOMY & ADENOIDECTOMY         Family History   Problem Relation Age of Onset     Allergies Mother      Cardiovascular Mother      Respiratory Mother      Thyroid Disease Mother      Other Cancer Mother         skin     Arthritis Father      Hypertension Father      Hyperlipidemia Father      Cerebrovascular Disease Father      Cancer Maternal Grandmother         bone     Other Cancer Maternal Grandmother         bone     Cerebrovascular Disease Paternal Grandmother      Cancer Paternal Grandfather      Cerebrovascular Disease Paternal Grandfather      Cancer Brother         testicular      Gastrointestinal Disease Brother      Other Cancer Brother         testicular     Allergies Sister      Allergies Daughter      Gastrointestinal Disease Daughter      Depression Daughter      Anesthesia Reaction Daughter      Genitourinary Problems Brother      Allergies Daughter 10        metal      Diabetes No family hx of        Social History     Socioeconomic History     Marital status:      Spouse name: Umberto     Number of children: 2     Years of education: 13     Highest education level: Not on file   Occupational History     Occupation: book keeper      Employer: RETIRED   Tobacco Use     Smoking status: Former     Packs/day: 0.50     Years: 49.00     Pack years: 24.50     Types: Cigarettes     Start date: 1960     Quit date: 2011     Years since quittin.1     Smokeless tobacco: Former     Quit date: 2011   Vaping Use     Vaping status: Never Used   Substance and Sexual Activity     Alcohol use: Yes     Comment: socially     Drug use: No     Sexual activity: Not Currently     Partners: Male     Birth control/protection: Post-menopausal   Other Topics Concern     Parent/sibling w/ CABG, MI or angioplasty before 65F 55M? No   Social History Narrative     Not on file     Social Determinants of Health     Financial Resource Strain: Not on file   Food Insecurity: Not on file   Transportation Needs: Not on file   Physical Activity: Not on file   Stress: Not on file   Social Connections: Not on file   Intimate Partner Violence: Not on file   Housing Stability: Not on file       Current Outpatient Medications   Medication Sig Dispense Refill     atorvastatin (LIPITOR) 40 MG tablet TAKE 1 TABLET EVERY DAY 90 tablet 3     Ferrous Sulfate (IRON) 325 (65 FE) MG tablet Take 1 tablet by mouth daily       latanoprost (XALATAN) 0.005 % ophthalmic solution Place 1 drop into both eyes daily       levothyroxine (SYNTHROID/LEVOTHROID) 137 MCG tablet TAKE 1 TABLET EVERY DAY 90 tablet 3     lisinopril (ZESTRIL) 20 MG tablet TAKE 1 TABLET EVERY DAY 90 tablet 3     OMEGA-3 KRILL OIL PO        STIOLTO RESPIMAT 2.5-2.5 MCG/ACT AERS Inhale 2 puffs into the lungs daily       Vitamin D, Cholecalciferol, 1000 units CAPS Take 1 capsule by mouth daily         Allergies   Allergen Reactions     Nkda [No  "Known Drug Allergy]        REVIEW OF SYSTEMS:  CONSTITUTIONAL:  NEGATIVE for fever, chills, change in weight, not feeling tired  SKIN:  NEGATIVE for worrisome rashes, no skin lumps, no skin ulcers and no non-healing wounds  EYES:  NEGATIVE for vision changes or irritation  ENT/MOUTH:  NEGATIVE  No hearing loss, no hoarseness, no difficulty swallowing.  RESP:  NEGATIVE for significant cough or SOB  BREAST:  NEGATIVE for masses, tenderness or discharge  CV:  NEGATIVE for chest pain, palpitations or peripheral edema  GI:  NEGATIVE for nausea, abdominal pain, heartburn, or change in bowel habits  :  Negative   MUSCULOSKELETAL:  See HPI above  NEURO:  NEGATIVE for weakness, dizziness or paresthesias  ENDOCRINE:  NEGATIVE for temperature intolerance, skin/hair changes  HEME/ALLERGY/IMMUNE:  NEGATIVE for bleeding problems  PSYCHIATRIC:  NEGATIVE for changes in mood or affect    PHYSICAL EXAM:  Vitals: /68   Pulse 79   Resp 18   Ht 1.556 m (5' 1.25\")   Wt 121.6 kg (268 lb)   BMI 50.23 kg/m    BMI= Body mass index is 50.23 kg/m .  Constitutional:  healthy, alert and no distress  Neuro: Alert and Oriented x 3, Sensation grossly WNL.  HEENT:  Atraumatic, EOMI  Neck:  Neck supple with no tenderness.  Psych: Affect normal   Respiratory: Breathing not labored.  Cardiovascular: normal peripheral pulses  Lymph: no adenopathy  Skin: No rashes,worrisome lesions or skin problems  Spine: straight, no straight leg raising pain.  Hips show full range of motion.  There is no tenderness over the sacro-iliac joints, sciatic notch, or greater trochanters.     KNEE EXAM:   Gait: walks with antalgic gait favoring left side   Alignment: Right: normal , Left:  Mild valgus  ROM: Right knee: 0 extension to 115 flexion, Left knee: 0 extension to 110 flexion  Effusion: Right: none, Left: mild  Tender: Right: lateral joint line, Left: lateral joint line and lateral facet of the patella  Ligaments:  Lachman's Stable, Anterior and " posterior drawer stable.  Right medial collateral ligament:  no laxity,  Lateral collateral ligament  no laxity.    Left medial collateral ligament:  no laxity, lateral collateral ligament:  no laxity.   mild pain with Varus/Valgus stress testing on left     Patellofemoral joint: mild crepitations in the left patellofemoral joint.    Apprehension: negative      X-RAY:  From  7/5/22: shows severe lateral joint space narrowing on left   There is bone-on-bone laterally by x-ray.       Impression: Right Knee: total knee arthroplasty doing well.  Left Knee: Osteoarthritis severe.    Plan: Total Knee Arthroplasty: We talked about the patient's condition and diagnosis.  Because of severe arthritis, and failure of conservative measures, I have suggested total knee arthroplasty of the left knee(s).  I've talked in depth about the procedure and the risks, which include, but are not limited to blood loss requiring transfusion, infection, neurovascular injury, DVT, PE, continued pain, (both perioperative and persistent post-recovery pain), numbness around the wound, instability, and potential anesthetic and perioperative medical complications, and the possibility of needing additional procedures. We also talked about recovery time, which differs from patient to patient.    Medical clearance will need to be obtained.      Special considerations: cemented.  Right side on 2/15/23 used Femur D LPS, tibia 3 keeled, 32 patella, 14 LPS spacer.  She wants this in the fall.  We should repeat left knee x-ray a month before surgery.

## 2023-06-12 NOTE — TELEPHONE ENCOUNTER
Type of surgery: ARTHROPLASTY, KNEE, TOTAL (Left)     Location of surgery: [facility]  Date and time of surgery: 09/20/2023  Surgeon: ANDIE  Pre-Op Appt Date: 09/13/2023  Post-Op Appt Date: 10/02/2023   Packet sent out: Yes  Pre-cert/Authorization completed:  No  Date:

## 2023-06-14 PROBLEM — E61.1 IRON DEFICIENCY: Status: RESOLVED | Noted: 2017-08-30 | Resolved: 2023-06-14

## 2023-06-14 PROBLEM — M17.0 PRIMARY OSTEOARTHRITIS OF BOTH KNEES: Status: RESOLVED | Noted: 2022-08-24 | Resolved: 2023-06-14

## 2023-06-14 PROBLEM — M17.11 PRIMARY OSTEOARTHRITIS OF RIGHT KNEE: Status: RESOLVED | Noted: 2023-01-26 | Resolved: 2023-06-14

## 2023-06-14 PROBLEM — R32 INCONTINENCE IN FEMALE: Status: ACTIVE | Noted: 2022-12-14

## 2023-06-14 PROBLEM — R32 INCONTINENCE IN FEMALE: Status: RESOLVED | Noted: 2022-12-14 | Resolved: 2023-06-14

## 2023-06-19 ENCOUNTER — OFFICE VISIT (OUTPATIENT)
Dept: FAMILY MEDICINE | Facility: CLINIC | Age: 78
End: 2023-06-19
Payer: COMMERCIAL

## 2023-06-19 VITALS
HEART RATE: 83 BPM | OXYGEN SATURATION: 95 % | TEMPERATURE: 98.3 F | BODY MASS INDEX: 51.16 KG/M2 | HEIGHT: 61 IN | WEIGHT: 271 LBS | SYSTOLIC BLOOD PRESSURE: 115 MMHG | RESPIRATION RATE: 16 BRPM | DIASTOLIC BLOOD PRESSURE: 75 MMHG

## 2023-06-19 DIAGNOSIS — N39.3 FEMALE STRESS INCONTINENCE: Primary | ICD-10-CM

## 2023-06-19 DIAGNOSIS — E66.01 MORBIDLY OBESE (H): ICD-10-CM

## 2023-06-19 DIAGNOSIS — R20.2 NUMBNESS AND TINGLING IN RIGHT HAND: ICD-10-CM

## 2023-06-19 DIAGNOSIS — J44.9 CHRONIC OBSTRUCTIVE PULMONARY DISEASE, UNSPECIFIED COPD TYPE (H): ICD-10-CM

## 2023-06-19 DIAGNOSIS — D64.9 POSTOPERATIVE ANEMIA: ICD-10-CM

## 2023-06-19 DIAGNOSIS — R20.0 NUMBNESS AND TINGLING IN RIGHT HAND: ICD-10-CM

## 2023-06-19 DIAGNOSIS — R73.02 IMPAIRED GLUCOSE TOLERANCE: ICD-10-CM

## 2023-06-19 DIAGNOSIS — E78.5 HYPERLIPIDEMIA WITH TARGET LDL LESS THAN 130: ICD-10-CM

## 2023-06-19 DIAGNOSIS — M53.3 SACROILIAC PAIN: ICD-10-CM

## 2023-06-19 DIAGNOSIS — I87.2 VENOUS STASIS DERMATITIS OF RIGHT LOWER EXTREMITY: ICD-10-CM

## 2023-06-19 DIAGNOSIS — E03.9 ACQUIRED HYPOTHYROIDISM: ICD-10-CM

## 2023-06-19 PROBLEM — R31.29 MICROSCOPIC HEMATURIA: Status: RESOLVED | Noted: 2017-10-26 | Resolved: 2023-06-19

## 2023-06-19 LAB
ALBUMIN UR-MCNC: NEGATIVE MG/DL
APPEARANCE UR: CLEAR
BILIRUB UR QL STRIP: NEGATIVE
COLOR UR AUTO: YELLOW
ERYTHROCYTE [DISTWIDTH] IN BLOOD BY AUTOMATED COUNT: 14.3 % (ref 10–15)
GLUCOSE UR STRIP-MCNC: NEGATIVE MG/DL
HBA1C MFR BLD: 5.5 % (ref 0–5.6)
HCT VFR BLD AUTO: 40.7 % (ref 35–47)
HGB BLD-MCNC: 12.7 G/DL (ref 11.7–15.7)
HGB UR QL STRIP: ABNORMAL
KETONES UR STRIP-MCNC: NEGATIVE MG/DL
LEUKOCYTE ESTERASE UR QL STRIP: NEGATIVE
MCH RBC QN AUTO: 26.5 PG (ref 26.5–33)
MCHC RBC AUTO-ENTMCNC: 31.2 G/DL (ref 31.5–36.5)
MCV RBC AUTO: 85 FL (ref 78–100)
NITRATE UR QL: NEGATIVE
PH UR STRIP: 5 [PH] (ref 5–7)
PLATELET # BLD AUTO: 271 10E3/UL (ref 150–450)
RBC # BLD AUTO: 4.8 10E6/UL (ref 3.8–5.2)
RBC #/AREA URNS AUTO: ABNORMAL /HPF
SP GR UR STRIP: 1.01 (ref 1–1.03)
SQUAMOUS #/AREA URNS AUTO: ABNORMAL /LPF
UROBILINOGEN UR STRIP-ACNC: 0.2 E.U./DL
WBC # BLD AUTO: 8 10E3/UL (ref 4–11)
WBC #/AREA URNS AUTO: ABNORMAL /HPF

## 2023-06-19 PROCEDURE — 99214 OFFICE O/P EST MOD 30 MIN: CPT | Mod: 25 | Performed by: FAMILY MEDICINE

## 2023-06-19 PROCEDURE — 83550 IRON BINDING TEST: CPT | Performed by: FAMILY MEDICINE

## 2023-06-19 PROCEDURE — 83036 HEMOGLOBIN GLYCOSYLATED A1C: CPT | Performed by: FAMILY MEDICINE

## 2023-06-19 PROCEDURE — 80061 LIPID PANEL: CPT | Performed by: FAMILY MEDICINE

## 2023-06-19 PROCEDURE — 84443 ASSAY THYROID STIM HORMONE: CPT | Performed by: FAMILY MEDICINE

## 2023-06-19 PROCEDURE — 83540 ASSAY OF IRON: CPT | Performed by: FAMILY MEDICINE

## 2023-06-19 PROCEDURE — 0124A COVID-19 BIVALENT 12+ (PFIZER): CPT | Performed by: FAMILY MEDICINE

## 2023-06-19 PROCEDURE — 80048 BASIC METABOLIC PNL TOTAL CA: CPT | Performed by: FAMILY MEDICINE

## 2023-06-19 PROCEDURE — 81001 URINALYSIS AUTO W/SCOPE: CPT | Performed by: FAMILY MEDICINE

## 2023-06-19 PROCEDURE — 36415 COLL VENOUS BLD VENIPUNCTURE: CPT | Performed by: FAMILY MEDICINE

## 2023-06-19 PROCEDURE — 85027 COMPLETE CBC AUTOMATED: CPT | Performed by: FAMILY MEDICINE

## 2023-06-19 PROCEDURE — 91312 COVID-19 BIVALENT 12+ (PFIZER): CPT | Performed by: FAMILY MEDICINE

## 2023-06-19 PROCEDURE — 82728 ASSAY OF FERRITIN: CPT | Performed by: FAMILY MEDICINE

## 2023-06-19 RX ORDER — TRIAMCINOLONE ACETONIDE 1 MG/G
CREAM TOPICAL 2 TIMES DAILY PRN
Qty: 60 G | Refills: 0 | Status: SHIPPED | OUTPATIENT
Start: 2023-06-19 | End: 2023-11-22

## 2023-06-19 ASSESSMENT — ENCOUNTER SYMPTOMS: NUMBNESS: 1

## 2023-06-19 NOTE — PROGRESS NOTES
"  Assessment & Plan     (N39.3) Female stress incontinence  (primary encounter diagnosis)  Plan: UA with Microscopic reflex to Culture - lab         collect        Consider follow-up with urology     (R73.02) Impaired glucose tolerance  (E78.5) Hyperlipidemia with target LDL less than 130  (E66.01) Morbidly obese (H)  Plan: Basic metabolic panel  (Ca, Cl, CO2, Creat,         Gluc, K, Na, BUN), Lipid panel reflex to direct        LDL Non-fasting        Continue lifestyle strategies for weight loss     (I87.2) Venous stasis dermatitis of right lower extremity  Plan: triamcinolone (KENALOG) 0.1 % external cream        Lower extremity elevation, low-salt diet, compression stockings, and call or return to clinic as needed if these symptoms worsen or fail to improve as anticipated.     (D64.9) Postoperative anemia  Plan: CBC with Platelets and Reflex to Iron Studies          (R20.0,  R20.2) Numbness and tingling in right hand  Plan: consider repeat EMG, avoid elbow compression/irritation; follow-up as needed     (M53.3) Sacroiliac pain  Comment: improved with physical therapy   Plan: follow-up as needed     (J44.9) Chronic obstructive pulmonary disease, unspecified COPD type (H)  Comment: Well controlled with medications without side effects.     (E03.9) Acquired hypothyroidism  Comment: euthyroid on replacement   Plan: TSH with free T4 reflex            BMI:   Estimated body mass index is 50.77 kg/m  as calculated from the following:    Height as of this encounter: 1.556 m (5' 1.26\").    Weight as of this encounter: 122.9 kg (271 lb).   Weight management plan: Discussed healthy diet and exercise guidelines    See Patient Instructions    Verena Vee MD  [facility] JOE Weaver is a 78 year old, presenting for the following health issues:  Patient Request (Growing more hair on the leg she had surgery on and nails are growing faster. Hormone? Immunization for RSV?), Urinary Frequency, " Numbness (Fingers. Medication she can take.), Blister (On right shin.), and Shoulder right (Starting to bother her again. Wondering if she should get an x-ray.)        6/19/2023    11:50 AM   Additional Questions   Roomed by Sally MCLEOD CMA     Numbness  Associated symptoms include numbness.   Shoulder left  Associated symptoms include numbness.   History of Present Illness       Reason for visit:  Rsv shot, hair & nail growth, sciatica, blisters on leg, increased neuropathy,  frequent urination, shoulder pain.  Symptom onset:  More than a month  Symptoms include:  Various  Symptom intensity:  Moderate  Symptom progression:  Staying the same  Had these symptoms before:  Yes    She eats 4 or more servings of fruits and vegetables daily.She consumes 0 sweetened beverage(s) daily.She exercises with enough effort to increase her heart rate 20 to 29 minutes per day.  She exercises with enough effort to increase her heart rate 7 days per week.   She is taking medications regularly.     Gina Yeh is a 78 year old female who presents with large amount of urination with some frequency and incontinence. Symptom onset has been gradual, worsening for a time period of months. Severity is described as mild. Course of her symptoms over time is worsening.     She has a blistering rash on her right lower extremity since her right TKA.     She has numbness and tingling in her right hand but wants to avoid another EMG.     She has some left buttock pain which is improving with physical therapy. She also has chronic right shoulder pain.     Review of Systems   Neurological: Positive for numbness.    CONSTITUTIONAL: NEGATIVE for fever, chills, change in weight  INTEGUMENTARY/SKIN: see HPI   EYES: NEGATIVE for vision changes or irritation  ENT/MOUTH: NEGATIVE for ear, mouth and throat problems  RESP:Hx COPD  CV: lower extremity edema   female: see HPI   MUSCULOSKELETAL: uses walker, right TKA, chronic right shoulder pain   "  HEME/ALLERGY/IMMUNE: anemia - reported resolved per blood donation center       Objective    /75 (BP Location: Left arm, Patient Position: Sitting, Cuff Size: Adult Large)   Pulse 83   Temp 98.3  F (36.8  C) (Oral)   Resp 16   Ht 1.556 m (5' 1.26\")   Wt 122.9 kg (271 lb)   SpO2 95%   BMI 50.77 kg/m    Body mass index is 50.77 kg/m .  Physical Exam   GENERAL: alert, no distress and obese  EYES: Eyes grossly normal to inspection, PERRL and conjunctivae and sclerae normal  NECK: no adenopathy, no asymmetry, masses, or scars and thyroid normal to palpation  RESP: lungs clear to auscultation - no rales, rhonchi or wheezes  CV: regular rate and rhythm, normal S1 S2, no S3 or S4, no murmur, click or rub, trace bipedal edema   MS: antalgic gait with use of walker; no deformity   SKIN: mild erythema and few clear vesicles on right anterior shin   PSYCH: mentation appears normal, affect normal/bright    Office Visit on 02/27/2023   Component Date Value Ref Range Status     Culture 02/27/2023 No Growth   Final     Gram Stain Result 02/27/2023 No organisms seen   Final     Gram Stain Result 02/27/2023 1+ WBC seen   Final     Culture 02/27/2023 No anaerobic organisms isolated   Final     Results for orders placed or performed in visit on 06/19/23 (from the past 24 hour(s))   CBC with Platelets and Reflex to Iron Studies    Narrative    The following orders were created for panel order CBC with Platelets and Reflex to Iron Studies.  Procedure                               Abnormality         Status                     ---------                               -----------         ------                     CBC with Platelets and R...[[id number]]                      In process                 Extra Green Top (Lithium...[[id number]]                                                   Please view results for these tests on the individual orders.                   "

## 2023-06-20 LAB
ANION GAP SERPL CALCULATED.3IONS-SCNC: 14 MMOL/L (ref 7–15)
BUN SERPL-MCNC: 21.1 MG/DL (ref 8–23)
CALCIUM SERPL-MCNC: 10 MG/DL (ref 8.8–10.2)
CHLORIDE SERPL-SCNC: 101 MMOL/L (ref 98–107)
CHOLEST SERPL-MCNC: 179 MG/DL
CREAT SERPL-MCNC: 0.76 MG/DL (ref 0.51–0.95)
DEPRECATED HCO3 PLAS-SCNC: 22 MMOL/L (ref 22–29)
FERRITIN SERPL-MCNC: 53 NG/ML (ref 11–328)
GFR SERPL CREATININE-BSD FRML MDRD: 81 ML/MIN/1.73M2
GLUCOSE SERPL-MCNC: 102 MG/DL (ref 70–99)
HDLC SERPL-MCNC: 43 MG/DL
IRON BINDING CAPACITY (ROCHE): 370 UG/DL (ref 240–430)
IRON SATN MFR SERPL: 39 % (ref 15–46)
IRON SERPL-MCNC: 146 UG/DL (ref 37–145)
LDLC SERPL CALC-MCNC: 101 MG/DL
NONHDLC SERPL-MCNC: 136 MG/DL
POTASSIUM SERPL-SCNC: 4.9 MMOL/L (ref 3.4–5.3)
SODIUM SERPL-SCNC: 137 MMOL/L (ref 136–145)
TRIGL SERPL-MCNC: 175 MG/DL
TSH SERPL DL<=0.005 MIU/L-ACNC: 2.89 UIU/ML (ref 0.3–4.2)

## 2023-06-21 ENCOUNTER — THERAPY VISIT (OUTPATIENT)
Dept: PHYSICAL THERAPY | Facility: CLINIC | Age: 78
End: 2023-06-21
Payer: COMMERCIAL

## 2023-06-21 DIAGNOSIS — Z96.651 HISTORY OF TOTAL RIGHT KNEE REPLACEMENT: Primary | ICD-10-CM

## 2023-06-21 PROCEDURE — 97110 THERAPEUTIC EXERCISES: CPT | Mod: GP | Performed by: PHYSICAL THERAPIST

## 2023-06-23 ENCOUNTER — TRANSFERRED RECORDS (OUTPATIENT)
Dept: HEALTH INFORMATION MANAGEMENT | Facility: CLINIC | Age: 78
End: 2023-06-23

## 2023-06-28 ENCOUNTER — THERAPY VISIT (OUTPATIENT)
Dept: PHYSICAL THERAPY | Facility: CLINIC | Age: 78
End: 2023-06-28
Payer: COMMERCIAL

## 2023-06-28 DIAGNOSIS — Z96.651 HISTORY OF TOTAL RIGHT KNEE REPLACEMENT: Primary | ICD-10-CM

## 2023-06-28 PROCEDURE — 97110 THERAPEUTIC EXERCISES: CPT | Mod: GP | Performed by: PHYSICAL THERAPIST

## 2023-07-10 ENCOUNTER — TRANSFERRED RECORDS (OUTPATIENT)
Dept: HEALTH INFORMATION MANAGEMENT | Facility: CLINIC | Age: 78
End: 2023-07-10

## 2023-07-25 ENCOUNTER — PATIENT OUTREACH (OUTPATIENT)
Dept: CARE COORDINATION | Facility: CLINIC | Age: 78
End: 2023-07-25
Payer: COMMERCIAL

## 2023-07-27 ENCOUNTER — ANCILLARY PROCEDURE (OUTPATIENT)
Dept: MAMMOGRAPHY | Facility: CLINIC | Age: 78
End: 2023-07-27
Attending: FAMILY MEDICINE
Payer: COMMERCIAL

## 2023-07-27 DIAGNOSIS — Z12.31 VISIT FOR SCREENING MAMMOGRAM: ICD-10-CM

## 2023-07-27 PROCEDURE — 77067 SCR MAMMO BI INCL CAD: CPT | Mod: TC | Performed by: RADIOLOGY

## 2023-07-31 ENCOUNTER — TELEPHONE (OUTPATIENT)
Dept: ORTHOPEDICS | Facility: CLINIC | Age: 78
End: 2023-07-31
Payer: COMMERCIAL

## 2023-07-31 DIAGNOSIS — M17.12 PRIMARY OSTEOARTHRITIS OF LEFT KNEE: ICD-10-CM

## 2023-07-31 DIAGNOSIS — Z96.652 S/P TOTAL KNEE ARTHROPLASTY, LEFT: Primary | ICD-10-CM

## 2023-07-31 NOTE — TELEPHONE ENCOUNTER
Physical therapy order placed for upcoming total knee arthroplasty.    Patient plans to complete 3 weeks of home physical therapy.    Lakeshia Walters MS, ATC  Certified Athletic Trainer

## 2023-08-07 ENCOUNTER — THERAPY VISIT (OUTPATIENT)
Dept: PHYSICAL THERAPY | Facility: CLINIC | Age: 78
End: 2023-08-07
Payer: COMMERCIAL

## 2023-08-07 DIAGNOSIS — Z96.651 HISTORY OF TOTAL RIGHT KNEE REPLACEMENT: Primary | ICD-10-CM

## 2023-08-07 PROCEDURE — 97110 THERAPEUTIC EXERCISES: CPT | Mod: GP | Performed by: PHYSICAL THERAPIST

## 2023-08-07 NOTE — PROGRESS NOTES
DISCHARGE  Reason for Discharge: Patient chooses to discontinue therapy in order to save visits and get ready for left TKA in a few months.     Equipment Issued: Stir    Discharge Plan: Patient to continue home program.    Referring Provider:  Krishna Santiago

## 2023-08-08 ENCOUNTER — PATIENT OUTREACH (OUTPATIENT)
Dept: CARE COORDINATION | Facility: CLINIC | Age: 78
End: 2023-08-08
Payer: COMMERCIAL

## 2023-08-20 DIAGNOSIS — E78.5 HYPERLIPIDEMIA WITH TARGET LDL LESS THAN 130: ICD-10-CM

## 2023-08-21 RX ORDER — ATORVASTATIN CALCIUM 40 MG/1
TABLET, FILM COATED ORAL
Qty: 90 TABLET | Refills: 1 | Status: SHIPPED | OUTPATIENT
Start: 2023-08-21 | End: 2024-03-27

## 2023-08-24 DIAGNOSIS — E03.9 ACQUIRED HYPOTHYROIDISM: ICD-10-CM

## 2023-08-24 DIAGNOSIS — I10 HYPERTENSION GOAL BP (BLOOD PRESSURE) < 140/90: ICD-10-CM

## 2023-08-25 RX ORDER — LISINOPRIL 20 MG/1
TABLET ORAL
Qty: 90 TABLET | Refills: 3 | Status: SHIPPED | OUTPATIENT
Start: 2023-08-25 | End: 2024-03-27

## 2023-08-25 RX ORDER — LEVOTHYROXINE SODIUM 137 UG/1
TABLET ORAL
Qty: 90 TABLET | Refills: 3 | Status: SHIPPED | OUTPATIENT
Start: 2023-08-25 | End: 2024-03-27

## 2023-09-11 ENCOUNTER — OFFICE VISIT (OUTPATIENT)
Dept: ORTHOPEDICS | Facility: CLINIC | Age: 78
End: 2023-09-11
Payer: COMMERCIAL

## 2023-09-11 ENCOUNTER — ANCILLARY PROCEDURE (OUTPATIENT)
Dept: GENERAL RADIOLOGY | Facility: CLINIC | Age: 78
End: 2023-09-11
Attending: ORTHOPAEDIC SURGERY
Payer: COMMERCIAL

## 2023-09-11 VITALS
DIASTOLIC BLOOD PRESSURE: 61 MMHG | BODY MASS INDEX: 51.16 KG/M2 | HEART RATE: 91 BPM | SYSTOLIC BLOOD PRESSURE: 133 MMHG | HEIGHT: 61 IN | WEIGHT: 271 LBS | RESPIRATION RATE: 18 BRPM

## 2023-09-11 DIAGNOSIS — M25.562 LEFT KNEE PAIN, UNSPECIFIED CHRONICITY: Primary | ICD-10-CM

## 2023-09-11 DIAGNOSIS — M25.562 LEFT KNEE PAIN, UNSPECIFIED CHRONICITY: ICD-10-CM

## 2023-09-11 DIAGNOSIS — Z98.890 S/P COMPLETE REPAIR OF ROTATOR CUFF: ICD-10-CM

## 2023-09-11 DIAGNOSIS — M17.12 PRIMARY OSTEOARTHRITIS OF LEFT KNEE: ICD-10-CM

## 2023-09-11 DIAGNOSIS — S46.011D TRAUMATIC COMPLETE TEAR OF RIGHT ROTATOR CUFF, SUBSEQUENT ENCOUNTER: ICD-10-CM

## 2023-09-11 PROBLEM — S46.011A TRAUMATIC COMPLETE TEAR OF RIGHT ROTATOR CUFF: Status: ACTIVE | Noted: 2023-09-11

## 2023-09-11 PROBLEM — Z96.652 HISTORY OF TOTAL LEFT KNEE REPLACEMENT: Status: ACTIVE | Noted: 2023-09-11

## 2023-09-11 PROCEDURE — 73030 X-RAY EXAM OF SHOULDER: CPT | Mod: TC | Performed by: RADIOLOGY

## 2023-09-11 PROCEDURE — 73562 X-RAY EXAM OF KNEE 3: CPT | Mod: TC | Performed by: RADIOLOGY

## 2023-09-11 PROCEDURE — 99212 OFFICE O/P EST SF 10 MIN: CPT | Performed by: ORTHOPAEDIC SURGERY

## 2023-09-11 NOTE — LETTER
9/11/2023         RE: Gina Yeh  [address]  [place] [address]        Dear Colleague,    Thank you for referring your patient, Gina Yeh, to the [facility]. Please see a copy of my visit note below.    Gina Yeh is seen in preparation for left total knee arthroplasty.  New x-ray was obtained showing severe lateral wear.  There is also considerable patellofemoral wear.  She is also having trouble with her right shoulder.  She had right rotator cuff repair on 9/1/2021.  She subsequently has fallen and caught herself with the arm.  She has some limited use since then.  Range of motion of shoulder shows 90 degrees flexion, 70 degrees abduction, 50 degrees external rotation.  Weakness mildly with external rotation and abduction.  X-ray of the right shoulder shows an anchor in the humerus from the previous repair.  It also shows high riding humerus consistent with rotator cuff tear.  We discussed options for the shoulder.    Since it is not a major issue right now, we will wait and perform reverse total shoulder arthroplasty at some time in the future.      Again, thank you for allowing me to participate in the care of your patient.        Sincerely,        Krishna Santiago MD

## 2023-09-11 NOTE — PROGRESS NOTES
Gina ORTIZ Jasonmillicentmiciara is seen in preparation for left total knee arthroplasty.  New x-ray was obtained showing severe lateral wear.  There is also considerable patellofemoral wear.  She is also having trouble with her right shoulder.  She had right rotator cuff repair on 9/1/2021.  She subsequently has fallen and caught herself with the arm.  She has some limited use since then.  Range of motion of shoulder shows 90 degrees flexion, 70 degrees abduction, 50 degrees external rotation.  Weakness mildly with external rotation and abduction.  X-ray of the right shoulder shows an anchor in the humerus from the previous repair.  It also shows high riding humerus consistent with rotator cuff tear.  We discussed options for the shoulder.    Since it is not a major issue right now, we will wait and perform reverse total shoulder arthroplasty at some time in the future.

## 2023-09-12 PROBLEM — Z96.651 HISTORY OF TOTAL RIGHT KNEE REPLACEMENT: Status: RESOLVED | Noted: 2023-01-26 | Resolved: 2023-09-12

## 2023-09-12 PROBLEM — M17.12 PRIMARY OSTEOARTHRITIS OF LEFT KNEE: Status: RESOLVED | Noted: 2023-06-12 | Resolved: 2023-09-12

## 2023-09-12 PROBLEM — Z96.653 HISTORY OF BILATERAL KNEE REPLACEMENT: Status: ACTIVE | Noted: 2023-09-11

## 2023-09-13 ENCOUNTER — OFFICE VISIT (OUTPATIENT)
Dept: FAMILY MEDICINE | Facility: CLINIC | Age: 78
End: 2023-09-13
Payer: COMMERCIAL

## 2023-09-13 VITALS
DIASTOLIC BLOOD PRESSURE: 78 MMHG | WEIGHT: 274 LBS | OXYGEN SATURATION: 95 % | SYSTOLIC BLOOD PRESSURE: 118 MMHG | HEIGHT: 61 IN | RESPIRATION RATE: 22 BRPM | BODY MASS INDEX: 51.73 KG/M2 | TEMPERATURE: 98.5 F | HEART RATE: 85 BPM

## 2023-09-13 DIAGNOSIS — E87.5 HYPERKALEMIA: ICD-10-CM

## 2023-09-13 DIAGNOSIS — Z01.818 PREOP GENERAL PHYSICAL EXAM: Primary | ICD-10-CM

## 2023-09-13 DIAGNOSIS — I10 HYPERTENSION GOAL BP (BLOOD PRESSURE) < 140/90: ICD-10-CM

## 2023-09-13 DIAGNOSIS — M17.12 PRIMARY OSTEOARTHRITIS OF LEFT KNEE: ICD-10-CM

## 2023-09-13 DIAGNOSIS — E66.01 MORBIDLY OBESE (H): ICD-10-CM

## 2023-09-13 DIAGNOSIS — J44.9 CHRONIC OBSTRUCTIVE PULMONARY DISEASE, UNSPECIFIED COPD TYPE (H): ICD-10-CM

## 2023-09-13 DIAGNOSIS — G47.33 OSA (OBSTRUCTIVE SLEEP APNEA): Chronic | ICD-10-CM

## 2023-09-13 PROBLEM — Z96.651 HISTORY OF RIGHT KNEE JOINT REPLACEMENT: Status: ACTIVE | Noted: 2023-09-11

## 2023-09-13 LAB
ANION GAP SERPL CALCULATED.3IONS-SCNC: 10 MMOL/L (ref 7–15)
BUN SERPL-MCNC: 19.9 MG/DL (ref 8–23)
CALCIUM SERPL-MCNC: 9.9 MG/DL (ref 8.8–10.2)
CHLORIDE SERPL-SCNC: 104 MMOL/L (ref 98–107)
CREAT SERPL-MCNC: 0.78 MG/DL (ref 0.51–0.95)
DEPRECATED HCO3 PLAS-SCNC: 25 MMOL/L (ref 22–29)
EGFRCR SERPLBLD CKD-EPI 2021: 78 ML/MIN/1.73M2
FERRITIN SERPL-MCNC: 34 NG/ML (ref 11–328)
GLUCOSE SERPL-MCNC: 100 MG/DL (ref 70–99)
HGB BLD-MCNC: 12.6 G/DL (ref 11.7–15.7)
HOLD SPECIMEN: NORMAL
IRON BINDING CAPACITY (ROCHE): 371 UG/DL (ref 240–430)
IRON SATN MFR SERPL: 39 % (ref 15–46)
IRON SERPL-MCNC: 145 UG/DL (ref 37–145)
POTASSIUM SERPL-SCNC: 5.4 MMOL/L (ref 3.4–5.3)
SODIUM SERPL-SCNC: 139 MMOL/L (ref 136–145)

## 2023-09-13 PROCEDURE — 83550 IRON BINDING TEST: CPT | Performed by: FAMILY MEDICINE

## 2023-09-13 PROCEDURE — 99214 OFFICE O/P EST MOD 30 MIN: CPT | Performed by: FAMILY MEDICINE

## 2023-09-13 PROCEDURE — 82728 ASSAY OF FERRITIN: CPT | Performed by: FAMILY MEDICINE

## 2023-09-13 PROCEDURE — 83540 ASSAY OF IRON: CPT | Performed by: FAMILY MEDICINE

## 2023-09-13 PROCEDURE — 36415 COLL VENOUS BLD VENIPUNCTURE: CPT | Performed by: FAMILY MEDICINE

## 2023-09-13 PROCEDURE — 85018 HEMOGLOBIN: CPT | Performed by: FAMILY MEDICINE

## 2023-09-13 PROCEDURE — 80048 BASIC METABOLIC PNL TOTAL CA: CPT | Performed by: FAMILY MEDICINE

## 2023-09-13 NOTE — PATIENT INSTRUCTIONS
Preparing for Your Surgery  Getting started  A nurse will call you to review your health history and instructions. They will give you an arrival time based on your scheduled surgery time. Please be ready to share:  Your doctor's clinic name and phone number  Your medical, surgical, and anesthesia history  A list of allergies and sensitivities  A list of medicines, including herbal treatments and over-the-counter drugs  Whether the patient has a legal guardian (ask how to send us the papers in advance)  Please tell us if you're pregnant--or if there's any chance you might be pregnant. Some surgeries may injure a fetus (unborn baby), so they require a pregnancy test. Surgeries that are safe for a fetus don't always need a test, and you can choose whether to have one.   If you have a child who's having surgery, please ask for a copy of Preparing for Your Child's Surgery.    Preparing for surgery  Within 10 to 30 days of surgery: Have a pre-op exam (sometimes called an H&P, or History and Physical). This can be done at a clinic or pre-operative center.  If you're having a , you may not need this exam. Talk to your care team.  At your pre-op exam, talk to your care team about all medicines you take. If you need to stop any medicines before surgery, ask when to start taking them again.  We do this for your safety. Many medicines can make you bleed too much during surgery. Some change how well surgery (anesthesia) drugs work.  Call your insurance company to let them know you're having surgery. (If you don't have insurance, call [phone].)  Call your clinic if there's any change in your health. This includes signs of a cold or flu (sore throat, runny nose, cough, rash, fever). It also includes a scrape or scratch near the surgery site.  If you have questions on the day of surgery, call your hospital or surgery center.  Eating and drinking guidelines  For your safety: Unless your surgeon tells you otherwise,  follow the guidelines below.  Eat and drink as usual until 8 hours before you arrive for surgery. After that, no food or milk.  Drink clear liquids until 2 hours before you arrive. These are liquids you can see through, like water, Gatorade, and Propel Water. They also include plain black coffee and tea (no cream or milk), candy, and breath mints. You can spit out gum when you arrive.  If you drink alcohol: Stop drinking it the night before surgery.  If your care team tells you to take medicine on the morning of surgery, it's okay to take it with a sip of water.  Preventing infection  Shower or bathe the night before and morning of your surgery. Follow the instructions your clinic gave you. (If no instructions, use regular soap.)  Don't shave or clip hair near your surgery site. We'll remove the hair if needed.  Don't smoke or vape the morning of surgery. You may chew nicotine gum up to 2 hours before surgery. A nicotine patch is okay.  Note: Some surgeries require you to completely quit smoking and nicotine. Check with your surgeon.  Your care team will make every effort to keep you safe from infection. We will:  Clean our hands often with soap and water (or an alcohol-based hand rub).  Clean the skin at your surgery site with a special soap that kills germs.  Give you a special gown to keep you warm. (Cold raises the risk of infection.)  Wear special hair covers, masks, gowns and gloves during surgery.  Give antibiotic medicine, if prescribed. Not all surgeries need antibiotics.  What to bring on the day of surgery  Photo ID and insurance card  Copy of your health care directive, if you have one  Glasses and hearing aids (bring cases)  You can't wear contacts during surgery  Inhaler and eye drops, if you use them (tell us about these when you arrive)  CPAP machine or breathing device, if you use them  A few personal items, if spending the night  If you have . . .  A pacemaker, ICD (cardiac defibrillator) or other  implant: Bring the ID card.  An implanted stimulator: Bring the remote control.  A legal guardian: Bring a copy of the certified (court-stamped) guardianship papers.  Please remove any jewelry, including body piercings. Leave jewelry and other valuables at home.  If you're going home the day of surgery  You must have a responsible adult drive you home. They should stay with you overnight as well.  If you don't have someone to stay with you, and you aren't safe to go home alone, we may keep you overnight. Insurance often won't pay for this.  After surgery  If it's hard to control your pain or you need more pain medicine, please call your surgeon's office.  Questions?   If you have any questions for your care team, list them here: _________________________________________________________________________________________________________________________________________________________________________ ____________________________________ ____________________________________ ____________________________________    How to Take Your Medication Before Surgery  - Take all of your medications before surgery except as noted below  - HOLD (do not take) lisinopril on the day of surgery

## 2023-09-13 NOTE — PROGRESS NOTES
[facility]  [address] [place] [address]  [place] [address]  Phone: [phone]  Primary Provider: Scar Vee  Pre-op Performing Provider: SCAR VEE       PREOPERATIVE EVALUATION:  Today's date: 9/13/2023    Gina Yeh is a 78 year old female who presents for a preoperative evaluation.      9/13/2023     9:20 AM   Additional Questions   Roomed by Sally MCLEOD CMA   Accompanied by Self       Surgical Information:  Surgery/Procedure: ARTHROPLASTY, KNEE, TOTAL LEFT  Surgery Location: [facility]  Surgeon: Krishna Santiago MD   Surgery Date: 9/20/23  Time of Surgery: 7:30 AM  Where patient plans to recover: At home with family  Fax number for surgical facility: Note does not need to be faxed, will be available electronically in Epic.    Assessment & Plan     The proposed surgical procedure is considered INTERMEDIATE risk.    Preop general physical exam  Primary osteoarthritis of left knee    Chronic obstructive pulmonary disease, unspecified COPD type (H)  Hypertension goal BP (blood pressure) < 140/90  Morbidly obese (H)  JOSE (obstructive sleep apnea)  -patient is compliant with use of CPAP and receives benefit from using the DME            Risks and Recommendations:  The patient has the following additional risks and recommendations for perioperative complications:   - Morbid obesity (BMI >40)  Obstructive Sleep Apnea:     Antiplatelet or Anticoagulation Medication Instructions:   - Patient is on no antiplatelet or anticoagulation medications.    Additional Medication Instructions:  Patient is to take all scheduled medications on the day of surgery EXCEPT for modifications listed below:   - ACE/ARB: HOLD on day of surgery (minimum 11 hours for general anesthesia).    RECOMMENDATION:  APPROVAL GIVEN to proceed with proposed procedure, without further diagnostic evaluation.    Subjective   HPI related to upcoming procedure: Gina Yeh is a 78 year old  female who presents with knee pain. Symptom onset has been gradual, worsening for a time period of years. Severity is described as moderate. Course of her symptoms over time is worsening.         9/6/2023     2:48 PM   Preop Questions   1. Have you ever had a heart attack or stroke? No   2. Have you ever had surgery on your heart or blood vessels, such as a stent placement, a coronary artery bypass, or surgery on an artery in your head, neck, heart, or legs? No   3. Do you have chest pain with activity? No   4. Do you have a history of  heart failure? No   5. Do you currently have a cold, bronchitis or symptoms of other infection? No   6. Do you have a cough, shortness of breath, or wheezing? No   7. Do you or anyone in your family have previous history of blood clots? No   8. Do you or does anyone in your family have a serious bleeding problem such as prolonged bleeding following surgeries or cuts? No   9. Have you ever had problems with anemia or been told to take iron pills? No   10. Have you had any abnormal blood loss such as black, tarry or bloody stools, or abnormal vaginal bleeding? No   11. Have you ever had a blood transfusion? No   12. Are you willing to have a blood transfusion if it is medically needed before, during, or after your surgery? Yes   13. Have you or any of your relatives ever had problems with anesthesia? YES -     14. Do you have sleep apnea, excessive snoring or daytime drowsiness? YES -     14a. Do you have a CPAP machine? Yes   15. Do you have any artifical heart valves or other implanted medical devices like a pacemaker, defibrillator, or continuous glucose monitor? No   16. Do you have artificial joints? YES -     17. Are you allergic to latex? No       Health Care Directive:  Patient has a Health Care Directive on file      Preoperative Review of :   reviewed - controlled substances prescribed by other outside provider(s).       Status of Chronic Conditions:  See problem list  for active medical problems.  Problems all longstanding and stable, except as noted/documented.  See ROS for pertinent symptoms related to these conditions.    Review of Systems  CONSTITUTIONAL:NEGATIVE for fever, chills, change in weight  INTEGUMENTARY/SKIN: NEGATIVE for worrisome rashes, moles or lesions  EYES: NEGATIVE for vision changes or irritation  ENT/MOUTH: NEGATIVE for ear, mouth and throat problems  RESP: NEGATIVE for significant cough or SOB  CV: NEGATIVE for chest pain, palpitations or peripheral edema  GI: NEGATIVE for nausea, abdominal pain, heartburn, or change in bowel habits  : NEGATIVE for frequency, dysuria, or hematuria  MUSCULOSKELETAL:knee pain   NEURO: NEGATIVE for weakness, dizziness or paresthesias  ENDOCRINE: NEGATIVE for temperature intolerance, skin/hair changes  HEME: NEGATIVE for bleeding problems  PSYCHIATRIC: NEGATIVE for changes in mood or affect  Constitutional, neuro, ENT, endocrine, pulmonary, cardiac, gastrointestinal, genitourinary, musculoskeletal, integument and psychiatric systems are negative, except as otherwise noted.    Patient Active Problem List    Diagnosis Date Noted    COPD (chronic obstructive pulmonary disease) (H)      Priority: High    Morbidly obese (H) 05/29/2012     Priority: High     Bariatric surgery consult offered      Hypertension goal BP (blood pressure) < 140/90      Priority: High    Traumatic complete tear of right rotator cuff 09/11/2023     Priority: Medium    Primary osteoarthritis of left knee 06/12/2023     Priority: Medium    JOSE (obstructive sleep apnea) 07/24/2015     Priority: Medium     Diagnostic Study 9/12/2017 - (271.0 lbs) AHI 78.7, RDI 91.3, Supine AHI 73.5, REM AHI -, Low O2 79.5%, Time Spent ?88% 22.9 minutes.  Titration Study: 9/26/2017- (271.0 lbs) The patient was titrated at pressures ranging from 5 cmH2O up to 8 cmH2O.  The optimal pressure achieved was 8 cmH2O with a residual AHI of 1.1 events per hour.  Time in REM supine on  final pressure was 22.5 minutes.  PLM index was 25.6 movements per hour.  The PLM Arousal Index was 8.0 per hour.      Impaired glucose tolerance 05/29/2012     Priority: Medium    Hypothyroid      Priority: Medium    Hyperlipidemia with target LDL less than 130      Priority: Medium    History of right knee joint replacement 09/11/2023     Priority: Low    Degenerative joint disease of hand, left 02/06/2023     Priority: Low    Female stress incontinence 02/22/2022     Priority: Low    Ulnar neuropathy at elbow, left      Priority: Low    Personal history of tobacco use, presenting hazards to health 06/28/2021     Priority: Low    Restless legs syndrome (RLS) 08/30/2017     Priority: Low    Sensorineural hearing loss      Priority: Low    History of cervical dysplasia 05/28/2012     Priority: Low      Past Medical History:   Diagnosis Date    Arthritis of right acromioclavicular joint 07/28/2021    Cervical dysplasia 1988    COPD (chronic obstructive pulmonary disease) (H)     Degenerative joint disease of hand, left     Female stress incontinence 02/22/2022    Glaucoma     Hyperlipidemia LDL goal < 130     Hypertension goal BP (blood pressure) < 140/90     Hypothyroid     Impaired glucose tolerance 05/29/2012    Microscopic hematuria     MMT (medial meniscus tear) 09/21/2006    LT    Moderate recurrent major depression (H) 02/13/2014    Morbid obesity (H)     JOSE (obstructive sleep apnea) 07/24/2015    Doesn't use cpap    Primary osteoarthritis of both knees     RLS (restless legs syndrome)     Sensorineural hearing loss     Shingles 01/27/2022    Ulnar neuropathy at elbow, left     Vitamin D insufficiency      Past Surgical History:   Procedure Laterality Date    ARTHROPLASTY KNEE Right 2/15/2023    Procedure: ARTHROPLASTY, KNEE, TOTAL RIGHT;  Surgeon: Krishna Santiago MD;  Location: [facility]    BIOPSY      BLADDER SURGERY  03/19/2018    CERVIX SURGERY  1988    cervical cone    COLONOSCOPY      LAPAROSCOPIC  OOPHORECTOMY Right 2018    with hysteroscopy/EMB, and pubovaginal sling     ROTATOR CUFF REPAIR RT/LT Right 2021    TONSILLECTOMY & ADENOIDECTOMY       Current Outpatient Medications   Medication Sig Dispense Refill    atorvastatin (LIPITOR) 40 MG tablet TAKE 1 TABLET DAILY 90 tablet 1    Ferrous Sulfate (IRON) 325 (65 FE) MG tablet Take 1 tablet by mouth daily      latanoprost (XALATAN) 0.005 % ophthalmic solution Place 1 drop into both eyes daily      levothyroxine (SYNTHROID/LEVOTHROID) 137 MCG tablet TAKE 1 TABLET DAILY 90 tablet 3    lisinopril (ZESTRIL) 20 MG tablet TAKE 1 TABLET DAILY 90 tablet 3    OMEGA-3 KRILL OIL PO       STIOLTO RESPIMAT 2.5-2.5 MCG/ACT AERS Inhale 2 puffs into the lungs daily      triamcinolone (KENALOG) 0.1 % external cream Apply topically 2 times daily as needed (rash) 60 g 0    Vitamin D, Cholecalciferol, 1000 units CAPS Take 1 capsule by mouth daily         Allergies   Allergen Reactions    Nkda [No Known Drug Allergy]         Social History     Tobacco Use    Smoking status: Former     Packs/day: 0.50     Years: 49.00     Pack years: 24.50     Types: Cigarettes     Start date: 1960     Quit date: 2011     Years since quittin.3    Smokeless tobacco: Former     Quit date: 2011   Substance Use Topics    Alcohol use: Yes     Comment: socially     Family History   Problem Relation Age of Onset    Allergies Mother     Cardiovascular Mother     Respiratory Mother     Thyroid Disease Mother     Other Cancer Mother         skin    Arthritis Father     Hypertension Father     Hyperlipidemia Father     Cerebrovascular Disease Father     Cancer Maternal Grandmother         bone    Other Cancer Maternal Grandmother         bone    Cerebrovascular Disease Paternal Grandmother     Cancer Paternal Grandfather     Cerebrovascular Disease Paternal Grandfather     Cancer Brother         testicular     Gastrointestinal Disease Brother     Other Cancer Brother          "testicular    Allergies Sister     Allergies Daughter     Gastrointestinal Disease Daughter     Depression Daughter     Anesthesia Reaction Daughter     Genitourinary Problems Brother     Allergies Daughter 10        metal    Diabetes No family hx of      History   Drug Use No         Objective     /78 (BP Location: Left arm, Patient Position: Sitting, Cuff Size: Adult Large)   Pulse 85   Temp 98.5  F (36.9  C) (Oral)   Resp 22   Ht 1.556 m (5' 1.26\")   Wt 124.3 kg (274 lb)   SpO2 95%   BMI 51.33 kg/m      Physical Exam    GENERAL APPEARANCE: alert, no distress, and cooperative     EYES: EOMI, PERRL     HENT: ear canals and TM's normal and nose and mouth without ulcers or lesions     NECK: no adenopathy, no asymmetry, masses, or scars and thyroid normal to palpation     RESP: lungs clear to auscultation - no rales, rhonchi or wheezes     CV: regular rates and rhythm, normal S1 S2, no S3 or S4 and no murmur, click or rub     ABDOMEN:  soft, nontender, no HSM or masses and bowel sounds normal     MS: antalgic gait with use of walker; no deformity      SKIN: no suspicious lesions or rashes     NEURO: Normal strength and tone, sensory exam grossly normal, mentation intact and speech normal     PSYCH: mentation appears normal. and affect normal/bright     LYMPHATICS: No cervical adenopathy    Recent Labs   Lab Test 06/19/23  1241 02/16/23  0536 02/06/23  0933   HGB 12.7 10.0* 12.2     --  279    136 139   POTASSIUM 4.9 4.9 4.6   CR 0.76 0.98* 0.84   A1C 5.5  --   --         Diagnostics:  Labs pending at this time.  Results will be reviewed when available.   No EKG required, no history of coronary heart disease, significant arrhythmia, peripheral arterial disease or other structural heart disease.    Revised Cardiac Risk Index (RCRI):  The patient has the following serious cardiovascular risks for perioperative complications:   - No serious cardiac risks = 0 points     RCRI Interpretation: 0 " points: Class I (very low risk - 0.4% complication rate)         Signed Electronically by: Verena Vee MD  Copy of this evaluation report is provided to requesting physician.

## 2023-09-13 NOTE — H&P (VIEW-ONLY)
[facility]  [address] [place] [address]  [place] [address]  Phone: [phone]  Primary Provider: Scar Vee  Pre-op Performing Provider: SCAR VEE       PREOPERATIVE EVALUATION:  Today's date: 9/13/2023    Gina Yeh is a 78 year old female who presents for a preoperative evaluation.      9/13/2023     9:20 AM   Additional Questions   Roomed by Sally MCLEOD CMA   Accompanied by Self       Surgical Information:  Surgery/Procedure: ARTHROPLASTY, KNEE, TOTAL LEFT  Surgery Location: [facility]  Surgeon: Krishna Santiago MD   Surgery Date: 9/20/23  Time of Surgery: 7:30 AM  Where patient plans to recover: At home with family  Fax number for surgical facility: Note does not need to be faxed, will be available electronically in Epic.    Assessment & Plan     The proposed surgical procedure is considered INTERMEDIATE risk.    Preop general physical exam  Primary osteoarthritis of left knee    Chronic obstructive pulmonary disease, unspecified COPD type (H)  Hypertension goal BP (blood pressure) < 140/90  Morbidly obese (H)  JOSE (obstructive sleep apnea)  -patient is compliant with use of CPAP and receives benefit from using the DME            Risks and Recommendations:  The patient has the following additional risks and recommendations for perioperative complications:   - Morbid obesity (BMI >40)  Obstructive Sleep Apnea:     Antiplatelet or Anticoagulation Medication Instructions:   - Patient is on no antiplatelet or anticoagulation medications.    Additional Medication Instructions:  Patient is to take all scheduled medications on the day of surgery EXCEPT for modifications listed below:   - ACE/ARB: HOLD on day of surgery (minimum 11 hours for general anesthesia).    RECOMMENDATION:  APPROVAL GIVEN to proceed with proposed procedure, without further diagnostic evaluation.    Subjective   HPI related to upcoming procedure: Gina Yeh is a 78 year old  female who presents with knee pain. Symptom onset has been gradual, worsening for a time period of years. Severity is described as moderate. Course of her symptoms over time is worsening.         9/6/2023     2:48 PM   Preop Questions   1. Have you ever had a heart attack or stroke? No   2. Have you ever had surgery on your heart or blood vessels, such as a stent placement, a coronary artery bypass, or surgery on an artery in your head, neck, heart, or legs? No   3. Do you have chest pain with activity? No   4. Do you have a history of  heart failure? No   5. Do you currently have a cold, bronchitis or symptoms of other infection? No   6. Do you have a cough, shortness of breath, or wheezing? No   7. Do you or anyone in your family have previous history of blood clots? No   8. Do you or does anyone in your family have a serious bleeding problem such as prolonged bleeding following surgeries or cuts? No   9. Have you ever had problems with anemia or been told to take iron pills? No   10. Have you had any abnormal blood loss such as black, tarry or bloody stools, or abnormal vaginal bleeding? No   11. Have you ever had a blood transfusion? No   12. Are you willing to have a blood transfusion if it is medically needed before, during, or after your surgery? Yes   13. Have you or any of your relatives ever had problems with anesthesia? YES -     14. Do you have sleep apnea, excessive snoring or daytime drowsiness? YES -     14a. Do you have a CPAP machine? Yes   15. Do you have any artifical heart valves or other implanted medical devices like a pacemaker, defibrillator, or continuous glucose monitor? No   16. Do you have artificial joints? YES -     17. Are you allergic to latex? No       Health Care Directive:  Patient has a Health Care Directive on file      Preoperative Review of :   reviewed - controlled substances prescribed by other outside provider(s).       Status of Chronic Conditions:  See problem list  for active medical problems.  Problems all longstanding and stable, except as noted/documented.  See ROS for pertinent symptoms related to these conditions.    Review of Systems  CONSTITUTIONAL:NEGATIVE for fever, chills, change in weight  INTEGUMENTARY/SKIN: NEGATIVE for worrisome rashes, moles or lesions  EYES: NEGATIVE for vision changes or irritation  ENT/MOUTH: NEGATIVE for ear, mouth and throat problems  RESP: NEGATIVE for significant cough or SOB  CV: NEGATIVE for chest pain, palpitations or peripheral edema  GI: NEGATIVE for nausea, abdominal pain, heartburn, or change in bowel habits  : NEGATIVE for frequency, dysuria, or hematuria  MUSCULOSKELETAL:knee pain   NEURO: NEGATIVE for weakness, dizziness or paresthesias  ENDOCRINE: NEGATIVE for temperature intolerance, skin/hair changes  HEME: NEGATIVE for bleeding problems  PSYCHIATRIC: NEGATIVE for changes in mood or affect  Constitutional, neuro, ENT, endocrine, pulmonary, cardiac, gastrointestinal, genitourinary, musculoskeletal, integument and psychiatric systems are negative, except as otherwise noted.    Patient Active Problem List    Diagnosis Date Noted    COPD (chronic obstructive pulmonary disease) (H)      Priority: High    Morbidly obese (H) 05/29/2012     Priority: High     Bariatric surgery consult offered      Hypertension goal BP (blood pressure) < 140/90      Priority: High    Traumatic complete tear of right rotator cuff 09/11/2023     Priority: Medium    Primary osteoarthritis of left knee 06/12/2023     Priority: Medium    JOSE (obstructive sleep apnea) 07/24/2015     Priority: Medium     Diagnostic Study 9/12/2017 - (271.0 lbs) AHI 78.7, RDI 91.3, Supine AHI 73.5, REM AHI -, Low O2 79.5%, Time Spent ?88% 22.9 minutes.  Titration Study: 9/26/2017- (271.0 lbs) The patient was titrated at pressures ranging from 5 cmH2O up to 8 cmH2O.  The optimal pressure achieved was 8 cmH2O with a residual AHI of 1.1 events per hour.  Time in REM supine on  final pressure was 22.5 minutes.  PLM index was 25.6 movements per hour.  The PLM Arousal Index was 8.0 per hour.      Impaired glucose tolerance 05/29/2012     Priority: Medium    Hypothyroid      Priority: Medium    Hyperlipidemia with target LDL less than 130      Priority: Medium    History of right knee joint replacement 09/11/2023     Priority: Low    Degenerative joint disease of hand, left 02/06/2023     Priority: Low    Female stress incontinence 02/22/2022     Priority: Low    Ulnar neuropathy at elbow, left      Priority: Low    Personal history of tobacco use, presenting hazards to health 06/28/2021     Priority: Low    Restless legs syndrome (RLS) 08/30/2017     Priority: Low    Sensorineural hearing loss      Priority: Low    History of cervical dysplasia 05/28/2012     Priority: Low      Past Medical History:   Diagnosis Date    Arthritis of right acromioclavicular joint 07/28/2021    Cervical dysplasia 1988    COPD (chronic obstructive pulmonary disease) (H)     Degenerative joint disease of hand, left     Female stress incontinence 02/22/2022    Glaucoma     Hyperlipidemia LDL goal < 130     Hypertension goal BP (blood pressure) < 140/90     Hypothyroid     Impaired glucose tolerance 05/29/2012    Microscopic hematuria     MMT (medial meniscus tear) 09/21/2006    LT    Moderate recurrent major depression (H) 02/13/2014    Morbid obesity (H)     JOSE (obstructive sleep apnea) 07/24/2015    Doesn't use cpap    Primary osteoarthritis of both knees     RLS (restless legs syndrome)     Sensorineural hearing loss     Shingles 01/27/2022    Ulnar neuropathy at elbow, left     Vitamin D insufficiency      Past Surgical History:   Procedure Laterality Date    ARTHROPLASTY KNEE Right 2/15/2023    Procedure: ARTHROPLASTY, KNEE, TOTAL RIGHT;  Surgeon: Krishna Santiago MD;  Location: [facility]    BIOPSY      BLADDER SURGERY  03/19/2018    CERVIX SURGERY  1988    cervical cone    COLONOSCOPY      LAPAROSCOPIC  OOPHORECTOMY Right 2018    with hysteroscopy/EMB, and pubovaginal sling     ROTATOR CUFF REPAIR RT/LT Right 2021    TONSILLECTOMY & ADENOIDECTOMY       Current Outpatient Medications   Medication Sig Dispense Refill    atorvastatin (LIPITOR) 40 MG tablet TAKE 1 TABLET DAILY 90 tablet 1    Ferrous Sulfate (IRON) 325 (65 FE) MG tablet Take 1 tablet by mouth daily      latanoprost (XALATAN) 0.005 % ophthalmic solution Place 1 drop into both eyes daily      levothyroxine (SYNTHROID/LEVOTHROID) 137 MCG tablet TAKE 1 TABLET DAILY 90 tablet 3    lisinopril (ZESTRIL) 20 MG tablet TAKE 1 TABLET DAILY 90 tablet 3    OMEGA-3 KRILL OIL PO       STIOLTO RESPIMAT 2.5-2.5 MCG/ACT AERS Inhale 2 puffs into the lungs daily      triamcinolone (KENALOG) 0.1 % external cream Apply topically 2 times daily as needed (rash) 60 g 0    Vitamin D, Cholecalciferol, 1000 units CAPS Take 1 capsule by mouth daily         Allergies   Allergen Reactions    Nkda [No Known Drug Allergy]         Social History     Tobacco Use    Smoking status: Former     Packs/day: 0.50     Years: 49.00     Pack years: 24.50     Types: Cigarettes     Start date: 1960     Quit date: 2011     Years since quittin.3    Smokeless tobacco: Former     Quit date: 2011   Substance Use Topics    Alcohol use: Yes     Comment: socially     Family History   Problem Relation Age of Onset    Allergies Mother     Cardiovascular Mother     Respiratory Mother     Thyroid Disease Mother     Other Cancer Mother         skin    Arthritis Father     Hypertension Father     Hyperlipidemia Father     Cerebrovascular Disease Father     Cancer Maternal Grandmother         bone    Other Cancer Maternal Grandmother         bone    Cerebrovascular Disease Paternal Grandmother     Cancer Paternal Grandfather     Cerebrovascular Disease Paternal Grandfather     Cancer Brother         testicular     Gastrointestinal Disease Brother     Other Cancer Brother          "testicular    Allergies Sister     Allergies Daughter     Gastrointestinal Disease Daughter     Depression Daughter     Anesthesia Reaction Daughter     Genitourinary Problems Brother     Allergies Daughter 10        metal    Diabetes No family hx of      History   Drug Use No         Objective     /78 (BP Location: Left arm, Patient Position: Sitting, Cuff Size: Adult Large)   Pulse 85   Temp 98.5  F (36.9  C) (Oral)   Resp 22   Ht 1.556 m (5' 1.26\")   Wt 124.3 kg (274 lb)   SpO2 95%   BMI 51.33 kg/m      Physical Exam    GENERAL APPEARANCE: alert, no distress, and cooperative     EYES: EOMI, PERRL     HENT: ear canals and TM's normal and nose and mouth without ulcers or lesions     NECK: no adenopathy, no asymmetry, masses, or scars and thyroid normal to palpation     RESP: lungs clear to auscultation - no rales, rhonchi or wheezes     CV: regular rates and rhythm, normal S1 S2, no S3 or S4 and no murmur, click or rub     ABDOMEN:  soft, nontender, no HSM or masses and bowel sounds normal     MS: antalgic gait with use of walker; no deformity      SKIN: no suspicious lesions or rashes     NEURO: Normal strength and tone, sensory exam grossly normal, mentation intact and speech normal     PSYCH: mentation appears normal. and affect normal/bright     LYMPHATICS: No cervical adenopathy    Recent Labs   Lab Test 06/19/23  1241 02/16/23  0536 02/06/23  0933   HGB 12.7 10.0* 12.2     --  279    136 139   POTASSIUM 4.9 4.9 4.6   CR 0.76 0.98* 0.84   A1C 5.5  --   --         Diagnostics:  Labs pending at this time.  Results will be reviewed when available.   No EKG required, no history of coronary heart disease, significant arrhythmia, peripheral arterial disease or other structural heart disease.    Revised Cardiac Risk Index (RCRI):  The patient has the following serious cardiovascular risks for perioperative complications:   - No serious cardiac risks = 0 points     RCRI Interpretation: 0 " points: Class I (very low risk - 0.4% complication rate)         Signed Electronically by: Verena Vee MD  Copy of this evaluation report is provided to requesting physician.

## 2023-09-14 NOTE — RESULT ENCOUNTER NOTE
Please call patient:  Your anemia, iron, blood glucose and kidney test results are fine. Your potassium is one the high side of normal. Have this rechecked by lab appointment before your surgery.   Verena Vee MD

## 2023-09-15 ENCOUNTER — LAB (OUTPATIENT)
Dept: LAB | Facility: CLINIC | Age: 78
End: 2023-09-15
Payer: COMMERCIAL

## 2023-09-15 DIAGNOSIS — E87.5 HYPERKALEMIA: ICD-10-CM

## 2023-09-15 LAB — POTASSIUM SERPL-SCNC: 5.3 MMOL/L (ref 3.4–5.3)

## 2023-09-15 PROCEDURE — 36415 COLL VENOUS BLD VENIPUNCTURE: CPT

## 2023-09-15 PROCEDURE — 84132 ASSAY OF SERUM POTASSIUM: CPT

## 2023-09-18 ENCOUNTER — TELEPHONE (OUTPATIENT)
Dept: FAMILY MEDICINE | Facility: CLINIC | Age: 78
End: 2023-09-18

## 2023-09-18 ENCOUNTER — TELEPHONE (OUTPATIENT)
Dept: ORTHOPEDICS | Facility: CLINIC | Age: 78
End: 2023-09-18
Payer: COMMERCIAL

## 2023-09-18 NOTE — TELEPHONE ENCOUNTER
[facility] Call Center    Phone Message    May a detailed message be left on voicemail: yes     Reason for Call: Other: Patient is requesting a call back from care team regarding questions about her surgery.     Action Taken: Message routed to:  Other: [facility]    Travel Screening: Not Applicable

## 2023-09-18 NOTE — TELEPHONE ENCOUNTER
Patient Quality Outreach    Patient is due for the following:   Physical Annual Wellness Visit      Topic Date Due    Flu Vaccine (1) 09/01/2023       Next Steps:   Patient has upcoming appointment, these items will be addressed at that time. Wellness visit scheduled with the RN on 10/18/23. Will postpone until 10/18/23 to see if completed.    Type of outreach:    Chart review performed, no outreach needed.      Questions for provider review:    None           Maru Beaver CMA  Chart routed to Care Team.

## 2023-09-19 ENCOUNTER — TELEPHONE (OUTPATIENT)
Dept: ORTHOPEDICS | Facility: CLINIC | Age: 78
End: 2023-09-19
Payer: COMMERCIAL

## 2023-09-19 NOTE — TELEPHONE ENCOUNTER
Called and talked with patient, she had a small blister on her right leg. No signs of infection, no redness, no fever, no chills. I informed her we should be OK for surgery tomorrow as her upcoming total knee arthroplasty is on the left leg. She will show the surgical Rns tomorrow.     She greatly appreciated the call.     Lakeshia Walters MS, ATC  Certified Athletic Trainer

## 2023-09-19 NOTE — TELEPHONE ENCOUNTER
Reason for Call:  Other appointment    Detailed comments: patient is having surgery tomorrow morning on her left leg with Dr. Santiago and she's concerned about a dime sized wound she has on her right leg. She says it's scabbed over and looks ok but she just wants to double check and make sure before her surgery. Please advise.     Phone Number Patient can be reached at: Home number on file [phone] (home)    Best Time:      Can we leave a detailed message on this number? YES    Call taken on 9/19/2023 at 7:57 AM by Sarita Jaimes

## 2023-09-19 NOTE — TELEPHONE ENCOUNTER
Already spoke to Lakeshia about her wound on her right leg. No fever or chills. No sign of infection. Patient ok for surgery tomorrow.     Melina BROWN RN, [facility] 09/19/23 11:02 AM

## 2023-09-20 ENCOUNTER — ANESTHESIA EVENT (OUTPATIENT)
Dept: SURGERY | Facility: CLINIC | Age: 78
End: 2023-09-20
Payer: COMMERCIAL

## 2023-09-20 ENCOUNTER — APPOINTMENT (OUTPATIENT)
Dept: PHYSICAL THERAPY | Facility: CLINIC | Age: 78
End: 2023-09-20
Attending: ORTHOPAEDIC SURGERY
Payer: COMMERCIAL

## 2023-09-20 ENCOUNTER — HOSPITAL ENCOUNTER (OUTPATIENT)
Facility: CLINIC | Age: 78
Discharge: HOME | End: 2023-09-21
Attending: ORTHOPAEDIC SURGERY | Admitting: ORTHOPAEDIC SURGERY
Payer: COMMERCIAL

## 2023-09-20 ENCOUNTER — ANESTHESIA (OUTPATIENT)
Dept: SURGERY | Facility: CLINIC | Age: 78
End: 2023-09-20
Payer: COMMERCIAL

## 2023-09-20 DIAGNOSIS — Z96.652 HISTORY OF TOTAL LEFT KNEE REPLACEMENT: Primary | ICD-10-CM

## 2023-09-20 DIAGNOSIS — Z96.651 HISTORY OF RIGHT KNEE JOINT REPLACEMENT: ICD-10-CM

## 2023-09-20 PROBLEM — Z96.659 TOTAL KNEE REPLACEMENT STATUS: Status: ACTIVE | Noted: 2023-09-20

## 2023-09-20 LAB — LACTATE SERPL-SCNC: 1.2 MMOL/L (ref 0.7–2)

## 2023-09-20 PROCEDURE — 250N000009 HC RX 250: Performed by: NURSE ANESTHETIST, CERTIFIED REGISTERED

## 2023-09-20 PROCEDURE — 360N000077 HC SURGERY LEVEL 4, PER MIN: Performed by: ORTHOPAEDIC SURGERY

## 2023-09-20 PROCEDURE — 272N000001 HC OR GENERAL SUPPLY STERILE: Performed by: ORTHOPAEDIC SURGERY

## 2023-09-20 PROCEDURE — 97162 PT EVAL MOD COMPLEX 30 MIN: CPT | Mod: GP | Performed by: PHYSICAL THERAPIST

## 2023-09-20 PROCEDURE — 258N000003 HC RX IP 258 OP 636: Performed by: NURSE ANESTHETIST, CERTIFIED REGISTERED

## 2023-09-20 PROCEDURE — 258N000001 HC RX 258: Performed by: ORTHOPAEDIC SURGERY

## 2023-09-20 PROCEDURE — C1713 ANCHOR/SCREW BN/BN,TIS/BN: HCPCS | Performed by: ORTHOPAEDIC SURGERY

## 2023-09-20 PROCEDURE — 271N000001 HC OR GENERAL SUPPLY NON-STERILE: Performed by: ORTHOPAEDIC SURGERY

## 2023-09-20 PROCEDURE — 27447 TOTAL KNEE ARTHROPLASTY: CPT | Mod: AS | Performed by: PHYSICIAN ASSISTANT

## 2023-09-20 PROCEDURE — 250N000009 HC RX 250: Performed by: ORTHOPAEDIC SURGERY

## 2023-09-20 PROCEDURE — 97530 THERAPEUTIC ACTIVITIES: CPT | Mod: GP | Performed by: PHYSICAL THERAPIST

## 2023-09-20 PROCEDURE — 27447 TOTAL KNEE ARTHROPLASTY: CPT | Mod: LT | Performed by: ORTHOPAEDIC SURGERY

## 2023-09-20 PROCEDURE — 250N000011 HC RX IP 250 OP 636: Performed by: ORTHOPAEDIC SURGERY

## 2023-09-20 PROCEDURE — 370N000017 HC ANESTHESIA TECHNICAL FEE, PER MIN: Performed by: ORTHOPAEDIC SURGERY

## 2023-09-20 PROCEDURE — 999N000141 HC STATISTIC PRE-PROCEDURE NURSING ASSESSMENT: Performed by: ORTHOPAEDIC SURGERY

## 2023-09-20 PROCEDURE — 258N000003 HC RX IP 258 OP 636: Performed by: ORTHOPAEDIC SURGERY

## 2023-09-20 PROCEDURE — 250N000013 HC RX MED GY IP 250 OP 250 PS 637: Performed by: ORTHOPAEDIC SURGERY

## 2023-09-20 PROCEDURE — 36415 COLL VENOUS BLD VENIPUNCTURE: CPT | Performed by: ORTHOPAEDIC SURGERY

## 2023-09-20 PROCEDURE — 250N000011 HC RX IP 250 OP 636: Mod: JZ | Performed by: NURSE ANESTHETIST, CERTIFIED REGISTERED

## 2023-09-20 PROCEDURE — C1776 JOINT DEVICE (IMPLANTABLE): HCPCS | Performed by: ORTHOPAEDIC SURGERY

## 2023-09-20 PROCEDURE — 710N000011 HC RECOVERY PHASE 1, LEVEL 3, PER MIN: Performed by: ORTHOPAEDIC SURGERY

## 2023-09-20 PROCEDURE — 97110 THERAPEUTIC EXERCISES: CPT | Mod: GP | Performed by: PHYSICAL THERAPIST

## 2023-09-20 PROCEDURE — 250N000011 HC RX IP 250 OP 636: Performed by: NURSE ANESTHETIST, CERTIFIED REGISTERED

## 2023-09-20 PROCEDURE — 83605 ASSAY OF LACTIC ACID: CPT | Performed by: ORTHOPAEDIC SURGERY

## 2023-09-20 DEVICE — IMP COMP PATELLA ZIM NEXGEN 8.5X32MM 00-5972-065-32: Type: IMPLANTABLE DEVICE | Site: KNEE | Status: FUNCTIONAL

## 2023-09-20 DEVICE — IMPLANTABLE DEVICE: Type: IMPLANTABLE DEVICE | Site: KNEE | Status: FUNCTIONAL

## 2023-09-20 DEVICE — BONE CEMENT SIMPLEX W/TOBRAMYCIN 6197-9-001: Type: IMPLANTABLE DEVICE | Site: KNEE | Status: FUNCTIONAL

## 2023-09-20 RX ORDER — ONDANSETRON 2 MG/ML
4 INJECTION INTRAMUSCULAR; INTRAVENOUS EVERY 30 MIN PRN
Status: DISCONTINUED | OUTPATIENT
Start: 2023-09-20 | End: 2023-09-20 | Stop reason: HOSPADM

## 2023-09-20 RX ORDER — CEFAZOLIN SODIUM/WATER 2 G/20 ML
2 SYRINGE (ML) INTRAVENOUS EVERY 8 HOURS
Status: COMPLETED | OUTPATIENT
Start: 2023-09-20 | End: 2023-09-20

## 2023-09-20 RX ORDER — HYDROXYZINE HYDROCHLORIDE 10 MG/1
10 TABLET, FILM COATED ORAL EVERY 6 HOURS PRN
Status: DISCONTINUED | OUTPATIENT
Start: 2023-09-20 | End: 2023-09-21 | Stop reason: HOSPADM

## 2023-09-20 RX ORDER — LATANOPROST 50 UG/ML
1 SOLUTION/ DROPS OPHTHALMIC AT BEDTIME
Status: DISCONTINUED | OUTPATIENT
Start: 2023-09-20 | End: 2023-09-20

## 2023-09-20 RX ORDER — ASPIRIN 325 MG
325 TABLET, DELAYED RELEASE (ENTERIC COATED) ORAL 2 TIMES DAILY WITH MEALS
Status: DISCONTINUED | OUTPATIENT
Start: 2023-09-20 | End: 2023-09-21 | Stop reason: HOSPADM

## 2023-09-20 RX ORDER — CELECOXIB 100 MG/1
400 CAPSULE ORAL ONCE
Status: COMPLETED | OUTPATIENT
Start: 2023-09-20 | End: 2023-09-20

## 2023-09-20 RX ORDER — OXYCODONE HYDROCHLORIDE 5 MG/1
10 TABLET ORAL EVERY 4 HOURS PRN
Status: DISCONTINUED | OUTPATIENT
Start: 2023-09-20 | End: 2023-09-21 | Stop reason: HOSPADM

## 2023-09-20 RX ORDER — OXYCODONE HCL 10 MG/1
10 TABLET, FILM COATED, EXTENDED RELEASE ORAL EVERY 12 HOURS
Status: COMPLETED | OUTPATIENT
Start: 2023-09-20 | End: 2023-09-20

## 2023-09-20 RX ORDER — ACETAMINOPHEN 325 MG/1
975 TABLET ORAL EVERY 6 HOURS PRN
Status: DISCONTINUED | OUTPATIENT
Start: 2023-09-20 | End: 2023-09-20 | Stop reason: HOSPADM

## 2023-09-20 RX ORDER — HYDROMORPHONE HYDROCHLORIDE 1 MG/ML
0.5 INJECTION, SOLUTION INTRAMUSCULAR; INTRAVENOUS; SUBCUTANEOUS EVERY 5 MIN PRN
Status: DISCONTINUED | OUTPATIENT
Start: 2023-09-20 | End: 2023-09-20 | Stop reason: HOSPADM

## 2023-09-20 RX ORDER — LIDOCAINE 40 MG/G
CREAM TOPICAL
Status: DISCONTINUED | OUTPATIENT
Start: 2023-09-20 | End: 2023-09-21 | Stop reason: HOSPADM

## 2023-09-20 RX ORDER — FENTANYL CITRATE 50 UG/ML
INJECTION, SOLUTION INTRAMUSCULAR; INTRAVENOUS PRN
Status: DISCONTINUED | OUTPATIENT
Start: 2023-09-20 | End: 2023-09-20

## 2023-09-20 RX ORDER — FENTANYL CITRATE 50 UG/ML
25 INJECTION, SOLUTION INTRAMUSCULAR; INTRAVENOUS
Status: DISCONTINUED | OUTPATIENT
Start: 2023-09-20 | End: 2023-09-20 | Stop reason: HOSPADM

## 2023-09-20 RX ORDER — PROPOFOL 10 MG/ML
INJECTION, EMULSION INTRAVENOUS CONTINUOUS PRN
Status: DISCONTINUED | OUTPATIENT
Start: 2023-09-20 | End: 2023-09-20

## 2023-09-20 RX ORDER — SODIUM CHLORIDE, SODIUM LACTATE, POTASSIUM CHLORIDE, CALCIUM CHLORIDE 600; 310; 30; 20 MG/100ML; MG/100ML; MG/100ML; MG/100ML
INJECTION, SOLUTION INTRAVENOUS CONTINUOUS
Status: DISCONTINUED | OUTPATIENT
Start: 2023-09-20 | End: 2023-09-20 | Stop reason: HOSPADM

## 2023-09-20 RX ORDER — ONDANSETRON 4 MG/1
4 TABLET, ORALLY DISINTEGRATING ORAL EVERY 6 HOURS PRN
Status: DISCONTINUED | OUTPATIENT
Start: 2023-09-20 | End: 2023-09-21 | Stop reason: HOSPADM

## 2023-09-20 RX ORDER — DIMENHYDRINATE 50 MG/ML
25 INJECTION, SOLUTION INTRAMUSCULAR; INTRAVENOUS
Status: DISCONTINUED | OUTPATIENT
Start: 2023-09-20 | End: 2023-09-20 | Stop reason: HOSPADM

## 2023-09-20 RX ORDER — ONDANSETRON 2 MG/ML
4 INJECTION INTRAMUSCULAR; INTRAVENOUS EVERY 6 HOURS PRN
Status: DISCONTINUED | OUTPATIENT
Start: 2023-09-20 | End: 2023-09-21 | Stop reason: HOSPADM

## 2023-09-20 RX ORDER — HYDROXYZINE HYDROCHLORIDE 10 MG/1
10 TABLET, FILM COATED ORAL EVERY 6 HOURS PRN
Status: DISCONTINUED | OUTPATIENT
Start: 2023-09-20 | End: 2023-09-20 | Stop reason: HOSPADM

## 2023-09-20 RX ORDER — NALOXONE HYDROCHLORIDE 0.4 MG/ML
0.2 INJECTION, SOLUTION INTRAMUSCULAR; INTRAVENOUS; SUBCUTANEOUS
Status: DISCONTINUED | OUTPATIENT
Start: 2023-09-20 | End: 2023-09-21 | Stop reason: HOSPADM

## 2023-09-20 RX ORDER — FENTANYL CITRATE 50 UG/ML
50 INJECTION, SOLUTION INTRAMUSCULAR; INTRAVENOUS EVERY 5 MIN PRN
Status: DISCONTINUED | OUTPATIENT
Start: 2023-09-20 | End: 2023-09-20 | Stop reason: HOSPADM

## 2023-09-20 RX ORDER — OXYCODONE HYDROCHLORIDE 5 MG/1
5 TABLET ORAL
Status: DISCONTINUED | OUTPATIENT
Start: 2023-09-20 | End: 2023-09-20 | Stop reason: HOSPADM

## 2023-09-20 RX ORDER — ONDANSETRON 4 MG/1
4 TABLET, ORALLY DISINTEGRATING ORAL EVERY 30 MIN PRN
Status: DISCONTINUED | OUTPATIENT
Start: 2023-09-20 | End: 2023-09-20 | Stop reason: HOSPADM

## 2023-09-20 RX ORDER — HYDROMORPHONE HCL IN WATER/PF 6 MG/30 ML
0.4 PATIENT CONTROLLED ANALGESIA SYRINGE INTRAVENOUS
Status: DISCONTINUED | OUTPATIENT
Start: 2023-09-20 | End: 2023-09-21 | Stop reason: HOSPADM

## 2023-09-20 RX ORDER — OXYCODONE HYDROCHLORIDE 5 MG/1
5-10 TABLET ORAL EVERY 4 HOURS PRN
Qty: 30 TABLET | Refills: 0 | Status: SHIPPED | OUTPATIENT
Start: 2023-09-20 | End: 2023-11-22

## 2023-09-20 RX ORDER — OXYCODONE HYDROCHLORIDE 5 MG/1
10 TABLET ORAL
Status: DISCONTINUED | OUTPATIENT
Start: 2023-09-20 | End: 2023-09-20 | Stop reason: HOSPADM

## 2023-09-20 RX ORDER — HYDROMORPHONE HYDROCHLORIDE 1 MG/ML
0.2 INJECTION, SOLUTION INTRAMUSCULAR; INTRAVENOUS; SUBCUTANEOUS EVERY 5 MIN PRN
Status: DISCONTINUED | OUTPATIENT
Start: 2023-09-20 | End: 2023-09-20 | Stop reason: HOSPADM

## 2023-09-20 RX ORDER — ASPIRIN 325 MG
325 TABLET, DELAYED RELEASE (ENTERIC COATED) ORAL 2 TIMES DAILY WITH MEALS
Qty: 80 TABLET | Refills: 0 | Status: SHIPPED | OUTPATIENT
Start: 2023-09-20 | End: 2023-10-30

## 2023-09-20 RX ORDER — CEFAZOLIN SODIUM/WATER 3 G/30 ML
3 SYRINGE (ML) INTRAVENOUS SEE ADMIN INSTRUCTIONS
Status: DISCONTINUED | OUTPATIENT
Start: 2023-09-20 | End: 2023-09-20 | Stop reason: HOSPADM

## 2023-09-20 RX ORDER — POLYETHYLENE GLYCOL 3350 17 G/17G
17 POWDER, FOR SOLUTION ORAL DAILY
Status: DISCONTINUED | OUTPATIENT
Start: 2023-09-21 | End: 2023-09-21 | Stop reason: HOSPADM

## 2023-09-20 RX ORDER — HALOPERIDOL 5 MG/ML
1 INJECTION INTRAMUSCULAR
Status: DISCONTINUED | OUTPATIENT
Start: 2023-09-20 | End: 2023-09-20 | Stop reason: HOSPADM

## 2023-09-20 RX ORDER — DEXAMETHASONE SODIUM PHOSPHATE 10 MG/ML
INJECTION, SOLUTION INTRAMUSCULAR; INTRAVENOUS PRN
Status: DISCONTINUED | OUTPATIENT
Start: 2023-09-20 | End: 2023-09-20

## 2023-09-20 RX ORDER — NALOXONE HYDROCHLORIDE 0.4 MG/ML
0.4 INJECTION, SOLUTION INTRAMUSCULAR; INTRAVENOUS; SUBCUTANEOUS
Status: DISCONTINUED | OUTPATIENT
Start: 2023-09-20 | End: 2023-09-21 | Stop reason: HOSPADM

## 2023-09-20 RX ORDER — BUPIVACAINE HYDROCHLORIDE AND EPINEPHRINE 5; 5 MG/ML; UG/ML
INJECTION, SOLUTION PERINEURAL PRN
Status: DISCONTINUED | OUTPATIENT
Start: 2023-09-20 | End: 2023-09-20

## 2023-09-20 RX ORDER — ACETAMINOPHEN 325 MG/1
975 TABLET ORAL EVERY 8 HOURS PRN
Qty: 100 TABLET | Refills: 1 | Status: SHIPPED | OUTPATIENT
Start: 2023-09-20 | End: 2023-11-22

## 2023-09-20 RX ORDER — ONDANSETRON 2 MG/ML
INJECTION INTRAMUSCULAR; INTRAVENOUS PRN
Status: DISCONTINUED | OUTPATIENT
Start: 2023-09-20 | End: 2023-09-20

## 2023-09-20 RX ORDER — ACETAMINOPHEN 325 MG/1
975 TABLET ORAL EVERY 8 HOURS
Status: DISCONTINUED | OUTPATIENT
Start: 2023-09-20 | End: 2023-09-21 | Stop reason: HOSPADM

## 2023-09-20 RX ORDER — FENTANYL CITRATE 50 UG/ML
25 INJECTION, SOLUTION INTRAMUSCULAR; INTRAVENOUS EVERY 5 MIN PRN
Status: DISCONTINUED | OUTPATIENT
Start: 2023-09-20 | End: 2023-09-20 | Stop reason: HOSPADM

## 2023-09-20 RX ORDER — OXYCODONE HYDROCHLORIDE 5 MG/1
5 TABLET ORAL EVERY 4 HOURS PRN
Status: DISCONTINUED | OUTPATIENT
Start: 2023-09-20 | End: 2023-09-21 | Stop reason: HOSPADM

## 2023-09-20 RX ORDER — LIDOCAINE HYDROCHLORIDE 10 MG/ML
INJECTION, SOLUTION INFILTRATION; PERINEURAL PRN
Status: DISCONTINUED | OUTPATIENT
Start: 2023-09-20 | End: 2023-09-20

## 2023-09-20 RX ORDER — ALBUTEROL SULFATE 0.83 MG/ML
2.5 SOLUTION RESPIRATORY (INHALATION) EVERY 4 HOURS PRN
Status: DISCONTINUED | OUTPATIENT
Start: 2023-09-20 | End: 2023-09-20 | Stop reason: HOSPADM

## 2023-09-20 RX ORDER — SODIUM CHLORIDE, SODIUM LACTATE, POTASSIUM CHLORIDE, CALCIUM CHLORIDE 600; 310; 30; 20 MG/100ML; MG/100ML; MG/100ML; MG/100ML
INJECTION, SOLUTION INTRAVENOUS CONTINUOUS
Status: DISCONTINUED | OUTPATIENT
Start: 2023-09-20 | End: 2023-09-21 | Stop reason: HOSPADM

## 2023-09-20 RX ORDER — AMOXICILLIN 250 MG
1-2 CAPSULE ORAL 2 TIMES DAILY
Qty: 20 TABLET | Refills: 1 | Status: SHIPPED | OUTPATIENT
Start: 2023-09-20 | End: 2023-11-22

## 2023-09-20 RX ORDER — HYDROMORPHONE HCL IN WATER/PF 6 MG/30 ML
0.2 PATIENT CONTROLLED ANALGESIA SYRINGE INTRAVENOUS
Status: DISCONTINUED | OUTPATIENT
Start: 2023-09-20 | End: 2023-09-21 | Stop reason: HOSPADM

## 2023-09-20 RX ORDER — BUPIVACAINE HYDROCHLORIDE 7.5 MG/ML
INJECTION, SOLUTION INTRASPINAL PRN
Status: DISCONTINUED | OUTPATIENT
Start: 2023-09-20 | End: 2023-09-20

## 2023-09-20 RX ORDER — BISACODYL 10 MG
10 SUPPOSITORY, RECTAL RECTAL DAILY PRN
Status: DISCONTINUED | OUTPATIENT
Start: 2023-09-20 | End: 2023-09-21 | Stop reason: HOSPADM

## 2023-09-20 RX ORDER — PROPOFOL 10 MG/ML
INJECTION, EMULSION INTRAVENOUS PRN
Status: DISCONTINUED | OUTPATIENT
Start: 2023-09-20 | End: 2023-09-20

## 2023-09-20 RX ORDER — AMOXICILLIN 250 MG
1 CAPSULE ORAL 2 TIMES DAILY
Status: DISCONTINUED | OUTPATIENT
Start: 2023-09-20 | End: 2023-09-21 | Stop reason: HOSPADM

## 2023-09-20 RX ORDER — LATANOPROST 50 UG/ML
1 SOLUTION/ DROPS OPHTHALMIC AT BEDTIME
Status: DISCONTINUED | OUTPATIENT
Start: 2023-09-20 | End: 2023-09-21 | Stop reason: HOSPADM

## 2023-09-20 RX ORDER — TRANEXAMIC ACID 650 MG/1
1950 TABLET ORAL ONCE
Status: COMPLETED | OUTPATIENT
Start: 2023-09-20 | End: 2023-09-20

## 2023-09-20 RX ORDER — LISINOPRIL 10 MG/1
20 TABLET ORAL DAILY
Status: DISCONTINUED | OUTPATIENT
Start: 2023-09-21 | End: 2023-09-21 | Stop reason: HOSPADM

## 2023-09-20 RX ORDER — ACETAMINOPHEN 325 MG/1
975 TABLET ORAL ONCE
Status: COMPLETED | OUTPATIENT
Start: 2023-09-20 | End: 2023-09-20

## 2023-09-20 RX ORDER — ATORVASTATIN CALCIUM 40 MG/1
40 TABLET, FILM COATED ORAL DAILY
Status: DISCONTINUED | OUTPATIENT
Start: 2023-09-21 | End: 2023-09-21 | Stop reason: HOSPADM

## 2023-09-20 RX ORDER — KETAMINE HYDROCHLORIDE 10 MG/ML
INJECTION INTRAMUSCULAR; INTRAVENOUS PRN
Status: DISCONTINUED | OUTPATIENT
Start: 2023-09-20 | End: 2023-09-20

## 2023-09-20 RX ORDER — CEFAZOLIN SODIUM/WATER 3 G/30 ML
3 SYRINGE (ML) INTRAVENOUS
Status: COMPLETED | OUTPATIENT
Start: 2023-09-20 | End: 2023-09-20

## 2023-09-20 RX ORDER — PROCHLORPERAZINE MALEATE 5 MG
5 TABLET ORAL EVERY 6 HOURS PRN
Status: DISCONTINUED | OUTPATIENT
Start: 2023-09-20 | End: 2023-09-21 | Stop reason: HOSPADM

## 2023-09-20 RX ORDER — LIDOCAINE 40 MG/G
CREAM TOPICAL
Status: DISCONTINUED | OUTPATIENT
Start: 2023-09-20 | End: 2023-09-20 | Stop reason: HOSPADM

## 2023-09-20 RX ORDER — KETOROLAC TROMETHAMINE 15 MG/ML
15 INJECTION, SOLUTION INTRAMUSCULAR; INTRAVENOUS EVERY 6 HOURS
Status: COMPLETED | OUTPATIENT
Start: 2023-09-20 | End: 2023-09-21

## 2023-09-20 RX ORDER — ACETAMINOPHEN 325 MG/1
650 TABLET ORAL EVERY 4 HOURS PRN
Status: DISCONTINUED | OUTPATIENT
Start: 2023-09-23 | End: 2023-09-21 | Stop reason: HOSPADM

## 2023-09-20 RX ADMIN — Medication 2 G: at 15:38

## 2023-09-20 RX ADMIN — Medication 2 G: at 22:24

## 2023-09-20 RX ADMIN — SODIUM CHLORIDE, POTASSIUM CHLORIDE, SODIUM LACTATE AND CALCIUM CHLORIDE: 600; 310; 30; 20 INJECTION, SOLUTION INTRAVENOUS at 07:09

## 2023-09-20 RX ADMIN — ASPIRIN 325 MG: 325 TABLET, COATED ORAL at 18:10

## 2023-09-20 RX ADMIN — KETOROLAC TROMETHAMINE 15 MG: 15 INJECTION, SOLUTION INTRAMUSCULAR; INTRAVENOUS at 18:10

## 2023-09-20 RX ADMIN — LIDOCAINE HYDROCHLORIDE 1 ML: 10 INJECTION, SOLUTION EPIDURAL; INFILTRATION; INTRACAUDAL; PERINEURAL at 07:09

## 2023-09-20 RX ADMIN — FENTANYL CITRATE 25 MCG: 50 INJECTION, SOLUTION INTRAMUSCULAR; INTRAVENOUS at 07:43

## 2023-09-20 RX ADMIN — DEXAMETHASONE SODIUM PHOSPHATE 10 MG: 10 INJECTION, SOLUTION INTRAMUSCULAR; INTRAVENOUS at 11:15

## 2023-09-20 RX ADMIN — ACETAMINOPHEN 975 MG: 325 TABLET, FILM COATED ORAL at 14:27

## 2023-09-20 RX ADMIN — FENTANYL CITRATE 50 MCG: 50 INJECTION, SOLUTION INTRAMUSCULAR; INTRAVENOUS at 08:51

## 2023-09-20 RX ADMIN — FENTANYL CITRATE 50 MCG: 50 INJECTION, SOLUTION INTRAMUSCULAR; INTRAVENOUS at 10:39

## 2023-09-20 RX ADMIN — ACETAMINOPHEN 975 MG: 325 TABLET, FILM COATED ORAL at 22:23

## 2023-09-20 RX ADMIN — ACETAMINOPHEN 975 MG: 325 TABLET, FILM COATED ORAL at 06:35

## 2023-09-20 RX ADMIN — PHENYLEPHRINE HYDROCHLORIDE 0.2 MCG/KG/MIN: 10 INJECTION INTRAVENOUS at 07:44

## 2023-09-20 RX ADMIN — CELECOXIB 400 MG: 100 CAPSULE ORAL at 06:36

## 2023-09-20 RX ADMIN — LIDOCAINE HYDROCHLORIDE 50 MG: 10 INJECTION, SOLUTION INFILTRATION; PERINEURAL at 07:43

## 2023-09-20 RX ADMIN — Medication 10 MG: at 09:33

## 2023-09-20 RX ADMIN — DEXMEDETOMIDINE HYDROCHLORIDE 6 MCG: 100 INJECTION, SOLUTION INTRAVENOUS at 09:25

## 2023-09-20 RX ADMIN — DEXMEDETOMIDINE HYDROCHLORIDE 6 MCG: 100 INJECTION, SOLUTION INTRAVENOUS at 09:17

## 2023-09-20 RX ADMIN — FENTANYL CITRATE 25 MCG: 50 INJECTION, SOLUTION INTRAMUSCULAR; INTRAVENOUS at 09:14

## 2023-09-20 RX ADMIN — FENTANYL CITRATE 50 MCG: 50 INJECTION, SOLUTION INTRAMUSCULAR; INTRAVENOUS at 11:01

## 2023-09-20 RX ADMIN — Medication 10 MG: at 09:17

## 2023-09-20 RX ADMIN — ONDANSETRON 4 MG: 2 INJECTION INTRAMUSCULAR; INTRAVENOUS at 09:17

## 2023-09-20 RX ADMIN — SENNOSIDES AND DOCUSATE SODIUM 1 TABLET: 50; 8.6 TABLET ORAL at 20:16

## 2023-09-20 RX ADMIN — PROPOFOL 50 MG: 10 INJECTION, EMULSION INTRAVENOUS at 08:52

## 2023-09-20 RX ADMIN — TRANEXAMIC ACID 1950 MG: 650 TABLET ORAL at 06:34

## 2023-09-20 RX ADMIN — FENTANYL CITRATE 50 MCG: 50 INJECTION, SOLUTION INTRAMUSCULAR; INTRAVENOUS at 09:33

## 2023-09-20 RX ADMIN — PROPOFOL 100 MG: 10 INJECTION, EMULSION INTRAVENOUS at 07:44

## 2023-09-20 RX ADMIN — FENTANYL CITRATE 50 MCG: 50 INJECTION, SOLUTION INTRAMUSCULAR; INTRAVENOUS at 11:08

## 2023-09-20 RX ADMIN — LATANOPROST 1 DROP: 50 SOLUTION/ DROPS OPHTHALMIC at 20:16

## 2023-09-20 RX ADMIN — MIDAZOLAM 2 MG: 1 INJECTION INTRAMUSCULAR; INTRAVENOUS at 07:29

## 2023-09-20 RX ADMIN — FENTANYL CITRATE 50 MCG: 50 INJECTION, SOLUTION INTRAMUSCULAR; INTRAVENOUS at 11:22

## 2023-09-20 RX ADMIN — PHENYLEPHRINE HYDROCHLORIDE 0.4 MCG/KG/MIN: 10 INJECTION INTRAVENOUS at 07:48

## 2023-09-20 RX ADMIN — OXYCODONE HYDROCHLORIDE 10 MG: 10 TABLET, FILM COATED, EXTENDED RELEASE ORAL at 06:36

## 2023-09-20 RX ADMIN — BUPIVACAINE HYDROCHLORIDE IN DEXTROSE 1.8 ML: 7.5 INJECTION, SOLUTION SUBARACHNOID at 07:43

## 2023-09-20 RX ADMIN — PROPOFOL 200 MCG/KG/MIN: 10 INJECTION, EMULSION INTRAVENOUS at 07:44

## 2023-09-20 RX ADMIN — Medication 3 G: at 07:30

## 2023-09-20 RX ADMIN — BUPIVACAINE HYDROCHLORIDE AND EPINEPHRINE BITARTRATE 20 ML: 5; .005 INJECTION, SOLUTION PERINEURAL at 11:15

## 2023-09-20 ASSESSMENT — ACTIVITIES OF DAILY LIVING (ADL)
ADLS_ACUITY_SCORE: 29
ADLS_ACUITY_SCORE: 29
ADLS_ACUITY_SCORE: 35
ADLS_ACUITY_SCORE: 29
DEPENDENT_IADLS:: INDEPENDENT
ADLS_ACUITY_SCORE: 29
ADLS_ACUITY_SCORE: 35
ADLS_ACUITY_SCORE: 29
ADLS_ACUITY_SCORE: 35
ADLS_ACUITY_SCORE: 33

## 2023-09-20 ASSESSMENT — LIFESTYLE VARIABLES: TOBACCO_USE: 1

## 2023-09-20 ASSESSMENT — COPD QUESTIONNAIRES: COPD: 1

## 2023-09-20 NOTE — PROGRESS NOTES
S-(situation): Patient registered to Observation. Patient arrived to room 273 at 1308 per cart from PACU and was transferred to bed per air ricky.    B-(background): L TKA for osteoarthritis    A-(assessment): Stated pain in left knee was more of a tightness rating 3/10. Aquacell dressing CDI to left knee and hemovac compressed and collecting sanguinous drainage. Some numbness noted in left knee. Compression stockings on and SCD's applied. IV fluids infusing. Capnography applied with sat of 96% on room air. Daughter at bedside. First BP was 97/60 and second one was 105/50. Flagged for lactic which came back to be 1.2.     R-(recommendations): Orders and observation goals reviewed with patient and daughter.     Nursing Observation criteria listed below was met:    Skin issues/needs documented:Yes  Isolation needs addressed and Signage up: NA  Fall Prevention: Education given and documented: Yes  Education Assessment documented:Yes  Admission Education Documented: Yes  New medication patient education completed and documented (Possible Side Effects of Common Medications handout): Yes  OBS video/handout Reviewed & Documented: Yes  Allergies Reviewed: Yes  Medication Reconciliation Complete: Yes  Home medications if not able to send immediately home with family stored here: Yes  Reminder note placed in discharge instructions of home meds: Yes  Patient has discharge needs (If yes, please explain): No  Patient discharge preferences addressed and charted on white board:  Yes  Provider notified that patient has arrived to the unit: Yes

## 2023-09-20 NOTE — PLAN OF CARE
Goal Outcome Evaluation:      Plan of Care Reviewed With: patient, child    Overall Patient Progress: improvingOverall Patient Progress: improving    Outcome Evaluation: Ambulated in song with walker, gait belt, and one assist after eating 100% of supper. Rates pain in left knee 0/10. Dressing is CDI and hemovac is collecting sanguinous drainage. Saline locked IV. Was up in chair until went for a walk. Denies numbness, but states knee feels a bit tingly with touch. VSS. Immobilizer on with activity and has voided on commode. On room air since arriving to the unit. Bed alarm on.

## 2023-09-20 NOTE — ANESTHESIA CARE TRANSFER NOTE
Patient: Gina Yeh    Procedure: Procedure(s):  ARTHROPLASTY, KNEE, TOTAL       Diagnosis: Primary osteoarthritis of left knee [M17.12]  Diagnosis Additional Information: No value filed.    Anesthesia Type:   Spinal     Note:    Oropharynx: oropharynx clear of all foreign objects and spontaneously breathing  Level of Consciousness: drowsy  Oxygen Supplementation: face mask    Independent Airway: airway patency satisfactory and stable  Dentition: dentition unchanged  Vital Signs Stable: post-procedure vital signs reviewed and stable  Report to RN Given: handoff report given  Patient transferred to: PACU    Handoff Report: Identifed the Patient, Identified the Reponsible Provider, Reviewed the pertinent medical history, Discussed the surgical course, Reviewed Intra-OP anesthesia mangement and issues during anesthesia, Set expectations for post-procedure period and Allowed opportunity for questions and acknowledgement of understanding      Vitals:  Vitals Value Taken Time   /74 09/20/23 1052   Temp     Pulse 93 09/20/23 1055   Resp 21 09/20/23 1055   SpO2 100 % 09/20/23 1055   Vitals shown include unvalidated device data.    Electronically Signed By: ARY Hubbard CRNA  September 20, 2023  10:57 AM

## 2023-09-20 NOTE — INTERVAL H&P NOTE
"I have reviewed the surgical (or preoperative) H&P that is linked to this encounter, and examined the patient. There are no significant changes    Clinical Conditions Present on Arrival:  Clinically Significant Risk Factors Present on Admission        # Hyperkalemia: Highest K = 5.4 mmol/L in last 30 days, will monitor as appropriate           # Severe Obesity: Estimated body mass index is 51.33 kg/m  as calculated from the following:    Height as of 9/13/23: 1.556 m (5' 1.26\").    Weight as of 9/13/23: 124.3 kg (274 lb).       "

## 2023-09-20 NOTE — OR NURSING
Transfer from pacu to Room 273  Transferred to bed via air mat (Glyder Mat,Transfer Boar,Slider Sheet)    S: 77 y/o female  S/P Left TKA       Anesthesia Type:  spinal       Surgeon:  Dr. Santiago       Allergies:  See Medication Reconciliation Record       DNR: NO    (Yes,No)    B:  Pertinent Medical History: .  Past Medical History:   Diagnosis Date    Arthritis of right acromioclavicular joint 07/28/2021    Cervical dysplasia 1988    COPD (chronic obstructive pulmonary disease) (H)     Degenerative joint disease of hand, left     Female stress incontinence 02/22/2022    Glaucoma     Hyperlipidemia LDL goal < 130     Hypertension goal BP (blood pressure) < 140/90     Hypothyroid     Impaired glucose tolerance 05/29/2012    Microscopic hematuria     MMT (medial meniscus tear) 09/21/2006    LT    Moderate recurrent major depression (H) 02/13/2014    Morbid obesity (H)     JOSE (obstructive sleep apnea) 07/24/2015    Doesn't use cpap    Primary osteoarthritis of both knees     RLS (restless legs syndrome)     Sensorineural hearing loss     Shingles 01/27/2022    Ulnar neuropathy at elbow, left     Vitamin D insufficiency         (CHF; Heart Disease; Lung Disease; Chronic Pain; Diabetes; Other (Comment)          Surgical History:    Past Surgical History:   Procedure Laterality Date    ARTHROPLASTY KNEE Right 2/15/2023    Procedure: ARTHROPLASTY, KNEE, TOTAL RIGHT;  Surgeon: Krishna Santiago MD;  Location: [facility] OR    BIOPSY      BLADDER SURGERY  03/19/2018    CERVIX SURGERY  1988    cervical cone    COLONOSCOPY      LAPAROSCOPIC OOPHORECTOMY Right 03/19/2018    with hysteroscopy/EMB, and pubovaginal sling     ROTATOR CUFF REPAIR RT/LT Right 09/01/2021    TONSILLECTOMY & ADENOIDECTOMY           A:  EBL: 100        IVF:  700        UOP:  0        NPO:  ___Yes __x_No         Vomiting:  ___Yes ___No         Drainage: Hemovac        Skin Integrity: wdl (Normal; Pressure Ulcer (Location)           Brace/sling/equipment:   __X_Yes___No (identify item if present) knee immobilizer sent to floor with pt         See PACU record for ongoing assessment, vital signs and pain assessment.    R: Post-Op vitals and assessments as ordered/indicated per patient's condition.       Follow Post-Op orders and notify Physician prn.       Continue to involve patient/family in plan of care and discharge planning.       Reinforce Pre-Operative education.       Implement skin safety interventions as appropriate.  Report given to Patience ARRIAGA    Name: Suma Wesley RN on 9/20/2023 at 12:58 PM

## 2023-09-20 NOTE — ANESTHESIA PREPROCEDURE EVALUATION
Anesthesia Pre-Procedure Evaluation    Patient: Gina Yeh   MRN: [id number] : 1945        Procedure : Procedure(s):  ARTHROPLASTY, KNEE, TOTAL          Past Medical History:   Diagnosis Date     Arthritis of right acromioclavicular joint 2021     Cervical dysplasia      COPD (chronic obstructive pulmonary disease) (H)      Degenerative joint disease of hand, left      Female stress incontinence 2022     Glaucoma      Hyperlipidemia LDL goal < 130      Hypertension goal BP (blood pressure) < 140/90      Hypothyroid      Impaired glucose tolerance 2012     Microscopic hematuria      MMT (medial meniscus tear) 2006    LT     Moderate recurrent major depression (H) 2014     Morbid obesity (H)      JOSE (obstructive sleep apnea) 2015    Doesn't use cpap     Primary osteoarthritis of both knees      RLS (restless legs syndrome)      Sensorineural hearing loss      Shingles 2022     Ulnar neuropathy at elbow, left      Vitamin D insufficiency       Past Surgical History:   Procedure Laterality Date     ARTHROPLASTY KNEE Right 2/15/2023    Procedure: ARTHROPLASTY, KNEE, TOTAL RIGHT;  Surgeon: Krishna Santiago MD;  Location: [facility]     BIOPSY       BLADDER SURGERY  2018     CERVIX SURGERY  1988    cervical cone     COLONOSCOPY       LAPAROSCOPIC OOPHORECTOMY Right 2018    with hysteroscopy/EMB, and pubovaginal sling      ROTATOR CUFF REPAIR RT/LT Right 2021     TONSILLECTOMY & ADENOIDECTOMY        Allergies   Allergen Reactions     Nkda [No Known Drug Allergy]       Social History     Tobacco Use     Smoking status: Former     Packs/day: 0.50     Years: 49.00     Pack years: 24.50     Types: Cigarettes     Start date: 1960     Quit date: 2011     Years since quittin.3     Smokeless tobacco: Former     Quit date: 2011   Substance Use Topics     Alcohol use: Yes     Comment: socially      Wt Readings from Last 1 Encounters:    09/13/23 124.3 kg (274 lb)        Anesthesia Evaluation   Pt has had prior anesthetic. Type: General and MAC.    No history of anesthetic complications       ROS/MED HX  ENT/Pulmonary:     (+) sleep apnea, uses CPAP,              tobacco use, Past use,        COPD,              Neurologic:  - neg neurologic ROS     Cardiovascular:     (+) Dyslipidemia hypertension- -   -  - -                                 Previous cardiac testing   Echo: Date: 4/19 Results:  Normal LV size and function at rest. The LVEF is 55-60% and increases  appropriately to 70-75% with dobutamine infusion.  Stress Test:  Date: Results:    ECG Reviewed:  Date: 8/21 Results:  NSR    Cath:  Date: Results:      METS/Exercise Tolerance:     Hematologic:     (+)      anemia,          Musculoskeletal:   (+)  arthritis,             GI/Hepatic:  - neg GI/hepatic ROS     Renal/Genitourinary:  - neg Renal ROS     Endo:     (+)          thyroid problem, hypothyroidism,    Obesity,       Psychiatric/Substance Use:     (+) psychiatric history depression       Infectious Disease:  - neg infectious disease ROS     Malignancy:  - neg malignancy ROS     Other:  - neg other ROS          Physical Exam    Airway  airway exam normal      Mallampati: II   TM distance: > 3 FB   Neck ROM: full   Mouth opening: > 3 cm    Respiratory Devices and Support         Dental  no notable dental history   Comment: Upper and lower permanent dental implants    (+) Multiple crowns, permanant bridges      Cardiovascular   cardiovascular exam normal       Rhythm and rate: regular and normal     Pulmonary   pulmonary exam normal        breath sounds clear to auscultation         OUTSIDE LABS:  CBC:   Lab Results   Component Value Date    WBC 8.0 06/19/2023    WBC 7.9 02/06/2023    HGB 12.6 09/13/2023    HGB 12.7 06/19/2023    HCT 40.7 06/19/2023    HCT 38.6 02/06/2023     06/19/2023     02/06/2023     BMP:   Lab Results   Component Value Date     09/13/2023    NA  137 06/19/2023    POTASSIUM 5.3 09/15/2023    POTASSIUM 5.4 (H) 09/13/2023    CHLORIDE 104 09/13/2023    CHLORIDE 101 06/19/2023    CO2 25 09/13/2023    CO2 22 06/19/2023    BUN 19.9 09/13/2023    BUN 21.1 06/19/2023    CR 0.78 09/13/2023    CR 0.76 06/19/2023     (H) 09/13/2023     (H) 06/19/2023     COAGS: No results found for: PTT, INR, FIBR  POC: No results found for: BGM, HCG, HCGS  HEPATIC:   Lab Results   Component Value Date    ALBUMIN 3.7 10/25/2017    PROTTOTAL 8.5 10/25/2017    ALT 31 10/25/2017    AST 12 10/25/2017    ALKPHOS 121 10/25/2017    BILITOTAL 0.5 10/25/2017     OTHER:   Lab Results   Component Value Date    A1C 5.5 06/19/2023    ALISSON 9.9 09/13/2023    TSH 2.89 06/19/2023    T4 0.93 05/23/2012       Anesthesia Plan    ASA Status:  3    NPO Status:  NPO Appropriate    Anesthesia Type: Spinal.              Consents    Anesthesia Plan(s) and associated risks, benefits, and realistic alternatives discussed. Questions answered and patient/representative(s) expressed understanding.     - Discussed: Risks, Benefits and Alternatives for BOTH SEDATION and the PROCEDURE were discussed     - Discussed with:  Patient      - Extended Intubation/Ventilatory Support Discussed: No.      - Patient is DNR/DNI Status: No     Use of blood products discussed: No .     Postoperative Care    Pain management: Multi-modal analgesia, Peripheral nerve block (Single Shot), IV analgesics, Neuraxial analgesia.   PONV prophylaxis: Ondansetron (or other 5HT-3), Background Propofol Infusion, Dexamethasone or Solumedrol     Comments:    Other Comments: The risks and benefits of anesthesia, and the alternatives where applicable, have been discussed with the patient, and they wish to proceed.                ARY Hubbard CRNA

## 2023-09-20 NOTE — BRIEF OP NOTE
[facility]    Brief Operative Note    Pre-operative diagnosis: Primary osteoarthritis of left knee [M17.12]  Post-operative diagnosis Same as pre-operative diagnosis    Procedure: Procedure(s):  ARTHROPLASTY, KNEE, TOTAL  Surgeon: Surgeon(s) and Role:     * Krishna Santiago MD - Primary     * Hasmukh Hawley PA-C - Assisting  Anesthesia: Spinal with Block   Estimated Blood Loss: 100cc    Drains: Hemovac  Specimens:   ID Type Source Tests Collected by Time Destination   A : Femoral/Tibial bone fragments. Bone Fragments Bone OR DOCUMENTATION ONLY Kirshna Santiago MD 9/20/2023  9:26 AM      Findings:   Left knee osteoarthritis .  Complications: None.  Implants:   Implant Name Type Inv. Item Serial No.  Lot No. LRB No. Used Action   NexGen Complete Knee Solution Cruciate Retaining CR-Flex Femoral Component Size: D. Left Metallic Hardware/[place]   [employer] [id number] Left 1 Implanted   IMP COMP PATELLA ZIM NEXGEN 8.5X32MM -035-32 - [id number] Total Joint Component/Insert IMP COMP PATELLA ZIM NEXGEN 8.5X32MM -988-32  [employer] [id number] Left 1 Implanted   IMP PLATE TIBIAL ZIM NEXGEN SIZE 3  -226-01 - [id number] Metallic Hardware/[place] IMP PLATE TIBIAL ZIM NEXGEN SIZE 3  -596-01  [employer] [id number] Left 1 Implanted   BONE CEMENT SIMPLEX W/TOBRAMYCIN 6197-9-001 - [id number] Cement, Bone BONE CEMENT SIMPLEX W/TOBRAMYCIN 6197-9-001  [employer] [id number] Left 2 Implanted   NexGen Complete Knee Solution Cruciate Retaining Size: Yellow/C-H 12mm Height. Use with Plate 3,4 Metallic Hardware/[place]    [id number] Left 1 Implanted

## 2023-09-20 NOTE — ANESTHESIA PROCEDURE NOTES
Adductor canal Procedure Note    Pre-Procedure   Staff -        CRNA: Krys Sahu APRN CRNA       Other Anesthesia Staff: Yumiko Schmidt       Performed By: CRNA       Location: post-op       Procedure Start/Stop Times: 9/20/2023 11:04 AM and 9/20/2023 11:15 AM       Pre-Anesthestic Checklist: patient identified, IV checked, site marked, risks and benefits discussed, informed consent, monitors and equipment checked, pre-op evaluation, at physician/surgeon's request and post-op pain management  Timeout:       Correct Patient: Yes        Correct Procedure: Yes        Correct Site: Yes        Correct Position: Yes        Correct Laterality: Yes        Site Marked: Yes  Procedure Documentation  Procedure: Adductor canal       Diagnosis: LEFT TOTAL KNEE ARTHROPLASTY       Laterality: left       Patient Position: supine       Patient Prep/Sterile Barriers: sterile gloves, mask       Skin prep: Chloraprep       Needle Type: insulated       Needle Gauge: 21.        Needle Length (millimeters): 100        Ultrasound guided       1. Ultrasound was used to identify targeted nerve, plexus, vascular marker, or fascial plane and place a needle adjacent to it in real-time.       2. Ultrasound was used to visualize the spread of anesthetic in close proximity to the above referenced structure.       3. A permanent image is entered into the patient's record.       4. The visualized anatomic structures appeared normal.       5. There were no apparent abnormal pathologic findings.    Assessment/Narrative         The placement was negative for: blood aspirated, painful injection and site bleeding       Paresthesias: No.       Test dose of mL at.         Test dose negative, 3 minutes after injection, for signs of intravascular, subdural, or intrathecal injection.       Bolus given via needle..        Secured via.        Insertion/Infusion Method: Single Shot       Complications: none       Injection made incrementally  "with aspirations every 5 mL.    Medication(s) Administered   Medication Administration Time: 9/20/2023 11:04 AM     Comments:  Placed by SHANI Rodriguez under the direct supervision of Krys Sahu CRNA. Good visualization of the femoral artery and the local spread lateral to the artery.  No HR increase with injections and no other complications noted.  I will follow up with the pt if needed.      FOR [facility] ([facility] ONLY:   Pain Team Contact information: please page the Pain Team Via [employer]. Search \"Pain\". During daytime hours, please page the attending first. At night please page the resident first.      "

## 2023-09-20 NOTE — PROGRESS NOTES
09/20/23 1430   Appointment Info   Signing Clinician's Name / Credentials (PT) Sarah Beth Schofield PT, DPT, ATC, LAT   Rehab Comments (PT) PT eval and treat post op knee. Activity orders: up with assist, ambulate, up in chair, ice, position at rest, WBAT, immobilizer on when up   Quick Adds   Quick Adds Certification       Present no   Living Environment   People in Home alone   Current Living Arrangements apartment   Home Accessibility no concerns  (elevator access)   Transportation Anticipated family or friend will provide   Living Environment Comments daughter plans to stay with patient until Monday.   Self-Care   Usual Activity Tolerance moderate   Current Activity Tolerance fair   Regular Exercise No   Equipment Currently Used at Home cane, straight;walker, rolling;shower chair;raised toilet seat;grab bar, tub/shower;grab bar, toilet  (handheld shower head, reacher, sock aide)   Fall history within last six months yes   Number of times patient has fallen within last six months 1   General Information   Onset of Illness/Injury or Date of Surgery 09/20/23   Referring Physician Dr. Santiago   Patient/Family Therapy Goals Statement (PT) home with home health PT   Pertinent History of Current Problem (include personal factors and/or comorbidities that impact the POC) Patient is a 78 year old female, day 0 status post right total knee arthroplasty by Dr. Santiago, 100mL EBL, spinal with adductor block. Patient with a previous medical history of JOSE, obesity, HTN, COPD, restless leg syndrome, sensorineural hearing loss.   Existing Precautions/Restrictions fall   Weight-Bearing Status - LUE full weight-bearing   Weight-Bearing Status - RUE full weight-bearing   Weight-Bearing Status - LLE weight-bearing as tolerated   Weight-Bearing Status - RLE full weight-bearing   General Observations daughter present, on room air. BP running soft, orthostatics as follows supine 143/68, sitting 115/63, standing  125/41   Cognition   Affect/Mental Status (Cognition) WFL   Orientation Status (Cognition) oriented x 4   Follows Commands (Cognition) WFL   Pain Assessment   Patient Currently in Pain Yes, see Vital Sign flowsheet  (1/10 at rest, 5/10 with HEP)   Integumentary/Edema   Integumentary/Edema Comments post op dressing CDI   Posture    Posture Forward head position;Protracted shoulders;Kyphosis   Range of Motion (ROM)   ROM Comment left knee 15-40 degrees with pain, knees flexed at rest in bed   Strength (Manual Muscle Testing)   Strength (Manual Muscle Testing) Able to perform R SLR   Strength Comments fair quad set   Bed Mobility   Bed Mobility supine-sit   Supine-Sit [place] (Bed Mobility) modified independence   Impairments Contributing to Impaired Bed Mobility pain   Assistive Device (Bed Mobility) bed rails   Transfers   Transfers sit-stand transfer;bed-chair transfer   Bed-Chair Transfer   Assistive Device (Bed-Chair Transfers) walker, 4-wheeled   Bed-Chair [place] (Transfers) contact guard;verbal cues   Sit-Stand Transfer   Sit-Stand [place] (Transfers) contact guard;verbal cues   Assistive Device (Sit-Stand Transfers) walker, 4-wheeled   Comment, (Sit-Stand Transfer) RUE and RLE weak with transfers, requiring signficiant effort   Gait/Stairs (Locomotion)   Comment, (Gait/Stairs) did not process due to tearful, overwhelmed and BPs   Balance   Balance Comments IND sitting balance. transitional balance poor.   Sensory Examination   Sensory Perception patient reports no sensory changes   Sensory Perception Comments however due to pain and post op status suspect some impairment   Coordination   Coordination no deficits were identified   Muscle Tone   Muscle Tone no deficits were identified   Clinical Impression   Criteria for Skilled Therapeutic Intervention Yes, treatment indicated   PT Diagnosis (PT) muscle weakness, joint stiffness, impaired mobility   Influenced by the following impairments day 0  status post TKA   Functional limitations due to impairments use of walker and immobilizer for mobility, pain, fatigue   Clinical Presentation (PT Evaluation Complexity) Evolving/Changing   Clinical Presentation Rationale medical status, post op acuity, pain   Clinical Decision Making (Complexity) moderate complexity   Planned Therapy Interventions (PT) balance training;gait training;cryotherapy;home exercise program;ROM (range of motion);strengthening;stair training;patient/family education;transfer training;home program guidelines   Anticipated Equipment Needs at Discharge (PT)   (DME per home)   Risk & Benefits of therapy have been explained evaluation/treatment results reviewed;participants voiced agreement with care plan;participants included;patient   PT Total Evaluation Time   PT Eval, Moderate Complexity Minutes (87464) 15   Plan of Care Review   Plan of Care Reviewed With patient   Therapy Certification   Start of care date 09/20/23   Certification date from 09/20/23   Certification date to 09/21/23   Medical Diagnosis s/p TKA   Physical Therapy Goals   PT Frequency Daily   PT Predicted Duration/Target Date for Goal Attainment 09/21/23   PT Goals Bed Mobility;Transfers;Gait   PT: Bed Mobility Independent;Supine to/from sit   PT: Transfers Modified independent;Sit to/from stand;Assistive device   PT: Gait Supervision/stand-by assist;Rolling walker;100 feet   Interventions   Interventions Quick Adds Gait Training;Therapeutic Procedure;Therapeutic Activity   Therapeutic Procedure/Exercise   Ther. Procedure: strength, endurance, ROM, flexibillity Minutes (02466) 15   Symptoms Noted During/After Treatment increased pain   Treatment Detail/Skilled Intervention Provided post op HEP with written education reminders. Completed x10 ankle pumps, quad sets, glute sets, hamstring sets, heel slides, SAQ, hip abd to adduction.   Therapeutic Activity   Therapeutic Activities: dynamic activities to improve functional  performance Minutes (92093) 25   Symptoms Noted During/After Treatment Fatigue;Increased pain   Treatment Detail/Skilled Intervention PT: patient lying in bed, foot of bed flexed with patient s knee flexed at rest. Daughter present throughout session for education of the . PT lowered gatch and educated in knee extension importance and forced knee extension begins now, patient struggling to complete but able to progress to near straight at end of session. Patient with poor attention, provided Education to patient and  regarding post op precaution with immobilizer, WBAT, activity instructions, IS, OP PT, car transfer with immobilizer (back seat with elevated and supported), set up in bed, elevation, icing, recliner set up, knee extension stretching and position at rest. PT instructed patient and  in knee immobilizer application, support, purpose and DC guidelines. Patient requesting to void, orthostatic BPs recorded with assist of RN. Patient struggling to rise from bed height as home with RUE minimally efficient at press up and RLE struggling to achieve mechanical advantage. Patient completed IND pericares. Sit to stand from commode SBA, completed 180 degree turn to strong side for commode to recliner transfer with good tolerance, improving confidence however weak LLE. Set up in recliner with alarm on, needs within reach, IS reinforced, no questions for PT.   PT Discharge Planning   PT Plan daily. ambulate, brace   PT Discharge Recommendation (DC Rec) home with assist  (physical therapy services- defer HHPT vs OP PT to MD.)   PT Rationale for DC Rec Patient from apartment alone, family able to assist through the weekend. Patient plans for home care physical therapy as she completed previously, she has good DME set up and good post op mobility understanding. Patient is currently below her functional baseline due to post op status, anticipate with medical management and mobilization with nursing she will  make good progress towards discharge home tomorrow. She would benefit from skilled physical therapy follow up, location of services will be deferred to the provider.   PT Brief overview of current status MOD IND supine to sitting. CGA sit to/from stand and bed to chair transfer with walker and immobilizer. dependent for immobilizer application. HEP well tolerated.   Total Session Time   Timed Code Treatment Minutes 40   Total Session Time (sum of timed and untimed services) 55     [facility]  OUTPATIENT PHYSICAL THERAPY EVALUATION  PLAN OF TREATMENT FOR OUTPATIENT REHABILITATION  (COMPLETE FOR INITIAL CLAIMS ONLY)  Patient's Last Name, First Name, M.I.  YOB: 1945  Gina Yeh                        Provider's Name  [facility] Medical Record No.  [id number]                             Onset Date:  09/20/23   Start of Care Date:  09/20/23   Type:     _X_PT   ___OT   ___SLP Medical Diagnosis:  s/p TKA              PT Diagnosis:  muscle weakness, joint stiffness, impaired mobility Visits from SOC:  1     See note for plan of treatment, functional goals and certification details    I CERTIFY THE NEED FOR THESE SERVICES FURNISHED UNDER        THIS PLAN OF TREATMENT AND WHILE UNDER MY CARE     (Physician co-signature of this document indicates review and certification of the therapy plan).              Thank you for your referral.  Sarah Beth Schofield, PT, DPT, ATC, LAT    [facility]  O: [phone]  E: [email or web address]

## 2023-09-20 NOTE — PROGRESS NOTES
Gina Yeh is a 78 year old female with severe left knee osteoarthritis.  Xray shows severe wear.  She has failed conservative treatment.  Range of motion is - degrees.  Presents for left total knee arthroplasty.  This H&P has been reviewed and there are no clinically significant changes in the patient s condition.  The patient was evaluated by myself as well as (H&P provider) prior to surgery. The patient is approved for the surgery and the stated surgical procedure is still clinically indicated.  Risks, benefits, potential complications and alternatives were discussed.    Krishna Santiago M.D.  Department of Orthopaedic Surgery  [facility]  Pager: [phone]

## 2023-09-20 NOTE — DISCHARGE INSTRUCTIONS
Total Knee Replacement Discharge Instructions    [phone]  Bone and Joint Service Line for issues or concerns      General Care:  After surgery you may feel tired/sleepy. This is normal. If you have any question along the way please contact the office. If you feel it is an issue cannot wait for normal office hours, contact the on-call physician. You should not drive or operate machines/equipment until released by your physician to do so.     Bandages:   It s normal to have some blood-tinged fluid on your bandages, this may occur for a few days after being sent home from the hospital. This is a water resistant dressing so you may shower over this. Leave the dressing placed until follow up.  If you would like, you can also take it off on the 10th day, and then do daily dressing changes with gauze and tape. You may also notice a few drops of blood from your incision as you begin to move more this is normal. Keep the area clean and dry.  With your drain dressing (two poke holes), you can do daily dressing changes with a bigger Band-Aid until two days of no drainage then you do not need a dressing.       Bathing:  Do not submerge your incision in water such as a bath or pool. It is ok to shower when you get home over the aquacell water resistant bandage.  You may find in comfortable to get a shower chair for the first month while you continue to heal and get stronger.     Follow up:  Your follow up appointment should already be scheduled. If it s not, please call the office to verify an appointment 10-14 days after surgery.    Diet:  You may not have a full appetite at discharge this should get better. Progress to bland foods such as crackers and bread and finally to your normal diet if you have no problems.  Avoid alcohol when taking narcotic pain medications.      Pain control:  Take your pain medications as prescribed. These medications may make you sleepy. Do not drive, operate equipment, or drink alcohol when  taking these.  You may take Tylenol (Generic name is acetaminophen) as directed on the bottle for additional relief or in place of the prescribed pain medications as your pain gets better.  If the medications cause a reaction such as nausea or skin rash, stop taking them and contact your doctor. Please plan accordingly, pain medications will not be re-filled on the weekends or at night. Call the office during the day if you need more medications.    Blood thinner:  It is very important to take the Aspirin 325 mg twice a day to help prevent blood clots. No medication is perfect, so if you notice a sudden onset of pain/swelling in your calf area call your doctor. If you notice a sudden onset of troubles breathing and/or chest pain call 911.     Icing:  It is common for some swelling, aching and stiffness to occur for awhile after a knee replacement. Apply ice for 20 minutes at a time. For the first 1-2 weeks apply ice 2-3 x a day or more after therapy/exercises.    Walker/crutches:  Use a walker/crutches when you go home. You will transition to the use of a cane and finally to no additional support.     Physical Therapy:  The success of your knee replacement is based on doing physical therapy. You will have some pain and discomfort along the way. If you feel your pain is limiting your progress make sure to take some pain medication prior to your therapy session. If your pain medications are not working talk to your surgeon.   For the first 1-2 weeks after going home you will have in-home physical therapy. The goal is to work on walking and feeling steady.  Usually after your first post-operative follow up you will move to out-patient physical therapy.   If you are going to transitional care, they will work on physical therapy there then you will have home physical therapy when discharged to home, then outpatient physical therapy after a few weeks as explained above.     Activity:  Unless otherwise instructed, you can  weight bear as tolerated with a walker/cane.     Normal findings after surgery:  Numbness and tenderness around the incisions.   You may have bruising around the incisions and down the thigh.   Low grade fevers less than 100.5 degrees Fahrenheit are normal.   You will have some increased pain after your therapy sessions.     When to call the Office:  Temperature greater than 101.5 degrees Fahreheit.  Fever, chills, and increasing pain in the hip.  Excessive drainage from the incisions that include bright red blood.  Drainage from the incisions sites that appear yellow, pus-like, or foul smelling.  Increasing pain the hip not relieved by the prescribed pain medications or ice.  Persistent nausea or vomiting not helped by the Zofran.  Increased pain or swelling in your calf area (in back above your ankle) that wasn t there when in the hospital.  Any other effects you feel are significant.  Call 911 if you experience any chest pain and/or shortness or breath.   Has home meds labeled in bin. Please return.

## 2023-09-20 NOTE — CONSULTS
Care Management Initial Consult    General Information  Assessment completed with: Patient, Children,       Primary Care Provider verified and updated as needed:     Readmission within the last 30 days:        Reason for Consult: discharge planning  Advance Care Planning:          Communication Assessment  Patient's communication style: spoken language (English or Bilingual)    Hearing Difficulty or Deaf: yes   Wear Glasses or Blind: yes    Cognitive  Cognitive/Neuro/Behavioral: WDL  Level of Consciousness: sedated  Arousal Level: opens eyes spontaneously  Orientation: oriented x 4             Living Environment:   People in home: alone     Current living Arrangements: apartment      Able to return to prior arrangements: yes       Family/Social Support:  Care provided by: child(surinder)  Provides care for: no one  Marital Status:   Children          Description of Support System: Involved, Supportive    Support Assessment: Adequate family and caregiver support    Current Resources:   Patient receiving home care services: No     Community Resources: None  Equipment currently used at home: cane, straight, walker, rolling, shower chair, raised toilet seat, grab bar, tub/shower, grab bar, toilet  Supplies currently used at home: None    Employment/Financial:  Employment Status: retired        Financial Concerns:         Does the patient's insurance plan have a 3 day qualifying hospital stay waiver?  No    Lifestyle & Psychosocial Needs:  Social Determinants of Health     Food Insecurity: Not on file   Depression: Not at risk (2/6/2023)    PHQ-2     PHQ-2 Score: 0   Housing Stability: Not on file   Tobacco Use: Medium Risk (9/13/2023)    Patient History     Smoking Tobacco Use: Former     Smokeless Tobacco Use: Former     Passive Exposure: Not on file   Financial Resource Strain: Not on file   Alcohol Use: Not on file   Transportation Needs: Not on file   Physical Activity: Not on file   Interpersonal Safety: Not on  file   Stress: Not on file   Social Connections: Not on file       Functional Status:  Prior to admission patient needed assistance:   Dependent ADLs:: Ambulation-cane  Dependent IADLs:: Independent       Mental Health Status:          Chemical Dependency Status:              Values/Beliefs:  Spiritual, Cultural Beliefs, Gnosticist Practices, Values that affect care: no               Additional Information:  Referral received for discharge planning. Initial assessment completed with patient and daughter, Lita.     Patient lives alone in an apartment. She ambulates with a cane at baseline. Children assist her as needed.     Patient received [facility] (Phone: [phone])  for SN and P/T after her previous surgery and is requesting them again at discharge.    Referral placed to [facility] (Phone: [phone]).    Family transport.    EZIO Mckinnon  [facility] [phone]/ [facility] [phone]  Care Management

## 2023-09-20 NOTE — OP NOTE
rKishna Chaves MD Physician   Procedures    -   DATE OF SERVICE:  9/20/2023    SURGEON: KRISHNA CHAVES MD   ASSISTANT: Hasmukh Hawley PA-C     PREOPERATIVE DIAGNOSIS: Left knee osteoarthritis.   POSTOPERATIVE DIAGNOSIS: Left knee osteoarthritis.   PROCEDURE PERFORMED: Left total knee arthroplasty.     COMPONENTS IMPLANTED:  Alexis      Femur CR size D      Tibia size 3 pegged with 12 CR  spacer       Patella size 32    HISTORY   This is a 78 year old  female with severe left knee osteoarthritis.   She has failed conservative treatment.   She presents now   for left total knee arthroplasty. She has motion from 0-110 degrees.   Assistance of Hasmukh Hawley PA-C was medically necessary given the technical complexity of the case; with assistance with patient positioning, retraction and knee joint exposure, limb manipulation for femoral and tibial osteotomies, assistance with implant placement, trial and final arthroplasty knee implant reduction, and wound closure.    DESCRIPTION OF PROCEDURE   After a smooth Spinal anesthetic, the patient's left leg was prepped and   draped in sterile fashion. A pause was performed for patient verification.   The leg was exsanguinated, tourniquet inflated to 300 mm Hg. A longitudinal   incision was made centered over the patella, carried down through   subcutaneous tissue. A medial parapatellar capsulotomy was performed. Fat pad   was excised. Anterior cruciate ligament excised. There was significant wear   noted on the knee, primarily lateral . The Alexis NexGen   system was utilized. An intramedullary hole was made in the femur. The anterior femur was   cut at 3 degrees external rotation, distal femur cut at 5 degrees valgus.   Sizing was performed to size  D and finishing cuts made to the size D   component.   Medial and lateral menisci were then removed with electrocautery. An intramedullary hole   was made in the tibia and the tibia cut at 7 degrees posterior slope.  We  trimmed bone prominence from the posterior femoral condyles.  Sizing   was performed to a 3 component.  With the 10 mm spacer in, the lateral side was tight and medial side a little too loose.  I did lateral retinacular release, fenestrated the iliotibial band and did small release of lateral collateral ligament.  This allowed a 12 spacer in.  The posterior cruciate ligament did not appear too tight.  We now had good balance in flexion and extension   with the 12 mm spacer.   The tibia was prepared to the 3 pegged tibia. The patella   was osteotomized from 22 to 13 mm and fit for a 32 mm patella. Bone surfaces   were then prepared with jet lavage and drying. All components were then cemented into place   with tobramycin-impregnated cement. Excess cement was removed and cement   hardened. Joint block was injected.  After searching for all excess cement, the wound was thoroughly   Irrigated with dilute betadyne followed by antibiotic jet lavage.   The size 12 CR implant tibial spacer was inserted.  The tourniquet was deflated, and bleeding was controlled with   electrocautery. We did do a large lateral retinacular release adjacent to the   Patella as part of our lateral releasing.  The patellar tracking was now good.   The quadriceps mechanism was closed over a medium Hemovac with interrupted #2 Ethibond and #2  Vicryl suture. A second Hemovac was placed and the subcutaneous   tissue closed with interrupted 2-0 Vicryl suture. Skin edges were closed   with a running 3-0 Monocryl subcuticular suture. Dermabond was applied.   Sterile dressings   were applied.   The patient was taken to the recovery room in stable condition.     ALICIA CHAVES MD     cc:   Verena Vee   PATIENT: Gina Yeh   MR#: [id number]   : 1945   ADMIT DATE: 2023

## 2023-09-20 NOTE — ANESTHESIA PROCEDURE NOTES
"Intrathecal Procedure Note    Pre-Procedure   Staff -        CRNA: rKys Sahu APRN CRNA       Other Anesthesia Staff: Yumiko Schmidt       Performed By: CRNA       Location: OR       Procedure Start/Stop Times: 9/20/2023 7:33 AM and 9/20/2023 7:43 AM       Pre-Anesthestic Checklist: patient identified, IV checked, risks and benefits discussed, informed consent, monitors and equipment checked and pre-op evaluation  Timeout:       Correct Patient: Yes        Correct Procedure: Yes        Correct Site: Yes        Correct Position: Yes   Procedure Documentation  Procedure: intrathecal       Diagnosis: left total knee arthroplasty       Patient Position: sitting       Patient Prep/Sterile Barriers: sterile gloves, mask, patient draped       Skin prep: Betadine       Insertion Site: L3-4. (midline approach).       Needle Gauge: 25.        Needle Length (Inches): 5        Spinal Needle Type: Joe tip       Introducer used       Introducer: 20 G       # of attempts: 1 and  # of redirects:  2    Assessment/Narrative         Paresthesias: No.       CSF fluid: clear.       Opening pressure was cmH2O while  Sitting.      Medication(s) Administered   Medication Administration Time: 9/20/2023 7:33 AM     Comments:  Spinal attempt by SRNA with 2+ redirects - unable to get to intrathecal space. CRNA redirected with success to intrathecal space. Spinal medication given with predictable setup.       FOR [facility] ([facility]) ONLY:   Pain Team Contact information: please page the Pain Team Via "[employer]. Search \"Pain\". During daytime hours, please page the attending first. At night please page the resident first.      "

## 2023-09-21 ENCOUNTER — APPOINTMENT (OUTPATIENT)
Dept: PHYSICAL THERAPY | Facility: CLINIC | Age: 78
End: 2023-09-21
Attending: ORTHOPAEDIC SURGERY
Payer: COMMERCIAL

## 2023-09-21 VITALS
TEMPERATURE: 97.7 F | HEIGHT: 61 IN | WEIGHT: 274.69 LBS | OXYGEN SATURATION: 95 % | SYSTOLIC BLOOD PRESSURE: 112 MMHG | HEART RATE: 71 BPM | DIASTOLIC BLOOD PRESSURE: 53 MMHG | BODY MASS INDEX: 51.86 KG/M2 | RESPIRATION RATE: 18 BRPM

## 2023-09-21 LAB
ANION GAP SERPL CALCULATED.3IONS-SCNC: 10 MMOL/L (ref 7–15)
BUN SERPL-MCNC: 36.4 MG/DL (ref 8–23)
CALCIUM SERPL-MCNC: 9.2 MG/DL (ref 8.8–10.2)
CHLORIDE SERPL-SCNC: 105 MMOL/L (ref 98–107)
CREAT SERPL-MCNC: 1.12 MG/DL (ref 0.51–0.95)
DEPRECATED HCO3 PLAS-SCNC: 23 MMOL/L (ref 22–29)
EGFRCR SERPLBLD CKD-EPI 2021: 51 ML/MIN/1.73M2
GLUCOSE SERPL-MCNC: 127 MG/DL (ref 70–99)
HGB BLD-MCNC: 9.9 G/DL (ref 11.7–15.7)
POTASSIUM SERPL-SCNC: 5.2 MMOL/L (ref 3.4–5.3)
SODIUM SERPL-SCNC: 138 MMOL/L (ref 136–145)

## 2023-09-21 PROCEDURE — 80048 BASIC METABOLIC PNL TOTAL CA: CPT | Performed by: ORTHOPAEDIC SURGERY

## 2023-09-21 PROCEDURE — 97110 THERAPEUTIC EXERCISES: CPT | Mod: GP | Performed by: PHYSICAL THERAPIST

## 2023-09-21 PROCEDURE — 250N000011 HC RX IP 250 OP 636: Mod: JZ | Performed by: ORTHOPAEDIC SURGERY

## 2023-09-21 PROCEDURE — 250N000013 HC RX MED GY IP 250 OP 250 PS 637: Performed by: ORTHOPAEDIC SURGERY

## 2023-09-21 PROCEDURE — 85018 HEMOGLOBIN: CPT | Performed by: ORTHOPAEDIC SURGERY

## 2023-09-21 PROCEDURE — 97530 THERAPEUTIC ACTIVITIES: CPT | Mod: GP | Performed by: PHYSICAL THERAPIST

## 2023-09-21 PROCEDURE — 36415 COLL VENOUS BLD VENIPUNCTURE: CPT | Performed by: ORTHOPAEDIC SURGERY

## 2023-09-21 PROCEDURE — 999N000111 HC STATISTIC OT IP EVAL DEFER

## 2023-09-21 RX ADMIN — KETOROLAC TROMETHAMINE 15 MG: 15 INJECTION, SOLUTION INTRAMUSCULAR; INTRAVENOUS at 12:19

## 2023-09-21 RX ADMIN — LEVOTHYROXINE SODIUM 137 MCG: 0.11 TABLET ORAL at 09:05

## 2023-09-21 RX ADMIN — KETOROLAC TROMETHAMINE 15 MG: 15 INJECTION, SOLUTION INTRAMUSCULAR; INTRAVENOUS at 06:49

## 2023-09-21 RX ADMIN — SENNOSIDES AND DOCUSATE SODIUM 1 TABLET: 50; 8.6 TABLET ORAL at 09:05

## 2023-09-21 RX ADMIN — ACETAMINOPHEN 975 MG: 325 TABLET, FILM COATED ORAL at 06:48

## 2023-09-21 RX ADMIN — ASPIRIN 325 MG: 325 TABLET, COATED ORAL at 07:44

## 2023-09-21 RX ADMIN — ATORVASTATIN CALCIUM 40 MG: 40 TABLET, FILM COATED ORAL at 09:06

## 2023-09-21 RX ADMIN — LISINOPRIL 20 MG: 10 TABLET ORAL at 09:05

## 2023-09-21 RX ADMIN — KETOROLAC TROMETHAMINE 15 MG: 15 INJECTION, SOLUTION INTRAMUSCULAR; INTRAVENOUS at 00:06

## 2023-09-21 RX ADMIN — POLYETHYLENE GLYCOL 3350 17 G: 17 POWDER, FOR SOLUTION ORAL at 09:06

## 2023-09-21 ASSESSMENT — ACTIVITIES OF DAILY LIVING (ADL)
ADLS_ACUITY_SCORE: 28
ADLS_ACUITY_SCORE: 28
ADLS_ACUITY_SCORE: 29
ADLS_ACUITY_SCORE: 29
ADLS_ACUITY_SCORE: 28
ADLS_ACUITY_SCORE: 29
ADLS_ACUITY_SCORE: 29

## 2023-09-21 NOTE — DISCHARGE SUMMARY
DISCHARGE SUMMARY    Primary MD: Verena Vee    ADMIT DATE: 9/20/2023  DISCHARGE DATE: 9/21/2023    DISCHARGE DIAGNOSIS:  Left knee osteoarthritis.    PROCEDURE:   Left total knee arthroplasty.    HISTORY:  Gina Yeh is a 78 year old female with severe osteoarthritis of the left knee.  She has failed conservative treatment and presents for total knee arthroplasty.    HOSPITAL COURSE:  On 9/20/2023 Ms. Yeh underwent total knee arthroplasty without complications.  The hemovac was discontinued on POD # 1.  Her hemoglobin dropped to a low of 9.9.   Progress with physical therapy was very good .  She walked 300 feet by POD # 1.   She is discharged on POD # 1 with wound healing well.  She will start Aspirin for blood thinning.  For pain control a prescription for oxycodone  was written.  RTC 1 1/2 weeks for xray of the knee.      Recent Labs   Lab Test 09/21/23  0629 09/13/23  1008 06/19/23  1241 02/16/23  0536 02/06/23  0933 02/22/22  1053 06/28/21  1553   HGB 9.9* 12.6 12.7   < > 12.2   < > 14.1   PLT  --   --  271  --  279  --  294    < > = values in this interval not displayed.     Recent Labs   Lab Test 09/21/23  0629 09/15/23  1505 09/13/23  1008 06/19/23  1241   POTASSIUM 5.2 5.3 5.4* 4.9   BUN 36.4*  --  19.9 21.1   CR 1.12*  --  0.78 0.76     No results for input(s): INR in the last 25545 hours.    Allergies: Nkda [no known drug allergy]            Discharge Medications:     Current Discharge Medication List        START taking these medications    Details   acetaminophen (TYLENOL) 325 MG tablet Take 3 tablets (975 mg) by mouth every 8 hours as needed for other (mild pain)  Qty: 100 tablet, Refills: 1    Associated Diagnoses: History of total left knee replacement      aspirin (ASA) 325 MG EC tablet Take 1 tablet (325 mg) by mouth 2 times daily (with meals) for 40 days  Qty: 80 tablet, Refills: 0    Associated Diagnoses: History of total left knee replacement      oxyCODONE (ROXICODONE) 5  MG tablet Take 1-2 tablets (5-10 mg) by mouth every 4 hours as needed for moderate to severe pain  Qty: 30 tablet, Refills: 0    Associated Diagnoses: History of total left knee replacement      senna-docusate (SENOKOT-S/PERICOLACE) 8.6-50 MG tablet Take 1-2 tablets by mouth 2 times daily Take while on oral narcotics to prevent or treat constipation.  Qty: 20 tablet, Refills: 1    Comments: While taking narcotics  Associated Diagnoses: History of total left knee replacement           CONTINUE these medications which have NOT CHANGED    Details   atorvastatin (LIPITOR) 40 MG tablet TAKE 1 TABLET DAILY  Qty: 90 tablet, Refills: 1    Associated Diagnoses: Hyperlipidemia with target LDL less than 130      Ferrous Sulfate (IRON) 325 (65 FE) MG tablet Take 1 tablet by mouth daily      latanoprost (XALATAN) 0.005 % ophthalmic solution Place 1 drop into both eyes daily      levothyroxine (SYNTHROID/LEVOTHROID) 137 MCG tablet TAKE 1 TABLET DAILY  Qty: 90 tablet, Refills: 3    Associated Diagnoses: Acquired hypothyroidism      lisinopril (ZESTRIL) 20 MG tablet TAKE 1 TABLET DAILY  Qty: 90 tablet, Refills: 3    Associated Diagnoses: Hypertension goal BP (blood pressure) < 140/90      OMEGA-3 KRILL OIL PO       STIOLTO RESPIMAT 2.5-2.5 MCG/ACT AERS Inhale 2 puffs into the lungs daily      triamcinolone (KENALOG) 0.1 % external cream Apply topically 2 times daily as needed (rash)  Qty: 60 g, Refills: 0    Associated Diagnoses: Venous stasis dermatitis of right lower extremity      Vitamin D, Cholecalciferol, 1000 units CAPS Take 1 capsule by mouth daily    Associated Diagnoses: Vitamin D insufficiency             Krishna Santiago M.D.  Department of Orthopaedic Surgery  [facility]

## 2023-09-21 NOTE — PROGRESS NOTES
Care Management Discharge Note    Discharge Date: 09/21/2023     Discharge Disposition: Home, Home Care - [facility] (Phone: [phone])     Discharge Services:  RN and P/T     Discharge DME:      Discharge Transportation: family or friend will provide    Private pay costs discussed: Not applicable    Does the patient's insurance plan have a 3 day qualifying hospital stay waiver?  No    PAS Confirmation Code:  N/A    Patient/family educated on Medicare website which has current facility and service quality ratings: yes    Education Provided on the Discharge Plan:    Persons Notified of Discharge Plans: Patient  Patient/Family in Agreement with the Plan: yes    Handoff Referral Completed: Yes    Additional Information:  Patient discharging home today with family support. She has been accepted for home care by [facility] (Phone: [phone])  for RN and P/T.    Family transport.    EZIO Mckinnon  [facility] [phone]/ [facility] [phone]  Care Management

## 2023-09-21 NOTE — PROGRESS NOTES
Patient vital signs are at baseline: No,  Reason:  Blood pressure stable but slightly lower than pt's normal per pt's report.  Patient able to ambulate as they were prior to admission or with assist devices provided by therapies during their stay:  Yes  Patient MUST void prior to discharge:  Yes  Patient able to tolerate oral intake:  Yes  Pain has adequate pain control using Oral analgesics:  Yes  Does patient have an identified :  Yes  Has goal D/C date and time been discussed with patient:  Yes    Patient is A&Ox4. VSS on RA. Pt is currently using CPAP. Pt tolerating regular diet and denies nausea. Pt has active bowel sounds and passing flatus. Incision dressing is CDI. Pt has ambulated in room and in hallway this shift with 1A, gb and rolling walker. Pt is steady. Pt has met goals for ambulation. Pt has voided without difficulty. CMS intact.  Pt reports pain as absent or minimal. Pt feels that she will be ready for discharge tomorrow AM.

## 2023-09-21 NOTE — PROGRESS NOTES
Physical Therapy Discharge Summary    Reason for therapy discharge:    Discharged to home with home therapy.    Progress towards therapy goal(s). See goals on Care Plan in [facility] electronic health record for goal details.  Goals met    Therapy recommendation(s):    Continued therapy is recommended.  Rationale/Recommendations:  Strength, ROM, mobility.          Thank you for your referral.   Praneeth Love PT, DPT, OCS    [facility]   O: [phone]  E: [email or web address]

## 2023-09-21 NOTE — PLAN OF CARE
Goal Outcome Evaluation:      Plan of Care Reviewed With: patient, child    Overall Patient Progress: improvingOverall Patient Progress: improving    Outcome Evaluation: Moving well with walker, gait belt, and SBA. 2/10 left knee pain is well-controlled with scheduled Tylenol and Toradol. Ambulated in song and into bathroom to void. Passing gas. Drain removed and pressure applied to site with sterile dressing. Aquacell dressing CDI. Compression stockings on. VSS. Tolerating regular diet.    S-(situation): Patient discharged to home via wheelchair to private vehicle with daughter.    B-(background): Observation goals met .    A-(assessment): as above    R-(recommendations): Discharge instructions reviewed with patient and daughter. Listed belongings gathered and returned to patient. Will follow up with Dr. Santiago 10-2-23.  Patient Education resolved: Yes  New medications-Pt. Has been educated about reason of use and side effects Yes  Home medications returned to patient Yes  Medication Bin checked and emptied on discharge Yes

## 2023-09-21 NOTE — PROGRESS NOTES
"ORTHO PROGRESS NOTE    POD # 1  Procedure(s):  ARTHROPLASTY, left KNEE, TOTAL - on 2023    Pain:  mild    /53 (BP Location: Left arm)   Pulse 71   Temp 97.7  F (36.5  C) (Oral)   Resp 18   Ht 1.549 m (5' 1\")   Wt 124.6 kg (274 lb 11.1 oz)   SpO2 95%   BMI 51.90 kg/m      Temp (24hrs), Av.1  F (36.7  C), Min:97.7  F (36.5  C), Max:99  F (37.2  C)      Recent Labs   Lab Test 23  0629 23  1008 23  1241   HGB 9.9* 12.6 12.7         Drains: 575 ml.          Circulation intact.   Sensation intact.   Calves soft and nontender.   Incision is covered      PT  walked 300 feet.         ASSESSMENT:  left total knee arthroplasty doing well.    PLAN:  Discharge today.  Aspirin: Take 1 tablet (325 mg) by mouth twice daily for blood thinning for 6 weeks.   Tylenol 1000 mg three times daily.  Oxycodone as needed for pain.  Wear graduated compression stockings for 1 month.  After 2 weeks they may be off at night.   Weight bearing as tolerated.  Use walker.  You do not need the knee immobilizer.  Return to clinic 10-14 days.     Krishna Santiago M.D.  Department of Orthopaedic Surgery  [facility]  Pager: [phone]      "

## 2023-09-21 NOTE — PROGRESS NOTES
"Patient vital signs are at baseline: Yes  Patient able to ambulate as they were prior to admission or with assist devices provided by therapies during their stay:  Yes  Patient MUST void prior to discharge:  Yes  Patient able to tolerate oral intake:  Yes  Pain has adequate pain control using Oral analgesics:  No,  Reason:  Pt still receiving IV toradol  Does patient have an identified :  Yes  Has goal D/C date and time been discussed with patient:  Yes    Pt A&O x4 VSS. Pt reports no pain. Scant drainage in woundvac. Pt sleeping with CPAP on.    /50 (BP Location: Left arm)   Pulse 79   Temp 98.6  F (37  C) (Oral)   Resp 18   Ht 1.549 m (5' 1\")   Wt 124.6 kg (274 lb 11.1 oz)   SpO2 92%   BMI 51.90 kg/m       "

## 2023-09-21 NOTE — PROGRESS NOTES
Occupational Therapy: Orders received. Chart reviewed and discussed with care team.? Occupational Therapy not indicated due per PT evaluation.? Defer discharge recommendations to MD and care team.? Will complete orders.     Thank you for the referral.   ELIANA Benson/L   [facility]    Email: [email or web address]  Phone: [phone]

## 2023-09-21 NOTE — ANESTHESIA POSTPROCEDURE EVALUATION
Patient: Gina Yeh    Procedure: Procedure(s):  ARTHROPLASTY, left KNEE, TOTAL       Anesthesia Type:  Spinal    Note:  Disposition: Inpatient   Postop Pain Control: Uneventful            Sign Out: Well controlled pain   PONV: No   Neuro/Psych: Uneventful            Sign Out: Acceptable/Baseline neuro status   Airway/Respiratory: Uneventful            Sign Out: Acceptable/Baseline resp. status   CV/Hemodynamics: Uneventful            Sign Out: Acceptable CV status   Other NRE: NONE   DID A NON-ROUTINE EVENT OCCUR? No    Event details/Postop Comments:  Pt was happy with her anesthesia care.  No complications.  I advised the pt she may have some soreness at the spinal site and this is normal.  However if the soreness continues over a week or if redness is noted around the site to let anesthesia know.  Adductor canal block remains quite effective as she denies pain at the time of our visit.  I will follow up with the pt if needed.           Last vitals:  Vitals Value Taken Time   /92 09/20/23 1245   Temp 97.3  F (36.3  C) 09/20/23 1230   Pulse 84 09/20/23 1246   Resp 27 09/20/23 1246   SpO2 93 % 09/20/23 1258   Vitals shown include unvalidated device data.    Electronically Signed By: ARY Sosa CRNA  September 21, 2023  1:04 PM

## 2023-09-22 ENCOUNTER — PATIENT OUTREACH (OUTPATIENT)
Dept: CARE COORDINATION | Facility: CLINIC | Age: 78
End: 2023-09-22
Payer: COMMERCIAL

## 2023-09-22 NOTE — LETTER
[facility] CARE COORDINATION  [address]  [place] [address]    September 22, 2023    Gina Yeh  [address]   [place] [address]      Dear Gina,        I am a  clinic care coordinator who works with Verena Vee MD with the [facility]. I wanted to introduce myself and provide you with my contact information for you to be able to call me with any questions or concerns. I wanted to thank you for spending the time to talk with me.  Below is a description of clinic care coordination and how I can further assist you.       The clinic care coordination team is made up of a registered nurse, , financial resource worker and community health worker who understand the health care system. The goal of clinic care coordination is to help you manage your health and improve access to the health care system. Our team works alongside your provider to assist you in determining your health and social needs. We can help you obtain health care and community resources, providing you with necessary information and education. We can work with you through any barriers and develop a care plan that helps coordinate and strengthen the communication between you and your care team.  Our services are voluntary and are offered without charge to you personally.    Please feel free to contact me with any questions or concerns regarding care coordination and what we can offer.      We are focused on providing you with the highest-quality healthcare experience possible.    Sincerely,     Loi Hagan MSN, RN, PHN, [facility]   Primary Care Clinical RN Care Coordinator  [facility]  9/22/2023   9:19 AM  [email or web address]  Office: [phone]

## 2023-09-22 NOTE — PROGRESS NOTES
Clinic Care Coordination Contact  [facility]: Post-Discharge Note  SITUATION                                                      Admission:    Admission Date: 09/20/23   Reason for Admission: scheduled surgery  Discharge:   Discharge Date: 09/21/23  Discharge Diagnosis: total knee arthroplasty    BACKGROUND                                                      Per hospital discharge summary and inpatient provider notes:  Total knee arthroplasty.  The patient states that she knows what to expect as she has had the surgery done before.     ASSESSMENT           Discharge Assessment  How are you doing now that you are home?: doing very well the only pain is from the tourniquet  How are your symptoms? (Red Flag symptoms escalate to triage hotline per guidelines): Improved  Do you feel your condition is stable enough to be safe at home until your provider visit?: Yes  Does the patient have their discharge instructions? : Yes  Does the patient have questions regarding their discharge instructions? : No  Were you started on any new medications or were there changes to any of your previous medications? : No  Does the patient have all of their medications?: Yes  Do you have questions regarding any of your medications? : No  Do you have all of your needed medical supplies or equipment (DME)?  (i.e. oxygen tank, CPAP, cane, etc.): Yes  Discharge follow-up appointment scheduled within 14 calendar days? : Yes  Discharge Follow Up Appointment Date: 10/02/23  Discharge Follow Up Appointment Scheduled with?: Specialty Care Provider         Post-op (Clinicians Only)  Did the patient have surgery or a procedure: Yes  Incision: closed  Drainage: No  Bleeding: none  Fever: No  Chills: No  Redness: No  Warmth: No  Swelling: No  Incision site pain: No  Closure: suture  Eating & Drinking: eating and drinking without complaints/concerns  PO Intake: regular diet  Bowel Function: loose stools  Urinary Status: voiding without  complaint/concerns      PLAN                                                      Outpatient Plan:  The patient declines care coordination at this time.  RN CC verified that she had contact information.    Future Appointments   Date Time Provider Department Center   10/2/2023  8:00 AM Krishna Santiago MD [facility]   10/16/2023 12:30 PM Sue, Wan F, PT [facility]   10/18/2023  9:00 AM MARK RN [facility]   10/19/2023  9:20 AM Sue, Wan F, PT [facility]   10/23/2023 12:30 PM Sue, Wan F, PT [facility]   10/26/2023  9:20 AM Sue, Wan F, PT [facility]   10/30/2023 12:30 PM Sue, Wan F, PT [facility]   11/2/2023  9:20 AM Sue, Wan F, PT [facility]   11/6/2023 12:30 PM Sue, Wan F, PT [facility]   11/9/2023  9:20 AM Sue, Wan F, PT [facility]   11/16/2023  8:00 AM Michelle Cerna, APRN CNP [facility]         For any urgent concerns, please contact our 24 hour nurse triage line: [phone] ([phone])       Loi Hagan MSN, RN, PHN, [facility]   Primary Care Clinical RN Care Coordinator  [facility]  9/22/2023   9:21 AM  [email or web address]  Office: [phone]

## 2023-09-24 ENCOUNTER — HEALTH MAINTENANCE LETTER (OUTPATIENT)
Age: 78
End: 2023-09-24

## 2023-09-25 ENCOUNTER — TELEPHONE (OUTPATIENT)
Dept: ADMISSION | Facility: CLINIC | Age: 78
End: 2023-09-25
Payer: COMMERCIAL

## 2023-09-25 NOTE — TELEPHONE ENCOUNTER
Reason for Call:  Other appointment    Detailed comments: home care calling to let us know that patient will start physical therapy home care 10/2  if we could update the order to reflect this?     Phone Number Patient can be reached at: Home number on file [phone] (home)    Best Time: any    Can we leave a detailed message on this number? YES    Call taken on 9/25/2023 at 10:35 AM by Sarita Jaimes

## 2023-09-25 NOTE — TELEPHONE ENCOUNTER
Patient is being seen by [facility]. Called and spoke with CORINA Stein and verbal ok to delay start of care until 10/2/23 was provided. Nothing further is needed at this time.     Michelle Christianson RN   [facility]

## 2023-10-02 ENCOUNTER — ANCILLARY PROCEDURE (OUTPATIENT)
Dept: GENERAL RADIOLOGY | Facility: CLINIC | Age: 78
End: 2023-10-02
Attending: ORTHOPAEDIC SURGERY
Payer: COMMERCIAL

## 2023-10-02 ENCOUNTER — OFFICE VISIT (OUTPATIENT)
Dept: ORTHOPEDICS | Facility: CLINIC | Age: 78
End: 2023-10-02
Payer: COMMERCIAL

## 2023-10-02 VITALS — HEIGHT: 61 IN | BODY MASS INDEX: 51.73 KG/M2 | WEIGHT: 274 LBS

## 2023-10-02 DIAGNOSIS — Z96.652 S/P TOTAL KNEE ARTHROPLASTY, LEFT: Primary | ICD-10-CM

## 2023-10-02 DIAGNOSIS — Z96.652 S/P TOTAL KNEE ARTHROPLASTY, LEFT: ICD-10-CM

## 2023-10-02 PROCEDURE — 99024 POSTOP FOLLOW-UP VISIT: CPT | Performed by: ORTHOPAEDIC SURGERY

## 2023-10-02 PROCEDURE — 73562 X-RAY EXAM OF KNEE 3: CPT | Mod: TC | Performed by: RADIOLOGY

## 2023-10-02 NOTE — PROGRESS NOTES
Follow up left total knee arthroplasty on 9/20/23.   Also had right total knee arthroplasty on 2/15/23  She complains of anterior medial pain just starting today.  Pain: mild.  Off opioids.  Wound is healing well.   Erythema: mild   Increased warmth: mild   Tenderness: mild  Effusion: mild  Range of motion 2-90 degrees  Stability: good    Xray: today's images were reviewed with the patient.  This shows excellent position of total knee arthroplasty components    Assessment/Plan: left total knee arthroplasty doing well.  Continue physical therapy 2-3 x week for 4 weeks.   Use TEDS for 2 more weeks.  They may be off at night.  Use aspirin for 4 more weeks.  Start scar massage with vitamin-E cream.   Return to clinic 4 weeks.    Medication renewal:  None

## 2023-10-02 NOTE — LETTER
10/2/2023         RE: Gina Yeh  [address]  [place] [address]        Dear Colleague,    Thank you for referring your patient, Gina Yeh, to the [facility]. Please see a copy of my visit note below.    Follow up left total knee arthroplasty on 9/20/23.   Also had right total knee arthroplasty on 2/15/23  She complains of anterior medial pain just starting today.  Pain: mild.  Off opioids.  Wound is healing well.   Erythema: mild   Increased warmth: mild   Tenderness: mild  Effusion: mild  Range of motion 2-90 degrees  Stability: good    Xray: today's images were reviewed with the patient.  This shows excellent position of total knee arthroplasty components    Assessment/Plan: left total knee arthroplasty doing well.  Continue physical therapy 2-3 x week for 4 weeks.   Use TEDS for 2 more weeks.  They may be off at night.  Use aspirin for 4 more weeks.  Start scar massage with vitamin-E cream.   Return to clinic 4 weeks.    Medication renewal:  None          Again, thank you for allowing me to participate in the care of your patient.        Sincerely,        Krishna Santiago MD

## 2023-10-02 NOTE — PATIENT INSTRUCTIONS
left total knee arthroplasty doing well.  Continue physical therapy 2-3 x week for 4 weeks.   Use TEDS for 2 more weeks.  They may be off at night.  Use aspirin for 4 more weeks.  Start scar massage with vitamin-E cream.   Return to clinic 4 weeks.

## 2023-10-06 ENCOUNTER — MEDICAL CORRESPONDENCE (OUTPATIENT)
Dept: HEALTH INFORMATION MANAGEMENT | Facility: CLINIC | Age: 78
End: 2023-10-06
Payer: COMMERCIAL

## 2023-10-10 ASSESSMENT — ACTIVITIES OF DAILY LIVING (ADL)
STIFFNESS: THE SYMPTOM AFFECTS MY ACTIVITY SLIGHTLY
GO DOWN STAIRS: ACTIVITY IS MINIMALLY DIFFICULT
LIMPING: I DO NOT HAVE THE SYMPTOM
KNEEL ON THE FRONT OF YOUR KNEE: I AM UNABLE TO DO THE ACTIVITY
AS_A_RESULT_OF_YOUR_KNEE_INJURY,_HOW_WOULD_YOU_RATE_YOUR_CURRENT_LEVEL_OF_DAILY_ACTIVITY?: NEARLY NORMAL
SIT WITH YOUR KNEE BENT: ACTIVITY IS NOT DIFFICULT
WALK: ACTIVITY IS NOT DIFFICULT
RISE FROM A CHAIR: ACTIVITY IS MINIMALLY DIFFICULT
KNEE_ACTIVITY_OF_DAILY_LIVING_SCORE: 78.57
GO UP STAIRS: ACTIVITY IS MINIMALLY DIFFICULT
STAND: ACTIVITY IS NOT DIFFICULT
SWELLING: I HAVE THE SYMPTOM BUT IT DOES NOT AFFECT MY ACTIVITY
HOW_WOULD_YOU_RATE_THE_OVERALL_FUNCTION_OF_YOUR_KNEE_DURING_YOUR_USUAL_DAILY_ACTIVITIES?: NEARLY NORMAL
HOW_WOULD_YOU_RATE_THE_CURRENT_FUNCTION_OF_YOUR_KNEE_DURING_YOUR_USUAL_DAILY_ACTIVITIES_ON_A_SCALE_FROM_0_TO_100_WITH_100_BEING_YOUR_LEVEL_OF_KNEE_FUNCTION_PRIOR_TO_YOUR_INJURY_AND_0_BEING_THE_INABILITY_TO_PERFORM_ANY_OF_YOUR_USUAL_DAILY_ACTIVITIES?: 95
PAIN: THE SYMPTOM AFFECTS MY ACTIVITY SLIGHTLY
SQUAT: ACTIVITY IS SOMEWHAT DIFFICULT
KNEE_ACTIVITY_OF_DAILY_LIVING_SUM: 55
WEAKNESS: I DO NOT HAVE THE SYMPTOM
GIVING WAY, BUCKLING OR SHIFTING OF KNEE: I DO NOT HAVE THE SYMPTOM
RAW_SCORE: 55

## 2023-10-16 ENCOUNTER — THERAPY VISIT (OUTPATIENT)
Dept: PHYSICAL THERAPY | Facility: CLINIC | Age: 78
End: 2023-10-16
Attending: ORTHOPAEDIC SURGERY
Payer: COMMERCIAL

## 2023-10-16 DIAGNOSIS — M25.562 CHRONIC PAIN OF LEFT KNEE: Primary | ICD-10-CM

## 2023-10-16 DIAGNOSIS — G89.29 CHRONIC PAIN OF LEFT KNEE: Primary | ICD-10-CM

## 2023-10-16 DIAGNOSIS — Z96.652 HISTORY OF TOTAL LEFT KNEE REPLACEMENT: ICD-10-CM

## 2023-10-16 PROCEDURE — 97110 THERAPEUTIC EXERCISES: CPT | Mod: GP

## 2023-10-16 PROCEDURE — 97161 PT EVAL LOW COMPLEX 20 MIN: CPT | Mod: GP

## 2023-10-16 NOTE — PROGRESS NOTES
Physical Therapy Initial Evaluation: Subjective History  Surgical Procedure: L TKA  Date of Surgery: 9/20/23.    Surgeon Name: Dr. Naeem Santiago  Precautions/Restrictions/MD instructions: WBAT    Average Daily Pain Levels: 0/10 --> 5/10 at worst (Location: Behind the knee when walking, anterior and medial pain; Quality: Sharp)  Other Symptoms: Mild swelling  Symptom Mgmt Strategies: Icing (less frequently now); Tylenol as needed; Aspirin 2x/day    Prior orthopaedic history/procedures: R TKA in February   Prior non-operative management: None  Next MD Appt Date: 10/30/23    Functional limitations following procedure: Ambulation in the outside community for longer distances  Previous level of function: Indepenent in all ADLs    Patient Employment: None  Desired Physical Activity (Goals): Back to walking normal without pain, no assistive device in house, 4WW for community mobility    Red Flags: (Bold when present) - reviewed the following and denies  Malaise, unexplained weight loss, night pain, fever    Post-Operative Physical Therapy Examination    Physical Mobility Status  Gait: Ambulation for longer distances with 4WW, lack of terminal knee extension bilaterally, forward trunk lean  Transfers:  Independent, unable to straighten legs fully    Anthropometric Measures     Right Left Difference   Joint ROM      Hyperextension      Extension 8 deg 10 deg (improved after exercise activities today and correction with cueing) 2 deg   Flexion 100 deg 92 deg 8 deg   Circumferential Measures      Joint Line  Mild    15 cm Prox      Effusion          Quadriceps Muscle Activation Right Left   Isometric Quad Activation Good Good   Straight Leg Raising  Extensor lag of 10 deg - able to correct with cueing     Status of Incision: Clean & healing    Assessment/Plan    Patient is a 78 year old female with left side knee complaints.    Patient has the following significant findings with corresponding treatment plan.                 Diagnosis 1:  L TKA  Pain -  hot/cold therapy, US, electric stimulation, manual therapy, self management, and education  Decreased ROM/flexibility - manual therapy, therapeutic exercise, therapeutic activity, and home program  Decreased joint mobility - manual therapy, therapeutic exercise, therapeutic activity, and home program  Decreased strength - therapeutic exercise, therapeutic activities, and home program  Impaired balance - neuro re-education, gait training, therapeutic activities, adaptive equipment/assistive device, and home program  Decreased proprioception - neuro re-education, gait training, therapeutic activities, and home program  Inflammation - cold therapy, US, and electric stimulation  Edema - vasopneumatics, electric stimulation, cold therapy, cryocuff, and self management/home program  Impaired gait - gait training, assistive devices, and home program  Impaired muscle performance - biofeedback, electric stimulation, neuro re-education, and home program  Decreased function - therapeutic activities and home program  Impaired posture - neuro re-education, therapeutic activities, and home program  Instability -  Therapeutic Activity  Therapeutic Exercise  Neuromuscular Re-education  home program    Therapy Evaluation Codes:   History comprised of:   Personal factors that impact the plan of care:      Age.    Comorbidity factors that impact the plan of care are:      COPD .     Medications impacting care: None.  Examination of Body Systems comprised of:   Body structures and functions that impact the plan of care:      Knee.   Activity limitations that impact the plan of care are:      Bending, Jumping, Lifting, Sitting, Squatting/kneeling, Stairs, Standing, and Walking.  Clinical presentation characteristics are:   Stable/Uncomplicated.  Decision-Making    Low complexity using standardized patient assessment instrument and/or measureable assessment of functional outcome.  Cumulative Therapy  Evaluation is: Low complexity.    Previous and current functional limitations:  (See Goal Flow Sheet for this information)    Short term and Long term goals: (See Goal Flow Sheet for this information)     Communication ability:  Patient appears to be able to clearly communicate and understand verbal and written communication and follow directions correctly.  Treatment Explanation - The following has been discussed with the patient:   RX ordered/plan of care  Anticipated outcomes  Possible risks and side effects  This patient would benefit from PT intervention to resume normal activities.   Rehab potential is good.    Frequency:  2 X week, once daily  Duration:  for 6 weeks  Discharge Plan:  Achieve all LTG.  Independent in home treatment program.  Reach maximal therapeutic benefit.    Please refer to the daily flowsheet for treatment today, total treatment time and time spent performing 1:1 timed codes.         [facility]                                                                                   OUTPATIENT PHYSICAL THERAPY    PLAN OF TREATMENT FOR OUTPATIENT REHABILITATION   Patient's Last Name, First Name, Gina Lopez YOB: 1945   Provider's Name   [facility]   Medical Record No.  [id number]     Onset Date: 09/20/23  Start of Care Date: 10/16/23     Medical Diagnosis:  L TKA      PT Treatment Diagnosis:  L Knee pain Plan of Treatment  Frequency/Duration: 2x/wk/ 6 weeks    Certification date from 10/16/23 to 12/11/23         See note for plan of treatment details and functional goals     Eliel Barrios, PT                         I CERTIFY THE NEED FOR THESE SERVICES FURNISHED UNDER        THIS PLAN OF TREATMENT AND WHILE UNDER MY CARE     (Physician attestation of this document indicates review and certification of the therapy plan).                Referring Provider:  Krishna Santiago      Initial Assessment  See  Epic Evaluation- Start of Care Date: 10/16/23

## 2023-10-17 ENCOUNTER — TELEPHONE (OUTPATIENT)
Dept: ORTHOPEDICS | Facility: CLINIC | Age: 78
End: 2023-10-17
Payer: COMMERCIAL

## 2023-10-17 NOTE — TELEPHONE ENCOUNTER
Form in Dr. Santillan mailbox pending review and signature.  Fadumo Pablo RN on 10/17/2023 at 3:04 PM

## 2023-10-17 NOTE — TELEPHONE ENCOUNTER
Reason for Call:  Form, our goal is to have forms completed with 72 hours, however, some forms may require a visit or additional information.    Type of letter, form or note:  Home Health Certification    Who is the form from?: Home care    Where did the form come from: form was faxed in    What clinic location was the form placed at?: [facility]    Where the form was placed:  Dr Santiago's  Box/Folder    What number is listed as a contact on the form?: [phone]       Additional comments: Please sign, date and return to [phone]    Call taken on 10/17/2023 at 2:46 PM by Brooklyn Man

## 2023-10-18 NOTE — TELEPHONE ENCOUNTER
Patient Quality Outreach    Patient is due for the following:   Physical Annual Wellness Visit    Next Steps:   Schedule a Annual Wellness Visit   RN wellness visit canceled for 10/18/23    Type of outreach:    Sent Danger message.      Questions for provider review:    None           Maru Beaver [facility]  Chart routed to Care Team.

## 2023-10-19 ENCOUNTER — THERAPY VISIT (OUTPATIENT)
Dept: PHYSICAL THERAPY | Facility: CLINIC | Age: 78
End: 2023-10-19
Payer: COMMERCIAL

## 2023-10-19 DIAGNOSIS — Z96.652 HISTORY OF TOTAL LEFT KNEE REPLACEMENT: Primary | ICD-10-CM

## 2023-10-19 PROCEDURE — 97110 THERAPEUTIC EXERCISES: CPT | Mod: GP | Performed by: PHYSICAL THERAPIST

## 2023-10-23 ENCOUNTER — THERAPY VISIT (OUTPATIENT)
Dept: PHYSICAL THERAPY | Facility: CLINIC | Age: 78
End: 2023-10-23
Payer: COMMERCIAL

## 2023-10-23 DIAGNOSIS — Z96.652 HISTORY OF TOTAL LEFT KNEE REPLACEMENT: Primary | ICD-10-CM

## 2023-10-23 PROCEDURE — 97110 THERAPEUTIC EXERCISES: CPT | Mod: GP | Performed by: PHYSICAL THERAPIST

## 2023-10-23 PROCEDURE — 97140 MANUAL THERAPY 1/> REGIONS: CPT | Mod: GP | Performed by: PHYSICAL THERAPIST

## 2023-10-24 DIAGNOSIS — Z53.9 DIAGNOSIS NOT YET DEFINED: Primary | ICD-10-CM

## 2023-10-24 PROCEDURE — G0180 MD CERTIFICATION HHA PATIENT: HCPCS | Performed by: ORTHOPAEDIC SURGERY

## 2023-10-24 NOTE — TELEPHONE ENCOUNTER
Physical therapy note from [facility] signed by provider and faxed. Copy sent to scanning.    Lakeshia Walters MS, ATC  Certified Athletic Trainer

## 2023-10-26 ENCOUNTER — THERAPY VISIT (OUTPATIENT)
Dept: PHYSICAL THERAPY | Facility: CLINIC | Age: 78
End: 2023-10-26
Payer: COMMERCIAL

## 2023-10-26 DIAGNOSIS — Z96.652 HISTORY OF TOTAL LEFT KNEE REPLACEMENT: Primary | ICD-10-CM

## 2023-10-26 PROCEDURE — 97140 MANUAL THERAPY 1/> REGIONS: CPT | Mod: GP | Performed by: PHYSICAL THERAPIST

## 2023-10-26 PROCEDURE — 97110 THERAPEUTIC EXERCISES: CPT | Mod: GP | Performed by: PHYSICAL THERAPIST

## 2023-10-30 ENCOUNTER — OFFICE VISIT (OUTPATIENT)
Dept: ORTHOPEDICS | Facility: CLINIC | Age: 78
End: 2023-10-30
Payer: COMMERCIAL

## 2023-10-30 ENCOUNTER — THERAPY VISIT (OUTPATIENT)
Dept: PHYSICAL THERAPY | Facility: CLINIC | Age: 78
End: 2023-10-30
Payer: COMMERCIAL

## 2023-10-30 VITALS
SYSTOLIC BLOOD PRESSURE: 154 MMHG | WEIGHT: 274 LBS | HEIGHT: 61 IN | HEART RATE: 82 BPM | BODY MASS INDEX: 51.73 KG/M2 | RESPIRATION RATE: 18 BRPM | DIASTOLIC BLOOD PRESSURE: 68 MMHG

## 2023-10-30 DIAGNOSIS — Z96.652 HISTORY OF TOTAL LEFT KNEE REPLACEMENT: Primary | ICD-10-CM

## 2023-10-30 PROCEDURE — 97140 MANUAL THERAPY 1/> REGIONS: CPT | Mod: GP

## 2023-10-30 PROCEDURE — 97110 THERAPEUTIC EXERCISES: CPT | Mod: GP

## 2023-10-30 PROCEDURE — 99024 POSTOP FOLLOW-UP VISIT: CPT | Performed by: ORTHOPAEDIC SURGERY

## 2023-10-30 NOTE — LETTER
10/30/2023         RE: Gina Yeh  [address]  [place] [address]        Dear Colleague,    Thank you for referring your patient, Gina Yeh, to the [facility]. Please see a copy of my visit note below.    Follow up left total knee arthroplasty on 9/20/23  She also had right total knee arthroplasty on 2/15/23..  She has no complaints.    Pain: mild  Range of motion 2-105 degrees  Good stability.  There is no effusion.  There is no erythema  Moderate scar adherence.    Assessment/Plan:  Doing well status post left total knee arthroplasty.  Continue physical therapy 1 x week for 6 weeks.   Scar massage.  May stop aspirin.  Return to clinic 4-6 weeks to check Koos  score.  Medication renewal:.  None.    We will plan x-ray of both knees in June 2024.    Again, thank you for allowing me to participate in the care of your patient.        Sincerely,        Krishna Santiago MD

## 2023-10-30 NOTE — PROGRESS NOTES
Follow up left total knee arthroplasty on 9/20/23  She also had right total knee arthroplasty on 2/15/23..  She has no complaints.    Pain: mild  Range of motion 2-105 degrees  Good stability.  There is no effusion.  There is no erythema  Moderate scar adherence.    Assessment/Plan:  Doing well status post left total knee arthroplasty.  Continue physical therapy 1 x week for 6 weeks.   Scar massage.  May stop aspirin.  Return to clinic 4-6 weeks to check Koos  score.  Medication renewal:.  None.    We will plan x-ray of both knees in June 2024.

## 2023-11-02 ENCOUNTER — THERAPY VISIT (OUTPATIENT)
Dept: PHYSICAL THERAPY | Facility: CLINIC | Age: 78
End: 2023-11-02
Payer: COMMERCIAL

## 2023-11-02 DIAGNOSIS — Z96.652 HISTORY OF TOTAL LEFT KNEE REPLACEMENT: Primary | ICD-10-CM

## 2023-11-02 PROCEDURE — 97140 MANUAL THERAPY 1/> REGIONS: CPT | Mod: GP | Performed by: PHYSICAL THERAPIST

## 2023-11-02 PROCEDURE — 97110 THERAPEUTIC EXERCISES: CPT | Mod: GP | Performed by: PHYSICAL THERAPIST

## 2023-11-06 ENCOUNTER — THERAPY VISIT (OUTPATIENT)
Dept: PHYSICAL THERAPY | Facility: CLINIC | Age: 78
End: 2023-11-06
Payer: COMMERCIAL

## 2023-11-06 DIAGNOSIS — Z96.652 HISTORY OF TOTAL LEFT KNEE REPLACEMENT: Primary | ICD-10-CM

## 2023-11-06 PROCEDURE — 97140 MANUAL THERAPY 1/> REGIONS: CPT | Mod: GP | Performed by: PHYSICAL THERAPIST

## 2023-11-06 PROCEDURE — 97110 THERAPEUTIC EXERCISES: CPT | Mod: GP | Performed by: PHYSICAL THERAPIST

## 2023-11-09 ENCOUNTER — THERAPY VISIT (OUTPATIENT)
Dept: PHYSICAL THERAPY | Facility: CLINIC | Age: 78
End: 2023-11-09
Payer: COMMERCIAL

## 2023-11-09 DIAGNOSIS — Z96.652 HISTORY OF TOTAL LEFT KNEE REPLACEMENT: Primary | ICD-10-CM

## 2023-11-09 PROCEDURE — 97110 THERAPEUTIC EXERCISES: CPT | Mod: GP | Performed by: PHYSICAL THERAPIST

## 2023-11-09 PROCEDURE — 97140 MANUAL THERAPY 1/> REGIONS: CPT | Mod: GP | Performed by: PHYSICAL THERAPIST

## 2023-11-14 ENCOUNTER — THERAPY VISIT (OUTPATIENT)
Dept: PHYSICAL THERAPY | Facility: CLINIC | Age: 78
End: 2023-11-14
Payer: COMMERCIAL

## 2023-11-14 DIAGNOSIS — Z96.652 HISTORY OF TOTAL LEFT KNEE REPLACEMENT: Primary | ICD-10-CM

## 2023-11-14 PROCEDURE — 97110 THERAPEUTIC EXERCISES: CPT | Mod: GP | Performed by: PHYSICAL THERAPIST

## 2023-11-14 PROCEDURE — 97140 MANUAL THERAPY 1/> REGIONS: CPT | Mod: GP | Performed by: PHYSICAL THERAPIST

## 2023-11-16 ENCOUNTER — OFFICE VISIT (OUTPATIENT)
Dept: DERMATOLOGY | Facility: CLINIC | Age: 78
End: 2023-11-16
Payer: COMMERCIAL

## 2023-11-16 DIAGNOSIS — I87.2 STASIS DERMATITIS OF BOTH LEGS: Primary | ICD-10-CM

## 2023-11-16 PROCEDURE — 99204 OFFICE O/P NEW MOD 45 MIN: CPT | Performed by: NURSE PRACTITIONER

## 2023-11-16 RX ORDER — TRIAMCINOLONE ACETONIDE 1 MG/G
OINTMENT TOPICAL 2 TIMES DAILY
Qty: 80 G | Refills: 2 | Status: SHIPPED | OUTPATIENT
Start: 2023-11-16

## 2023-11-16 RX ORDER — MUPIROCIN 20 MG/G
OINTMENT TOPICAL
Qty: 30 G | Refills: 3 | Status: SHIPPED | OUTPATIENT
Start: 2023-11-16 | End: 2024-03-27

## 2023-11-16 NOTE — LETTER
11/16/2023         RE: Gina Yeh  [address]  [place] [address]        Dear Colleague,    Thank you for referring your patient, Gina Yeh, to the [facility]. Please see a copy of my visit note below.    [facility] Dermatology Note  Encounter Date: Nov 16, 2023  Office Visit     Reviewed patients past medical history and pertinent chart review prior to patients visit today.     Dermatology Problem List:  Stasis dermatitis, given mupirocin 2% ointment for open sores, triamcinolone 0.1% ointment for inflammation 1/16/2023    Patient denies personal history of skin cancer or dysplastic nevi.    Mother had skin cancer, unknown type    ____________________________________________    Assessment & Plan:     # Stasis dermatitis.   -apply mupirocin ointment twice daily with bandage changes to any open sores until healed  -compression is key in improving skin integrity. Discussed options of compression socks or ace wrapping. If fluid is leaking from legs apply a dressing under the wraps.   -prior to compression application, and upon removal at night apply triamcinolone 0.1% ointment twice to all areas of rash/skin breakdown.   -ambulation helps circulation, stand and walk at least once per hour, more is better.  -when at rest, try to raise legs above heart level so gravity is working in your favor.   -discoloration is likely permanent but erythema and symptom resolution is the goal of treatment.   -I sent a message to patient's primary care provider regarding possible initiation of diuretics.  She has been on these in the past she states and tolerated that well.  She will follow-up with her primary care at her next appointment next month regarding this.    Follow up in 1 month.      Mai Cerna, EDU  Dermatology    _______________________________________    CC: Derm Problem (Right pointer finger growth (present for years with no pain and no  growth)/Growth on left forehead /Blister on right shin-began Feb 2022 2 months after surgery. Thought it was from swelling, using TEDS)    HPI:  Ms. Gina Yeh is a(n) 78 year old female who presents today as a new patient for sores on the right shin for 2-3 weeks. She had knee surgery in September on this leg and she is wearing compression socks with zippers since the surgery. She doesn't think they are due to the zippers because she was wearing ones without zippers prior to the sores.     Patient is otherwise feeling well, without additional skin concerns.      Physical Exam:  SKIN: Focused examination of right lower leg was performed.  - right anterior shin, 4 erosions from 1-2 cm in diameter with generalized erythema on the anterior lower leg and moderate pitting edema     - No other lesions of concern on areas examined.     Medications:  Current Outpatient Medications   Medication     mupirocin (BACTROBAN) 2 % external ointment     triamcinolone (KENALOG) 0.1 % external ointment     acetaminophen (TYLENOL) 325 MG tablet     atorvastatin (LIPITOR) 40 MG tablet     Ferrous Sulfate (IRON) 325 (65 FE) MG tablet     latanoprost (XALATAN) 0.005 % ophthalmic solution     levothyroxine (SYNTHROID/LEVOTHROID) 137 MCG tablet     lisinopril (ZESTRIL) 20 MG tablet     OMEGA-3 KRILL OIL PO     oxyCODONE (ROXICODONE) 5 MG tablet     senna-docusate (SENOKOT-S/PERICOLACE) 8.6-50 MG tablet     STIOLTO RESPIMAT 2.5-2.5 MCG/ACT AERS     triamcinolone (KENALOG) 0.1 % external cream     Vitamin D, Cholecalciferol, 1000 units CAPS     No current facility-administered medications for this visit.      Past Medical History:   Patient Active Problem List   Diagnosis     Hyperlipidemia with target LDL less than 130     Hypothyroid     Hypertension goal BP (blood pressure) < 140/90     History of cervical dysplasia     Morbidly obese (H)     Impaired glucose tolerance     JOSE (obstructive sleep apnea)     Sensorineural hearing loss      Restless legs syndrome (RLS)     COPD (chronic obstructive pulmonary disease) (H)     Personal history of tobacco use, presenting hazards to health     Ulnar neuropathy at elbow, left     Female stress incontinence     Degenerative joint disease of hand, left     Primary osteoarthritis of left knee     Traumatic complete tear of right rotator cuff     History of right knee joint replacement     Total knee replacement status     Chronic pain of left knee     History of total left knee replacement     Past Medical History:   Diagnosis Date     Arthritis of right acromioclavicular joint 07/28/2021     Cervical dysplasia 1988     COPD (chronic obstructive pulmonary disease) (H)      Degenerative joint disease of hand, left      Female stress incontinence 02/22/2022     Glaucoma      Hyperlipidemia LDL goal < 130      Hypertension goal BP (blood pressure) < 140/90      Hypothyroid      Impaired glucose tolerance 05/29/2012     Microscopic hematuria      MMT (medial meniscus tear) 09/21/2006    LT     Moderate recurrent major depression (H) 02/13/2014     Morbid obesity (H)      JOSE (obstructive sleep apnea) 07/24/2015    Doesn't use cpap     Primary osteoarthritis of both knees      RLS (restless legs syndrome)      Sensorineural hearing loss      Shingles 01/27/2022     Ulnar neuropathy at elbow, left      Vitamin D insufficiency        CC No referring provider defined for this encounter. on close of this encounter.       Again, thank you for allowing me to participate in the care of your patient.        Sincerely,        Michelle Cerna, ARY CNP

## 2023-11-16 NOTE — Clinical Note
I saw Meg today and she is having breakdown of the skin due to her underlying swelling of the legs. I asked her to follow up with you regarding possible diuretic use. She says she sees you next month but if you think she should be on it sooner please contact her.   Mai Cerna, EDU Dermatology

## 2023-11-16 NOTE — PATIENT INSTRUCTIONS
# Stasis dermatitis.   -compression is key in improving skin integrity. Wear your compression socks when you are awake.   -prior to compression application, and upon removal at night apply triamcinolone 0.1% ointment twice to all areas of rash/skin breakdown. As the rash improves, decrease use of the triamcinolone to once daily use and apply vaseline the other time of day when you would have otherwise used the triamcinolone. If the rash goes away I would still use vaseline or a thick moisturizer twice daily to keep your skin well hydrated.   -walking helps circulation, stand and walk at least once per hour, more is better.  -apply the mupirocin ointment twice daily to any open sores daily until they heal  -when at rest, try to raise legs above heart level so gravity is working in your favor to drain the fluid back to the heart.   -stop using soap on the legs. Pat dry instead of rubbing with a towel after bathing. Be gentle with your skin and try not to scratch.   -discoloration is likely permanent but redness and itching resolution is the goal of treatment.

## 2023-11-16 NOTE — PROGRESS NOTES
[facility] Dermatology Note  Encounter Date: Nov 16, 2023  Office Visit     Reviewed patients past medical history and pertinent chart review prior to patients visit today.     Dermatology Problem List:  Stasis dermatitis, given mupirocin 2% ointment for open sores, triamcinolone 0.1% ointment for inflammation 1/16/2023    Patient denies personal history of skin cancer or dysplastic nevi.    Mother had skin cancer, unknown type    ____________________________________________    Assessment & Plan:     # Stasis dermatitis.   -apply mupirocin ointment twice daily with bandage changes to any open sores until healed  -compression is key in improving skin integrity. Discussed options of compression socks or ace wrapping. If fluid is leaking from legs apply a dressing under the wraps.   -prior to compression application, and upon removal at night apply triamcinolone 0.1% ointment twice to all areas of rash/skin breakdown.   -ambulation helps circulation, stand and walk at least once per hour, more is better.  -when at rest, try to raise legs above heart level so gravity is working in your favor.   -discoloration is likely permanent but erythema and symptom resolution is the goal of treatment.   -I sent a message to patient's primary care provider regarding possible initiation of diuretics.  She has been on these in the past she states and tolerated that well.  She will follow-up with her primary care at her next appointment next month regarding this.    Follow up in 1 month.      Mai Cerna, EDU  Dermatology    _______________________________________    CC: Derm Problem (Right pointer finger growth (present for years with no pain and no growth)/Growth on left forehead /Blister on right shin-began Feb 2022 2 months after surgery. Thought it was from swelling, using TEDS)    HPI:  Ms. Gina Yeh is a(n) 78 year old female who presents today as a new patient for sores on the right shin for 2-3 weeks.  She had knee surgery in September on this leg and she is wearing compression socks with zippers since the surgery. She doesn't think they are due to the zippers because she was wearing ones without zippers prior to the sores.     Patient is otherwise feeling well, without additional skin concerns.      Physical Exam:  SKIN: Focused examination of right lower leg was performed.  - right anterior shin, 4 erosions from 1-2 cm in diameter with generalized erythema on the anterior lower leg and moderate pitting edema     - No other lesions of concern on areas examined.     Medications:  Current Outpatient Medications   Medication    mupirocin (BACTROBAN) 2 % external ointment    triamcinolone (KENALOG) 0.1 % external ointment    acetaminophen (TYLENOL) 325 MG tablet    atorvastatin (LIPITOR) 40 MG tablet    Ferrous Sulfate (IRON) 325 (65 FE) MG tablet    latanoprost (XALATAN) 0.005 % ophthalmic solution    levothyroxine (SYNTHROID/LEVOTHROID) 137 MCG tablet    lisinopril (ZESTRIL) 20 MG tablet    OMEGA-3 KRILL OIL PO    oxyCODONE (ROXICODONE) 5 MG tablet    senna-docusate (SENOKOT-S/PERICOLACE) 8.6-50 MG tablet    STIOLTO RESPIMAT 2.5-2.5 MCG/ACT AERS    triamcinolone (KENALOG) 0.1 % external cream    Vitamin D, Cholecalciferol, 1000 units CAPS     No current facility-administered medications for this visit.      Past Medical History:   Patient Active Problem List   Diagnosis    Hyperlipidemia with target LDL less than 130    Hypothyroid    Hypertension goal BP (blood pressure) < 140/90    History of cervical dysplasia    Morbidly obese (H)    Impaired glucose tolerance    JOSE (obstructive sleep apnea)    Sensorineural hearing loss    Restless legs syndrome (RLS)    COPD (chronic obstructive pulmonary disease) (H)    Personal history of tobacco use, presenting hazards to health    Ulnar neuropathy at elbow, left    Female stress incontinence    Degenerative joint disease of hand, left    Primary osteoarthritis of left  knee    Traumatic complete tear of right rotator cuff    History of right knee joint replacement    Total knee replacement status    Chronic pain of left knee    History of total left knee replacement     Past Medical History:   Diagnosis Date    Arthritis of right acromioclavicular joint 07/28/2021    Cervical dysplasia 1988    COPD (chronic obstructive pulmonary disease) (H)     Degenerative joint disease of hand, left     Female stress incontinence 02/22/2022    Glaucoma     Hyperlipidemia LDL goal < 130     Hypertension goal BP (blood pressure) < 140/90     Hypothyroid     Impaired glucose tolerance 05/29/2012    Microscopic hematuria     MMT (medial meniscus tear) 09/21/2006    LT    Moderate recurrent major depression (H) 02/13/2014    Morbid obesity (H)     JOSE (obstructive sleep apnea) 07/24/2015    Doesn't use cpap    Primary osteoarthritis of both knees     RLS (restless legs syndrome)     Sensorineural hearing loss     Shingles 01/27/2022    Ulnar neuropathy at elbow, left     Vitamin D insufficiency        CC No referring provider defined for this encounter. on close of this encounter.

## 2023-11-17 NOTE — TELEPHONE ENCOUNTER
Patient Quality Outreach    Patient is due for the following:   Physical Annual Wellness Visit    Next Steps:   Patient has upcoming appointment, these items will be addressed at that time. 11/27/23 Wellness Visit scheduled with PCP    Type of outreach:    Chart review performed, no outreach needed.    Next Steps:  Reach out within 90 days via riskmethodst.    Max number of attempts reached: Yes. Will try again in 90 days if patient still on fail list.    Questions for provider review:    None           Maru Beaver, ADARSH  Chart routed to Care Team.

## 2023-11-20 ASSESSMENT — ACTIVITIES OF DAILY LIVING (ADL): CURRENT_FUNCTION: NO ASSISTANCE NEEDED

## 2023-11-20 ASSESSMENT — ENCOUNTER SYMPTOMS
CHILLS: 0
JOINT SWELLING: 0
HEMATURIA: 0
PARESTHESIAS: 0
DYSURIA: 0
FREQUENCY: 1
BREAST MASS: 0
NAUSEA: 0
HEADACHES: 0
HEARTBURN: 0
FEVER: 0
HEMATOCHEZIA: 0
PALPITATIONS: 0
EYE PAIN: 0
SHORTNESS OF BREATH: 0
CONSTIPATION: 0
ABDOMINAL PAIN: 0
NERVOUS/ANXIOUS: 0
COUGH: 0
WEAKNESS: 0
DIZZINESS: 0
SORE THROAT: 0
ARTHRALGIAS: 1
MYALGIAS: 0
DIARRHEA: 0

## 2023-11-20 NOTE — COMMUNITY RESOURCES LIST (ENGLISH)
11/20/2023   [facility] [employer]  N/A  For questions about this resource list or additional care needs, please contact your primary care clinic or care manager.  Phone: [phone]   Email: N/A   Address: [address] [place] [id number]   Hours: N/A        Financial Stability       Utility payment assistance  1  [facility] - [facility] - Low Income Energy Assistance Program (LIHEAP) application assistance Distance: 1.39 miles      In-Person, Phone/Virtual   [address] [place] [address]  Language: English  Hours: Mon - Fri 8:00 AM - 4:30 PM  Fees: Free   Phone: (234) 122-2262 Email: servicecenterinfo@[employer]. Website: https://www.[facility]/your-[facility]/facilities/service-center-info     2  [employer] - ???????Gas Affordability Program Distance: 1.44 miles      Phone/Virtual   [address] [address] [place] [address]  Language: English  Hours: Mon - Sun Open 24 Hours  Fees: Sliding Fee   Phone: (806) 306-5903 Website: http://www.[employer]/en-us/residential/customer-service/billing-payment/need-help-paying-your-bill?sa=[place]          Important Numbers & Websites       Emergency Services   911  [facility]   311  Poison Control   (672) 310-6935  Suicide Prevention Lifeline   (930) 710-4545 (TALK)  Child Abuse Hotline   (591) 157-9978 (4-A-Child)  Sexual Assault Hotline   (720) 719-2676 (HOPE)  National Runaway Safeline   (927) 679-9203 (RUNAWAY)  All-Options Talkline   (614) 515-2502  Substance Abuse Referral   (252) 697-5605 (HELP)

## 2023-11-22 PROBLEM — G89.29 CHRONIC PAIN OF LEFT KNEE: Status: RESOLVED | Noted: 2023-10-16 | Resolved: 2023-11-22

## 2023-11-22 PROBLEM — M25.562 CHRONIC PAIN OF LEFT KNEE: Status: RESOLVED | Noted: 2023-10-16 | Resolved: 2023-11-22

## 2023-11-22 PROBLEM — G25.81 RESTLESS LEGS SYNDROME (RLS): Status: RESOLVED | Noted: 2017-08-30 | Resolved: 2023-11-22

## 2023-11-27 ENCOUNTER — OFFICE VISIT (OUTPATIENT)
Dept: FAMILY MEDICINE | Facility: CLINIC | Age: 78
End: 2023-11-27
Attending: FAMILY MEDICINE
Payer: COMMERCIAL

## 2023-11-27 VITALS
WEIGHT: 273 LBS | DIASTOLIC BLOOD PRESSURE: 70 MMHG | TEMPERATURE: 98 F | HEIGHT: 61 IN | OXYGEN SATURATION: 95 % | RESPIRATION RATE: 24 BRPM | HEART RATE: 92 BPM | BODY MASS INDEX: 51.54 KG/M2 | SYSTOLIC BLOOD PRESSURE: 107 MMHG

## 2023-11-27 DIAGNOSIS — Z00.00 ENCOUNTER FOR MEDICARE ANNUAL WELLNESS EXAM: Primary | ICD-10-CM

## 2023-11-27 DIAGNOSIS — I10 HYPERTENSION GOAL BP (BLOOD PRESSURE) < 140/90: ICD-10-CM

## 2023-11-27 DIAGNOSIS — E66.01 MORBIDLY OBESE (H): ICD-10-CM

## 2023-11-27 DIAGNOSIS — I87.2 VENOUS STASIS DERMATITIS OF BOTH LOWER EXTREMITIES: ICD-10-CM

## 2023-11-27 DIAGNOSIS — L24.81 IRRITANT CONTACT DERMATITIS DUE TO METALS: ICD-10-CM

## 2023-11-27 PROBLEM — Z96.659 TOTAL KNEE REPLACEMENT STATUS: Status: RESOLVED | Noted: 2023-09-20 | Resolved: 2023-11-27

## 2023-11-27 PROBLEM — M17.12 PRIMARY OSTEOARTHRITIS OF LEFT KNEE: Status: RESOLVED | Noted: 2023-06-12 | Resolved: 2023-11-27

## 2023-11-27 PROCEDURE — G0439 PPPS, SUBSEQ VISIT: HCPCS | Performed by: FAMILY MEDICINE

## 2023-11-27 ASSESSMENT — ENCOUNTER SYMPTOMS
DIARRHEA: 0
PARESTHESIAS: 0
SHORTNESS OF BREATH: 0
CONSTIPATION: 0
HEMATOCHEZIA: 0
BREAST MASS: 0
NERVOUS/ANXIOUS: 0
ABDOMINAL PAIN: 0
SORE THROAT: 0
HEARTBURN: 0
FREQUENCY: 1
WEAKNESS: 0
ARTHRALGIAS: 1
JOINT SWELLING: 0
FEVER: 0
HEADACHES: 0
DYSURIA: 0
EYE PAIN: 0
NAUSEA: 0
DIZZINESS: 0
MYALGIAS: 0
CHILLS: 0
COUGH: 0
HEMATURIA: 0
PALPITATIONS: 0

## 2023-11-27 ASSESSMENT — ACTIVITIES OF DAILY LIVING (ADL): CURRENT_FUNCTION: NO ASSISTANCE NEEDED

## 2023-11-27 NOTE — PATIENT INSTRUCTIONS
Patient Education   Personalized Prevention Plan  You are due for the preventive services outlined below.  Your care team is available to assist you in scheduling these services.  If you have already completed any of these items, please share that information with your care team to update in your medical record.  Health Maintenance Due   Topic Date Due     LUNG CANCER SCREENING  Never done     Bladder Training: Care Instructions  Your Care Instructions     Bladder training is used to treat urge incontinence and stress incontinence. Urge incontinence means that the need to urinate comes on so fast that you can't get to a toilet in time. Stress incontinence means that you leak urine because of pressure on your bladder. For example, it may happen when you laugh, cough, or lift something heavy.  Bladder training can increase how long you can wait before you have to urinate. It can also help your bladder hold more urine. And it can give you better control over the urge to urinate.  It is important to remember that bladder training takes a few weeks to a few months to make a difference. You may not see results right away, but don't give up.  Follow-up care is a key part of your treatment and safety. Be sure to make and go to all appointments, and call your doctor if you are having problems. It's also a good idea to know your test results and keep a list of the medicines you take.  How can you care for yourself at home?  Work with your doctor to come up with a bladder training program that is right for you. You may use one or more of the following methods.  Delayed urination  In the beginning, try to keep from urinating for 5 minutes after you first feel the need to go.  While you wait, take deep, slow breaths to relax. Kegel exercises can also help you delay the need to go to the bathroom.  After some practice, when you can easily wait 5 minutes to urinate, try to wait 10 minutes before you urinate.  Slowly increase the  "waiting period until you are able to control when you have to urinate.  Scheduled urination  Empty your bladder when you first wake up in the morning.  Schedule times throughout the day when you will urinate.  Start by going to the bathroom every hour, even if you don't need to go.  Slowly increase the time between trips to the bathroom.  When you have found a schedule that works well for you, keep doing it.  If you wake up during the night and have to urinate, do it. Apply your schedule to waking hours only.  Kegel exercises  These tighten and strengthen pelvic muscles, which can help you control the flow of urine. (If doing these exercises causes pain, stop doing them and talk with your doctor.) To do Kegel exercises:  Squeeze your muscles as if you were trying not to pass gas. Or squeeze your muscles as if you were stopping the flow of urine. Your belly, legs, and buttocks shouldn't move.  Hold the squeeze for 3 seconds, then relax for 5 to 10 seconds.  Start with 3 seconds, then add 1 second each week until you are able to squeeze for 10 seconds.  Repeat the exercise 10 times a session. Do 3 to 8 sessions a day.  When should you call for help?  Watch closely for changes in your health, and be sure to contact your doctor if:    Your incontinence is getting worse.     You do not get better as expected.   Where can you learn more?  Go to https://www.[employer]or software.net/patiented  Enter V684 in the search box to learn more about \"Bladder Training: Care Instructions.\"  Current as of: February 28, 2023               Content Version: 13.8    8170-3900 [employer].   Care instructions adapted under license by your healthcare professional. If you have questions about a medical condition or this instruction, always ask your healthcare professional. [employer] disclaims any warranty or liability for your use of this information.         "

## 2023-11-27 NOTE — COMMUNITY RESOURCES LIST (ENGLISH)
11/27/2023   [facility] App[employer]  N/A  For questions about this resource list or additional care needs, please contact your primary care clinic or care manager.  Phone: [phone]   Email: N/A   Address: [address] [place] [id number]   Hours: N/A        Financial Stability       Utility payment assistance  1  [facility] - Human Services and Public Health Department - [facility] - Low Income Energy Assistance Program (LIHEAP) application assistance Distance: 1.39 miles      In-Person, Phone/Virtual   [address] [place] [address]  Language: English  Hours: Mon - Fri 8:00 AM - 4:30 PM  Fees: Free   Phone: (248) 950-6228 Email: servicecenterinfo@[place]. Website: https://www.[facility]/your-[facility]/facilities/service-center-info     2  [employer] - ???????Gas Affordability Program Distance: 1.44 miles      Phone/Virtual   [address] [address] [place] [address]  Language: English  Hours: Mon - Sun Open 24 Hours  Fees: Sliding Fee   Phone: (824) 693-8147 Website: http://www.[employer]/en-us/residential/customer-service/billing-payment/need-help-paying-your-bill?sa=[place]          Important Numbers & Websites       Emergency Services   911  [facility]   311  Poison Control   (746) 358-2728  Suicide Prevention Lifeline   (527) 706-4874 (TALK)  Child Abuse Hotline   (449) 533-1032 (4-A-Child)  Sexual Assault Hotline   (851) 170-2771 (HOPE)  National Runaway Safeline   (707) 598-6950 (RUNAWAY)  All-Options Talkline   (150) 253-3947  Substance Abuse Referral   (691) 149-3304 (HELP)

## 2023-11-27 NOTE — COMMUNITY RESOURCES LIST (ENGLISH)
11/27/2023   [facility] - Outpatient Clinics  N/A  For additional resource needs, please contact your health insurance member services or your primary care team.  Phone: [phone]   Email: N/A   Address: [facility] [place] [address]   Hours: N/A        Financial Stability       Utility payment assistance  1  [place] [facility] - Energy and Utilities Distance: 9.17 miles      In-Person, Phone/Virtual   [address] [place] [address]  Language: English  Hours: Mon - Fri 8:30 AM - 4:30 PM  Fees: Free   Phone: (341) 139-8907 Website: https://[place].gov/[employer]/energy/consumer-assistance/energy-assistance-program/     2  [place] [employer] [facility] - [place]'s Telephone Assistance Plan (TAP) and [facility] Lifeline and Affordable Connectivity Program (ACP) Distance: 14.93 miles      Phone/Virtual   [address] [place] [address]  Language: English  Fees: Free   Phone: (319) 611-1392 Email: ernie.luis@[facility].mn. Website: https://[place]/puc/consumers/telephone/          Important Numbers & Websites       [facility]   211 211unitedway.org  Poison Control   (775) 640-1252 [place]poison.[place]  Suicide and Crisis Lifeline   [address] [place], [place] [address]line.org  Childhelp [place] Child Abuse Hotline   181.869.6291 Childhelphotline.org  National Sexual Assault Hotline   (572) 570-5741 (HOPE) Rainn.org  National Runaway Safeline   (475) 775-1672 (RUNAWAY) 1800runaway.org  Pregnancy & Postpartum Support [place]   Call/text 883-749-0666 Ppsupportmn.org  Substance Abuse National Helpline ([facility]   658-314-HELP (7478) Findtreatment.gov  Emergency Services   911

## 2023-11-27 NOTE — PROGRESS NOTES
"SUBJECTIVE:   Meg is a 78 year old, presenting for the following:  Physical        11/27/2023     3:23 PM   Additional Questions   Roomed by Sally MCLEOD CMA   Accompanied by Self       Are you in the first 12 months of your Medicare coverage?  No    She also has a new rash on her left lower extremity, where her compression stockings zipper contacts her skin. The rash is red, very itchy, and is improving with use of steroid care and avoidance of the trigger.     She saw a dermatologist about stasis dermatitis. She denies orthopnea, PND, or worsening symptoms.     Hypertension well controlled on current medications without side effects, chest pain, or dyspnea.     Healthy Habits:     In general, how would you rate your overall health?  Good    Frequency of exercise:  6-7 days/week    Duration of exercise:  15-30 minutes    Do you usually eat at least 4 servings of fruit and vegetables a day, include whole grains    & fiber and avoid regularly eating high fat or \"junk\" foods?  Yes    Taking medications regularly:  Yes    Medication side effects:  None    Ability to successfully perform activities of daily living:  No assistance needed    Home Safety:  Lack of grab bars in the bathroom    Hearing Impairment:  No hearing concerns    In the past 6 months, have you been bothered by leaking of urine? Yes    In general, how would you rate your overall mental or emotional health?  Good    Additional concerns today:  No      Today's PHQ-2 Score:       11/27/2023     9:12 AM   PHQ-2 ( 1999 [employer])   Q1: Little interest or pleasure in doing things 0   Q2: Feeling down, depressed or hopeless 0   PHQ-2 Score 0   Q1: Little interest or pleasure in doing things Not at all   Q2: Feeling down, depressed or hopeless Not at all   PHQ-2 Score 0           Have you ever done Advance Care Planning? (For example, a Health Directive, POLST, or a discussion with a medical provider or your loved ones about your wishes): Yes, advance care planning " is on file.       Fall risk  Fallen 2 or more times in the past year?: No  Any fall with injury in the past year?: No    Cognitive Screening   1) Repeat 3 items (Leader, Season, Table)    2) Clock draw: NORMAL  3) 3 item recall: Recalls 3 objects  Results: 3 items recalled: COGNITIVE IMPAIRMENT LESS LIKELY    Mini-CogTM Copyright MARCE Kirby. Licensed by the author for use in [facility]; reprinted with permission (hali@[facility]). All rights reserved.      Do you have sleep apnea, excessive snoring or daytime drowsiness? : yes    Reviewed and updated as needed this visit by clinical staff   Tobacco  Allergies  Meds  Problems  Med Hx  Surg Hx  Fam Hx          Reviewed and updated as needed this visit by Provider   Tobacco  Allergies  Meds  Problems  Med Hx  Surg Hx  Fam Hx         Social History     Tobacco Use     Smoking status: Former     Packs/day: 0.50     Years: 49.00     Additional pack years: 0.00     Total pack years: 24.50     Types: Cigarettes     Start date: 1960     Quit date: 2011     Years since quittin.5     Smokeless tobacco: Former     Quit date: 2011   Substance Use Topics     Alcohol use: Yes     Comment: socially             2023    12:23 PM   Alcohol Use   Prescreen: >3 drinks/day or >7 drinks/week? Not Applicable     Do you have a current opioid prescription? No  Do you use any other controlled substances or medications that are not prescribed by a provider? None     Current providers sharing in care for this patient include:   Patient Care Team:  Verena Vee MD as PCP - General (Family Medicine)  Verena Vee MD as Assigned PCP  Krishna Valerio MD as MD (Neurology)  Shanna Navarrete MD as Resident (Student in organized health care education/training program)  Krishna Santiago MD as Assigned Musculoskeletal Provider  Courtney Barclay MD as MD (Neurology)  Michelle Cerna APRN CNP as Nurse Practitioner  (Dermatology)  Michelle Cerna APRN CNP as Assigned Surgical Provider    The following health maintenance items are reviewed in Epic and correct as of today:  Health Maintenance   Topic Date Due     LUNG CANCER SCREENING  Never done     LIPID  06/19/2024     TSH W/FREE T4 REFLEX  06/19/2024     URINALYSIS  06/19/2024     MAMMO SCREENING  07/27/2024     BMP  09/21/2024     MEDICARE ANNUAL WELLNESS VISIT  11/27/2024     ANNUAL REVIEW OF HM ORDERS  11/27/2024     FALL RISK ASSESSMENT  11/27/2024     ADVANCE CARE PLANNING  11/27/2028     DTAP/TDAP/TD IMMUNIZATION (3 - Td or Tdap) 06/28/2031     DEXA  03/15/2038     SPIROMETRY  Completed     HEPATITIS C SCREENING  Completed     COPD ACTION PLAN  Completed     PHQ-2 (once per calendar year)  Completed     INFLUENZA VACCINE  Completed     Pneumococcal Vaccine: 65+ Years  Completed     ZOSTER IMMUNIZATION  Completed     RSV VACCINE (Pregnancy & 60+)  Completed     COVID-19 Vaccine  Completed     IPV IMMUNIZATION  Aged Out     HPV IMMUNIZATION  Aged Out     MENINGITIS IMMUNIZATION  Aged Out     RSV MONOCLONAL ANTIBODY  Aged Out     COLORECTAL CANCER SCREENING  Discontinued     Patient Active Problem List   Diagnosis     Hyperlipidemia with target LDL less than 130     Hypothyroid     Hypertension goal BP (blood pressure) < 140/90     History of cervical dysplasia     Morbidly obese (H)     Impaired glucose tolerance     JOSE (obstructive sleep apnea)     Sensorineural hearing loss     COPD (chronic obstructive pulmonary disease) (H)     Personal history of tobacco use, presenting hazards to health     Ulnar neuropathy at elbow, left     Female stress incontinence     Degenerative joint disease of hand, left     Traumatic complete tear of right rotator cuff     History of right knee joint replacement     History of total left knee replacement     Past Surgical History:   Procedure Laterality Date     ARTHROPLASTY KNEE Right 2/15/2023    Procedure: ARTHROPLASTY, KNEE, TOTAL  RIGHT;  Surgeon: Krishna Santiago MD;  Location: [facility]     ARTHROPLASTY KNEE Left 2023    Procedure: ARTHROPLASTY, left KNEE, TOTAL;  Surgeon: Krishna Santiago MD;  Location: [facility] OR     BIOPSY       BLADDER SURGERY  2018     CERVIX SURGERY      cervical cone     COLONOSCOPY       LAPAROSCOPIC OOPHORECTOMY Right 2018    with hysteroscopy/EMB, and pubovaginal sling      ROTATOR CUFF REPAIR RT/LT Right 2021     TONSILLECTOMY & ADENOIDECTOMY         Social History     Tobacco Use     Smoking status: Former     Packs/day: 0.50     Years: 49.00     Additional pack years: 0.00     Total pack years: 24.50     Types: Cigarettes     Start date: 1960     Quit date: 2011     Years since quittin.5     Smokeless tobacco: Former     Quit date: 2011   Substance Use Topics     Alcohol use: Yes     Comment: socially     Family History   Problem Relation Age of Onset     Allergies Mother      Cardiovascular Mother      Respiratory Mother      Thyroid Disease Mother      Other Cancer Mother         skin     Skin Cancer Mother      Arthritis Father      Hypertension Father      Hyperlipidemia Father      Cerebrovascular Disease Father      Allergies Sister      Cancer Brother         testicular      Gastrointestinal Disease Brother      Other Cancer Brother         testicular     Genitourinary Problems Brother      Cancer Maternal Grandmother         bone     Other Cancer Maternal Grandmother         bone     Cerebrovascular Disease Paternal Grandmother      Cancer Paternal Grandfather      Cerebrovascular Disease Paternal Grandfather      Allergies Daughter      Gastrointestinal Disease Daughter      Depression Daughter      Anesthesia Reaction Daughter      Allergies Daughter 10        metal     Diabetes No family hx of          Current Outpatient Medications   Medication Sig Dispense Refill     atorvastatin (LIPITOR) 40 MG tablet TAKE 1 TABLET DAILY 90 tablet 1     Ferrous Sulfate  (IRON) 325 (65 FE) MG tablet Take 1 tablet by mouth daily       latanoprost (XALATAN) 0.005 % ophthalmic solution Place 1 drop into both eyes daily       levothyroxine (SYNTHROID/LEVOTHROID) 137 MCG tablet TAKE 1 TABLET DAILY 90 tablet 3     lisinopril (ZESTRIL) 20 MG tablet TAKE 1 TABLET DAILY 90 tablet 3     mupirocin (BACTROBAN) 2 % external ointment Use 2 times a day with bandage changes to open sores 30 g 3     OMEGA-3 KRILL OIL PO        STIOLTO RESPIMAT 2.5-2.5 MCG/ACT AERS Inhale 2 puffs into the lungs daily       triamcinolone (KENALOG) 0.1 % external ointment Apply topically 2 times daily When legs have redness 80 g 2     Vitamin D, Cholecalciferol, 1000 units CAPS Take 1 capsule by mouth daily       Allergies   Allergen Reactions     Nkda [No Known Drug Allergy]            Pertinent mammograms are reviewed under the imaging tab.    Review of Systems   Constitutional:  Negative for chills and fever.   HENT:  Negative for congestion, ear pain, hearing loss and sore throat.    Eyes:  Negative for pain and visual disturbance.   Respiratory:  Negative for cough and shortness of breath.    Cardiovascular:  Positive for peripheral edema. Negative for chest pain and palpitations.   Gastrointestinal:  Negative for abdominal pain, constipation, diarrhea, heartburn, hematochezia and nausea.   Breasts:  Negative for tenderness and breast mass.   Genitourinary:  Positive for frequency. Negative for dysuria, genital sores, hematuria, pelvic pain, urgency, vaginal bleeding and vaginal discharge.   Musculoskeletal:  Positive for arthralgias. Negative for joint swelling and myalgias.   Skin:  Positive for rash.   Neurological:  Negative for dizziness, weakness, headaches and paresthesias.   Psychiatric/Behavioral:  Negative for mood changes. The patient is not nervous/anxious.          OBJECTIVE:   /70 (BP Location: Right arm, Patient Position: Sitting, Cuff Size: Adult Large)   Pulse 92   Temp 98  F (36.7  C)  "(Oral)   Resp 24   Ht 1.556 m (5' 1.26\")   Wt 123.8 kg (273 lb)   SpO2 95%   BMI 51.15 kg/m   Estimated body mass index is 51.15 kg/m  as calculated from the following:    Height as of this encounter: 1.556 m (5' 1.26\").    Weight as of this encounter: 123.8 kg (273 lb).  Physical Exam  GENERAL: alert, no distress, and obese  MS: woody bipedal edema, L > R; uses walker   SKIN: improvement of bilateral stasis dermatitis without ulcers or scale; new rash on left anterior shin is bright red and excoriated   PSYCH: mentation appears normal, affect normal/bright    Diagnostic Test Results:  Labs reviewed in Epic    ASSESSMENT / PLAN:   (Z00.00) Encounter for Medicare annual wellness exam  (primary encounter diagnosis)    (L24.81) Irritant contact dermatitis due to metals  Comment: left lower extremity due to compression stocking   Plan: OFFICE/OUTPT VISIT,EST,LEVL III        Avoid trigger. Use steroid cream until resolved. Call or return to clinic as needed if these symptoms worsen or fail to improve as anticipated.     (I87.2) Venous stasis dermatitis of both lower extremities  (E66.01) Morbidly obese (H)  Comment: no other evidence of fluid overload   Plan: OFFICE/OUTPT VISIT,EST,LEVL III        Lower extremity elevation, low-salt diet, weight loss, and call or return to clinic as needed if these symptoms worsen or fail to improve as anticipated.     (I10) Hypertension goal BP (blood pressure) < 140/90  Plan: OFFICE/OUTPT VISIT,EST,LEVL III        Continue medication and follow-up yearly           COUNSELING:  Reviewed preventive health counseling, as reflected in patient instructions  Special attention given to:       Regular exercise       Healthy diet/nutrition       Fall risk prevention       Osteoporosis prevention/bone health       Consider lung cancer screening for ages 55-80 years (77 for Medicare) and 20 pack-year smoking history          Colon cancer screening       The 10-year ASCVD risk score (Darrell " ZAHRA, et al., 2019) is: 16.5%    Values used to calculate the score:      Age: 78 years      Sex: Female      Is Non- : No      Diabetic: No      Tobacco smoker: No      Systolic Blood Pressure: 107 mmHg      Is BP treated: Yes      HDL Cholesterol: 43 mg/dL      Total Cholesterol: 179 mg/dL        She reports that she quit smoking about 12 years ago. Her smoking use included cigarettes. She started smoking about 63 years ago. She has a 24.50 pack-year smoking history. She quit smokeless tobacco use about 12 years ago.      Appropriate preventive services were discussed with this patient, including applicable screening as appropriate for fall prevention, nutrition, physical activity, Tobacco-use cessation, weight loss and cognition.  Checklist reviewing preventive services available has been given to the patient.    Reviewed patients plan of care and provided an AVS. The Basic Care Plan (routine screening as documented in Health Maintenance) for Gina meets the Care Plan requirement. This Care Plan has been established and reviewed with the Patient.          Verena Vee MD  [facility]    Identified Health Risks:  I have reviewed Opioid Use Disorder and Substance Use Disorder risk factors and made any needed referrals. Information on urinary incontinence and treatment options given to patient.

## 2023-12-11 ENCOUNTER — THERAPY VISIT (OUTPATIENT)
Dept: PHYSICAL THERAPY | Facility: CLINIC | Age: 78
End: 2023-12-11
Payer: COMMERCIAL

## 2023-12-11 DIAGNOSIS — Z96.652 HISTORY OF TOTAL LEFT KNEE REPLACEMENT: Primary | ICD-10-CM

## 2023-12-11 PROCEDURE — 97110 THERAPEUTIC EXERCISES: CPT | Mod: GP | Performed by: PHYSICAL THERAPIST

## 2024-01-05 ENCOUNTER — TELEPHONE (OUTPATIENT)
Dept: ORTHOPEDICS | Facility: CLINIC | Age: 79
End: 2024-01-05

## 2024-01-05 NOTE — TELEPHONE ENCOUNTER
"Returned call to patient.    Patient states she was advised to contact Dr. Santiago when she decided she wanted to proceed with shoulder surgery.    Patient states she had an X-ray on right shoulder on or around 9/20/23. Patient states Dr. Santiago advised he would perform some time of \"Reverse surgery\".    Advised patient that at the office visit with Dr. Santiago on 10/30/23 he advised he wanted to see the patient back in 4-6 weeks to check Koos score. Advised patient that she should schedule a follow up appointment with Dr. Santiago to discuss.  Patient verbalized good understanding and agreed with plan.      Patient scheduled for an appointment with Dr. Santiago on 1/22/24. Patient advised the appointment would be at the [facility].  Patient advised he is located in the [address] [place] [address].  Patient states she is familiar with the clinic and has seen him there before.    Sabrina MERINO RN, BSN, PHN        "

## 2024-01-05 NOTE — TELEPHONE ENCOUNTER
[facility] Call Center    Phone Message    May a detailed message be left on voicemail: yes     Reason for Call: Other: Patient is trying to reach [place]  she has some questions about the upcoming shoulder surgery.  Please contact Pt to discuss her questions.     Action Taken: Message routed to:  Other: MARK Triage    Travel Screening: Not Applicable

## 2024-01-08 ENCOUNTER — THERAPY VISIT (OUTPATIENT)
Dept: PHYSICAL THERAPY | Facility: CLINIC | Age: 79
End: 2024-01-08
Payer: COMMERCIAL

## 2024-01-08 DIAGNOSIS — Z96.652 HISTORY OF TOTAL LEFT KNEE REPLACEMENT: Primary | ICD-10-CM

## 2024-01-08 PROCEDURE — 97110 THERAPEUTIC EXERCISES: CPT | Mod: GP

## 2024-01-22 ENCOUNTER — OFFICE VISIT (OUTPATIENT)
Dept: ORTHOPEDICS | Facility: CLINIC | Age: 79
End: 2024-01-22
Payer: COMMERCIAL

## 2024-01-22 ENCOUNTER — ANCILLARY PROCEDURE (OUTPATIENT)
Dept: GENERAL RADIOLOGY | Facility: CLINIC | Age: 79
End: 2024-01-22
Attending: ORTHOPAEDIC SURGERY
Payer: COMMERCIAL

## 2024-01-22 ENCOUNTER — TELEPHONE (OUTPATIENT)
Dept: ORTHOPEDICS | Facility: CLINIC | Age: 79
End: 2024-01-22

## 2024-01-22 VITALS
HEART RATE: 90 BPM | SYSTOLIC BLOOD PRESSURE: 129 MMHG | BODY MASS INDEX: 50.98 KG/M2 | HEIGHT: 61 IN | DIASTOLIC BLOOD PRESSURE: 85 MMHG | RESPIRATION RATE: 20 BRPM | WEIGHT: 270 LBS

## 2024-01-22 DIAGNOSIS — M19.011 PRIMARY OSTEOARTHRITIS OF RIGHT SHOULDER: ICD-10-CM

## 2024-01-22 DIAGNOSIS — M75.101 ROTATOR CUFF TEAR ARTHROPATHY OF RIGHT SHOULDER: Primary | ICD-10-CM

## 2024-01-22 DIAGNOSIS — M12.811 ROTATOR CUFF TEAR ARTHROPATHY OF RIGHT SHOULDER: Primary | ICD-10-CM

## 2024-01-22 PROCEDURE — 73030 X-RAY EXAM OF SHOULDER: CPT | Mod: TC | Performed by: RADIOLOGY

## 2024-01-22 PROCEDURE — 99214 OFFICE O/P EST MOD 30 MIN: CPT | Performed by: ORTHOPAEDIC SURGERY

## 2024-01-22 NOTE — LETTER
1/22/2024         RE: Gina Yeh  [address]  [place] [address]        Dear Colleague,    Thank you for referring your patient, Gina Yeh, to the [facility]. Please see a copy of my visit note below.    Gina Yeh is a 78 year old female who is seen in follow-up for right shoulder pain.  She had right rotator cuff repair and near Emir procedure on 9/1/2021.  Unfortunately she went on to have recurrent rotator cuff tear and arthritis.  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  She thinks this was aggravated by having to use a walker for knee replacements.  She is beginning to get very limited in her use of the right arm.  She cannot put things away in the refrigerator.  She cannot get her hand to the top of her head.  She would like to have surgery at this point    X-ray shows high riding humerus on the right.  There appears to be superior posterior escape.  On axillary view there appears to be posterior glenoid wear.    Past Medical History:   Diagnosis Date     Arthritis of right acromioclavicular joint 07/28/2021     Cervical dysplasia 1988     COPD (chronic obstructive pulmonary disease) (H)      Degenerative joint disease of hand, left      Female stress incontinence 02/22/2022     Glaucoma      Hyperlipidemia LDL goal < 130      Hypertension goal BP (blood pressure) < 140/90      Hypothyroid      Impaired glucose tolerance 05/29/2012     Microscopic hematuria      MMT (medial meniscus tear) 09/21/2006    LT     Moderate recurrent major depression (H) 02/13/2014     Morbid obesity (H)      JOSE (obstructive sleep apnea) 07/24/2015    Doesn't use cpap     Primary osteoarthritis of both knees      RLS (restless legs syndrome)      Sensorineural hearing loss      Shingles 01/27/2022     Ulnar neuropathy at elbow, left      Vitamin D insufficiency        Past Surgical History:   Procedure Laterality Date     ARTHROPLASTY KNEE Right 2/15/2023    Procedure:  ARTHROPLASTY, KNEE, TOTAL RIGHT;  Surgeon: Krishna Santiago MD;  [facility]     ARTHROPLASTY KNEE Left 2023    Procedure: ARTHROPLASTY, left KNEE, TOTAL;  Surgeon: Krishna Santiago MD;  [facility]     BIOPSY       BLADDER SURGERY  2018     CERVIX SURGERY      cervical cone     COLONOSCOPY       LAPAROSCOPIC OOPHORECTOMY Right 2018    with hysteroscopy/EMB, and pubovaginal sling      ROTATOR CUFF REPAIR RT/LT Right 2021     TONSILLECTOMY & ADENOIDECTOMY         Family History   Problem Relation Age of Onset     Allergies Mother      Cardiovascular Mother      Respiratory Mother      Thyroid Disease Mother      Other Cancer Mother         skin     Skin Cancer Mother      Arthritis Father      Hypertension Father      Hyperlipidemia Father      Cerebrovascular Disease Father      Allergies Sister      Cancer Brother         testicular      Gastrointestinal Disease Brother      Other Cancer Brother         testicular     Genitourinary Problems Brother      Cancer Maternal Grandmother         bone     Other Cancer Maternal Grandmother         bone     Cerebrovascular Disease Paternal Grandmother      Cancer Paternal Grandfather      Cerebrovascular Disease Paternal Grandfather      Allergies Daughter      Gastrointestinal Disease Daughter      Depression Daughter      Anesthesia Reaction Daughter      Allergies Daughter 10        metal     Diabetes No family hx of        Social History     Socioeconomic History     Marital status:      Spouse name: Umberto     Number of children: 2     Years of education: 13     Highest education level: Not on file   Occupational History     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      Employer: RETIRED   Tobacco Use     Smoking status: Former     Packs/day: 0.50     Years: 49.00     Additional pack years: 0.00     Total pack years: 24.50     Types: Cigarettes     Start date: 1960     Quit date: 2011     Years since quittin.7      Smokeless tobacco: Former     Quit date: 4/25/2011   Vaping Use     Vaping Use: Never used   Substance and Sexual Activity     Alcohol use: Yes     Comment: socially     Drug use: No     Sexual activity: Not Currently     Partners: Male     Birth control/protection: Post-menopausal   Other Topics Concern     Parent/sibling w/ CABG, MI or angioplasty before 65F 55M? No   Social History Narrative     Not on file     Social Determinants of Health     Financial Resource Strain: High Risk (11/20/2023)    Financial Resource Strain      Within the past 12 months, have you or your family members you live with been unable to get utilities (heat, electricity) when it was really needed?: Yes   Food Insecurity: Low Risk  (11/20/2023)    Food Insecurity      Within the past 12 months, did you worry that your food would run out before you got money to buy more?: No      Within the past 12 months, did the food you bought just not last and you didn t have money to get more?: No   Transportation Needs: Low Risk  (11/20/2023)    Transportation Needs      Within the past 12 months, has lack of transportation kept you from medical appointments, getting your medicines, non-medical meetings or appointments, work, or from getting things that you need?: No   Physical Activity: Not on file   Stress: Not on file   Social Connections: Not on file   Interpersonal Safety: Low Risk  (11/27/2023)    Interpersonal Safety      Do you feel physically and emotionally safe where you currently live?: Yes      Within the past 12 months, have you been hit, slapped, kicked or otherwise physically hurt by someone?: No      Within the past 12 months, have you been humiliated or emotionally abused in other ways by your partner or ex-partner?: No   Housing Stability: Low Risk  (11/20/2023)    Housing Stability      Do you have housing? : Yes      Are you worried about losing your housing?: No       Current Outpatient Medications   Medication Sig Dispense  "Refill     atorvastatin (LIPITOR) 40 MG tablet TAKE 1 TABLET DAILY 90 tablet 1     Ferrous Sulfate (IRON) 325 (65 FE) MG tablet Take 1 tablet by mouth daily       latanoprost (XALATAN) 0.005 % ophthalmic solution Place 1 drop into both eyes daily       levothyroxine (SYNTHROID/LEVOTHROID) 137 MCG tablet TAKE 1 TABLET DAILY 90 tablet 3     lisinopril (ZESTRIL) 20 MG tablet TAKE 1 TABLET DAILY 90 tablet 3     mupirocin (BACTROBAN) 2 % external ointment Use 2 times a day with bandage changes to open sores 30 g 3     OMEGA-3 KRILL OIL PO        STIOLTO RESPIMAT 2.5-2.5 MCG/ACT AERS Inhale 2 puffs into the lungs daily       triamcinolone (KENALOG) 0.1 % external ointment Apply topically 2 times daily When legs have redness 80 g 2     Vitamin D, Cholecalciferol, 1000 units CAPS Take 1 capsule by mouth daily         Allergies   Allergen Reactions     Nkda [No Known Drug Allergy]        REVIEW OF SYSTEMS:  CONSTITUTIONAL:  NEGATIVE for fever, chills, change in weight, not feeling tired  SKIN:  NEGATIVE for worrisome rashes, no skin lumps, no skin ulcers and no non-healing wounds  EYES:  NEGATIVE for vision changes or irritation.  ENT/MOUTH:  NEGATIVE.  No hearing loss, no hoarseness, no difficulty swallowing.  RESP:  NEGATIVE. No cough or shortness of breath.  CV:  NEGATIVE for chest pain, palpitations or peripheral edema  GI:  NEGATIVE for nausea, abdominal pain, heartburn, or change in bowel habits  :  Negative. No dysuria, no hematuria  MUSCULOSKELETAL:  See HPI above  NEURO:  NEGATIVE . No headaches, no dizziness,  no numbness  ENDOCRINE:  NEGATIVE for temperature intolerance, skin/hair changes  HEME/ALLERGY/IMMUNE:  NEGATIVE for bleeding problems  PSYCHIATRIC:  NEGATIVE. no anxiety, no depression.     Exam:  Vitals: /85   Pulse 90   Resp 20   Ht 1.556 m (5' 1.25\")   Wt 122.5 kg (270 lb)   BMI 50.60 kg/m    BMI= Body mass index is 50.6 kg/m .  Constitutional:  healthy, alert and no distress  Neuro: Alert " and Oriented x 3, no focal defects.  Psych: Affect normal   Respiratory: Breathing not labored.  Cardiovascular: normal peripheral pulses  Lymph: no adenopathy  Skin: No rashes,worrisome lesions or skin problems  She has active flexion of the shoulder to about 60 degrees.  Much of this is scapulothoracic.  She has passive flexion to about 70 degrees.  She has passive external rotation to 10 degrees and internal rotation to about 30 degrees.  She has significant weakness with attempting external rotation or abduction.    Assessment:  right cuff tear arthropathy.  She has failed conservative treatment and is significantly limited in her daily activities such as washing her hair or putting anything away in cupboards or refrigerator.  She is having fairly constant pain.    Plan: I recommend right reverse total shoulder arthroplasty.  She will need a right shoulder signature CT to define her glenoid anatomy and make a surgical guide for reverse shoulder.  We have talked in depth about the procedure and the risks, which include, but are not limited to:  * blood loss possibly requiring transfusion,   * blood clots with possible pulmonary embolism  * risk of dislocation.  There will be a motion restriction for 4-6 weeks.  Do not reach behind, do not push up off a chair or bed.  * infection or wound healing problems  * nerve damage   * vascular circulation problems  * continued pain  * Loosening or wear  * anesthetic risks  * The possibility of needing additional procedures.      We also talked about recovery time, which differs from patient to patient.    Plan overnight in the hospital.  Most people can return home at that point.  Physical Therapy will start at 2 weeks.    Preop xrays have been ordered, and medical clearance will need to be obtained.    Preop physical with primary physician is needed within 30 days of the surgery.  Nothing to eat or drink for 8 hours before surgery.  Stop blood thinners 5 days before  surgery (aspirin, warfarin, anti-inflammatories).    Stop Plavix at least 1-2 weeks before surgery.     Again, thank you for allowing me to participate in the care of your patient.        Sincerely,        Krishna Santiago MD

## 2024-01-22 NOTE — PROGRESS NOTES
Gina Yeh is a 78 year old female who is seen in follow-up for right shoulder pain.  She had right rotator cuff repair and near Emir procedure on 9/1/2021.  Unfortunately she went on to have recurrent rotator cuff tear and arthritis.  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  She thinks this was aggravated by having to use a walker for knee replacements.  She is beginning to get very limited in her use of the right arm.  She cannot put things away in the refrigerator.  She cannot get her hand to the top of her head.  She would like to have surgery at this point    X-ray shows high riding humerus on the right.  There appears to be superior posterior escape.  On axillary view there appears to be posterior glenoid wear.    Past Medical History:   Diagnosis Date    Arthritis of right acromioclavicular joint 07/28/2021    Cervical dysplasia 1988    COPD (chronic obstructive pulmonary disease) (H)     Degenerative joint disease of hand, left     Female stress incontinence 02/22/2022    Glaucoma     Hyperlipidemia LDL goal < 130     Hypertension goal BP (blood pressure) < 140/90     Hypothyroid     Impaired glucose tolerance 05/29/2012    Microscopic hematuria     MMT (medial meniscus tear) 09/21/2006    LT    Moderate recurrent major depression (H) 02/13/2014    Morbid obesity (H)     JOSE (obstructive sleep apnea) 07/24/2015    Doesn't use cpap    Primary osteoarthritis of both knees     RLS (restless legs syndrome)     Sensorineural hearing loss     Shingles 01/27/2022    Ulnar neuropathy at elbow, left     Vitamin D insufficiency        Past Surgical History:   Procedure Laterality Date    ARTHROPLASTY KNEE Right 2/15/2023    Procedure: ARTHROPLASTY, KNEE, TOTAL RIGHT;  Surgeon: Krishna Santiago MD;  [facility]    ARTHROPLASTY KNEE Left 9/20/2023    Procedure: ARTHROPLASTY, left KNEE, TOTAL;  Surgeon: Krishna Santiago MD;  [facility]    BIOPSY      BLADDER SURGERY  03/19/2018    CERVIX SURGERY       cervical cone    COLONOSCOPY      LAPAROSCOPIC OOPHORECTOMY Right 2018    with hysteroscopy/EMB, and pubovaginal sling     ROTATOR CUFF REPAIR RT/LT Right 2021    TONSILLECTOMY & ADENOIDECTOMY         Family History   Problem Relation Age of Onset    Allergies Mother     Cardiovascular Mother     Respiratory Mother     Thyroid Disease Mother     Other Cancer Mother         skin    Skin Cancer Mother     Arthritis Father     Hypertension Father     Hyperlipidemia Father     Cerebrovascular Disease Father     Allergies Sister     Cancer Brother         testicular     Gastrointestinal Disease Brother     Other Cancer Brother         testicular    Genitourinary Problems Brother     Cancer Maternal Grandmother         bone    Other Cancer Maternal Grandmother         bone    Cerebrovascular Disease Paternal Grandmother     Cancer Paternal Grandfather     Cerebrovascular Disease Paternal Grandfather     Allergies Daughter     Gastrointestinal Disease Daughter     Depression Daughter     Anesthesia Reaction Daughter     Allergies Daughter 10        metal    Diabetes No family hx of        Social History     Socioeconomic History    Marital status:      Spouse name: Umberto    Number of children: 2    Years of education: 13    Highest education level: Not on file   Occupational History    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      Employer: RETIRED   Tobacco Use    Smoking status: Former     Packs/day: 0.50     Years: 49.00     Additional pack years: 0.00     Total pack years: 24.50     Types: Cigarettes     Start date: 1960     Quit date: 2011     Years since quittin.7    Smokeless tobacco: Former     Quit date: 2011   Vaping Use    Vaping Use: Never used   Substance and Sexual Activity    Alcohol use: Yes     Comment: socially    Drug use: No    Sexual activity: Not Currently     Partners: Male     Birth control/protection: Post-menopausal   Other Topics Concern    Parent/sibling w/ CABG, MI  or angioplasty before 65F 55M? No   Social History Narrative    Not on file     Social Determinants of Health     Financial Resource Strain: High Risk (11/20/2023)    Financial Resource Strain     Within the past 12 months, have you or your family members you live with been unable to get utilities (heat, electricity) when it was really needed?: Yes   Food Insecurity: Low Risk  (11/20/2023)    Food Insecurity     Within the past 12 months, did you worry that your food would run out before you got money to buy more?: No     Within the past 12 months, did the food you bought just not last and you didn t have money to get more?: No   Transportation Needs: Low Risk  (11/20/2023)    Transportation Needs     Within the past 12 months, has lack of transportation kept you from medical appointments, getting your medicines, non-medical meetings or appointments, work, or from getting things that you need?: No   Physical Activity: Not on file   Stress: Not on file   Social Connections: Not on file   Interpersonal Safety: Low Risk  (11/27/2023)    Interpersonal Safety     Do you feel physically and emotionally safe where you currently live?: Yes     Within the past 12 months, have you been hit, slapped, kicked or otherwise physically hurt by someone?: No     Within the past 12 months, have you been humiliated or emotionally abused in other ways by your partner or ex-partner?: No   Housing Stability: Low Risk  (11/20/2023)    Housing Stability     Do you have housing? : Yes     Are you worried about losing your housing?: No       Current Outpatient Medications   Medication Sig Dispense Refill    atorvastatin (LIPITOR) 40 MG tablet TAKE 1 TABLET DAILY 90 tablet 1    Ferrous Sulfate (IRON) 325 (65 FE) MG tablet Take 1 tablet by mouth daily      latanoprost (XALATAN) 0.005 % ophthalmic solution Place 1 drop into both eyes daily      levothyroxine (SYNTHROID/LEVOTHROID) 137 MCG tablet TAKE 1 TABLET DAILY 90 tablet 3    lisinopril  "(ZESTRIL) 20 MG tablet TAKE 1 TABLET DAILY 90 tablet 3    mupirocin (BACTROBAN) 2 % external ointment Use 2 times a day with bandage changes to open sores 30 g 3    OMEGA-3 KRILL OIL PO       STIOLTO RESPIMAT 2.5-2.5 MCG/ACT AERS Inhale 2 puffs into the lungs daily      triamcinolone (KENALOG) 0.1 % external ointment Apply topically 2 times daily When legs have redness 80 g 2    Vitamin D, Cholecalciferol, 1000 units CAPS Take 1 capsule by mouth daily         Allergies   Allergen Reactions    Nkda [No Known Drug Allergy]        REVIEW OF SYSTEMS:  CONSTITUTIONAL:  NEGATIVE for fever, chills, change in weight, not feeling tired  SKIN:  NEGATIVE for worrisome rashes, no skin lumps, no skin ulcers and no non-healing wounds  EYES:  NEGATIVE for vision changes or irritation.  ENT/MOUTH:  NEGATIVE.  No hearing loss, no hoarseness, no difficulty swallowing.  RESP:  NEGATIVE. No cough or shortness of breath.  CV:  NEGATIVE for chest pain, palpitations or peripheral edema  GI:  NEGATIVE for nausea, abdominal pain, heartburn, or change in bowel habits  :  Negative. No dysuria, no hematuria  MUSCULOSKELETAL:  See HPI above  NEURO:  NEGATIVE . No headaches, no dizziness,  no numbness  ENDOCRINE:  NEGATIVE for temperature intolerance, skin/hair changes  HEME/ALLERGY/IMMUNE:  NEGATIVE for bleeding problems  PSYCHIATRIC:  NEGATIVE. no anxiety, no depression.     Exam:  Vitals: /85   Pulse 90   Resp 20   Ht 1.556 m (5' 1.25\")   Wt 122.5 kg (270 lb)   BMI 50.60 kg/m    BMI= Body mass index is 50.6 kg/m .  Constitutional:  healthy, alert and no distress  Neuro: Alert and Oriented x 3, no focal defects.  Psych: Affect normal   Respiratory: Breathing not labored.  Cardiovascular: normal peripheral pulses  Lymph: no adenopathy  Skin: No rashes,worrisome lesions or skin problems  She has active flexion of the shoulder to about 60 degrees.  Much of this is scapulothoracic.  She has passive flexion to about 70 degrees.  She " has passive external rotation to 10 degrees and internal rotation to about 30 degrees.  She has significant weakness with attempting external rotation or abduction.    Assessment:  right cuff tear arthropathy.  She has failed conservative treatment and is significantly limited in her daily activities such as washing her hair or putting anything away in cupboards or refrigerator.  She is having fairly constant pain.    Plan: I recommend right reverse total shoulder arthroplasty.  She will need a right shoulder signature CT to define her glenoid anatomy and make a surgical guide for reverse shoulder.  We have talked in depth about the procedure and the risks, which include, but are not limited to:  * blood loss possibly requiring transfusion,   * blood clots with possible pulmonary embolism  * risk of dislocation.  There will be a motion restriction for 4-6 weeks.  Do not reach behind, do not push up off a chair or bed.  * infection or wound healing problems  * nerve damage   * vascular circulation problems  * continued pain  * Loosening or wear  * anesthetic risks  * The possibility of needing additional procedures.      We also talked about recovery time, which differs from patient to patient.    Plan overnight in the hospital.  Most people can return home at that point.  Physical Therapy will start at 2 weeks.    Preop xrays have been ordered, and medical clearance will need to be obtained.    Preop physical with primary physician is needed within 30 days of the surgery.  Nothing to eat or drink for 8 hours before surgery.  Stop blood thinners 5 days before surgery (aspirin, warfarin, anti-inflammatories).    Stop Plavix at least 1-2 weeks before surgery.

## 2024-01-22 NOTE — PATIENT INSTRUCTIONS
Gina Yeh  and I talked about her condition and diagnosis.  Because of severe arthritis, and failure of conservative measures, we have decided to proceed with right reverse total shoulder arthroplasty.      We have talked in depth about the procedure and the risks, which include, but are not limited to:  * blood loss possibly requiring transfusion,   * blood clots with possible pulmonary embolism  * risk of dislocation.    There will be a motion restriction for 4-6 weeks.    Do not reach behind your back, do not reach your hands widely apart, do not push up off a chair or bed.  * infection or wound healing problems  * nerve damage   * vascular circulation problems  * continued pain  * Loosening or wear  * anesthetic risks  * The possibility of needing additional procedures.      We also talked about recovery time, which differs from patient to patient.    Average 1 night in the hospital.  Most people can return home at that point.  Physical Therapy will start at 2 weeks.    Preop xrays have been ordered, and medical clearance will need to be obtained.    Preop physical with primary physician is needed within 30 days of the surgery.  Nothing to eat or drink for 8 hours before surgery.  Stop blood thinners 5 days before surgery (aspirin, warfarin, anti-inflammatories).  .    Surgery schedulers will call you to schedule surgery.  If you don't get a call in the next few days, then call [phone] to schedule for [place].       Please call [facility] at [phone] to schedule your CT. [employer] Signature CT Shoulder Scan 6-8 weeks before surgery.  [facility] is located at:   [id number] [place] [address]

## 2024-01-23 NOTE — TELEPHONE ENCOUNTER
Type of surgery: ARTHROPLASTY, SHOULDER, TOTAL, REVERSE (Right)   Location of surgery: [facility]  Date and time of surgery: 04/03/2024  Surgeon: ANDIE  Pre-Op Appt Date: 03/27/2024  Post-Op Appt Date: 04/22/2024   Packet sent out: Yes  Pre-cert/Authorization completed:  No  Date:

## 2024-01-30 NOTE — TELEPHONE ENCOUNTER
Patient has canceled for 04/03/2024 due to no transportation, patient has rescheduled for 04/17/2024 in [place]  Preop 03/27/2024  Postop 05/06/2024

## 2024-02-01 ENCOUNTER — THERAPY VISIT (OUTPATIENT)
Dept: PHYSICAL THERAPY | Facility: CLINIC | Age: 79
End: 2024-02-01
Payer: COMMERCIAL

## 2024-02-01 DIAGNOSIS — Z96.652 HISTORY OF TOTAL LEFT KNEE REPLACEMENT: Primary | ICD-10-CM

## 2024-02-01 PROCEDURE — 97110 THERAPEUTIC EXERCISES: CPT | Mod: GP | Performed by: PHYSICAL THERAPIST

## 2024-03-06 ENCOUNTER — ANCILLARY PROCEDURE (OUTPATIENT)
Dept: CT IMAGING | Facility: CLINIC | Age: 79
End: 2024-03-06
Attending: ORTHOPAEDIC SURGERY
Payer: COMMERCIAL

## 2024-03-06 DIAGNOSIS — M12.811 ROTATOR CUFF TEAR ARTHROPATHY OF RIGHT SHOULDER: ICD-10-CM

## 2024-03-06 DIAGNOSIS — M75.101 ROTATOR CUFF TEAR ARTHROPATHY OF RIGHT SHOULDER: ICD-10-CM

## 2024-03-06 PROCEDURE — 73200 CT UPPER EXTREMITY W/O DYE: CPT | Mod: RT | Performed by: RADIOLOGY

## 2024-03-19 PROBLEM — S46.011A TRAUMATIC COMPLETE TEAR OF RIGHT ROTATOR CUFF: Status: RESOLVED | Noted: 2023-09-11 | Resolved: 2024-03-19

## 2024-03-27 ENCOUNTER — OFFICE VISIT (OUTPATIENT)
Dept: FAMILY MEDICINE | Facility: CLINIC | Age: 79
End: 2024-03-27
Payer: COMMERCIAL

## 2024-03-27 VITALS
OXYGEN SATURATION: 93 % | SYSTOLIC BLOOD PRESSURE: 123 MMHG | BODY MASS INDEX: 50.61 KG/M2 | HEIGHT: 62 IN | WEIGHT: 275 LBS | RESPIRATION RATE: 20 BRPM | DIASTOLIC BLOOD PRESSURE: 76 MMHG | TEMPERATURE: 98.1 F | HEART RATE: 87 BPM

## 2024-03-27 DIAGNOSIS — E03.9 ACQUIRED HYPOTHYROIDISM: ICD-10-CM

## 2024-03-27 DIAGNOSIS — G47.33 OSA (OBSTRUCTIVE SLEEP APNEA): Chronic | ICD-10-CM

## 2024-03-27 DIAGNOSIS — Z01.818 PREOP GENERAL PHYSICAL EXAM: Primary | ICD-10-CM

## 2024-03-27 DIAGNOSIS — I10 HYPERTENSION GOAL BP (BLOOD PRESSURE) < 140/90: ICD-10-CM

## 2024-03-27 DIAGNOSIS — E78.5 HYPERLIPIDEMIA WITH TARGET LDL LESS THAN 130: ICD-10-CM

## 2024-03-27 DIAGNOSIS — M19.011 PRIMARY OSTEOARTHRITIS OF RIGHT SHOULDER: ICD-10-CM

## 2024-03-27 DIAGNOSIS — E66.01 MORBIDLY OBESE (H): ICD-10-CM

## 2024-03-27 DIAGNOSIS — J44.9 CHRONIC OBSTRUCTIVE PULMONARY DISEASE, UNSPECIFIED COPD TYPE (H): ICD-10-CM

## 2024-03-27 PROBLEM — Z87.891 PERSONAL HISTORY OF TOBACCO USE, PRESENTING HAZARDS TO HEALTH: Status: RESOLVED | Noted: 2021-06-28 | Resolved: 2024-03-27

## 2024-03-27 PROBLEM — N39.41 URGE INCONTINENCE OF URINE: Status: ACTIVE | Noted: 2024-03-27

## 2024-03-27 LAB
ANION GAP SERPL CALCULATED.3IONS-SCNC: 13 MMOL/L (ref 7–15)
BUN SERPL-MCNC: 22.4 MG/DL (ref 8–23)
CALCIUM SERPL-MCNC: 9.7 MG/DL (ref 8.8–10.2)
CHLORIDE SERPL-SCNC: 103 MMOL/L (ref 98–107)
CHOLEST SERPL-MCNC: 180 MG/DL
CREAT SERPL-MCNC: 0.78 MG/DL (ref 0.51–0.95)
DEPRECATED HCO3 PLAS-SCNC: 24 MMOL/L (ref 22–29)
EGFRCR SERPLBLD CKD-EPI 2021: 78 ML/MIN/1.73M2
FASTING STATUS PATIENT QL REPORTED: NO
FERRITIN SERPL-MCNC: 43 NG/ML (ref 11–328)
GLUCOSE SERPL-MCNC: 107 MG/DL (ref 70–99)
HDLC SERPL-MCNC: 40 MG/DL
HGB BLD-MCNC: 12.7 G/DL (ref 11.7–15.7)
HOLD SPECIMEN: NORMAL
IRON BINDING CAPACITY (ROCHE): 361 UG/DL (ref 240–430)
IRON SATN MFR SERPL: 24 % (ref 15–46)
IRON SERPL-MCNC: 86 UG/DL (ref 37–145)
LDLC SERPL CALC-MCNC: 98 MG/DL
NONHDLC SERPL-MCNC: 140 MG/DL
POTASSIUM SERPL-SCNC: 4.9 MMOL/L (ref 3.4–5.3)
SODIUM SERPL-SCNC: 140 MMOL/L (ref 135–145)
TRIGL SERPL-MCNC: 212 MG/DL
TSH SERPL DL<=0.005 MIU/L-ACNC: 2.78 UIU/ML (ref 0.3–4.2)

## 2024-03-27 PROCEDURE — 80061 LIPID PANEL: CPT | Performed by: FAMILY MEDICINE

## 2024-03-27 PROCEDURE — 36415 COLL VENOUS BLD VENIPUNCTURE: CPT | Performed by: FAMILY MEDICINE

## 2024-03-27 PROCEDURE — 82728 ASSAY OF FERRITIN: CPT | Performed by: FAMILY MEDICINE

## 2024-03-27 PROCEDURE — 84443 ASSAY THYROID STIM HORMONE: CPT | Performed by: FAMILY MEDICINE

## 2024-03-27 PROCEDURE — 99214 OFFICE O/P EST MOD 30 MIN: CPT | Performed by: FAMILY MEDICINE

## 2024-03-27 PROCEDURE — 83550 IRON BINDING TEST: CPT | Performed by: FAMILY MEDICINE

## 2024-03-27 PROCEDURE — 80048 BASIC METABOLIC PNL TOTAL CA: CPT | Performed by: FAMILY MEDICINE

## 2024-03-27 PROCEDURE — 83540 ASSAY OF IRON: CPT | Performed by: FAMILY MEDICINE

## 2024-03-27 PROCEDURE — 85018 HEMOGLOBIN: CPT | Performed by: FAMILY MEDICINE

## 2024-03-27 RX ORDER — LISINOPRIL 20 MG/1
TABLET ORAL
Qty: 90 TABLET | Refills: 3 | Status: SHIPPED | OUTPATIENT
Start: 2024-03-27

## 2024-03-27 RX ORDER — LEVOTHYROXINE SODIUM 137 UG/1
TABLET ORAL
Qty: 90 TABLET | Refills: 3 | Status: SHIPPED | OUTPATIENT
Start: 2024-03-27

## 2024-03-27 RX ORDER — ATORVASTATIN CALCIUM 40 MG/1
TABLET, FILM COATED ORAL
Qty: 90 TABLET | Refills: 3 | Status: SHIPPED | OUTPATIENT
Start: 2024-03-27

## 2024-03-27 NOTE — PATIENT INSTRUCTIONS
Preparing for Your Surgery  Getting started  A nurse will call you to review your health history and instructions. They will give you an arrival time based on your scheduled surgery time. Please be ready to share:  Your doctor's clinic name and phone number  Your medical, surgical, and anesthesia history  A list of allergies and sensitivities  A list of medicines, including herbal treatments and over-the-counter drugs  Whether the patient has a legal guardian (ask how to send us the papers in advance)  Please tell us if you're pregnant--or if there's any chance you might be pregnant. Some surgeries may injure a fetus (unborn baby), so they require a pregnancy test. Surgeries that are safe for a fetus don't always need a test, and you can choose whether to have one.   If you have a child who's having surgery, please ask for a copy of Preparing for Your Child's Surgery.    Preparing for surgery  Within 10 to 30 days of surgery: Have a pre-op exam (sometimes called an H&P, or History and Physical). This can be done at a clinic or pre-operative center.  If you're having a , you may not need this exam. Talk to your care team.  At your pre-op exam, talk to your care team about all medicines you take. If you need to stop any medicines before surgery, ask when to start taking them again.  We do this for your safety. Many medicines can make you bleed too much during surgery. Some change how well surgery (anesthesia) drugs work.  Call your insurance company to let them know you're having surgery. (If you don't have insurance, call [phone].)  Call your clinic if there's any change in your health. This includes signs of a cold or flu (sore throat, runny nose, cough, rash, fever). It also includes a scrape or scratch near the surgery site.  If you have questions on the day of surgery, call your hospital or surgery center.  Eating and drinking guidelines  For your safety: Unless your surgeon tells you otherwise,  follow the guidelines below.  Eat and drink as usual until 8 hours before you arrive for surgery. After that, no food or milk.  Drink clear liquids until 2 hours before you arrive. These are liquids you can see through, like water, Gatorade, and Propel Water. They also include plain black coffee and tea (no cream or milk), candy, and breath mints. You can spit out gum when you arrive.  If you drink alcohol: Stop drinking it the night before surgery.  If your care team tells you to take medicine on the morning of surgery, it's okay to take it with a sip of water.  Preventing infection  Shower or bathe the night before and morning of your surgery. Follow the instructions your clinic gave you. (If no instructions, use regular soap.)  Don't shave or clip hair near your surgery site. We'll remove the hair if needed.  Don't smoke or vape the morning of surgery. You may chew nicotine gum up to 2 hours before surgery. A nicotine patch is okay.  Note: Some surgeries require you to completely quit smoking and nicotine. Check with your surgeon.  Your care team will make every effort to keep you safe from infection. We will:  Clean our hands often with soap and water (or an alcohol-based hand rub).  Clean the skin at your surgery site with a special soap that kills germs.  Give you a special gown to keep you warm. (Cold raises the risk of infection.)  Wear special hair covers, masks, gowns and gloves during surgery.  Give antibiotic medicine, if prescribed. Not all surgeries need antibiotics.  What to bring on the day of surgery  Photo ID and insurance card  Copy of your health care directive, if you have one  Glasses and hearing aids (bring cases)  You can't wear contacts during surgery  Inhaler and eye drops, if you use them (tell us about these when you arrive)  CPAP machine or breathing device, if you use them  A few personal items, if spending the night  If you have . . .  A pacemaker, ICD (cardiac defibrillator) or other  implant: Bring the ID card.  An implanted stimulator: Bring the remote control.  A legal guardian: Bring a copy of the certified (court-stamped) guardianship papers.  Please remove any jewelry, including body piercings. Leave jewelry and other valuables at home.  If you're going home the day of surgery  You must have a responsible adult drive you home. They should stay with you overnight as well.  If you don't have someone to stay with you, and you aren't safe to go home alone, we may keep you overnight. Insurance often won't pay for this.  After surgery  If it's hard to control your pain or you need more pain medicine, please call your surgeon's office.  Questions?   If you have any questions for your care team, list them here: _________________________________________________________________________________________________________________________________________________________________________ ____________________________________ ____________________________________ ____________________________________  For informational purposes only. Not to replace the advice of your health care provider. Copyright   2003, 2019 [employer] [facility]. All rights reserved. Clinically reviewed by Mahogany Hassan MD. [employer] 982890 - REV 12/22.    How to Take Your Medication Before Surgery  - Take all of your medications before surgery except as noted below  - HOLD (do not take) lisinopril on the day of surgery   - STOP taking all vitamins and herbal supplements 14 days before surgery.

## 2024-03-27 NOTE — PROGRESS NOTES
Preoperative Evaluation  [facility]  [address]  [place] [address]  Phone: [phone]  Primary Provider: Scar Vee  Pre-op Performing Provider: SCAR VEE  Mar 27, 2024        Meg is a 79 year old, presenting for the following:  Pre-Op Exam        3/27/2024     9:17 AM   Additional Questions   Roomed by Sally MCLEOD CMA   Accompanied by Self     Surgical Information  Surgery/Procedure: Arthroplasty right shoulder  Surgery Location: [facility]  Surgeon: Dr. Santiago  Surgery Date: 4/17/24  Time of Surgery: Unknown  Where patient plans to recover: At home with family  Fax number for surgical facility: Note does not need to be faxed, will be available electronically in Epic.    Assessment & Plan     The proposed surgical procedure is considered INTERMEDIATE risk.    Preop general physical exam  Primary osteoarthritis of right shoulder    JOSE (obstructive sleep apnea)  Morbidly obese (H)  Hypertension goal BP (blood pressure) < 140/90  Chronic obstructive pulmonary disease, unspecified COPD type (H)      - No identified additional risk factors other than previously addressed    Antiplatelet or Anticoagulation Medication Instructions   - Patient is on no antiplatelet or anticoagulation medications.    Additional Medication Instructions  Patient is to take all scheduled medications on the day of surgery EXCEPT for modifications listed below:   - ACE/ARB: HOLD on day of surgery (minimum 11 hours for general anesthesia).   - Herbal medications and vitamins: HOLD 14 days prior to surgery.    Recommendation  APPROVAL GIVEN to proceed with proposed procedure, without further diagnostic evaluation.          Subjective   HPI related to upcoming procedure: patient has chronic pain due to shoulder degenerative joint disease and rotator cuff arthropathy.         3/22/2024     6:50 PM   Preop Questions   1. Have you ever had a heart attack or stroke? No    2. Have you ever had surgery on your heart or blood vessels, such as a stent placement, a coronary artery bypass, or surgery on an artery in your head, neck, heart, or legs? No   3. Do you have chest pain with activity? No   4. Do you have a history of  heart failure? No   5. Do you currently have a cold, bronchitis or symptoms of other infection? No   6. Do you have a cough, shortness of breath, or wheezing? No   7. Do you or anyone in your family have previous history of blood clots? No   8. Do you or does anyone in your family have a serious bleeding problem such as prolonged bleeding following surgeries or cuts? No   9. Have you ever had problems with anemia or been told to take iron pills? YES -     10. Have you had any abnormal blood loss such as black, tarry or bloody stools, or abnormal vaginal bleeding? No   11. Have you ever had a blood transfusion? No   12. Are you willing to have a blood transfusion if it is medically needed before, during, or after your surgery? Yes   13. Have you or any of your relatives ever had problems with anesthesia? YES -     14. Do you have sleep apnea, excessive snoring or daytime drowsiness? YES -     14a. Do you have a CPAP machine? Yes   15. Do you have any artifical heart valves or other implanted medical devices like a pacemaker, defibrillator, or continuous glucose monitor? No   16. Do you have artificial joints? YES -     17. Are you allergic to latex? No       Health Care Directive  Patient has a Health Care Directive on file      Preoperative Review of    reviewed - controlled substances prescribed by other outside provider(s).       Status of Chronic Conditions:  See problem list for active medical problems.  Problems all longstanding and stable, except as noted/documented.  See ROS for pertinent symptoms related to these conditions.    Patient Active Problem List    Diagnosis Date Noted    COPD (chronic obstructive pulmonary disease) (H)      Priority: High     Morbidly obese (H) 05/29/2012     Priority: High     Bariatric surgery consult offered      Hypertension goal BP (blood pressure) < 140/90      Priority: High    Urge incontinence of urine 03/27/2024     Priority: Medium    JOSE (obstructive sleep apnea) 07/24/2015     Priority: Medium     Diagnostic Study 9/12/2017 - (271.0 lbs) AHI 78.7, RDI 91.3, Supine AHI 73.5, REM AHI -, Low O2 79.5%, Time Spent ?88% 22.9 minutes.  Titration Study: 9/26/2017- (271.0 lbs) The patient was titrated at pressures ranging from 5 cmH2O up to 8 cmH2O.  The optimal pressure achieved was 8 cmH2O with a residual AHI of 1.1 events per hour.  Time in REM supine on final pressure was 22.5 minutes.  PLM index was 25.6 movements per hour.  The PLM Arousal Index was 8.0 per hour.      Impaired glucose tolerance 05/29/2012     Priority: Medium    Hypothyroid      Priority: Medium    Hyperlipidemia with target LDL less than 130      Priority: Medium    Rotator cuff tear arthropathy of right shoulder 01/22/2024     Priority: Low    History of total left knee replacement 10/16/2023     Priority: Low    History of right knee joint replacement 09/11/2023     Priority: Low    Degenerative joint disease of hand, left 02/06/2023     Priority: Low    Female stress incontinence 02/22/2022     Priority: Low    Ulnar neuropathy at elbow, left      Priority: Low    Sensorineural hearing loss      Priority: Low      Past Medical History:   Diagnosis Date    Arthritis of right acromioclavicular joint 07/28/2021    Cervical dysplasia 1988    COPD (chronic obstructive pulmonary disease) (H)     Degenerative joint disease of hand, left     Female stress incontinence 02/22/2022    Glaucoma     Hyperlipidemia LDL goal < 130     Hypertension goal BP (blood pressure) < 140/90     Hypothyroid     Impaired glucose tolerance 05/29/2012    Microscopic hematuria     MMT (medial meniscus tear) 09/21/2006    LT    Moderate recurrent major depression (H) 02/13/2014     Morbid obesity (H)     JOSE (obstructive sleep apnea) 07/24/2015    Doesn't use cpap    Primary osteoarthritis of both knees     RLS (restless legs syndrome)     Sensorineural hearing loss     Shingles 01/27/2022    Ulnar neuropathy at elbow, left     Urge incontinence of urine 03/27/2024    Vitamin D insufficiency      Past Surgical History:   Procedure Laterality Date    ARTHROPLASTY KNEE Right 2/15/2023    Procedure: ARTHROPLASTY, KNEE, TOTAL RIGHT;  Surgeon: Krishna Santiago MD;  Location: [facility] OR    ARTHROPLASTY KNEE Left 9/20/2023    Procedure: ARTHROPLASTY, left KNEE, TOTAL;  Surgeon: Krishna Santiago MD;  Location: [facility] OR    BIOPSY      BLADDER SURGERY  03/19/2018    CERVIX SURGERY  1988    cervical cone    COLONOSCOPY      LAPAROSCOPIC OOPHORECTOMY Right 03/19/2018    with hysteroscopy/EMB, and pubovaginal sling     ROTATOR CUFF REPAIR RT/LT Right 09/01/2021    TONSILLECTOMY & ADENOIDECTOMY       Current Outpatient Medications   Medication Sig Dispense Refill    atorvastatin (LIPITOR) 40 MG tablet TAKE 1 TABLET DAILY 90 tablet 3    Ferrous Sulfate (IRON) 325 (65 FE) MG tablet Take 1 tablet by mouth daily      latanoprost (XALATAN) 0.005 % ophthalmic solution Place 1 drop into both eyes daily      levothyroxine (SYNTHROID/LEVOTHROID) 137 MCG tablet TAKE 1 TABLET DAILY 90 tablet 3    lisinopril (ZESTRIL) 20 MG tablet TAKE 1 TABLET DAILY 90 tablet 3    OMEGA-3 KRILL OIL PO       STIOLTO RESPIMAT 2.5-2.5 MCG/ACT AERS Inhale 2 puffs into the lungs daily      Vitamin D, Cholecalciferol, 1000 units CAPS Take 1 capsule by mouth daily      triamcinolone (KENALOG) 0.1 % external ointment Apply topically 2 times daily When legs have redness (Patient not taking: Reported on 3/27/2024) 80 g 2       Allergies   Allergen Reactions    Nkda [No Known Drug Allergy]         Social History     Tobacco Use    Smoking status: Former     Packs/day: 0.50     Years: 49.00     Additional pack years: 0.00     Total pack years:  "24.50     Types: Cigarettes     Start date: 1960     Quit date: 2011     Years since quittin.9    Smokeless tobacco: Former     Quit date: 2011   Substance Use Topics    Alcohol use: Yes     Comment: socially     Family History   Problem Relation Age of Onset    Allergies Mother     Cardiovascular Mother     Respiratory Mother     Thyroid Disease Mother     Other Cancer Mother         skin    Skin Cancer Mother     Arthritis Father     Hypertension Father     Hyperlipidemia Father     Cerebrovascular Disease Father     Allergies Sister     Cancer Brother         testicular     Gastrointestinal Disease Brother     Other Cancer Brother         testicular    Genitourinary Problems Brother     Cancer Maternal Grandmother         bone    Other Cancer Maternal Grandmother         bone    Cerebrovascular Disease Paternal Grandmother     Cancer Paternal Grandfather     Cerebrovascular Disease Paternal Grandfather     Allergies Daughter     Gastrointestinal Disease Daughter     Depression Daughter     Anesthesia Reaction Daughter     Allergies Daughter 10        metal    Diabetes No family hx of      History   Drug Use No         Review of Systems    Review of Systems  CONSTITUTIONAL: NEGATIVE for fever, chills, change in weight  ENT/MOUTH: NEGATIVE for ear, mouth and throat problems  RESP: NEGATIVE for significant cough or SOB  CV: NEGATIVE for chest pain, palpitations or peripheral edema  MUSCULOSKELETAL: see HPI   HEME/ALLERGY/IMMUNE: NEGATIVE for bleeding problems  ROS otherwise negative    Objective    /76 (BP Location: Right arm, Patient Position: Sitting, Cuff Size: Adult Large)   Pulse 87   Temp 98.1  F (36.7  C) (Oral)   Resp 20   Ht 1.568 m (5' 1.73\")   Wt 124.7 kg (275 lb)   SpO2 93%   BMI 50.74 kg/m     Estimated body mass index is 50.74 kg/m  as calculated from the following:    Height as of this encounter: 1.568 m (5' 1.73\").    Weight as of this encounter: 124.7 kg (275 " lb).  Physical Exam  GENERAL: alert, no distress, and obese  EYES: Eyes grossly normal to inspection, PERRL and conjunctivae and sclerae normal  HENT: ear canals and TM's normal, nose and mouth without ulcers or lesions  NECK: no adenopathy, no asymmetry, masses, or scars  RESP: lungs clear to auscultation - no rales, rhonchi or wheezes  CV: regular rate and rhythm, normal S1 S2, no S3 or S4, no murmur, click or rub, no peripheral edema  ABDOMEN: soft, nontender, no hepatosplenomegaly, no masses and bowel sounds normal  MS: antalgic gait with use of cane   SKIN: no suspicious lesions or rashes  NEURO: Normal strength and tone, mentation intact and speech normal  PSYCH: mentation appears normal, affect normal/bright    Recent Labs   Lab Test 09/21/23  0629 09/15/23  1505 09/13/23  1008 06/19/23  1241 02/16/23  0536 02/06/23  0933   HGB 9.9*  --  12.6 12.7   < > 12.2   PLT  --   --   --  271  --  279     --  139 137   < > 139   POTASSIUM 5.2 5.3 5.4* 4.9   < > 4.6   CR 1.12*  --  0.78 0.76   < > 0.84   A1C  --   --   --  5.5  --   --     < > = values in this interval not displayed.        Diagnostics  Labs pending at this time.  Results will be reviewed when available.   No EKG required, no history of coronary heart disease, significant arrhythmia, peripheral arterial disease or other structural heart disease.    Revised Cardiac Risk Index (RCRI)  The patient has the following serious cardiovascular risks for perioperative complications:   - No serious cardiac risks = 0 points     RCRI Interpretation: 0 points: Class I (very low risk - 0.4% complication rate)         Signed Electronically by: Verena Vee MD  Copy of this evaluation report is provided to requesting physician.

## 2024-03-28 NOTE — RESULT ENCOUNTER NOTE
Meg,    Your cholesterol is controlled. Your kidney and thyroid tests are normal. Your blood glucose is fine.     Verena Vee MD

## 2024-03-29 PROBLEM — Z96.611 S/P REVERSE TOTAL SHOULDER ARTHROPLASTY, RIGHT: Status: ACTIVE | Noted: 2024-03-29

## 2024-04-17 ENCOUNTER — ANESTHESIA (OUTPATIENT)
Dept: SURGERY | Facility: CLINIC | Age: 79
End: 2024-04-17
Payer: COMMERCIAL

## 2024-04-17 ENCOUNTER — HOSPITAL ENCOUNTER (OUTPATIENT)
Facility: CLINIC | Age: 79
Discharge: HOME | End: 2024-04-18
Attending: ORTHOPAEDIC SURGERY | Admitting: ORTHOPAEDIC SURGERY
Payer: COMMERCIAL

## 2024-04-17 ENCOUNTER — ANESTHESIA EVENT (OUTPATIENT)
Dept: SURGERY | Facility: CLINIC | Age: 79
End: 2024-04-17
Payer: COMMERCIAL

## 2024-04-17 ENCOUNTER — APPOINTMENT (OUTPATIENT)
Dept: GENERAL RADIOLOGY | Facility: CLINIC | Age: 79
End: 2024-04-17
Attending: ORTHOPAEDIC SURGERY
Payer: COMMERCIAL

## 2024-04-17 DIAGNOSIS — Z96.611 S/P REVERSE TOTAL SHOULDER ARTHROPLASTY, RIGHT: Primary | ICD-10-CM

## 2024-04-17 PROCEDURE — 258N000003 HC RX IP 258 OP 636: Performed by: ORTHOPAEDIC SURGERY

## 2024-04-17 PROCEDURE — 258N000003 HC RX IP 258 OP 636: Performed by: NURSE ANESTHETIST, CERTIFIED REGISTERED

## 2024-04-17 PROCEDURE — 370N000017 HC ANESTHESIA TECHNICAL FEE, PER MIN: Performed by: ORTHOPAEDIC SURGERY

## 2024-04-17 PROCEDURE — 250N000009 HC RX 250: Performed by: ORTHOPAEDIC SURGERY

## 2024-04-17 PROCEDURE — 258N000001 HC RX 258: Performed by: ORTHOPAEDIC SURGERY

## 2024-04-17 PROCEDURE — 250N000009 HC RX 250: Performed by: NURSE ANESTHETIST, CERTIFIED REGISTERED

## 2024-04-17 PROCEDURE — 710N000010 HC RECOVERY PHASE 1, LEVEL 2, PER MIN: Performed by: ORTHOPAEDIC SURGERY

## 2024-04-17 PROCEDURE — 360N000077 HC SURGERY LEVEL 4, PER MIN: Performed by: ORTHOPAEDIC SURGERY

## 2024-04-17 PROCEDURE — 250N000025 HC SEVOFLURANE, PER MIN: Performed by: ORTHOPAEDIC SURGERY

## 2024-04-17 PROCEDURE — 250N000011 HC RX IP 250 OP 636: Performed by: NURSE ANESTHETIST, CERTIFIED REGISTERED

## 2024-04-17 PROCEDURE — 250N000013 HC RX MED GY IP 250 OP 250 PS 637: Performed by: ORTHOPAEDIC SURGERY

## 2024-04-17 PROCEDURE — C9290 INJ, BUPIVACAINE LIPOSOME: HCPCS | Performed by: NURSE ANESTHETIST, CERTIFIED REGISTERED

## 2024-04-17 PROCEDURE — 999N000063 XR SHOULDER RIGHT 1 VIEW: Mod: RT

## 2024-04-17 PROCEDURE — 999N000157 HC STATISTIC RCP TIME EA 10 MIN

## 2024-04-17 PROCEDURE — 250N000011 HC RX IP 250 OP 636: Performed by: ORTHOPAEDIC SURGERY

## 2024-04-17 PROCEDURE — C1776 JOINT DEVICE (IMPLANTABLE): HCPCS | Performed by: ORTHOPAEDIC SURGERY

## 2024-04-17 PROCEDURE — 23472 RECONSTRUCT SHOULDER JOINT: CPT | Mod: AS | Performed by: PHYSICIAN ASSISTANT

## 2024-04-17 PROCEDURE — 272N000001 HC OR GENERAL SUPPLY STERILE: Performed by: ORTHOPAEDIC SURGERY

## 2024-04-17 PROCEDURE — 94660 CPAP INITIATION&MGMT: CPT

## 2024-04-17 PROCEDURE — C1713 ANCHOR/SCREW BN/BN,TIS/BN: HCPCS | Performed by: ORTHOPAEDIC SURGERY

## 2024-04-17 PROCEDURE — 999N000141 HC STATISTIC PRE-PROCEDURE NURSING ASSESSMENT: Performed by: ORTHOPAEDIC SURGERY

## 2024-04-17 PROCEDURE — 23472 RECONSTRUCT SHOULDER JOINT: CPT | Mod: RT | Performed by: ORTHOPAEDIC SURGERY

## 2024-04-17 PROCEDURE — 271N000001 HC OR GENERAL SUPPLY NON-STERILE: Performed by: ORTHOPAEDIC SURGERY

## 2024-04-17 DEVICE — IMP SCR LOCKING BIOM REV SHLDR 3.5 HEX 4.75X25MM 180552: Type: IMPLANTABLE DEVICE | Site: SHOULDER | Status: FUNCTIONAL

## 2024-04-17 DEVICE — IMPLANTABLE DEVICE: Type: IMPLANTABLE DEVICE | Site: SHOULDER | Status: FUNCTIONAL

## 2024-04-17 DEVICE — IMP BEARING HUMERAL ZIM 36MM STD PRLNG 110031418: Type: IMPLANTABLE DEVICE | Site: SHOULDER | Status: FUNCTIONAL

## 2024-04-17 DEVICE — IMP HUMERAL TRAY ZIM RVRS SHLDR STD 110031399: Type: IMPLANTABLE DEVICE | Site: SHOULDER | Status: FUNCTIONAL

## 2024-04-17 DEVICE — IMP SCR LOCKING BIOM REV SHLDR 3.5 HEX 4.75X15MM 180550: Type: IMPLANTABLE DEVICE | Site: SHOULDER | Status: FUNCTIONAL

## 2024-04-17 DEVICE — IMP GLENOSPHERE BIOM REV SHLDR 36MM STD 115310: Type: IMPLANTABLE DEVICE | Site: SHOULDER | Status: FUNCTIONAL

## 2024-04-17 RX ORDER — SODIUM CHLORIDE, SODIUM LACTATE, POTASSIUM CHLORIDE, CALCIUM CHLORIDE 600; 310; 30; 20 MG/100ML; MG/100ML; MG/100ML; MG/100ML
INJECTION, SOLUTION INTRAVENOUS CONTINUOUS
Status: DISCONTINUED | OUTPATIENT
Start: 2024-04-17 | End: 2024-04-17 | Stop reason: HOSPADM

## 2024-04-17 RX ORDER — POLYETHYLENE GLYCOL 3350 17 G/17G
17 POWDER, FOR SOLUTION ORAL DAILY
Status: DISCONTINUED | OUTPATIENT
Start: 2024-04-18 | End: 2024-04-18 | Stop reason: HOSPADM

## 2024-04-17 RX ORDER — ACETAMINOPHEN 325 MG/1
650 TABLET ORAL EVERY 4 HOURS PRN
Status: SHIPPED
Start: 2024-04-17

## 2024-04-17 RX ORDER — ALBUTEROL SULFATE 0.83 MG/ML
2.5 SOLUTION RESPIRATORY (INHALATION) EVERY 4 HOURS PRN
Status: DISCONTINUED | OUTPATIENT
Start: 2024-04-17 | End: 2024-04-17 | Stop reason: HOSPADM

## 2024-04-17 RX ORDER — ACETAMINOPHEN 325 MG/1
975 TABLET ORAL EVERY 8 HOURS
Qty: 27 TABLET | Refills: 0 | Status: DISCONTINUED | OUTPATIENT
Start: 2024-04-17 | End: 2024-04-18 | Stop reason: HOSPADM

## 2024-04-17 RX ORDER — CEFAZOLIN SODIUM/WATER 3 G/30 ML
3 SYRINGE (ML) INTRAVENOUS SEE ADMIN INSTRUCTIONS
Status: DISCONTINUED | OUTPATIENT
Start: 2024-04-17 | End: 2024-04-17 | Stop reason: HOSPADM

## 2024-04-17 RX ORDER — ONDANSETRON 4 MG/1
4 TABLET, ORALLY DISINTEGRATING ORAL EVERY 6 HOURS PRN
Status: DISCONTINUED | OUTPATIENT
Start: 2024-04-17 | End: 2024-04-18 | Stop reason: HOSPADM

## 2024-04-17 RX ORDER — MEPERIDINE HYDROCHLORIDE 25 MG/ML
12.5 INJECTION INTRAMUSCULAR; INTRAVENOUS; SUBCUTANEOUS EVERY 5 MIN PRN
Status: DISCONTINUED | OUTPATIENT
Start: 2024-04-17 | End: 2024-04-17 | Stop reason: HOSPADM

## 2024-04-17 RX ORDER — CELECOXIB 100 MG/1
400 CAPSULE ORAL ONCE
Status: COMPLETED | OUTPATIENT
Start: 2024-04-17 | End: 2024-04-17

## 2024-04-17 RX ORDER — HYDROMORPHONE HCL IN WATER/PF 6 MG/30 ML
0.2 PATIENT CONTROLLED ANALGESIA SYRINGE INTRAVENOUS
Status: DISCONTINUED | OUTPATIENT
Start: 2024-04-17 | End: 2024-04-18 | Stop reason: HOSPADM

## 2024-04-17 RX ORDER — ONDANSETRON 2 MG/ML
INJECTION INTRAMUSCULAR; INTRAVENOUS PRN
Status: DISCONTINUED | OUTPATIENT
Start: 2024-04-17 | End: 2024-04-17

## 2024-04-17 RX ORDER — HYDROMORPHONE HYDROCHLORIDE 1 MG/ML
0.2 INJECTION, SOLUTION INTRAMUSCULAR; INTRAVENOUS; SUBCUTANEOUS EVERY 5 MIN PRN
Status: DISCONTINUED | OUTPATIENT
Start: 2024-04-17 | End: 2024-04-17 | Stop reason: HOSPADM

## 2024-04-17 RX ORDER — NALOXONE HYDROCHLORIDE 0.4 MG/ML
0.4 INJECTION, SOLUTION INTRAMUSCULAR; INTRAVENOUS; SUBCUTANEOUS
Status: DISCONTINUED | OUTPATIENT
Start: 2024-04-17 | End: 2024-04-18 | Stop reason: HOSPADM

## 2024-04-17 RX ORDER — PROCHLORPERAZINE MALEATE 5 MG
5 TABLET ORAL EVERY 6 HOURS PRN
Status: DISCONTINUED | OUTPATIENT
Start: 2024-04-17 | End: 2024-04-18 | Stop reason: HOSPADM

## 2024-04-17 RX ORDER — OXYCODONE HYDROCHLORIDE 5 MG/1
5-10 TABLET ORAL EVERY 4 HOURS PRN
Qty: 26 TABLET | Refills: 0 | Status: SHIPPED | OUTPATIENT
Start: 2024-04-17 | End: 2024-06-03

## 2024-04-17 RX ORDER — BUPIVACAINE HYDROCHLORIDE 5 MG/ML
INJECTION, SOLUTION EPIDURAL; INTRACAUDAL PRN
Status: DISCONTINUED | OUTPATIENT
Start: 2024-04-17 | End: 2024-04-17

## 2024-04-17 RX ORDER — OXYCODONE HYDROCHLORIDE 5 MG/1
10 TABLET ORAL EVERY 4 HOURS PRN
Status: DISCONTINUED | OUTPATIENT
Start: 2024-04-17 | End: 2024-04-18 | Stop reason: HOSPADM

## 2024-04-17 RX ORDER — AMOXICILLIN 250 MG
1 CAPSULE ORAL 2 TIMES DAILY
Status: DISCONTINUED | OUTPATIENT
Start: 2024-04-17 | End: 2024-04-18 | Stop reason: HOSPADM

## 2024-04-17 RX ORDER — LATANOPROST 50 UG/ML
1 SOLUTION/ DROPS OPHTHALMIC DAILY
Status: DISCONTINUED | OUTPATIENT
Start: 2024-04-17 | End: 2024-04-18 | Stop reason: HOSPADM

## 2024-04-17 RX ORDER — LIDOCAINE 40 MG/G
CREAM TOPICAL
Status: DISCONTINUED | OUTPATIENT
Start: 2024-04-17 | End: 2024-04-17 | Stop reason: HOSPADM

## 2024-04-17 RX ORDER — LIDOCAINE 40 MG/G
CREAM TOPICAL
Status: DISCONTINUED | OUTPATIENT
Start: 2024-04-17 | End: 2024-04-18 | Stop reason: HOSPADM

## 2024-04-17 RX ORDER — NALOXONE HYDROCHLORIDE 0.4 MG/ML
0.2 INJECTION, SOLUTION INTRAMUSCULAR; INTRAVENOUS; SUBCUTANEOUS
Status: DISCONTINUED | OUTPATIENT
Start: 2024-04-17 | End: 2024-04-18 | Stop reason: HOSPADM

## 2024-04-17 RX ORDER — PROPOFOL 10 MG/ML
INJECTION, EMULSION INTRAVENOUS PRN
Status: DISCONTINUED | OUTPATIENT
Start: 2024-04-17 | End: 2024-04-17

## 2024-04-17 RX ORDER — EPHEDRINE SULFATE 50 MG/ML
INJECTION, SOLUTION INTRAMUSCULAR; INTRAVENOUS; SUBCUTANEOUS PRN
Status: DISCONTINUED | OUTPATIENT
Start: 2024-04-17 | End: 2024-04-17

## 2024-04-17 RX ORDER — DEXAMETHASONE SODIUM PHOSPHATE 10 MG/ML
INJECTION, SOLUTION INTRAMUSCULAR; INTRAVENOUS PRN
Status: DISCONTINUED | OUTPATIENT
Start: 2024-04-17 | End: 2024-04-17

## 2024-04-17 RX ORDER — OXYCODONE HCL 10 MG/1
10 TABLET, FILM COATED, EXTENDED RELEASE ORAL EVERY 8 HOURS
Status: COMPLETED | OUTPATIENT
Start: 2024-04-17 | End: 2024-04-17

## 2024-04-17 RX ORDER — AMOXICILLIN 250 MG
1-2 CAPSULE ORAL 2 TIMES DAILY
Status: SHIPPED
Start: 2024-04-17 | End: 2024-06-03

## 2024-04-17 RX ORDER — ONDANSETRON 2 MG/ML
4 INJECTION INTRAMUSCULAR; INTRAVENOUS EVERY 30 MIN PRN
Status: DISCONTINUED | OUTPATIENT
Start: 2024-04-17 | End: 2024-04-17 | Stop reason: HOSPADM

## 2024-04-17 RX ORDER — CEFAZOLIN SODIUM/WATER 3 G/30 ML
3 SYRINGE (ML) INTRAVENOUS
Status: COMPLETED | OUTPATIENT
Start: 2024-04-17 | End: 2024-04-17

## 2024-04-17 RX ORDER — LISINOPRIL 10 MG/1
20 TABLET ORAL DAILY
Status: DISCONTINUED | OUTPATIENT
Start: 2024-04-17 | End: 2024-04-18 | Stop reason: HOSPADM

## 2024-04-17 RX ORDER — BISACODYL 10 MG
10 SUPPOSITORY, RECTAL RECTAL DAILY PRN
Status: DISCONTINUED | OUTPATIENT
Start: 2024-04-17 | End: 2024-04-18 | Stop reason: HOSPADM

## 2024-04-17 RX ORDER — FENTANYL CITRATE 50 UG/ML
50 INJECTION, SOLUTION INTRAMUSCULAR; INTRAVENOUS EVERY 5 MIN PRN
Status: DISCONTINUED | OUTPATIENT
Start: 2024-04-17 | End: 2024-04-17 | Stop reason: HOSPADM

## 2024-04-17 RX ORDER — LABETALOL HYDROCHLORIDE 5 MG/ML
10 INJECTION, SOLUTION INTRAVENOUS
Status: DISCONTINUED | OUTPATIENT
Start: 2024-04-17 | End: 2024-04-17 | Stop reason: HOSPADM

## 2024-04-17 RX ORDER — OXYCODONE HYDROCHLORIDE 5 MG/1
5 TABLET ORAL EVERY 4 HOURS PRN
Status: DISCONTINUED | OUTPATIENT
Start: 2024-04-17 | End: 2024-04-18 | Stop reason: HOSPADM

## 2024-04-17 RX ORDER — HYDROMORPHONE HCL IN WATER/PF 6 MG/30 ML
0.4 PATIENT CONTROLLED ANALGESIA SYRINGE INTRAVENOUS
Status: DISCONTINUED | OUTPATIENT
Start: 2024-04-17 | End: 2024-04-18 | Stop reason: HOSPADM

## 2024-04-17 RX ORDER — ATORVASTATIN CALCIUM 40 MG/1
40 TABLET, FILM COATED ORAL DAILY
Status: DISCONTINUED | OUTPATIENT
Start: 2024-04-17 | End: 2024-04-18 | Stop reason: HOSPADM

## 2024-04-17 RX ORDER — FENTANYL CITRATE 50 UG/ML
INJECTION, SOLUTION INTRAMUSCULAR; INTRAVENOUS PRN
Status: DISCONTINUED | OUTPATIENT
Start: 2024-04-17 | End: 2024-04-17

## 2024-04-17 RX ORDER — ONDANSETRON 4 MG/1
4 TABLET, ORALLY DISINTEGRATING ORAL EVERY 30 MIN PRN
Status: DISCONTINUED | OUTPATIENT
Start: 2024-04-17 | End: 2024-04-17 | Stop reason: HOSPADM

## 2024-04-17 RX ORDER — CEFAZOLIN SODIUM/WATER 2 G/20 ML
2 SYRINGE (ML) INTRAVENOUS EVERY 8 HOURS
Qty: 40 ML | Refills: 0 | Status: COMPLETED | OUTPATIENT
Start: 2024-04-17 | End: 2024-04-18

## 2024-04-17 RX ORDER — TRANEXAMIC ACID 650 MG/1
1950 TABLET ORAL ONCE
Status: COMPLETED | OUTPATIENT
Start: 2024-04-17 | End: 2024-04-17

## 2024-04-17 RX ORDER — FENTANYL CITRATE-0.9 % NACL/PF 10 MCG/ML
PLASTIC BAG, INJECTION (ML) INTRAVENOUS PRN
Status: DISCONTINUED | OUTPATIENT
Start: 2024-04-17 | End: 2024-04-17

## 2024-04-17 RX ORDER — ONDANSETRON 2 MG/ML
4 INJECTION INTRAMUSCULAR; INTRAVENOUS EVERY 6 HOURS PRN
Status: DISCONTINUED | OUTPATIENT
Start: 2024-04-17 | End: 2024-04-18 | Stop reason: HOSPADM

## 2024-04-17 RX ORDER — HYDROMORPHONE HYDROCHLORIDE 1 MG/ML
0.5 INJECTION, SOLUTION INTRAMUSCULAR; INTRAVENOUS; SUBCUTANEOUS EVERY 5 MIN PRN
Status: DISCONTINUED | OUTPATIENT
Start: 2024-04-17 | End: 2024-04-17 | Stop reason: HOSPADM

## 2024-04-17 RX ORDER — VANCOMYCIN HYDROCHLORIDE 1 G/20ML
INJECTION, POWDER, LYOPHILIZED, FOR SOLUTION INTRAVENOUS PRN
Status: DISCONTINUED | OUTPATIENT
Start: 2024-04-17 | End: 2024-04-17 | Stop reason: HOSPADM

## 2024-04-17 RX ORDER — ASPIRIN 325 MG
325 TABLET, DELAYED RELEASE (ENTERIC COATED) ORAL DAILY
Status: DISCONTINUED | OUTPATIENT
Start: 2024-04-17 | End: 2024-04-18 | Stop reason: HOSPADM

## 2024-04-17 RX ORDER — FENTANYL CITRATE 50 UG/ML
25 INJECTION, SOLUTION INTRAMUSCULAR; INTRAVENOUS EVERY 5 MIN PRN
Status: DISCONTINUED | OUTPATIENT
Start: 2024-04-17 | End: 2024-04-17 | Stop reason: HOSPADM

## 2024-04-17 RX ORDER — ACETAMINOPHEN 325 MG/1
650 TABLET ORAL EVERY 4 HOURS PRN
Status: DISCONTINUED | OUTPATIENT
Start: 2024-04-20 | End: 2024-04-18 | Stop reason: HOSPADM

## 2024-04-17 RX ORDER — FENTANYL CITRATE-0.9 % NACL/PF 10 MCG/ML
PLASTIC BAG, INJECTION (ML) INTRAVENOUS CONTINUOUS PRN
Status: DISCONTINUED | OUTPATIENT
Start: 2024-04-17 | End: 2024-04-17

## 2024-04-17 RX ORDER — SODIUM CHLORIDE, SODIUM LACTATE, POTASSIUM CHLORIDE, CALCIUM CHLORIDE 600; 310; 30; 20 MG/100ML; MG/100ML; MG/100ML; MG/100ML
INJECTION, SOLUTION INTRAVENOUS CONTINUOUS
Status: DISCONTINUED | OUTPATIENT
Start: 2024-04-17 | End: 2024-04-18 | Stop reason: HOSPADM

## 2024-04-17 RX ORDER — ASPIRIN 325 MG
325 TABLET, DELAYED RELEASE (ENTERIC COATED) ORAL DAILY
Status: SHIPPED
Start: 2024-04-17 | End: 2024-06-03

## 2024-04-17 RX ORDER — LIDOCAINE HYDROCHLORIDE 20 MG/ML
INJECTION, SOLUTION INFILTRATION; PERINEURAL PRN
Status: DISCONTINUED | OUTPATIENT
Start: 2024-04-17 | End: 2024-04-17

## 2024-04-17 RX ORDER — NALOXONE HYDROCHLORIDE 0.4 MG/ML
0.1 INJECTION, SOLUTION INTRAMUSCULAR; INTRAVENOUS; SUBCUTANEOUS
Status: DISCONTINUED | OUTPATIENT
Start: 2024-04-17 | End: 2024-04-17 | Stop reason: HOSPADM

## 2024-04-17 RX ORDER — ACETAMINOPHEN 325 MG/1
975 TABLET ORAL ONCE
Status: COMPLETED | OUTPATIENT
Start: 2024-04-17 | End: 2024-04-17

## 2024-04-17 RX ADMIN — ONDANSETRON 4 MG: 2 INJECTION INTRAMUSCULAR; INTRAVENOUS at 12:29

## 2024-04-17 RX ADMIN — ROCURONIUM BROMIDE 10 MG: 50 INJECTION, SOLUTION INTRAVENOUS at 13:47

## 2024-04-17 RX ADMIN — Medication 5 MG: at 12:40

## 2024-04-17 RX ADMIN — FENTANYL CITRATE 50 MCG: 50 INJECTION INTRAMUSCULAR; INTRAVENOUS at 11:32

## 2024-04-17 RX ADMIN — BUPIVACAINE 10 ML: 13.3 INJECTION, SUSPENSION, LIPOSOMAL INFILTRATION at 10:19

## 2024-04-17 RX ADMIN — ROCURONIUM BROMIDE 10 MG: 50 INJECTION, SOLUTION INTRAVENOUS at 14:17

## 2024-04-17 RX ADMIN — OXYCODONE HYDROCHLORIDE 10 MG: 10 TABLET, FILM COATED, EXTENDED RELEASE ORAL at 09:50

## 2024-04-17 RX ADMIN — BUPIVACAINE HYDROCHLORIDE 10 ML: 5 INJECTION, SOLUTION EPIDURAL; INTRACAUDAL at 10:19

## 2024-04-17 RX ADMIN — PROPOFOL 180 MG: 10 INJECTION, EMULSION INTRAVENOUS at 11:46

## 2024-04-17 RX ADMIN — Medication 100 MCG: at 11:53

## 2024-04-17 RX ADMIN — ROCURONIUM BROMIDE 50 MG: 50 INJECTION, SOLUTION INTRAVENOUS at 11:46

## 2024-04-17 RX ADMIN — ASPIRIN 325 MG: 325 TABLET, COATED ORAL at 17:49

## 2024-04-17 RX ADMIN — ACETAMINOPHEN 975 MG: 325 TABLET, FILM COATED ORAL at 17:51

## 2024-04-17 RX ADMIN — ROCURONIUM BROMIDE 20 MG: 50 INJECTION, SOLUTION INTRAVENOUS at 13:17

## 2024-04-17 RX ADMIN — Medication 3 G: at 15:09

## 2024-04-17 RX ADMIN — CELECOXIB 400 MG: 100 CAPSULE ORAL at 09:51

## 2024-04-17 RX ADMIN — ATORVASTATIN CALCIUM 40 MG: 40 TABLET, FILM COATED ORAL at 17:51

## 2024-04-17 RX ADMIN — SENNOSIDES AND DOCUSATE SODIUM 1 TABLET: 8.6; 5 TABLET ORAL at 23:35

## 2024-04-17 RX ADMIN — MIDAZOLAM 1 MG: 1 INJECTION INTRAMUSCULAR; INTRAVENOUS at 10:04

## 2024-04-17 RX ADMIN — SODIUM CHLORIDE, POTASSIUM CHLORIDE, SODIUM LACTATE AND CALCIUM CHLORIDE: 600; 310; 30; 20 INJECTION, SOLUTION INTRAVENOUS at 14:20

## 2024-04-17 RX ADMIN — Medication 20 MCG/MIN: at 11:53

## 2024-04-17 RX ADMIN — TRANEXAMIC ACID 1950 MG: 650 TABLET ORAL at 09:50

## 2024-04-17 RX ADMIN — Medication 5 MG: at 12:36

## 2024-04-17 RX ADMIN — SUGAMMADEX 200 MG: 100 INJECTION, SOLUTION INTRAVENOUS at 15:27

## 2024-04-17 RX ADMIN — Medication 5 MG: at 12:29

## 2024-04-17 RX ADMIN — Medication 10 MG: at 11:51

## 2024-04-17 RX ADMIN — LEVOTHYROXINE SODIUM 137 MCG: 25 TABLET ORAL at 17:51

## 2024-04-17 RX ADMIN — FENTANYL CITRATE 50 MCG: 50 INJECTION INTRAMUSCULAR; INTRAVENOUS at 10:04

## 2024-04-17 RX ADMIN — LIDOCAINE HYDROCHLORIDE 50 MG: 20 INJECTION, SOLUTION INFILTRATION; PERINEURAL at 11:46

## 2024-04-17 RX ADMIN — SODIUM CHLORIDE, POTASSIUM CHLORIDE, SODIUM LACTATE AND CALCIUM CHLORIDE: 600; 310; 30; 20 INJECTION, SOLUTION INTRAVENOUS at 23:35

## 2024-04-17 RX ADMIN — SODIUM CHLORIDE, POTASSIUM CHLORIDE, SODIUM LACTATE AND CALCIUM CHLORIDE 10 ML/HR: 600; 310; 30; 20 INJECTION, SOLUTION INTRAVENOUS at 10:32

## 2024-04-17 RX ADMIN — Medication 3 G: at 11:32

## 2024-04-17 RX ADMIN — LISINOPRIL 20 MG: 10 TABLET ORAL at 17:50

## 2024-04-17 RX ADMIN — ACETAMINOPHEN 975 MG: 325 TABLET, FILM COATED ORAL at 09:49

## 2024-04-17 RX ADMIN — MIDAZOLAM 1 MG: 1 INJECTION INTRAMUSCULAR; INTRAVENOUS at 11:32

## 2024-04-17 RX ADMIN — ROCURONIUM BROMIDE 50 MG: 50 INJECTION, SOLUTION INTRAVENOUS at 12:32

## 2024-04-17 RX ADMIN — Medication 2 G: at 23:35

## 2024-04-17 RX ADMIN — DEXAMETHASONE SODIUM PHOSPHATE 5 MG: 10 INJECTION, SOLUTION INTRAMUSCULAR; INTRAVENOUS at 12:29

## 2024-04-17 ASSESSMENT — ACTIVITIES OF DAILY LIVING (ADL)
ADLS_ACUITY_SCORE: 28
ADLS_ACUITY_SCORE: 26
ADLS_ACUITY_SCORE: 28
ADLS_ACUITY_SCORE: 27
ADLS_ACUITY_SCORE: 26
ADLS_ACUITY_SCORE: 28
ADLS_ACUITY_SCORE: 29
ADLS_ACUITY_SCORE: 29
ADLS_ACUITY_SCORE: 26
ADLS_ACUITY_SCORE: 29
ADLS_ACUITY_SCORE: 26
ADLS_ACUITY_SCORE: 29

## 2024-04-17 ASSESSMENT — COPD QUESTIONNAIRES: COPD: 1

## 2024-04-17 ASSESSMENT — LIFESTYLE VARIABLES: TOBACCO_USE: 1

## 2024-04-17 NOTE — OP NOTE
Date of Service: 4/17/2024      SURGEON:  Krishna Santiago MD      ASSISTANT: Hasmukh Hawley PA-C      PREOPERATIVE DIAGNOSIS:  right rotator cuff tear arthropathy of the shoulder.     POSTOPERATIVE DIAGNOSIS:  right rotator cuff tear arthropathy of the shoulder.     PROCEDURE:  right reverse total shoulder arthroplasty.    COMPONENTS IMPLANTED:         Biomet     7 Comprehensive Mini stem     Standard Bearing surface.     Mini baseplate     36 mms glenosphere at B offset.       HISTORY:  This is a 79 year old  female with chronic rotator cuff tear.   She has now developed significant arthritis of the glenohumeral joint and superior migration of the humerus.   She  presents for reverse total shoulder arthroplasty.    The PA's assistance was medically necessary given the technical complexity of the case; with assistance with patient positioning, retraction and glenohumeral joint exposure, limb manipulation, assistance with implant placement, trial and final arthroplasty shoulder implant reduction, and wound closure.         DESCRIPTION OF PROCEDURE:  After smooth general endotracheal anesthesia and an interscalene block, the patient was placed in a modified beach chair position with chest support, head support.  The right  shoulder was prepped and draped free.  Pause was performed for patient verification.  Dilute epinephrine was injected at the incision site.  A deltopectoral incision was then made, carried through subcutaneous tissue.  The deltopectoral interval was developed with the cephalic vein initially  retracted medially.   Once the interval was developed the deltoid was freed from the humerus.  Thickened bursa was excised.  There was a large rotator cuff tear involving the supraspinatus and infraspinatus  with detachment and retraction.  The subscapularis was identified and was divided from the humerus and tagged for later repair.  The subscapularis was very tight and contracted.  A cuff of tissue was left on  the humerus for  this repair.  The biceps was previously tenodesed.   The capsule was freed from the humeral neck about 2 cm down from the articular surface.  The head was then dislocated.  A step cut reamer was utilized to enter the superior aspect of the humeral head.  A suture anchor from the rotator cuff repair was removed.  This was then followed by rigid reaming up to size 8 mini for the Biomet Alexis shoulder system.  We then left the 8 reamer in and placed a cutting jig.  This was affixed to the humerus, taking off the entire  articular surface.  The articular surface was removed at 35 degrees retroversion, as 30 degrees seemed not to match her anatomy well.   We then broached up to 8 mini and left the 8 mini broach in place.  A humeral cap protector was attached.     Attention was then turned to the glenoid.  The arm was supported on a Rivera stand.  Retraction placed anteriorly and posteriorly and the glenoid exposed.  The labrum was excised circumferentially.  Capsule was released from the anterior, inferior, and a portion of the posterior glenoid.  We made sure all cartilage was removed from the glenoid surface.  I then placed the signature guide against the glenoid and when it was in proper position, the guidewire was placed through  this for the reverse total shoulder arthroplasty.  The guide was removed and the glenoid reamer was used to ream for the reverse shoulder baseplate.  I reamed very shallow and used the offset reamer to finish the posterior superior reaming, as the glenoid had abnormal posterior superior wear.  Once we had adequately reamed with cancellous bone exposed on the inferior half, cortical on the upper half, bone was trimmed from the inferior aspect where it was expected to be prominent from the baseplate.  The mini baseplate with medium augment was then impacted fully.  A centering screw was placed with a 30 mm  screw.  This was then followed by locking screws, placing first 20 mms  anterior-inferior, then 25 mms anterior-superior, then 25 mms posterior-superior , then 15 mms posterior-inferior .    The trial glenosphere was placed with the 36 mm glenosphere set at B offset directed inferiorly.     With this in place, we then performed trial reduction with the standard baseplate and found it to be too snug.  even with the B offset aimed superior this was tight.  I removed the 8 broach and inserted 7 broach further down the shaft.  Calcar reaming was performed and trial reduction with B offset inferior.  It had very good stability, with slight pistoning.  I tested range of motion and made sure the shoulder did not book open in external rotation.    Thus, the trials were removed and the 36 mms glenosphere obtained.  We set this at B offset.  It was then impacted with the maximal offset directed inferiorly which would prevent notching.  The 7 mini stem was obtained and impacted.  The standard bearing surface was assembled and impacted.  Final reduction was performed.  We found slight pistoning and good stability throughout full  motion.  Joint block was introduced.  The wound was thoroughly irrigated with betadyne and antibiotics irrigations.   The subscapularis was reattached with alternating #2 polydek and #2 Vicryl sutures.  I tested in external rotation and all sutures tore out at 60 degrees external rotation .  I repaired the subscapularis again.  Powdered vancomycin was applied to joint.  The deltopectoral interval was closed with a running 0 Vicryl suture, subcutaneous tissue closed in two layers with running and interrupted 2-0 Vicryl suture, skin edges closed with running 3-0 Monocryl subcuticular closure.  Dermabond was applied, sterile dressings applied.  The patient was taken to the recovery  room in stable condition.           Krishna Santiago MD

## 2024-04-17 NOTE — ANESTHESIA PROCEDURE NOTES
Interscalene Procedure Note    Pre-Procedure   Staff -        CRNA: Baljit Vieira APRN CRNA       Other Anesthesia Staff: Kayley Guerra       Performed By: CRNA       Location: post-op       Pre-Anesthestic Checklist: patient identified, IV checked, site marked, risks and benefits discussed, informed consent, monitors and equipment checked, pre-op evaluation, at physician/surgeon's request and post-op pain management  Timeout:       Correct Patient: Yes        Correct Procedure: Yes        Correct Site: Yes        Correct Position: Yes        Correct Laterality: Yes        Site Marked: Yes  Procedure Documentation  Procedure: interscalene       Laterality: right       Patient Position: supine       Patient Prep/Sterile Barriers: sterile gloves, mask       Skin prep: Chloraprep       Local skin infiltrated with 1 mL of 1% lidocaine.        Needle Type: insulated       Needle Gauge: 22.        Needle Length (millimeters): 100        Ultrasound guided       1. Ultrasound was used to identify targeted nerve, plexus, vascular marker, or fascial plane and place a needle adjacent to it in real-time.       2. Ultrasound was used to visualize the spread of anesthetic in close proximity to the above referenced structure.       3. A permanent image is entered into the patient's record.       4. The visualized anatomic structures appeared normal.       5. There were no apparent abnormal pathologic findings.       Nerve Stim: Initial Level 0.5 mA.  Lowest motor response 0.24 mA.    Assessment/Narrative         The placement was negative for: blood aspirated, painful injection and site bleeding       Paresthesias: No.       Test dose of mL at.         Test dose negative, 3 minutes after injection, for signs of intravascular, subdural, or intrathecal injection.       Bolus given via needle. no blood aspirated via catheter.        Secured via.        Insertion/Infusion Method: Single Shot       Complications: none       " Injection made incrementally with aspirations every 5 mL.     Comments:  Block placed by SRNA with CRNA. Pt tolerated the procedure well. No complications noted.  Will follow if needed.      FOR [facility] ([facility]) ONLY:   Pain Team Contact information: please page the Pain Team Via [employer]. Search \"Pain\". During daytime hours, please page the attending first. At night please page the resident first.      "

## 2024-04-17 NOTE — ANESTHESIA PROCEDURE NOTES
Airway       Patient location during procedure: OR       Procedure Start/Stop Times: 4/17/2024 11:49 AM  Staff -        Performed By: CRNA  Consent for Airway        Urgency: elective  Indications and Patient Condition       Indications for airway management: leo-procedural       Induction type:intravenous       Mask difficulty assessment: 1 - vent by mask    Final Airway Details       Final airway type: endotracheal airway       Successful airway: ETT - single  Endotracheal Airway Details        Cuffed: yes       Successful intubation technique: video laryngoscopy       VL Blade Size: Glidescope 3       Grade View of Cords: 1       Adjucts: stylet       Position: Right       Measured from: lips       Secured at (cm): 21       Bite block used: Oral Airway    Post intubation assessment        Placement verified by: capnometry, equal breath sounds and chest rise        Number of attempts at approach: 1       Number of other approaches attempted: 0       Secured with: plastic tape       Ease of procedure: easy       Dentition: Intact and Unchanged    Medication(s) Administered   Medication Administration Time: 4/17/2024 11:49 AM

## 2024-04-17 NOTE — ANESTHESIA PREPROCEDURE EVALUATION
Anesthesia Pre-Procedure Evaluation    Patient: Gina Yeh   MRN: [id number] : 1945        Procedure : Procedure(s):  Arthroplasty, right shoulder, total reverse          Past Medical History:   Diagnosis Date    Arthritis of right acromioclavicular joint 2021    Cervical dysplasia     COPD (chronic obstructive pulmonary disease) (H)     Degenerative joint disease of hand, left     Female stress incontinence 2022    Glaucoma     Hyperlipidemia LDL goal < 130     Hypertension goal BP (blood pressure) < 140/90     Hypothyroid     Impaired glucose tolerance 2012    Microscopic hematuria     MMT (medial meniscus tear) 2006    LT    Moderate recurrent major depression (H) 2014    Morbid obesity (H)     JOSE (obstructive sleep apnea) 2015    Doesn't use cpap    Primary osteoarthritis of both knees     RLS (restless legs syndrome)     Sensorineural hearing loss     Shingles 2022    Ulnar neuropathy at elbow, left     Urge incontinence of urine 2024    Vitamin D insufficiency       Past Surgical History:   Procedure Laterality Date    ARTHROPLASTY KNEE Right 2/15/2023    Procedure: ARTHROPLASTY, KNEE, TOTAL RIGHT;  Surgeon: Krishna Santiago MD;  Location: [facility] OR    ARTHROPLASTY KNEE Left 2023    Procedure: ARTHROPLASTY, left KNEE, TOTAL;  Surgeon: Krishna Santiago MD;  Location: [facility] OR    BIOPSY      BLADDER SURGERY  2018    CERVIX SURGERY  1988    cervical cone    COLONOSCOPY      LAPAROSCOPIC OOPHORECTOMY Right 2018    with hysteroscopy/EMB, and pubovaginal sling     ROTATOR CUFF REPAIR RT/LT Right 2021    TONSILLECTOMY & ADENOIDECTOMY        Allergies   Allergen Reactions    Nkda [No Known Drug Allergy]       Social History     Tobacco Use    Smoking status: Former     Current packs/day: 0.00     Average packs/day: 0.5 packs/day for 51.0 years (25.5 ttl pk-yrs)     Types: Cigarettes     Start date: 1960     Quit date:  2011     Years since quittin.9    Smokeless tobacco: Former     Quit date: 2011   Substance Use Topics    Alcohol use: Yes     Comment: socially      Wt Readings from Last 1 Encounters:   24 124.7 kg (275 lb)        Anesthesia Evaluation   Pt has had prior anesthetic. Type: General and MAC.    No history of anesthetic complications       ROS/MED HX  ENT/Pulmonary:     (+) sleep apnea, uses CPAP,              tobacco use, Past use,         COPD,              Neurologic:  - neg neurologic ROS     Cardiovascular:     (+) Dyslipidemia hypertension- -   -  - -                                 Previous cardiac testing   Echo: Date:  Results:  Normal LV size and function at rest. The LVEF is 55-60% and increases  appropriately to 70-75% with dobutamine infusion.  Stress Test:  Date: Results:    ECG Reviewed:  Date:  Results:  NSR    Cath:  Date: Results:      METS/Exercise Tolerance:     Hematologic:     (+)      anemia,          Musculoskeletal:   (+)  arthritis,             GI/Hepatic:  - neg GI/hepatic ROS     Renal/Genitourinary:  - neg Renal ROS     Endo:     (+)          thyroid problem, hypothyroidism,    Obesity,       Psychiatric/Substance Use:     (+) psychiatric history depression       Infectious Disease:  - neg infectious disease ROS     Malignancy:  - neg malignancy ROS     Other:  - neg other ROS          Physical Exam    Airway  airway exam normal      Mallampati: II   TM distance: > 3 FB   Neck ROM: full   Mouth opening: > 3 cm    Respiratory Devices and Support         Dental  no notable dental history   Comment: Upper and lower permanent dental implants    (+) Multiple crowns, permanant bridges      Cardiovascular   cardiovascular exam normal       Rhythm and rate: regular and normal     Pulmonary   pulmonary exam normal        breath sounds clear to auscultation           OUTSIDE LABS:  CBC:   Lab Results   Component Value Date    WBC 8.0 2023    WBC 7.9 2023     "HGB 12.7 03/27/2024    HGB 9.9 (L) 09/21/2023    HCT 40.7 06/19/2023    HCT 38.6 02/06/2023     06/19/2023     02/06/2023     BMP:   Lab Results   Component Value Date     03/27/2024     09/21/2023    POTASSIUM 4.9 03/27/2024    POTASSIUM 5.2 09/21/2023    CHLORIDE 103 03/27/2024    CHLORIDE 105 09/21/2023    CO2 24 03/27/2024    CO2 23 09/21/2023    BUN 22.4 03/27/2024    BUN 36.4 (H) 09/21/2023    CR 0.78 03/27/2024    CR 1.12 (H) 09/21/2023     (H) 03/27/2024     (H) 09/21/2023     COAGS: No results found for: \"PTT\", \"INR\", \"FIBR\"  POC: No results found for: \"BGM\", \"HCG\", \"HCGS\"  HEPATIC:   Lab Results   Component Value Date    ALBUMIN 3.7 10/25/2017    PROTTOTAL 8.5 10/25/2017    ALT 31 10/25/2017    AST 12 10/25/2017    ALKPHOS 121 10/25/2017    BILITOTAL 0.5 10/25/2017     OTHER:   Lab Results   Component Value Date    LACT 1.2 09/20/2023    A1C 5.5 06/19/2023    ALISSON 9.7 03/27/2024    TSH 2.78 03/27/2024    T4 0.93 05/23/2012       Anesthesia Plan    ASA Status:  3    NPO Status:  NPO Appropriate    Anesthesia Type: General.     - Airway: ETT   Induction: Intravenous, Propofol.   Maintenance: Balanced.        Consents    Anesthesia Plan(s) and associated risks, benefits, and realistic alternatives discussed. Questions answered and patient/representative(s) expressed understanding.     - Discussed: Risks, Benefits and Alternatives for BOTH SEDATION and the PROCEDURE were discussed     - Discussed with:  Patient      - Extended Intubation/Ventilatory Support Discussed: No.      - Patient is DNR/DNI Status: No     Use of blood products discussed: No .     Postoperative Care    Pain management: Multi-modal analgesia, Peripheral nerve block (Single Shot), IV analgesics, Neuraxial analgesia.   PONV prophylaxis: Ondansetron (or other 5HT-3), Background Propofol Infusion, Dexamethasone or Solumedrol     Comments:    Other Comments: The risks and benefits of anesthesia, and the " alternatives where applicable, have been discussed with the patient, and they wish to proceed.                Tristan Acosta, APRN CRNA

## 2024-04-17 NOTE — BRIEF OP NOTE
"[facility]    Brief Operative Note    Pre-operative diagnosis: Rotator cuff tear arthropathy of right shoulder [M75.101, M12.811]  Post-operative diagnosis Same as pre-operative diagnosis    Procedure: ARTHROPLASTY, RIGHT SHOULDER, TOTAL, REVERSE, Right - Shoulder    Surgeon: Surgeons and Role:     * Krisnha Santiago MD - Primary     * Hasmukh Hawley PA-C - Assisting  Anesthesia: General with Block   Estimated Blood Loss: 250 mL from 4/17/2024 11:35 AM to 4/17/2024  3:37 PM      Drains: None  Specimens:   ID Type Source Tests Collected by Time Destination   A :  Bone Resection Bone OR DOCUMENTATION ONLY Krishna Santiago MD 4/17/2024  1:34 PM      Findings:   Right shoulder rotator cuff tear and glenohumeral osteoarthritis .  Complications: None.  Implants:   Implant Name Type Inv. Item Serial No.  Lot No. LRB No. Used Action   PIN STEINMANN BIOMET REV SHLDER 3.2MM 9\" THRD SS [id number] - [id number] Wire PIN STEINMANN BIOMET REV SHLDER 3.2MM 9\" THRD SS [id number]  [employer] [id number] Right 1 Used as a Supply   IMP BASEPLATE RVRS SHLDR ZIM MED AUG [id number] - [id number] Total Joint Component/Insert IMP BASEPLATE RVRS SHLDR ZIM MED AUG [id number]  [employer] [id number] Right 1 Implanted   IMP SCR LOCKING BIOM REV SHLDR 3.5 HEX [id number] 123997 - [id number] Total Joint Component/Insert IMP SCR LOCKING BIOM REV SHLDR 3.5 HEX 4.50T34UF 761119  [employer] [id number] Right 1 Implanted   IMP SCR CENTRAL BIOMET REV SHLDR 6.5X30MM 785724 - [id number] Metallic Hardware/[place] IMP SCR CENTRAL BIOMET REV SHLDR 6.5X30MM 612195  [employer] [id number] Right 1 Implanted   IMP SCR LOCKING BIOM REV SHLDR 3.5 HEX 4.67M17VX 217171 - [id number] Total Joint Component/Insert IMP SCR LOCKING BIOM REV SHLDR 3.5 HEX 4.68P05XV 252699  [employer] [id number] Right 1 Implanted   IMP SCR LOCKING BIOM REV SHLDR 3.5 HEX 4.90B86IG 448452 - [id number] Metallic Hardware/[place] IMP SCR " LOCKING BIOM REV SHLDR 3.5 HEX 4.28X87JO 802100  [employer] [id number] Right 1 Implanted   IMP SCR LOCKING BIOM REV SHLDR 3.5 HEX 4.95D20XT 299315 - [id number] Metallic Hardware/[place] IMP SCR LOCKING BIOM REV SHLDR 3.5 HEX 4.87B76ZN 974298  [employer] [id number] Right 1 Implanted   IMP GLENOSPHERE BIOM REV SHLDR 36MM STD 951409 - [id number] Total Joint Component/Insert IMP GLENOSPHERE BIOM REV SHLDR 36MM STD 697579  [employer] [id number] Right 1 Implanted   IMP STEM MINI HUMERAL BIOMET SHLDR 7MM 931053 - [id number] Total Joint Component/Insert IMP STEM MINI HUMERAL BIOMET SHLDR 7MM 858008  [employer] [id number] Right 1 Implanted   IMP BEARING HUMERAL ZIM 36MM STD PRLNG [id number] - [id number] Total Joint Component/Insert IMP BEARING HUMERAL ZIM 36MM STD PRLNG [id number]  [employer] [id number] Right 1 Implanted   IMP HUMERAL TRAY ZIM RVRS SHLDR STD [id number] - [id number] Total Joint Component/Insert IMP HUMERAL TRAY ZIM RVRS SHLDR STD [id number]  [employer] [id number] Right 1 Implanted

## 2024-04-17 NOTE — DISCHARGE INSTRUCTIONS
Total Shoulder Replacement   Discharge Instructions                             [phone]  Bone and Joint Service Line for issues or concerns      Congratulations, you have a new shoulder!  Your care team, made of nurses, therapists, doctors and others are here to help you recover and ease your discomfort.  Goals we will help you achieve before going home:  Get safely in and out of bed and chair by yourself (or with a little help from your caregiver)  Safely complete self-care activities  Safely walk  Begin your home exercise program  Urinate and move your bowels as you did before your hospital stay  Know the signs of infection   Know how to manage your pain   Be comfortable taking and managing pain medications  Eat a well-balanced diet to promote healing  Feel sure that you or your caregiver can meet your needs    Pain Management  As your body heals, you might feel a stabbing, pinching or aching pain. Treating pain is an important part of your care.    There are many ways to relieve pain. We can help you decide what works best for you. The right treatment will probably not take all of your pain away.  Your care team will help you keep your pain at a manageable level so you are able to fully participate in your therapies.  Your pain should get better each day.    Pain medicine    Here are some tips for taking pain medicine:    Ask for medicine when you need it. Don t try to  tough it out . This can make you feel worse.  Take your medicine as soon as you feel pain. Don t wait for pain to get worse before taking medicine.  Medicine doesn t work the same for everyone. If yours isn t working, tell your nurse. We can try other medicines.  Take pain medicine at least 45 minutes before therapy or other activities.    We will watch for side effects from the medicines. If you feel sick,  off  or uncomfortable after taking medicine, tell your nurse.    Ice, Movement and Relaxation    Ice packs and moving your body into a  different position (up to a chair, walking, etc.) can help with pain and body stiffness.  Using ice intermittently (15 minutes on and 15 minutes off) can help.  We will offer you ice or cold packs to use.   You can also use your mind to reduce pain.   Visualize. Imagine yourself in a nice place where you feel good. This might be at a beach, in a park or at home on your couch.   Distract yourself. Do something to take your mind off the pain. For example, talk with a friend or play a game.   Listen to music. For many people, calming music helps them feel better.   Breathe slowly. If you focus on your breathing and let other thoughts go out of your mind, your pain may slip away for a while.    Activity  At first you may not feel like doing anything at all, but movement and exercise are keys to recovery.   However, if you want to get out of bed, ask for help. Do not get out of bed by yourself while you are hospitalized.  After discharge do the exercises (if prescribed) 3-5 times per day as instructed by your physical therapist.  Your sling you can remove for showering and dressing and exercises, otherwise remain in your sling until follow up.   You can shower as long as you have a well sealed aquacel dressing (we will specify this prior to discharge). This dressing is designed to get wet in a shower.  If you have a different dressing then you just have to make sure the dressing stays dry.  We will specify in the discharge orders exact dressing and wound care directions.    What You Will Work on in Therapy    You will learn new ways to:  Get in and out of bedDo regular tasks with one arm like dressing, grooming, and bathingUse the toilet safely  Talk about equipment for your home that can make it easier for you to use the bathtub, toilet and other areas  Use special equipment that helps you do tasks with one hand  Walk and use stairs safely  Use your energy wisely so you can do more things  Exercises you will do at home  to help you recover      What to Expect once You Return Home     Exercise    Exercise can help prevent problems after surgery. It also builds your strength so you can get back to your normal routine.  Follow your therapist s directions for exercising your shoulder, elbow and wrist until your surgeon or outpatient therapist gives you new directions.  Most of these exercises can be done sitting down.  It s normal to feel sore after exercise.  If you have pain that is not helped by medicine, rest or ice packs, call your surgeon to discuss your best next steps.    Walking    Besides your home exercise program, make time each day for walking. It builds your endurance. This means you ll be able to do more without feeling as tired.    Increasing your walking also helps prevent constipation (hard stools).  It keeps your joints flexible (able to move easily), improves muscle tone and makes you feel good.  Without it, you will be weak and stiff.    Nutrition     When you leave the hospital, go back to your normal diet as soon as you can. Do not skip meals. Eat breakfast, lunch and dinner.   A well-balanced diet will help you feel good and recover quickly. Choose a range of fruits, vegetables, grains, milk products and meat. Drink plenty of fluids, too. Be sure to include:   High-fiber foods. Fiber helps keep your bowel movements regular and prevent constipation (hard stools). Good sources of fiber include whole grains, brown rice, cracked wheat (bulgur), oatmeal, popcorn, whole oats and wheat. Fruits and vegetables are also great sources of fiber.   High-calcium foods. Calcium helps build strong bones. Good sources of calcium include milk, yogurt, cheese, enriched soymilk, tofu, soups made with milk, and dark green, leafy vegetables (such as kale and chris greens).   Iron-rich foods. This mineral helps your blood carry oxygen to every part of your body. Good sources of iron include lean meats, dark turkey meat, shellfish  (such as shrimp), cooked dry beans or peas and whole grain breads.   Water. Drink 8 to 10 glasses of fluid each day. Drinking water will help relieve constipation. You will have less trouble with constipation when you stop using prescribed pain medicines.      Preventing problems    After shoulder surgery, you need to watch for infections and other problems.     Call your doctor right away if:  Your arm is cool to the touch or a gray color  You feel numbness or tingling in your arm  You get a temperature of 101 F (38 C) or higher  You have chills  The area around the incision becomes more swollen, warm, red or painful  Fluid, pus or bright red blood comes out of the incision  You have sudden, severe shoulder pain  There is a burning feeling or foul odor when you urinate (pass water when going to the toilet), or you feel like you have to go more often than usual      Driving    Your doctor will let you know when you can drive.  Many surgeons advise against driving for at least four weeks after surgery. You need to be able to operate a vehicle safely.  You cannot drive until you no longer are using prescription pain medicine.  In order to drive, you must also be able to:    Buckle your seat belt  Maneuver the steering wheel and shift gears  Open and close car door      Work/Household Activities    Ask your doctor when you can go back to work.   Remember most people cannot even drive for 2 to 4 weeks.    Two to four weeks after surgery, many people are able to do simple activities such as shopping and light housework.   At your doctor appointments, you will learn what new activities you can safely do as your shoulder heals.

## 2024-04-17 NOTE — PROGRESS NOTES
S-(situation): Patient registered to Observation. Patient arrived to room 255 via cart from PACU    B-(background): Right shoulder total reverse arthroplasty    A-(assessment): A&O x4, VSS, 3L O2 NC, denies pain. Up in chair for meal, regular diet.    R-(recommendations): Orders and observation goals reviewed with patient.    Nursing Observation criteria listed below was met:    Skin issues/needs documented:NA  Isolation needs addressed and Signage up: NA  Fall Prevention: Education given and documented: Yes  Education Assessment documented:Yes  Admission Education Documented: Yes  New medication patient education completed and documented (Possible Side Effects of Common Medications handout): Yes  OBS video/handout Reviewed & Documented: Yes  Allergies Reviewed: Yes  Medication Reconciliation Complete: Yes  Home medications if not able to send immediately home with family stored here: NA  Reminder note placed in discharge instructions of home meds: NA  Patient has discharge needs (If yes, please explain): No  Patient discharge preferences addressed and charted on white board:  Yes  Provider notified that patient has arrived to the unit: Yes

## 2024-04-18 ENCOUNTER — APPOINTMENT (OUTPATIENT)
Dept: OCCUPATIONAL THERAPY | Facility: CLINIC | Age: 79
End: 2024-04-18
Attending: ORTHOPAEDIC SURGERY
Payer: COMMERCIAL

## 2024-04-18 VITALS
SYSTOLIC BLOOD PRESSURE: 118 MMHG | TEMPERATURE: 97.7 F | DIASTOLIC BLOOD PRESSURE: 60 MMHG | BODY MASS INDEX: 50.61 KG/M2 | HEART RATE: 74 BPM | HEIGHT: 62 IN | RESPIRATION RATE: 18 BRPM | WEIGHT: 275 LBS | OXYGEN SATURATION: 91 %

## 2024-04-18 LAB
GLUCOSE BLDC GLUCOMTR-MCNC: 133 MG/DL (ref 70–99)
HGB BLD-MCNC: 11.7 G/DL (ref 11.7–15.7)
HOLD SPECIMEN: NORMAL

## 2024-04-18 PROCEDURE — 250N000011 HC RX IP 250 OP 636: Performed by: ORTHOPAEDIC SURGERY

## 2024-04-18 PROCEDURE — 85018 HEMOGLOBIN: CPT | Performed by: ORTHOPAEDIC SURGERY

## 2024-04-18 PROCEDURE — 999N000157 HC STATISTIC RCP TIME EA 10 MIN

## 2024-04-18 PROCEDURE — 82962 GLUCOSE BLOOD TEST: CPT

## 2024-04-18 PROCEDURE — 999N000156 HC STATISTIC RCP CONSULT EA 30 MIN

## 2024-04-18 PROCEDURE — 97165 OT EVAL LOW COMPLEX 30 MIN: CPT | Mod: GO

## 2024-04-18 PROCEDURE — 250N000013 HC RX MED GY IP 250 OP 250 PS 637: Performed by: ORTHOPAEDIC SURGERY

## 2024-04-18 PROCEDURE — 97110 THERAPEUTIC EXERCISES: CPT | Mod: GO

## 2024-04-18 PROCEDURE — 36415 COLL VENOUS BLD VENIPUNCTURE: CPT | Performed by: ORTHOPAEDIC SURGERY

## 2024-04-18 PROCEDURE — 999N000123 HC STATISTIC OXYGEN O2DAILY TECH TIME

## 2024-04-18 RX ADMIN — LEVOTHYROXINE SODIUM 137 MCG: 25 TABLET ORAL at 09:18

## 2024-04-18 RX ADMIN — ACETAMINOPHEN 975 MG: 325 TABLET, FILM COATED ORAL at 09:17

## 2024-04-18 RX ADMIN — ATORVASTATIN CALCIUM 40 MG: 40 TABLET, FILM COATED ORAL at 09:17

## 2024-04-18 RX ADMIN — POLYETHYLENE GLYCOL 3350 17 G: 17 POWDER, FOR SOLUTION ORAL at 09:17

## 2024-04-18 RX ADMIN — ACETAMINOPHEN 975 MG: 325 TABLET, FILM COATED ORAL at 02:54

## 2024-04-18 RX ADMIN — LISINOPRIL 20 MG: 10 TABLET ORAL at 09:18

## 2024-04-18 RX ADMIN — ASPIRIN 325 MG: 325 TABLET, COATED ORAL at 09:18

## 2024-04-18 RX ADMIN — SENNOSIDES AND DOCUSATE SODIUM 1 TABLET: 8.6; 5 TABLET ORAL at 09:19

## 2024-04-18 ASSESSMENT — ACTIVITIES OF DAILY LIVING (ADL)
ADLS_ACUITY_SCORE: 29
ADLS_ACUITY_SCORE: 29
PREVIOUS_RESPONSIBILITIES: MEAL PREP;HOUSEKEEPING;LAUNDRY;SHOPPING;MEDICATION MANAGEMENT;FINANCES
ADLS_ACUITY_SCORE: 29

## 2024-04-18 NOTE — PROGRESS NOTES
04/18/24 0900   Appointment Info   Signing Clinician's Name / Credentials (OT) Ainsley Martin, OTR/L       Present no   Living Environment   People in Home alone  (family going to stay with patient during recovery, one is coming during the day and one at night)   Current Living Arrangements apartment   Home Accessibility no concerns  (elevated)   Stair Railings, Within Home, Primary railings safe and in good condition   Transportation Anticipated family or friend will provide   Self-Care   Usual Activity Tolerance moderate   Current Activity Tolerance moderate   Regular Exercise No   Equipment Currently Used at Home cane, straight;walker, rolling;shower chair;grab bar, tub/shower;grab bar, toilet;raised toilet seat   Fall history within last six months no   Instrumental Activities of Daily Living (IADL)   Previous Responsibilities meal prep;housekeeping;laundry;shopping;medication management;finances   IADL Comments Family is going to assist with I/ADL based tasks while patient is under precautions.   General Information   Onset of Illness/Injury or Date of Surgery 04/17/24   Referring Physician Krishna Santiago MD   Patient/Family Therapy Goal Statement (OT) Home when able   Existing Precautions/Restrictions shoulder   Left Upper Extremity (Weight-bearing Status) full weight-bearing (FWB)   Right Upper Extremity (Weight-bearing Status) non weight-bearing (NWB)   Left Lower Extremity (Weight-bearing Status) full weight-bearing (FWB)   Right Lower Extremity (Weight-bearing Status) full weight-bearing (FWB)   General Observations and Info Patient has been seen s/p day 1 with repair to R  rotator cuff, patient in resting arm sling.   Cognitive Status Examination   Orientation Status orientation to person, place and time   Sensory   Sensory Comments Nerve block in place during OT evaluation, starting to get feeling in finger tips.   Posture   Posture forward head position   Range of Motion  Comprehensive   Comment, General Range of Motion LUE WFL; RUE needing assistance for bicep curls d/t nerve block. Able to complete wrist bends and finger   Strength Comprehensive (MMT)   Comment, General Manual Muscle Testing (MMT) Assessment LUE WFL; RUE not tested   Muscle Tone Assessment   Muscle Tone Quick Adds No deficits were identified   Bed Mobility   Comment (Bed Mobility) Understanding of precautions for no wbing when getting out of bed.   Transfers   Transfer Comments IND with transfers using cane from home, ambulating in room at baseline functioning   Balance   Balance Comments no LOB with ambulation   Activities of Daily Living   BADL Assessment/Intervention upper body dressing;lower body dressing;bathing;grooming;feeding   Bathing Assessment/Intervention   Comment, (Bathing) Has shower chair at home and family going to provide assistance as needed in home setting per patient report   Upper Body Dressing Assessment/Training   Comment, (Upper Body Dressing) Reviewed one handed dressing with handout provided, patient reporting completing one handed dressing prior to surgery as well so she is familiar with sequence to complete. Patient has reacher to utilize in home setting   Lower Body Dressing Assessment/Training   Comment, (Lower Body Dressing) Reviewed one handed lb dressing with patient, able to use reacher and sock aid and shoe horn in home setting. Patient has equipment and reviewed use WFL. Reporting using one handed LB dressing at home prior to surgery. Will also have family assistance if needed   Grooming Assessment/Training   Comment, (Grooming) One handed grooming with L hand, WFL   Eating/Self Feeding   Comment, (Feeding) Dicussed ability to self-feed with RUE using bicep only, verbalized understanding, unable to complete at this time due to nerve block in place. Used L hand this morning for breakfast.   Clinical Impression   Criteria for Skilled Therapeutic Interventions Met (OT) Yes,  treatment indicated   Influenced by the following impairments s/p day 1 post-op   ADL comments/analysis Refer above   Intervention Comments Evaluation only   Clinical Decision Making Complexity (OT) problem focused assessment/low complexity   Risk & Benefits of therapy have been explained evaluation/treatment results reviewed;care plan/treatment goals reviewed;risks/benefits reviewed;participants voiced agreement with care plan;current/potential barriers reviewed;participants included;patient   Clinical Impression Comments Patient presenting with s/s in alignment with s/p day 1 for R shoulder arthoplasty, nerve block still in place upon OT evaluation but patient able to engage in directed task well. Starting to get feeling back in fingers/wrist. Completing post-op exercises WFL and familiar with ex's from previous surgery. No concerns for discharge for ADLs and patient already using one-handed dressing skills and AE prior to surgery. Patient   OT Total Evaluation Time   OT Eval, Low Complexity Minutes (29516) 65   Interventions   Interventions Quick Adds Therapeutic Procedures/Exercise   Therapeutic Procedures/Exercise   Therapeutic Procedure: strength, endurance, ROM, flexibility minutes (81874) 25   Symptoms Noted During/After Treatment none   Treatment Detail/Skilled Intervention OT: reviewed post-op UB exercises to complete after discharge, patient familiar with exercises. Reviewed AROM through forarm, wrist and digits, and fist pumps hourly. Utilized foam block for fist pumps today. Completed Codmans ex with circles CW and CCW and side to side. Completed 30 second ex and WFL. Min verbal prompts for form only.   OT Discharge Planning   OT Plan Eval only   OT Discharge Recommendation (DC Rec) home with assist  (as needed)   OT Rationale for DC Rec Patient from home alone, family assisting patient upon discharge for IADLs and self-care management as needed for patient support. Patient as appropriate AE in place in  home setting including dressing AE (reacher, sock aid, shoe horn) and AE in the bathroom for set-up. Patient MOD IND dressing and ambulating SBA using cane. Engaged in post-op exercises WFL. Aware of precautions for discharge. Anticipated one medically stable able to return home with family assistance as needed.   OT Brief overview of current status engaged in post-op ex's WFL, appropriate sling management. AE supports in home WFL, reviewed one handed dressing and ambulating in room SBA with cane from home. No cognitive concerns present.   Total Session Time   Timed Code Treatment Minutes 25   Total Session Time (sum of timed and untimed services) 90         Thank you for the referral.   ELIANA De Los Santos/L   [facility]    Email: [email or web address]  Phone: [phone]

## 2024-04-18 NOTE — PROGRESS NOTES
"ORTHO PROGRESS NOTE    POD # 1  Procedure(s):  ARTHROPLASTY, RIGHT SHOULDER, TOTAL, REVERSE - on 2024    Pain:  mild    /60 (BP Location: Left arm)   Pulse 74   Temp 97.7  F (36.5  C) (Oral)   Resp 18   Ht 1.568 m (5' 1.73\")   Wt 124.7 kg (275 lb)   SpO2 91%   BMI 50.74 kg/m      Temp (24hrs), Av.5  F (36.9  C), Min:97.7  F (36.5  C), Max:99.3  F (37.4  C)      Recent Labs   Lab Test 24  0602 24  0954 23  0629   HGB 11.7 12.7 9.9*               Circulation intact.   Sensation intact.   Calves soft and nontender.   Incision is covered      PT  passed ambulation and Codman's         ASSESSMENT:  right reverse total shoulder arthroplasty doing well.    PLAN:  Discharge today.  Aspirin: Take 1 tablet (325 mg) by mouth daily for blood thinning for 4 weeks.   Tylenol 1000 mg three times daily.  Oxycodone as needed for pain.  Use immobilizer for 4 weeks.  It may be off for eating, dressing, dangling exercises.  Do not hold arms wider than shoulder width.  Do not reach behind your back.  Do not push up out of chairs or bed with operative arm.    Krishna Santiago M.D.  Department of Orthopaedic Surgery  [facility]  Pager: [phone]      "

## 2024-04-18 NOTE — DISCHARGE SUMMARY
"DISCHARGE SUMMARY    Primary MD: Verena Vee    ADMIT DATE: 4/17/2024  DISCHARGE DATE: 4/18/2024    DISCHARGE DIAGNOSIS:  right shoulder cuff tear arthropathy..    PROCEDURE:  right reverse total shoulder arthroplasty.    HISTORY:  Gina Yeh is a 79 year old female with severe osteoarthritis of the right shoulder with failed rotator cuff repair .  She has failed conservative treatment and presents for total shoulder arthroplasty .    HOSPITAL COURSE:  On 4/17/2024 Ms. Yeh underwent right reverse total shoulder arthroplasty without complications. Her hemoglobin dropped to a low of 11.7.    Her progress with physical therapy was very good .  She is to do pendulum exercises four times daily .  She is discharged on POD # 1 with wound clean without drainage.  She will start ASA for blood thinning.  For pain control A prescription for oxycodone  was written.  RTC 1 1/2 weeks.  sling should be worn as needed..      Recent Labs   Lab Test 04/18/24  0602 03/27/24  0954 09/21/23  0629 09/13/23  1008 06/19/23  1241 02/16/23  0536 02/06/23  0933 02/22/22  1053 06/28/21  1553   HGB 11.7 12.7 9.9*   < > 12.7   < > 12.2   < > 14.1   PLT  --   --   --   --  271  --  279  --  294    < > = values in this interval not displayed.     Recent Labs   Lab Test 03/27/24  0954 09/21/23  0629 09/15/23  1505 09/13/23  1008   POTASSIUM 4.9 5.2 5.3 5.4*   BUN 22.4 36.4*  --  19.9   CR 0.78 1.12*  --  0.78     No results for input(s): \"INR\" in the last 54936 hours.    Allergies: Nkda [no known drug allergy]            Discharge Medications:     Current Discharge Medication List        START taking these medications    Details   acetaminophen (TYLENOL) 325 MG tablet Take 2 tablets (650 mg) by mouth every 4 hours as needed for other (mild pain)    Associated Diagnoses: S/P reverse total shoulder arthroplasty, right      aspirin (ASA) 325 MG EC tablet Take 1 tablet (325 mg) by mouth daily    Associated Diagnoses: S/P reverse " total shoulder arthroplasty, right      oxyCODONE (ROXICODONE) 5 MG tablet Take 1-2 tablets (5-10 mg) by mouth every 4 hours as needed for moderate to severe pain  Qty: 26 tablet, Refills: 0    Associated Diagnoses: S/P reverse total shoulder arthroplasty, right      senna-docusate (SENOKOT-S/PERICOLACE) 8.6-50 MG tablet Take 1-2 tablets by mouth 2 times daily Take while on oral narcotics to prevent or treat constipation.    Comments: While taking narcotics  Associated Diagnoses: S/P reverse total shoulder arthroplasty, right           CONTINUE these medications which have NOT CHANGED    Details   atorvastatin (LIPITOR) 40 MG tablet TAKE 1 TABLET DAILY  Qty: 90 tablet, Refills: 3    Associated Diagnoses: Hyperlipidemia with target LDL less than 130      latanoprost (XALATAN) 0.005 % ophthalmic solution Place 1 drop into both eyes daily      levothyroxine (SYNTHROID/LEVOTHROID) 137 MCG tablet TAKE 1 TABLET DAILY  Qty: 90 tablet, Refills: 3    Associated Diagnoses: Acquired hypothyroidism      lisinopril (ZESTRIL) 20 MG tablet TAKE 1 TABLET DAILY  Qty: 90 tablet, Refills: 3    Associated Diagnoses: Hypertension goal BP (blood pressure) < 140/90      OMEGA-3 KRILL OIL PO       STIOLTO RESPIMAT 2.5-2.5 MCG/ACT AERS Inhale 2 puffs into the lungs daily      Vitamin D, Cholecalciferol, 1000 units CAPS Take 1 capsule by mouth daily    Associated Diagnoses: Vitamin D insufficiency      Ferrous Sulfate (IRON) 325 (65 FE) MG tablet Take 1 tablet by mouth daily      triamcinolone (KENALOG) 0.1 % external ointment Apply topically 2 times daily When legs have redness  Qty: 80 g, Refills: 2    Associated Diagnoses: Stasis dermatitis of both legs             Krishna Santiago M.D.  Department of Orthopaedic Surgery  [facility]        Cc: Verena Vee

## 2024-04-18 NOTE — PROGRESS NOTES
S-(situation): Patient discharged to home via wheelchair with daughter     B-(background): right rotator cuff surgery    A-(assessment): vss, denies pain, ambulating and eating well    R-(recommendations): Discharge instructions reviewed with patient. Listed belongings gathered and returned to patient.           Discharge Nursing Criteria:   Patient Education given and documented: (STROKE, CHF, Diabetes):  { NA    Care Plan and Patient education resolved: Yes    New Medications- pt has been educated about purpose and side effects: Yes    Vaccines  Influenza status verified at discharge:  Yes      MISC  Prescriptions if needed, hard copies sent with patient  NA  Home medications returned to patient: NA  Medication Bin checked and emptied on discharge Yes  Patient reports post-discharge pain management plan is effective: Yes    Check [employer] and DELETE THIS ROW.

## 2024-04-18 NOTE — ANESTHESIA POSTPROCEDURE EVALUATION
Patient: Gina Yeh    Procedure: Procedure(s):  ARTHROPLASTY, RIGHT SHOULDER, TOTAL, REVERSE       Anesthesia Type:  General    Note:  Disposition: Outpatient   Postop Pain Control: Uneventful            Sign Out: Well controlled pain   PONV: No   Neuro/Psych: Uneventful            Sign Out: Acceptable/Baseline neuro status   Airway/Respiratory: Uneventful            Sign Out: Acceptable/Baseline resp. status   CV/Hemodynamics: Uneventful            Sign Out: Acceptable CV status; No obvious hypovolemia; No obvious fluid overload   Other NRE: NONE   DID A NON-ROUTINE EVENT OCCUR? No    Event details/Postop Comments:  Pt set to discharge home this afternoon. Pain well controlled.           Last vitals:  Vitals Value Taken Time   /51 04/17/24 1645   Temp 98.9  F (37.2  C) 04/17/24 1645   Pulse 92 04/17/24 1659   Resp 29 04/17/24 1659   SpO2 92 % 04/17/24 1659   Vitals shown include unfiled device data.    Electronically Signed By: ARY Fraire CRNA  April 18, 2024  2:33 PM

## 2024-04-18 NOTE — PROGRESS NOTES
Patient vital signs are at baseline: Yes  Patient able to ambulate as they were prior to admission or with assist devices provided by therapies during their stay:  No,  Reason:  arm sling on.  Patient MUST void prior to discharge:  Yes  Patient able to tolerate oral intake:  Yes  Pain has adequate pain control using Oral analgesics:  Yes  Does patient have an identified :  Yes  Has goal D/C date and time been discussed with patient:  Yes

## 2024-04-20 ASSESSMENT — SLEEP AND FATIGUE QUESTIONNAIRES
HOW LIKELY ARE YOU TO NOD OFF OR FALL ASLEEP IN A CAR, WHILE STOPPED FOR A FEW MINUTES IN TRAFFIC: WOULD NEVER DOZE
HOW LIKELY ARE YOU TO NOD OFF OR FALL ASLEEP WHEN YOU ARE A PASSENGER IN A CAR FOR AN HOUR WITHOUT A BREAK: WOULD NEVER DOZE
HOW LIKELY ARE YOU TO NOD OFF OR FALL ASLEEP WHILE SITTING AND TALKING TO SOMEONE: WOULD NEVER DOZE
HOW LIKELY ARE YOU TO NOD OFF OR FALL ASLEEP WHILE SITTING AND READING: SLIGHT CHANCE OF DOZING
HOW LIKELY ARE YOU TO NOD OFF OR FALL ASLEEP WHILE WATCHING TV: SLIGHT CHANCE OF DOZING
HOW LIKELY ARE YOU TO NOD OFF OR FALL ASLEEP WHILE LYING DOWN TO REST IN THE AFTERNOON WHEN CIRCUMSTANCES PERMIT: HIGH CHANCE OF DOZING
HOW LIKELY ARE YOU TO NOD OFF OR FALL ASLEEP WHILE SITTING QUIETLY AFTER LUNCH WITHOUT ALCOHOL: WOULD NEVER DOZE
HOW LIKELY ARE YOU TO NOD OFF OR FALL ASLEEP WHILE SITTING INACTIVE IN A PUBLIC PLACE: WOULD NEVER DOZE

## 2024-04-23 ENCOUNTER — VIRTUAL VISIT (OUTPATIENT)
Dept: SLEEP MEDICINE | Facility: CLINIC | Age: 79
End: 2024-04-23
Payer: COMMERCIAL

## 2024-04-23 VITALS — HEIGHT: 61 IN | WEIGHT: 275 LBS | BODY MASS INDEX: 51.92 KG/M2

## 2024-04-23 DIAGNOSIS — G47.33 OSA (OBSTRUCTIVE SLEEP APNEA): Primary | ICD-10-CM

## 2024-04-23 PROCEDURE — 99213 OFFICE O/P EST LOW 20 MIN: CPT | Mod: 95 | Performed by: PHYSICIAN ASSISTANT

## 2024-04-23 NOTE — PROGRESS NOTES
Virtual Visit Details    Type of service:  Video Visit   Video Start Time: 10:22 AM  Video End Time:10:35 AM    Originating Location (pt. Location): Home    Distant Location (provider location):  On-site  Platform used for Video Visit: [employer] Figueroa phone    Sleep Apnea - Follow-up Visit:    Impression/Plan:  Severe obstructive sleep apnea-  Excellent CPAP compliance and AHI is well controlled on CPAP 7-12 cm/H20. Daytime symptoms are improved.   Continue current treatment.   Comprehensive DME order placed.     Gina ORTIZ Rao will follow up in about 1 year or sooner if any concerns      20 minutes spent on day of encounter doing chart review, history and exam, counseling, coordinating plan of care, documentation and further activities as noted above.       Yazmin Jack PA-C  Sleep Medicine      History of Present Illness:  Chief Complaint   Patient presents with    RECHECK       Gina Yeh presents for follow-up of their severe sleep apnea, managed with CPAP.     Initially presented with snoring, snort arousals, witnessed apneas, enlarged neck girth >= 40 cm for female, and obesity with excessive daytime sleepiness. RLS.     Diagnostic Study 9/12/2017 - (271.0 lbs) AHI 78.7, RDI 91.3, Supine AHI 73.5, REM AHI -, Low O2 79.5%, Time Spent less than 88% 22.9 minutes.    Titration Study: 9/26/2017- (271.0 lbs) The patient was titrated at pressures ranging from 5 cmH2O up to 8 cmH2O. The optimal pressure achieved was 8 cmH2O with a residual AHI of 1.1 events per hour. Time in REM supine on final pressure was 22.5 minutes. PLM index was 25.6 movements per hour. The PLM Arousal Index was 8.0 per hour.    [facility] medical resmed    Do you use a CPAP Machine at home: Yes  Overall, on a scale of 0-10 how would you rate your CPAP (0 poor, 10 great): 6    What type of mask do you use: Nasal Mask  Is your mask comfortable: Yes  If not, why:      Is your mask leaking: No  If yes, where do you feel it:    How  many night per week does the mask leak (0-7):      Do you notice snoring with mask on: No  Do you notice gasping arousals with mask on: No  Are you having significant oral or nasal dryness: No  Is the pressure setting comfortable: Yes  If not, why:      What is your typical bedtime: 9:30p  How long does it take you to go to sleep on PAP therapy: Few minutes  What time do you typically get out of bed for the day: 5;30-6a  How many hours on average per night are you using PAP therapy: 8  How many hours are you sleeping per night: 6-7  Do you feel well rested in the morning: Yes      ResMed   Auto-PAP 7.0 - 12.0 cmH2O 30 day usage data:    100% of days with > 4 hours of use. 0/30 days with no use.   Average use 462 minutes per day.   95%ile Leak 10.37 L/min.   CPAP 95% pressure 11.6 cm.   AHI 0.71 events per hour.       EPWORTH SLEEPINESS SCALE         4/20/2024     1:14 PM    Bendena Sleepiness Scale ( CONNER Rich  [id number]<br>ESS - [place]/English - Final version - 21 Nov 07 - [facility] [place].)   Sitting and reading Slight chance of dozing   Watching TV Slight chance of dozing   Sitting, inactive in a public place (e.g. a theatre or a meeting) Would never doze   As a passenger in a car for an hour without a break Would never doze   Lying down to rest in the afternoon when circumstances permit High chance of dozing   Sitting and talking to someone Would never doze   Sitting quietly after a lunch without alcohol Would never doze   In a car, while stopped for a few minutes in traffic Would never doze   Bendena Score (MC) 5   Bendena Score (Sleep) 5       INSOMNIA SEVERITY INDEX (JONNY)          4/20/2024     1:09 PM   Insomnia Severity Index (JONNY)   Difficulty falling asleep 1   Difficulty staying asleep 2   Problems waking up too early 2   How SATISFIED/DISSATISFIED are you with your CURRENT sleep pattern? 2   How NOTICEABLE to others do you think your sleep problem is in terms of impairing the quality of your life?  1   How WORRIED/DISTRESSED are you about your current sleep problem? 0   To what extent do you consider your sleep problem to INTERFERE with your daily functioning (e.g. daytime fatigue, mood, ability to function at work/daily chores, concentration, memory, mood, etc.) CURRENTLY? 0   JONNY Total Score 8       Guidelines for Scoring/Interpretation:  Total score categories:  0-7 = No clinically significant insomnia   8-14 = Subthreshold insomnia   15-21 = Clinical insomnia (moderate severity)  22-28 = Clinical insomnia (severe)  Used via courtesy of www.[employer].va.gov with permission from Mac Naik PhD., [facility]        Past medical/surgical history, family history, social history, medications and allergies were reviewed.        Problem List:  Patient Active Problem List    Diagnosis Date Noted    Hypertension goal BP (blood pressure) < 140/90      Priority: High    Hyperlipidemia with target LDL less than 130      Priority: High    H/O total shoulder replacement, right 04/17/2024     Priority: Medium    S/P reverse total shoulder arthroplasty, right 03/29/2024     Priority: Medium    Urge incontinence of urine 03/27/2024     Priority: Medium    Rotator cuff tear arthropathy of right shoulder 01/22/2024     Priority: Medium    History of total left knee replacement 10/16/2023     Priority: Medium    History of right knee joint replacement 09/11/2023     Priority: Medium    Degenerative joint disease of hand, left 02/06/2023     Priority: Medium    Female stress incontinence 02/22/2022     Priority: Medium    Ulnar neuropathy at elbow, left      Priority: Medium    COPD (chronic obstructive pulmonary disease) (H)      Priority: Medium    Sensorineural hearing loss      Priority: Medium    JOSE (obstructive sleep apnea) 07/24/2015     Priority: Medium     Diagnostic Study 9/12/2017 - (271.0 lbs) AHI 78.7, RDI 91.3, Supine AHI 73.5, REM AHI -, Low O2 79.5%, Time Spent ?88% 22.9 minutes.  Titration Study:  "9/26/2017- (271.0 lbs) The patient was titrated at pressures ranging from 5 cmH2O up to 8 cmH2O.  The optimal pressure achieved was 8 cmH2O with a residual AHI of 1.1 events per hour.  Time in REM supine on final pressure was 22.5 minutes.  PLM index was 25.6 movements per hour.  The PLM Arousal Index was 8.0 per hour.      Morbidly obese (H) 05/29/2012     Priority: Medium     Bariatric surgery consult offered      Impaired glucose tolerance 05/29/2012     Priority: Medium    Hypothyroid      Priority: Medium        Ht 1.549 m (5' 1\")   Wt 124.7 kg (275 lb)   BMI 51.96 kg/m     "

## 2024-04-23 NOTE — NURSING NOTE
Is the patient currently in the state of [place]? YES    Visit mode:VIDEO    If the visit is dropped, the patient can be reconnected by: VIDEO VISIT: Text to cell phone:   Telephone Information:   Mobile [phone]       Will anyone else be joining the visit? NO  (If patient encounters technical issues they should call [phone]    How would you like to obtain your AVS? MyChart    Are changes needed to the allergy or medication list? No    Are refills needed on medications prescribed by this physician? NO    Reason for visit: RECHSHILPA HAMLIN

## 2024-04-23 NOTE — PATIENT INSTRUCTIONS
Your Body mass index is 51.96 kg/m .  Weight management is a personal decision.  If you are interested in exploring weight loss strategies, the following discussion covers the approaches that may be successful. Body mass index (BMI) is one way to tell whether you are at a healthy weight, overweight, or obese. It measures your weight in relation to your height.  A BMI of 18.5 to 24.9 is in the healthy range. A person with a BMI of 25 to 29.9 is considered overweight, and someone with a BMI of 30 or greater is considered obese. More than two-thirds of American adults are considered overweight or obese.  Being overweight or obese increases the risk for further weight gain. Excess weight may lead to heart disease and diabetes.  Creating and following plans for healthy eating and physical activity may help you improve your health.  Weight control is part of healthy lifestyle and includes exercise, emotional health, and healthy eating habits. Careful eating habits lifelong are the mainstay of weight control. Though there are significant health benefits from weight loss, long-term weight loss with diet alone may be very difficult to achieve- studies show long-term success with dietary management in less than 10% of people. Attaining a healthy weight may be especially difficult to achieve in those with severe obesity. In some cases, medications, devices and surgical management might be considered.  What can you do?  If you are overweight or obese and are interested in methods for weight loss, you should discuss this with your provider.   Consider reducing daily calorie intake by 500 calories.   Keep a food journal.   Avoiding skipping meals, consider cutting portions instead.    Diet combined with exercise helps maintain muscle while optimizing fat loss. Strength training is particularly important for building and maintaining muscle mass. Exercise helps reduce stress, increase energy, and improves fitness. Increasing  exercise without diet control, however, may not burn enough calories to loose weight.     Start walking three days a week 10-20 minutes at a time  Work towards walking thirty minutes five days a week   Eventually, increase the speed of your walking for 1-2 minutes at time    In addition, we recommend that you review healthy lifestyles and methods for weight loss available through the National Institutes of Health patient information sites:  http://win.niddk.nih.gov/publications/index.htm    And look into health and wellness programs that may be available through your health insurance provider, employer, local community center, or bebeto club.

## 2024-05-06 ENCOUNTER — OFFICE VISIT (OUTPATIENT)
Dept: ORTHOPEDICS | Facility: CLINIC | Age: 79
End: 2024-05-06
Payer: COMMERCIAL

## 2024-05-06 ENCOUNTER — ANCILLARY PROCEDURE (OUTPATIENT)
Dept: GENERAL RADIOLOGY | Facility: CLINIC | Age: 79
End: 2024-05-06
Attending: ORTHOPAEDIC SURGERY
Payer: COMMERCIAL

## 2024-05-06 VITALS
DIASTOLIC BLOOD PRESSURE: 76 MMHG | BODY MASS INDEX: 50.61 KG/M2 | WEIGHT: 275 LBS | SYSTOLIC BLOOD PRESSURE: 139 MMHG | HEART RATE: 70 BPM | HEIGHT: 62 IN | RESPIRATION RATE: 18 BRPM

## 2024-05-06 DIAGNOSIS — Z96.611 S/P REVERSE TOTAL SHOULDER ARTHROPLASTY, RIGHT: ICD-10-CM

## 2024-05-06 DIAGNOSIS — Z96.611 S/P REVERSE TOTAL SHOULDER ARTHROPLASTY, RIGHT: Primary | ICD-10-CM

## 2024-05-06 PROCEDURE — 73030 X-RAY EXAM OF SHOULDER: CPT | Mod: TC | Performed by: RADIOLOGY

## 2024-05-06 PROCEDURE — 99024 POSTOP FOLLOW-UP VISIT: CPT | Performed by: ORTHOPAEDIC SURGERY

## 2024-05-06 ASSESSMENT — ACTIVITIES OF DAILY LIVING (ADL)
WASHING_YOUR_HAIR?: 7
AT_ITS_WORST?: 1
PUTTING_ON_A_SHIRT_THAT_BUTTONS_DOWN_THE_FRONT: 0
PUTTING_ON_AN_UNDERSHIRT_OR_A_PULLOVER_SWEATER: 0
PLEASE_INDICATE_YOR_PRIMARY_REASON_FOR_REFERRAL_TO_THERAPY:: SHOULDER
PUTTING_ON_YOUR_PANTS: 5

## 2024-05-06 NOTE — PATIENT INSTRUCTIONS
Start Physical Therapy for active and passive range of motion.  Limit external rotation to 0 degrees.  No resistive internal rotation.  Do not reach behind your back for another 2 weeks.  Do not push up out of chairs and bed for 4 weeks.  Start scar massage with vitamin-E cream.   Use aspirin 2 more weeks.  Return to clinic 4 weeks.

## 2024-05-06 NOTE — PROGRESS NOTES
Follow up right reverse total shoulder arthroplasty on 4/17/24.  Her subscapularis was very tight and initial sutures tore out.  She complains of pain at deltoid insertion.  She is using a sling.  Is doing pendulum exercises.  Wound is healing well.  Sensation, circulation and motor are intact.  Xray shows good position of shoulder prosthesis.      Assessment:  right reverse total shoulder arthroplasty healing well.  Plan:  Start Physical Therapy for active and passive range of motion.  Limit external rotation to 0 degrees.  No resistive internal rotation.  Do not reach behind your back for another 2 weeks.  Do not push up out of chairs and bed for 4 weeks.  Start scar massage with vitamin-E cream.   Use aspirin 2 more weeks.  Return to clinic 4 weeks.  Medication renewal:  None # .

## 2024-05-06 NOTE — LETTER
5/6/2024         RE: Gina Yeh  [address]  [place] [address]        Dear Colleague,    Thank you for referring your patient, Gina Yeh, to the [facility] [place]. Please see a copy of my visit note below.    Follow up right reverse total shoulder arthroplasty on 4/17/24.  She is using a sling.  Is doing pendulum exercises.  Wound is healing well.  Sensation, circulation and motor are intact.  Xray shows good position of shoulder prosthesis.      Assessment:  right reverse total shoulder arthroplasty healing well.  Plan:  Start Physical Therapy for active and passive range of motion.  Limit external rotation to 0 degrees.  No resistive internal rotation.  Do not reach behind your back for another 2 weeks.  Do not push up out of chairs and bed for 4 weeks.  Start scar massage with vitamin-E cream.   Use aspirin 2 more weeks.  Return to clinic 4 weeks.  Medication renewal:  None # .       Again, thank you for allowing me to participate in the care of your patient.        Sincerely,        Krishna Santiago MD

## 2024-05-08 ENCOUNTER — THERAPY VISIT (OUTPATIENT)
Dept: PHYSICAL THERAPY | Facility: CLINIC | Age: 79
End: 2024-05-08
Payer: COMMERCIAL

## 2024-05-08 DIAGNOSIS — Z96.611 S/P REVERSE TOTAL SHOULDER ARTHROPLASTY, RIGHT: Primary | ICD-10-CM

## 2024-05-08 PROCEDURE — 97161 PT EVAL LOW COMPLEX 20 MIN: CPT | Mod: GP | Performed by: PHYSICAL THERAPIST

## 2024-05-08 PROCEDURE — 97110 THERAPEUTIC EXERCISES: CPT | Mod: GP | Performed by: PHYSICAL THERAPIST

## 2024-05-08 NOTE — PROGRESS NOTES
PHYSICAL THERAPY EVALUATION  Type of Visit: Evaluation    See electronic medical record for Abuse and Falls Screening details.    Subjective       Presenting condition or subjective complaint: Shoulder surgery needs therapy  Date of onset: 04/17/24 (date of surgery)    Relevant medical history: Bladder or bowel problems; COPD; Hearing problems; High blood pressure; Osteoarthritis; Overweight; Thyroid problems; Vision problems   Dates & types of surgery: Tonsillectomy 1952, Ovarectomy 2000, rotator cuff 2021, knee replacement feb 2023,  teeth implants may 2023,  knee replacement sept 2023, reverse shoulder joint april 2024    Prior diagnostic imaging/testing results: CT scan; X-ray     Prior therapy history for the same diagnosis, illness or injury: No      Pt presents to therapy today s/p right reverse total shoulder arthroplasty performed by Dr. Santiago on 4/17/24, 3 weeks ago. Since then patient has been compliant with sling use as instructed and has been working on pendulums.  Does reports some numbness into her middle finger since surgery.  No longer requires pain medications.  Has not needed to rely much on icing but does on occasion.      Prior Level of Function  Transfers: Independent  Ambulation: Independent  ADL: Independent  IADL: Driving, Finances, Housekeeping, Laundry, Meal preparation    Living Environment  Social support: Alone   Type of home: Apartment/condo   Stairs to enter the home: No       Ramp: No   Stairs inside the home: No       Help at home: None  Equipment owned: Straight Cane; Walker with wheels; Bedrail; Raised toilet seat; Bath bench; Lift chair     Employment: No    Hobbies/Interests:      Patient goals for therapy: Be able to get range of motion back    Pain assessment: Pain present     Objective   SHOULDER EVALUATION  PAIN: Pain Level at Rest: 0/10  Pain Level with Use: 3/10  Pain Location: shoulder  Pain Quality: Aching  Pain Frequency: intermittent  Pain is Worst: daytime  Pain is  Exacerbated By: sleeping, certain positions   Pain is Relieved By: cold, otc medications, and rest  Pain Progression: Improved  INTEGUMENTARY (edema, incisions):  incision healing well with no obvious signs of infection nor abnormal scarring. Betty-incisional tissue mobility is quite good. No redness or tenderness  POSTURE: Sitting Posture: Rounded shoulders, Forward head  GAIT:   Weightbearing Status: NWB  Assistive Device(s): Cane (single end)  Gait Deviations:   BALANCE/PROPRIOCEPTION:   WEIGHTBEARING ALIGNMENT:   ROM:   (Degrees) Left AROM Left PROM Right AROM  Right PROM   Shoulder Flexion    130   Shoulder Extension    0   Shoulder Abduction    82   Shoulder Adduction       Shoulder Internal Rotation    To stomach   Shoulder External Rotation    Able to get to 0/neutral today, per precautions not taken further than this   Shoulder Horizontal Abduction       Shoulder Horizontal Adduction       Shoulder Flexion ER    WNL   Shoulder Flexion IR    WNL   Elbow Extension       Elbow Flexion       Pain:   End feel:     STRENGTH:  well maintained through hand, wrist, elbow, not assessed for shoulder today   FLEXIBILITY:   SPECIAL TESTS:   PALPATION:   + Tenderness At Location Left Right   Clavicle     Sternoclavicular     Acromioclavicular     Biceps     Triceps     Supraspinatus     Infraspinatus     Teres minor     Subscapularis     Deltoid  +   Levator     Rhomboids     Upper trap  +   Incisional     Bicipital groove       JOINT MOBILITY:   CERVICAL SCREEN: WNL    Assessment & Plan   CLINICAL IMPRESSIONS  Medical Diagnosis: S/P reverse total shoulder arthroplasty, right    Treatment Diagnosis: S/P reverse total shoulder arthroplasty, right   Impression/Assessment: Patient is a 79 year old female with right shoulder complaints.  The following significant findings have been identified: Pain, Decreased ROM/flexibility, Decreased strength, Impaired muscle performance, Decreased activity tolerance, and Impaired posture.  These impairments interfere with their ability to perform self care tasks, recreational activities, and household chores as compared to previous level of function.     Clinical Decision Making (Complexity):  Clinical Presentation: Stable/Uncomplicated  Clinical Presentation Rationale: based on medical and personal factors listed in PT evaluation  Clinical Decision Making (Complexity): Low complexity    PLAN OF CARE  Treatment Interventions:  Modalities: Cryotherapy, Dry Needling, E-stim, Hot Pack, Ultrasound, Vasoneumatic Device  Interventions: Manual Therapy, Neuromuscular Re-education, Therapeutic Activity, Therapeutic Exercise, Self-Care/Home Management    Long Term Goals     PT Goal 1  Goal Identifier: goal 1  Goal Description: pt will display AROM shoulder flexion to 120 once restrictions lifted to allow for functional shoulder mobility at or near shoulder height  Rationale: to maximize safety and independence with performance of ADLs and functional tasks;to maximize safety and independence within the home;to maximize safety and independence within the community;to maximize safety and independence with transportation;to maximize safety and independence with self cares  Target Date: 07/17/24  PT Goal 2  Goal Identifier: goal 2  Goal Description: Pt will report 0/10 pain with performance of ADL's  Rationale: to maximize safety and independence with performance of ADLs and functional tasks;to maximize safety and independence within the home;to maximize safety and independence within the community;to maximize safety and independence with transportation;to maximize safety and independence with self cares  Target Date: 07/31/24      Frequency of Treatment: 1-2x/week  Duration of Treatment: 12 weeks    Recommended Referrals to Other Professionals:   Education Assessment:   Learner/Method: Patient;Demonstration;Pictures/Video;No Barriers to Learning    Risks and benefits of evaluation/treatment have been explained.    Patient/Family/caregiver agrees with Plan of Care.     Evaluation Time:     PT Rajan Low Complexity Minutes (50723): 10   Present: Not applicable     Signing Clinician: Wan Rogers PT      [facility]                                                                                   OUTPATIENT PHYSICAL THERAPY      PLAN OF TREATMENT FOR OUTPATIENT REHABILITATION   Patient's Last Name, First Name, Gina Lopez YOB: 1945   Provider's Name   [facility]   Medical Record No.  [id number]     Onset Date: 04/17/24 (date of surgery)  Start of Care Date: 05/08/24     Medical Diagnosis:  S/P reverse total shoulder arthroplasty, right      PT Treatment Diagnosis:  S/P reverse total shoulder arthroplasty, right Plan of Treatment  Frequency/Duration: 1-2x/week/ 12 weeks    Certification date from 05/08/24 to 07/31/24         See note for plan of treatment details and functional goals     Wan Rogers, PT                         I CERTIFY THE NEED FOR THESE SERVICES FURNISHED UNDER        THIS PLAN OF TREATMENT AND WHILE UNDER MY CARE     (Physician attestation of this document indicates review and certification of the therapy plan).              Referring Provider:  Krishna Santiago    Initial Assessment  See Epic Evaluation- Start of Care Date: 05/08/24

## 2024-05-08 NOTE — PROGRESS NOTES
Patient vital signs are at baseline: Yes  Patient able to ambulate as they were prior to admission or with assist devices provided by therapies during their stay:  Yes  Patient MUST void prior to discharge:  Yes  Patient able to tolerate oral intake:  Yes  Pain has adequate pain control using Oral analgesics:  Yes  Does patient have an identified :  Yes  Has goal D/C date and time been discussed with patient:  Yes     Pt A&O x4, VSS on RA. Ambulated 280 feet during overnight shift and has had adequate urinary output. Small amount of drainage in wound vac. Tube dislodged from vac x2. Nurse inserted and taped more securely. Pt unable to sleep well but stated not in pain. At 0630 c/o mild pain in knee at 2/10.   - Pt w/ hx of severe pHTN, RVSP 68 on 10/2022  - Repeat 4/12/24 at [facility] noted PAP 28, "normal PAP"   - Maintain euvolemia, as above  - c/w sildenafil 20 mg TID, currently tolerating

## 2024-05-15 ENCOUNTER — THERAPY VISIT (OUTPATIENT)
Dept: PHYSICAL THERAPY | Facility: CLINIC | Age: 79
End: 2024-05-15
Payer: COMMERCIAL

## 2024-05-15 DIAGNOSIS — Z96.611 S/P REVERSE TOTAL SHOULDER ARTHROPLASTY, RIGHT: Primary | ICD-10-CM

## 2024-05-15 PROCEDURE — 97110 THERAPEUTIC EXERCISES: CPT | Mod: GP | Performed by: PHYSICAL THERAPIST

## 2024-05-17 NOTE — NURSING NOTE
Diagnostic PSG completed per provider order.  Patient met criteria for PAP therapy, late in study with insufficient time remaining for adequate titration.   Yes

## 2024-05-22 ENCOUNTER — THERAPY VISIT (OUTPATIENT)
Dept: PHYSICAL THERAPY | Facility: CLINIC | Age: 79
End: 2024-05-22
Payer: COMMERCIAL

## 2024-05-22 DIAGNOSIS — Z96.611 S/P REVERSE TOTAL SHOULDER ARTHROPLASTY, RIGHT: Primary | ICD-10-CM

## 2024-05-22 PROCEDURE — 97110 THERAPEUTIC EXERCISES: CPT | Mod: GP | Performed by: PHYSICAL THERAPIST

## 2024-05-29 ENCOUNTER — THERAPY VISIT (OUTPATIENT)
Dept: PHYSICAL THERAPY | Facility: CLINIC | Age: 79
End: 2024-05-29
Payer: COMMERCIAL

## 2024-05-29 DIAGNOSIS — Z96.611 S/P REVERSE TOTAL SHOULDER ARTHROPLASTY, RIGHT: Primary | ICD-10-CM

## 2024-05-29 PROCEDURE — 97110 THERAPEUTIC EXERCISES: CPT | Mod: GP | Performed by: PHYSICAL THERAPIST

## 2024-05-29 NOTE — PROGRESS NOTES
Sleep Study Follow-Up Visit:    Date on this visit: 10/11/2017    Gina Yeh comes in today for follow-up of her sleep study done on 9/26/2017 at the [facility] for treatment of JOSE.    Titration Study: 9/26/2017- (271.0 lbs) The patient was titrated at pressures ranging from 5 cmH2O up to 8 cmH2O.  The optimal pressure achieved was 8 cmH2O with a residual AHI of 1.1 events per hour.  Time in REM supine on final pressure was 22.5 minutes.  PLM index was 25.6 movements per hour.  The PLM Arousal Index was 8.0 per hour.    Restless Legs Syndrome - Gabapentin was ineffective. Recently has been replacing iron + using IcyHot. Given that she is donating blood at maximum frequency I have recommended she stay on iron replacement.  Does not want to try a new medication yet.    These findings were reviewed with patient.     Past medical/surgical history, family history, social history, medications and allergies were reviewed.      Problem List:  Patient Active Problem List    Diagnosis Date Noted     Hypertension goal BP (blood pressure) < 150/90      Priority: High     Hyperlipidemia with target LDL less than 130      Priority: High     Restless legs syndrome (RLS) 08/30/2017     Priority: Medium     Iron deficiency 08/30/2017     Priority: Medium     Noted Aug 2017.    Rec start on daily ferrous sulfate and recent in Nov/Dec.       Sensorineural hearing loss      Priority: Medium     JOSE (obstructive sleep apnea) 07/24/2015     Priority: Medium     Diagnostic Study 9/12/2017 - (271.0 lbs) AHI 78.7, RDI 91.3, Supine AHI 73.5, REM AHI -, Low O2 79.5%, Time Spent ?88% 22.9 minutes.  Titration Study: 9/26/2017- (271.0 lbs) The patient was titrated at pressures ranging from 5 cmH2O up to 8 cmH2O.  The optimal pressure achieved was 8 cmH2O with a residual AHI of 1.1 events per hour.  Time in REM supine on final pressure was 22.5 minutes.  PLM index was 25.6 movements per hour.  The PLM Arousal  Index was 8.0 per hour.       MMT (medial meniscus tear) 10/13/2014     Priority: Medium     Morbidly obese (H) 05/29/2012     Priority: Medium     Bariatric surgery consult offered       Impaired glucose tolerance 05/29/2012     Priority: Medium     History of cervical dysplasia 05/28/2012     Priority: Medium     Advanced directives, counseling/discussion 06/02/2011     Priority: Medium     Advance Care Planning 1/7/2016: Receipt of ACP document:  Received: Health Care Directive which was witnessed or notarized on 11/25/15.  Document not previously scanned.  Validation form completed and sent with document to be scanned.  Code Status reflects choices in most recent ACP document.  Confirmed/documented designated decision maker(s).  Added by Melina Griffin    Health Care Directive on file 11/25/15       Hypothyroid      Priority: Medium      Impression/Plan:  1. Iron deficiency  Given regular blood donation, recommend iron supplementation regularly.    2. Restless legs syndrome (RLS)  Not interested in additional treatment for now.    3. JOSE (obstructive sleep apnea)  I reviewed the diagnosis of obstructive sleep apnea with patient.  We discussed health consequences of untreated sleep apnea and benefits of treatment.  She is interested in starting treatment of JOSE with PAP therapy.  Reviewed importance of nightly therapy for effective treatment of JOSE, and she voiced understanding and agreement.  We also reviewed importance of using PAP during the entire sleep duration to achieve maximal benefits, but reviewed that a minimum of 4 hours per night was required.  She is confident that she can achieve these goals and is willing to start therapy as soon as possible.  Auto 7 - 12 cmH2O  - Sleep Comprehensive DME    4. Morbidly obese (H)  We discussed the link between obesity, sleep apnea, and health outcomes.  Patient was encouraged to decrease caloric intake and increase activity levels to try to move towards a normal  weight.  She was encouraged to discuss further strategies with her primary care provider.      She will follow up with me in about 2 month(s).     Fifteen minutes spent with patient, all of which were spent face-to-face counseling, consulting, coordinating plan of care.      Félix Salazar MD, Sleep Physician  Oct 11, 2017     CC: Verena Vee      impairments found

## 2024-05-29 NOTE — PROGRESS NOTES
05/29/24 0500   Appointment Info   Signing clinician's name / credentials Wan Rogers, PT, DPT, Cert. DN   Total/Authorized Visits 16   Visits Used 4   Medical Diagnosis S/P reverse total shoulder arthroplasty, right   PT Tx Diagnosis S/P reverse total shoulder arthroplasty, right   Precautions/Limitations Limit external rotation to 0 degrees.  No resistive internal rotation.  Do not reach behind your back for another 2 weeks.  Do not push up out of chairs and bed for 4 weeks.  Start scar massage with vitamin-E cream.   Quick Adds Certification   Progress Note/Certification   Start of Care Date 05/08/24   Onset of illness/injury or Date of Surgery 04/17/24  (date of surgery)   Therapy Frequency 1-2x/week   Predicted Duration 12 weeks   Certification date from 05/08/24   Certification date to 07/31/24   PT Goal 1   Goal Identifier goal 1   Goal Description pt will display AROM shoulder flexion to 120 once restrictions lifted to allow for functional shoulder mobility at or near shoulder height   Rationale to maximize safety and independence with performance of ADLs and functional tasks;to maximize safety and independence within the home;to maximize safety and independence within the community;to maximize safety and independence with transportation;to maximize safety and independence with self cares   Target Date 07/17/24   PT Goal 2   Goal Identifier goal 2   Goal Description Pt will report 0/10 pain with performance of ADL's   Rationale to maximize safety and independence with performance of ADLs and functional tasks;to maximize safety and independence within the home;to maximize safety and independence within the community;to maximize safety and independence with transportation;to maximize safety and independence with self cares   Target Date 07/31/24   Subjective Report   Subjective Report Pt rerpots her shoulder has been doign well since last visit and has tolerated a little mroe activity well. progressions  from last visit tolerated well. tingling and stiffness into the right hand is improving now as well. Isometrics are no longer uncomfortable and getting better.'   Objective Measure 1   Objective Measure PROM   Details flexion 159, abduction 126   Therapeutic Procedure/Exercise   Therapeutic Procedures: strength, endurance, ROM, flexibility minutes (46391) 34   Ther Proc 1 UBE   Ther Proc 1 - Details single arm, RUE, level 2 x 3 minutes   PTRx Ther Proc 1 pulleys   PTRx Ther Proc 1 - Details flexion and scaption x 25 each   PTRx Ther Proc 2 finger ladder   PTRx Ther Proc 2 - Details x 3 reps - best at level 18   PTRx Ther Proc 3 supine shoulder flexion   PTRx Ther Proc 3 - Details 1# x 10   PTRx Ther Proc 4 side lying shoulder flexion   PTRx Ther Proc 4 - Details 0# x 10   PTRx Ther Proc 5 side lying abducion   PTRx Ther Proc 5 - Details 1# x 10   PTRx Ther Proc 6 end range stretching flexion and abduciton to tolerance   PTRx Ther Proc 6 - Details x 6 minutes   Skilled Intervention progression of therex to address post operative deficits within precautions   Patient Response/Progress tolerated well again today   Education   Learner/Method Patient;Demonstration;Pictures/Video;No Barriers to Learning   Plan   Home program PRTx - phone   Plan for next session progress as able within precautions.   Total Session Time   Timed Code Treatment Minutes 34   Total Treatment Time (sum of timed and untimed services) 34        05/29/24 0500   Appointment Info   Signing clinician's name / credentials Wan Rogers PT, DPT, Cert. DN   Total/Authorized Visits 16   Visits Used 4   Medical Diagnosis S/P reverse total shoulder arthroplasty, right   PT Tx Diagnosis S/P reverse total shoulder arthroplasty, right   Precautions/Limitations Limit external rotation to 0 degrees.  No resistive internal rotation.  Do not reach behind your back for another 2 weeks.  Do not push up out of chairs and bed for 4 weeks.  Start scar massage with  vitamin-E cream.   Quick Adds Certification   Progress Note/Certification   Start of Care Date 05/08/24   Onset of illness/injury or Date of Surgery 04/17/24  (date of surgery)   Therapy Frequency 1-2x/week   Predicted Duration 12 weeks   Certification date from 05/08/24   Certification date to 07/31/24   PT Goal 1   Goal Identifier goal 1   Goal Description pt will display AROM shoulder flexion to 120 once restrictions lifted to allow for functional shoulder mobility at or near shoulder height   Rationale to maximize safety and independence with performance of ADLs and functional tasks;to maximize safety and independence within the home;to maximize safety and independence within the community;to maximize safety and independence with transportation;to maximize safety and independence with self cares   Target Date 07/17/24   PT Goal 2   Goal Identifier goal 2   Goal Description Pt will report 0/10 pain with performance of ADL's   Rationale to maximize safety and independence with performance of ADLs and functional tasks;to maximize safety and independence within the home;to maximize safety and independence within the community;to maximize safety and independence with transportation;to maximize safety and independence with self cares   Target Date 07/31/24   Subjective Report   Subjective Report Pt rerpots her shoulder has been doign well since last visit and has tolerated a little mroe activity well. progressions from last visit tolerated well. tingling and stiffness into the right hand is improving now as well. Isometrics are no longer uncomfortable and getting better.'   Objective Measure 1   Objective Measure PROM   Details flexion 159, abduction 126   Therapeutic Procedure/Exercise   Therapeutic Procedures: strength, endurance, ROM, flexibility minutes (18579) 34   Ther Proc 1 UBE   Ther Proc 1 - Details single arm, RUE, level 2 x 3 minutes   PTRx Ther Proc 1 pulleys   PTRx Ther Proc 1 - Details flexion and  scaption x 25 each   PTRx Ther Proc 2 finger ladder   PTRx Ther Proc 2 - Details x 3 reps - best at level 18   PTRx Ther Proc 3 supine shoulder flexion   PTRx Ther Proc 3 - Details 1# x 10   PTRx Ther Proc 4 side lying shoulder flexion   PTRx Ther Proc 4 - Details 0# x 10   PTRx Ther Proc 5 side lying abducion   PTRx Ther Proc 5 - Details 1# x 10   PTRx Ther Proc 6 end range stretching flexion and abduciton to tolerance   PTRx Ther Proc 6 - Details x 6 minutes   Skilled Intervention progression of therex to address post operative deficits within precautions   Patient Response/Progress tolerated well again today   Education   Learner/Method Patient;Demonstration;Pictures/Video;No Barriers to Learning   Plan   Home program PRTx - phone   Plan for next session progress as able within precautions.   Total Session Time   Timed Code Treatment Minutes 34   Total Treatment Time (sum of timed and untimed services) 34     Patient progressing well with pain levels and activity tolerance with ADL's. Ongoing difficulty with AROM against gravity but improving appropriately in gravity reduced/eliminated positions for initial strengthening exercises.    PLAN  Continue therapy per current plan of care.    Beginning/End Dates of Progress Note Reporting Period:    to 05/29/2024    Referring Provider:  Krishna Santiago

## 2024-06-03 ENCOUNTER — OFFICE VISIT (OUTPATIENT)
Dept: ORTHOPEDICS | Facility: CLINIC | Age: 79
End: 2024-06-03
Payer: COMMERCIAL

## 2024-06-03 VITALS
BODY MASS INDEX: 50.61 KG/M2 | DIASTOLIC BLOOD PRESSURE: 85 MMHG | SYSTOLIC BLOOD PRESSURE: 143 MMHG | RESPIRATION RATE: 18 BRPM | WEIGHT: 275 LBS | HEIGHT: 62 IN | HEART RATE: 90 BPM

## 2024-06-03 DIAGNOSIS — Z96.611 S/P REVERSE TOTAL SHOULDER ARTHROPLASTY, RIGHT: Primary | ICD-10-CM

## 2024-06-03 PROCEDURE — 99024 POSTOP FOLLOW-UP VISIT: CPT | Performed by: ORTHOPAEDIC SURGERY

## 2024-06-03 NOTE — LETTER
"    6/3/2024         RE: Gina Yeh  [address]  [place] [address]        Dear Colleague,    Thank you for referring your patient, Gina Yeh, to the [facility]. Please see a copy of my visit note below.    S/p right reverse total shoulder arthroplasty on 4/17/24.   She complains of weakness.  She has good passive range of motion, but poor active range of motion.  Her subscapularis was very tight in surgery and pulled initial suture repair out.  We are being very careful..    Objective:  Vitals: BP (!) 143/85   Pulse 90   Resp 18   Ht 1.568 m (5' 1.75\")   Wt 124.7 kg (275 lb)   BMI 50.71 kg/m    BMI= Body mass index is 50.71 kg/m .   Physical Therapy report: :  passive range of motion in flexion is 159 degrees, abduction 126 degrees.  Has only 40 degrees active range of motion in flexion.    Pain:  mild    Assessment/Plan:  6 weeks Status post  right total shoulder arthroplasty doing well.  Still weak.  Work on range of motion and strengthening.  Allow full range of motion, but don't push in external rotation.  Medication renewal:  None # .  Return to clinic in 6 weeks.     Again, thank you for allowing me to participate in the care of your patient.        Sincerely,        Krishna Santiago MD  "

## 2024-06-03 NOTE — PROGRESS NOTES
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   She complains of weakness.  She has good passive range of motion, but poor active range of motion.  Her subscapularis was very tight in surgery and pulled initial suture repair out.  We are being very careful..    Objective:  Vitals: BP (!) 143/85   Pulse 90   Resp 18   Ht 1.568 m (5' 1.75\")   Wt 124.7 kg (275 lb)   BMI 50.71 kg/m    BMI= Body mass index is 50.71 kg/m .   Physical Therapy report: :  passive range of motion in flexion is 159 degrees, abduction 126 degrees.  Has only 40 degrees active range of motion in flexion.    Pain:  mild    Assessment/Plan:  6 weeks Status post  right total shoulder arthroplasty doing well.  Still weak.  Work on range of motion and strengthening.  Allow full range of motion, but don't push in external rotation.  Medication renewal:  None # .  Return to clinic in 6 weeks.   "

## 2024-06-03 NOTE — PATIENT INSTRUCTIONS
Meg to follow up with Primary Care provider regarding elevated blood pressure.    6 weeks Status post  right total shoulder arthroplasty doing well.  Still weak.  Work on range of motion and strengthening.  Use recliner.  Allow full range of motion, but don't push in external rotation.  Return to clinic in 6 weeks.

## 2024-06-05 ENCOUNTER — THERAPY VISIT (OUTPATIENT)
Dept: PHYSICAL THERAPY | Facility: CLINIC | Age: 79
End: 2024-06-05
Payer: COMMERCIAL

## 2024-06-05 DIAGNOSIS — Z96.611 S/P REVERSE TOTAL SHOULDER ARTHROPLASTY, RIGHT: Primary | ICD-10-CM

## 2024-06-05 PROCEDURE — 97110 THERAPEUTIC EXERCISES: CPT | Mod: GP | Performed by: PHYSICAL THERAPIST

## 2024-06-12 ENCOUNTER — THERAPY VISIT (OUTPATIENT)
Dept: PHYSICAL THERAPY | Facility: CLINIC | Age: 79
End: 2024-06-12
Payer: COMMERCIAL

## 2024-06-12 DIAGNOSIS — Z96.611 S/P REVERSE TOTAL SHOULDER ARTHROPLASTY, RIGHT: Primary | ICD-10-CM

## 2024-06-12 PROCEDURE — 97110 THERAPEUTIC EXERCISES: CPT | Mod: GP | Performed by: PHYSICAL THERAPIST

## 2024-06-19 ENCOUNTER — THERAPY VISIT (OUTPATIENT)
Dept: PHYSICAL THERAPY | Facility: CLINIC | Age: 79
End: 2024-06-19
Payer: COMMERCIAL

## 2024-06-19 DIAGNOSIS — Z96.611 S/P REVERSE TOTAL SHOULDER ARTHROPLASTY, RIGHT: Primary | ICD-10-CM

## 2024-06-19 PROCEDURE — 97110 THERAPEUTIC EXERCISES: CPT | Mod: GP | Performed by: PHYSICAL THERAPIST

## 2024-06-26 ENCOUNTER — THERAPY VISIT (OUTPATIENT)
Dept: PHYSICAL THERAPY | Facility: CLINIC | Age: 79
End: 2024-06-26
Payer: COMMERCIAL

## 2024-06-26 DIAGNOSIS — Z96.611 S/P REVERSE TOTAL SHOULDER ARTHROPLASTY, RIGHT: Primary | ICD-10-CM

## 2024-06-26 PROCEDURE — 97140 MANUAL THERAPY 1/> REGIONS: CPT | Mod: GP | Performed by: PHYSICAL THERAPIST

## 2024-06-26 PROCEDURE — 97110 THERAPEUTIC EXERCISES: CPT | Mod: GP | Performed by: PHYSICAL THERAPIST

## 2024-06-26 NOTE — PROGRESS NOTES
06/26/24 0500   Appointment Info   Signing clinician's name / credentials Wan Rogers, PT, DPT, Cert. DN   Total/Authorized Visits 16   Visits Used 8   Medical Diagnosis S/P reverse total shoulder arthroplasty, right   PT Tx Diagnosis S/P reverse total shoulder arthroplasty, right   Precautions/Limitations Limit external rotation to 0 degrees.  No resistive internal rotation.  Do not reach behind your back for another 2 weeks.  Do not push up out of chairs and bed for 4 weeks.  Start scar massage with vitamin-E cream.   Quick Adds Certification   Progress Note/Certification   Start of Care Date 05/08/24   Onset of illness/injury or Date of Surgery 04/17/24  (date of surgery)   Therapy Frequency 1-2x/week   Predicted Duration 12 weeks   Certification date from 05/08/24   Certification date to 07/31/24   Progress Note Completed Date 05/29/24   PT Goal 1   Goal Identifier goal 1   Goal Description pt will display AROM shoulder flexion to 120 once restrictions lifted to allow for functional shoulder mobility at or near shoulder height   Rationale to maximize safety and independence with performance of ADLs and functional tasks;to maximize safety and independence within the home;to maximize safety and independence within the community;to maximize safety and independence with transportation;to maximize safety and independence with self cares   Target Date 07/17/24   PT Goal 2   Goal Identifier goal 2   Goal Description Pt will report 0/10 pain with performance of ADL's   Rationale to maximize safety and independence with performance of ADLs and functional tasks;to maximize safety and independence within the home;to maximize safety and independence within the community;to maximize safety and independence with transportation;to maximize safety and independence with self cares   Target Date 07/31/24   Subjective Report   Subjective Report Pt reports she has some increase in pain down the lateral aspect of the shoulder  starting over the weekend. Thinks the increase in pain was due to being more active over the weekend and maybe from sleeping on it. Had to regress her HEP some secondary to the increase in pain but was able to complete most of her HEP.   Objective Measure 1   Objective Measure PROM - limited by stiffness and increased pain today.   Details flexion 160, abduction 145, ER 75   Treatment Interventions (PT)   Interventions Therapeutic Procedure/Exercise;Manual Therapy   Therapeutic Procedure/Exercise   Therapeutic Procedures: strength, endurance, ROM, flexibility minutes (93537) 15   PTRx Ther Proc 1 pulleys   PTRx Ther Proc 1 - Details flexion and scaption x 25 each   PTRx Ther Proc 2 supine shoulder press to flexion   PTRx Ther Proc 2 - Details x 10 0#   PTRx Ther Proc 4 supine reverse pendulums   PTRx Ther Proc 4 - Details 0# x 20 CW/CCW   PTRx Ther Proc 6 end range stretching all planes to tolerance within precautions   PTRx Ther Proc 6 - Details x 10 minutes   Skilled Intervention regression of therex to limit discomfort today   Patient Response/Progress tolerated well   Manual Therapy   Manual Therapy: Mobilization, MFR, MLD, friction massage minutes (87412) 20   Manual Therapy 1 STM   Manual Therapy 1 - Details right deltoid and periscapular   Skilled Intervention STM for pain modulation and tissue mobility   Patient Response/Progress tolerated well with large improvement in shoulder pain following completion.   Education   Learner/Method Patient;Demonstration;Pictures/Video;No Barriers to Learning   Plan   Home program PRTx - phone   Plan for next session progress as able within precautions.   Total Session Time   Timed Code Treatment Minutes 35   Total Treatment Time (sum of timed and untimed services) 35     Pt progressing well to date with PROM and activity tolerance. AROM still limited but improving appropriately with strengthening exercises. Acute exacerbation over the past few days due to likely spasm  through deltoid muscle. Large improvement in discomfort today following manual interventions.     PLAN  Continue therapy per current plan of care.    Beginning/End Dates of Progress Note Reporting Period:  05/29/24 to 06/26/2024    Referring Provider:  Krishna Santiago

## 2024-06-27 ENCOUNTER — PATIENT OUTREACH (OUTPATIENT)
Dept: CARE COORDINATION | Facility: CLINIC | Age: 79
End: 2024-06-27
Payer: COMMERCIAL

## 2024-07-01 ENCOUNTER — OFFICE VISIT (OUTPATIENT)
Dept: ORTHOPEDICS | Facility: CLINIC | Age: 79
End: 2024-07-01
Payer: COMMERCIAL

## 2024-07-01 VITALS
SYSTOLIC BLOOD PRESSURE: 127 MMHG | HEART RATE: 62 BPM | DIASTOLIC BLOOD PRESSURE: 80 MMHG | RESPIRATION RATE: 18 BRPM | WEIGHT: 275 LBS | HEIGHT: 62 IN | BODY MASS INDEX: 50.61 KG/M2

## 2024-07-01 DIAGNOSIS — Z96.611 S/P REVERSE TOTAL SHOULDER ARTHROPLASTY, RIGHT: Primary | ICD-10-CM

## 2024-07-01 PROCEDURE — 99024 POSTOP FOLLOW-UP VISIT: CPT | Performed by: ORTHOPAEDIC SURGERY

## 2024-07-01 NOTE — LETTER
"7/1/2024      Gina Yeh  [address]  [place] [address]      Dear Colleague,    Thank you for referring your patient, Gina Yeh, to the [facility]. Please see a copy of my visit note below.    S/p right reverse total shoulder arthroplasty on 4/17/24.   She has some stiffness and weakness.  She is able to put a gallon of milk in the fridge.  She has good passive range of motion, but less active range of motion.  Her subscapularis was very tight in surgery and pulled initial suture repair out.  We are being very careful..    Objective:  Vitals: /80   Pulse 62   Resp 18   Ht 1.568 m (5' 1.75\")   Wt 124.7 kg (275 lb)   BMI 50.71 kg/m    BMI= Body mass index is 50.71 kg/m .   Physical Therapy report: :  passive range of motion in flexion is 160 degrees, abduction 145 degrees.  Has 100 degrees active range of motion in flexion.  External rotation 75 degrees.    Pain:  mild    Assessment/Plan:  3 months Status post  right total shoulder arthroplasty doing well.  Still weak.  Work on range of motion and strengthening.  Allow full range of motion.  Medication renewal:  None # .  Return to clinic April 2025 with x-ray right shoulder and bilateral knees..      Again, thank you for allowing me to participate in the care of your patient.        Sincerely,        Krishna Santiago MD  "

## 2024-07-01 NOTE — PROGRESS NOTES
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   She has some stiffness and weakness.  She is able to put a gallon of milk in the fridge.  She has good passive range of motion, but less active range of motion.  Her subscapularis was very tight in surgery and pulled initial suture repair out.  We are being very careful..    Objective:  Vitals: /80   Pulse 62   Resp 18   Ht 1.568 m (5' 1.75\")   Wt 124.7 kg (275 lb)   BMI 50.71 kg/m    BMI= Body mass index is 50.71 kg/m .   Physical Therapy report: :  passive range of motion in flexion is 160 degrees, abduction 145 degrees.  Has 100 degrees active range of motion in flexion.  External rotation 75 degrees.    Pain:  mild    Assessment/Plan:  3 months Status post  right total shoulder arthroplasty doing well.  Still weak.  Work on range of motion and strengthening.  Allow full range of motion.  Medication renewal:  None # .  Return to clinic April 2025 with x-ray right shoulder and bilateral knees..  "

## 2024-07-03 ENCOUNTER — THERAPY VISIT (OUTPATIENT)
Dept: PHYSICAL THERAPY | Facility: CLINIC | Age: 79
End: 2024-07-03
Payer: COMMERCIAL

## 2024-07-03 DIAGNOSIS — Z96.611 S/P REVERSE TOTAL SHOULDER ARTHROPLASTY, RIGHT: Primary | ICD-10-CM

## 2024-07-03 PROCEDURE — 97110 THERAPEUTIC EXERCISES: CPT | Mod: GP | Performed by: PHYSICAL THERAPIST

## 2024-07-03 PROCEDURE — 97140 MANUAL THERAPY 1/> REGIONS: CPT | Mod: GP | Performed by: PHYSICAL THERAPIST

## 2024-07-10 ENCOUNTER — THERAPY VISIT (OUTPATIENT)
Dept: PHYSICAL THERAPY | Facility: CLINIC | Age: 79
End: 2024-07-10
Payer: COMMERCIAL

## 2024-07-10 DIAGNOSIS — Z96.611 S/P REVERSE TOTAL SHOULDER ARTHROPLASTY, RIGHT: Primary | ICD-10-CM

## 2024-07-10 PROCEDURE — 97110 THERAPEUTIC EXERCISES: CPT | Mod: GP | Performed by: PHYSICAL THERAPIST

## 2024-07-10 PROCEDURE — 97140 MANUAL THERAPY 1/> REGIONS: CPT | Mod: GP | Performed by: PHYSICAL THERAPIST

## 2024-07-10 NOTE — PROGRESS NOTES
07/10/24 0500   Appointment Info   Signing clinician's name / credentials Wan Rogers, PT, DPT, Cert. DN   Total/Authorized Visits 16   Visits Used 10   Medical Diagnosis S/P reverse total shoulder arthroplasty, right   PT Tx Diagnosis S/P reverse total shoulder arthroplasty, right   Precautions/Limitations Limit external rotation to 0 degrees.  No resistive internal rotation.  Do not reach behind your back for another 2 weeks.  Do not push up out of chairs and bed for 4 weeks.  Start scar massage with vitamin-E cream.   Quick Adds Certification   Progress Note/Certification   Start of Care Date 05/08/24   Onset of illness/injury or Date of Surgery 04/17/24  (date of surgery)   Therapy Frequency 1-2x/week   Predicted Duration 12 weeks   Certification date from 05/08/24   Certification date to 07/31/24   Progress Note Completed Date 06/26/24   PT Goal 1   Goal Identifier goal 1   Goal Description pt will display AROM shoulder flexion to 120 once restrictions lifted to allow for functional shoulder mobility at or near shoulder height   Rationale to maximize safety and independence with performance of ADLs and functional tasks;to maximize safety and independence within the home;to maximize safety and independence within the community;to maximize safety and independence with transportation;to maximize safety and independence with self cares   Target Date 07/17/24   PT Goal 2   Goal Identifier goal 2   Goal Description Pt will report 0/10 pain with performance of ADL's   Rationale to maximize safety and independence with performance of ADLs and functional tasks;to maximize safety and independence within the home;to maximize safety and independence within the community;to maximize safety and independence with transportation;to maximize safety and independence with self cares   Target Date 07/31/24   Subjective Report   Subjective Report Pt reports she is doign better with her shoulder pain since last visit but still  not quite as good as it was prior to the more recent exacerbation.  Mobility has started to imrpove a little bit again and has worked back into her HEP for the most part and was gage to work back into some light weights (can of beans). mobility while driving is better  and is now able to reach into her pocket more easily. Has increased icing frequency after therex.   Objective Measure 1   Objective Measure PROM   Details flexion 167, abduction 145, ER 80   Therapeutic Procedure/Exercise   Therapeutic Procedures: strength, endurance, ROM, flexibility minutes (00598) 15   Ther Proc 1 UBE   Ther Proc 1 - Details single arm x 1 minute forward level 3, then bilateral for remainder of time, 4 min total   PTRx Ther Proc 1 pulleys   PTRx Ther Proc 1 - Details flexion and scaption x 25 each   PTRx Ther Proc 2 bent over rows   PTRx Ther Proc 2 - Details 3# x 10   PTRx Ther Proc 3 supine hip flexor stretch in kodi test position   PTRx Ther Proc 3 - Details x 60s   Manual Therapy   Manual Therapy: Mobilization, MFR, MLD, friction massage minutes (15687) 20   Manual Therapy 1 STM   Manual Therapy 1 - Details right deltoid and periscapular   Skilled Intervention STM for pain modulation and tissue mobility   Patient Response/Progress tolerated well with improvement in shoulder pain following completion again today.   Education   Learner/Method Patient;Demonstration;Pictures/Video;No Barriers to Learning   Plan   Home program PRTx - phone   Plan for next session progress as able within precautions.   Total Session Time   Timed Code Treatment Minutes 35   Total Treatment Time (sum of timed and untimed services) 35         PLAN  Continue therapy per current plan of care.    Beginning/End Dates of Progress Note Reporting Period:  06/26/24 to 07/10/2024    Referring Provider:  Krishna Santiago

## 2024-07-17 ENCOUNTER — THERAPY VISIT (OUTPATIENT)
Dept: PHYSICAL THERAPY | Facility: CLINIC | Age: 79
End: 2024-07-17
Payer: COMMERCIAL

## 2024-07-17 DIAGNOSIS — Z96.611 S/P REVERSE TOTAL SHOULDER ARTHROPLASTY, RIGHT: Primary | ICD-10-CM

## 2024-07-17 PROCEDURE — 97110 THERAPEUTIC EXERCISES: CPT | Mod: GP | Performed by: PHYSICAL THERAPIST

## 2024-07-24 ENCOUNTER — THERAPY VISIT (OUTPATIENT)
Dept: PHYSICAL THERAPY | Facility: CLINIC | Age: 79
End: 2024-07-24
Payer: COMMERCIAL

## 2024-07-24 DIAGNOSIS — Z96.611 S/P REVERSE TOTAL SHOULDER ARTHROPLASTY, RIGHT: Primary | ICD-10-CM

## 2024-07-24 PROCEDURE — 97110 THERAPEUTIC EXERCISES: CPT | Mod: GP | Performed by: PHYSICAL THERAPIST

## 2024-07-24 PROCEDURE — 97140 MANUAL THERAPY 1/> REGIONS: CPT | Mod: GP | Performed by: PHYSICAL THERAPIST

## 2024-07-25 ENCOUNTER — PATIENT OUTREACH (OUTPATIENT)
Dept: CARE COORDINATION | Facility: CLINIC | Age: 79
End: 2024-07-25
Payer: COMMERCIAL

## 2024-08-07 ENCOUNTER — THERAPY VISIT (OUTPATIENT)
Dept: PHYSICAL THERAPY | Facility: CLINIC | Age: 79
End: 2024-08-07
Payer: COMMERCIAL

## 2024-08-07 DIAGNOSIS — Z96.611 S/P REVERSE TOTAL SHOULDER ARTHROPLASTY, RIGHT: Primary | ICD-10-CM

## 2024-08-07 PROCEDURE — 97140 MANUAL THERAPY 1/> REGIONS: CPT | Mod: GP | Performed by: PHYSICAL THERAPIST

## 2024-08-07 PROCEDURE — 97110 THERAPEUTIC EXERCISES: CPT | Mod: GP | Performed by: PHYSICAL THERAPIST

## 2024-08-15 ENCOUNTER — THERAPY VISIT (OUTPATIENT)
Dept: PHYSICAL THERAPY | Facility: CLINIC | Age: 79
End: 2024-08-15
Payer: COMMERCIAL

## 2024-08-15 DIAGNOSIS — Z96.611 S/P REVERSE TOTAL SHOULDER ARTHROPLASTY, RIGHT: Primary | ICD-10-CM

## 2024-08-15 PROCEDURE — 97140 MANUAL THERAPY 1/> REGIONS: CPT | Mod: GP | Performed by: PHYSICAL THERAPIST

## 2024-08-15 PROCEDURE — 97110 THERAPEUTIC EXERCISES: CPT | Mod: GP | Performed by: PHYSICAL THERAPIST

## 2024-08-19 ENCOUNTER — TRANSFERRED RECORDS (OUTPATIENT)
Dept: HEALTH INFORMATION MANAGEMENT | Facility: CLINIC | Age: 79
End: 2024-08-19

## 2024-08-20 ENCOUNTER — ANCILLARY PROCEDURE (OUTPATIENT)
Dept: MAMMOGRAPHY | Facility: CLINIC | Age: 79
End: 2024-08-20
Attending: FAMILY MEDICINE
Payer: COMMERCIAL

## 2024-08-20 DIAGNOSIS — Z12.31 VISIT FOR SCREENING MAMMOGRAM: ICD-10-CM

## 2024-08-20 PROCEDURE — 77063 BREAST TOMOSYNTHESIS BI: CPT | Mod: TC | Performed by: STUDENT IN AN ORGANIZED HEALTH CARE EDUCATION/TRAINING PROGRAM

## 2024-08-20 PROCEDURE — 77067 SCR MAMMO BI INCL CAD: CPT | Mod: TC | Performed by: STUDENT IN AN ORGANIZED HEALTH CARE EDUCATION/TRAINING PROGRAM

## 2024-08-21 ENCOUNTER — THERAPY VISIT (OUTPATIENT)
Dept: PHYSICAL THERAPY | Facility: CLINIC | Age: 79
End: 2024-08-21
Payer: COMMERCIAL

## 2024-08-21 DIAGNOSIS — Z96.611 S/P REVERSE TOTAL SHOULDER ARTHROPLASTY, RIGHT: Primary | ICD-10-CM

## 2024-08-21 PROCEDURE — 97110 THERAPEUTIC EXERCISES: CPT | Mod: GP | Performed by: PHYSICAL THERAPIST

## 2024-08-28 ENCOUNTER — THERAPY VISIT (OUTPATIENT)
Dept: PHYSICAL THERAPY | Facility: CLINIC | Age: 79
End: 2024-08-28
Payer: COMMERCIAL

## 2024-08-28 DIAGNOSIS — Z96.611 S/P REVERSE TOTAL SHOULDER ARTHROPLASTY, RIGHT: Primary | ICD-10-CM

## 2024-08-28 PROCEDURE — 97110 THERAPEUTIC EXERCISES: CPT | Mod: GP | Performed by: PHYSICAL THERAPIST

## 2024-09-04 ENCOUNTER — THERAPY VISIT (OUTPATIENT)
Dept: PHYSICAL THERAPY | Facility: CLINIC | Age: 79
End: 2024-09-04
Payer: COMMERCIAL

## 2024-09-04 DIAGNOSIS — Z96.611 S/P REVERSE TOTAL SHOULDER ARTHROPLASTY, RIGHT: Primary | ICD-10-CM

## 2024-09-04 PROCEDURE — 97110 THERAPEUTIC EXERCISES: CPT | Mod: GP | Performed by: PHYSICAL THERAPIST

## 2024-09-11 ENCOUNTER — THERAPY VISIT (OUTPATIENT)
Dept: PHYSICAL THERAPY | Facility: CLINIC | Age: 79
End: 2024-09-11
Payer: COMMERCIAL

## 2024-09-11 DIAGNOSIS — Z96.611 S/P REVERSE TOTAL SHOULDER ARTHROPLASTY, RIGHT: Primary | ICD-10-CM

## 2024-09-11 PROCEDURE — 97110 THERAPEUTIC EXERCISES: CPT | Mod: GP

## 2024-09-16 ENCOUNTER — TRANSCRIBE ORDERS (OUTPATIENT)
Dept: CALL CENTER | Age: 79
End: 2024-09-16
Payer: COMMERCIAL

## 2024-09-16 DIAGNOSIS — J44.9 COPD (CHRONIC OBSTRUCTIVE PULMONARY DISEASE) (H): Primary | ICD-10-CM

## 2024-09-16 DIAGNOSIS — J44.9 CHRONIC OBSTRUCTIVE PULMONARY DISEASE, UNSPECIFIED COPD TYPE (H): Primary | ICD-10-CM

## 2024-09-18 ENCOUNTER — THERAPY VISIT (OUTPATIENT)
Dept: PHYSICAL THERAPY | Facility: CLINIC | Age: 79
End: 2024-09-18
Payer: COMMERCIAL

## 2024-09-18 DIAGNOSIS — Z96.611 S/P REVERSE TOTAL SHOULDER ARTHROPLASTY, RIGHT: Primary | ICD-10-CM

## 2024-09-18 PROCEDURE — 97110 THERAPEUTIC EXERCISES: CPT | Mod: GP

## 2024-09-18 NOTE — PROGRESS NOTES
09/18/24 0500   Appointment Info   Signing clinician's name / credentials Wan Rogers, PT, DPT, Cert. DN, Milvia Mallory SPT   Total/Authorized Visits 16   Visits Used 19   Medical Diagnosis S/P reverse total shoulder arthroplasty, right   PT Tx Diagnosis S/P reverse total shoulder arthroplasty, right   Precautions/Limitations Limit external rotation to 0 degrees.  No resistive internal rotation.  Do not reach behind your back for another 2 weeks.  Do not push up out of chairs and bed for 4 weeks.  Start scar massage with vitamin-E cream.   Progress Note/Certification   Start of Care Date 05/08/24   Onset of illness/injury or Date of Surgery 04/17/24  (date of surgery)   Therapy Frequency 1-2x/week   Predicted Duration 12 weeks   Certification date from 07/31/24   Certification date to 09/25/24   Progress Note Completed Date 07/10/24   PT Goal 1   Goal Identifier goal 1   Goal Description pt will display AROM shoulder flexion to 120 once restrictions lifted to allow for functional shoulder mobility at or near shoulder height   Rationale to maximize safety and independence with performance of ADLs and functional tasks;to maximize safety and independence within the home;to maximize safety and independence within the community;to maximize safety and independence with transportation;to maximize safety and independence with self cares   Goal Progress AROM today 154   Target Date 07/17/24   PT Goal 2   Goal Identifier goal 2   Goal Description Pt will report 0/10 pain with performance of ADL's   Rationale to maximize safety and independence with performance of ADLs and functional tasks;to maximize safety and independence within the home;to maximize safety and independence within the community;to maximize safety and independence with transportation;to maximize safety and independence with self cares   Goal Progress Pt reports 0/10 pain throughout the day   Target Date 07/31/24   Subjective Report   Subjective Report Pt  reports no new complaints with shoulder and that things are going well. Numbness continues to get better. Just minor tingling in the middle finger.   Objective Measures   Objective Measures Objective Measure 2   Objective Measure 1   Objective Measure PROM supine   Details flexion 164, abduction 173,   Objective Measure 2   Objective Measure AROM seated   Details 135 seated, abduction 116, ER  62   Therapeutic Procedure/Exercise   Therapeutic Procedures: strength, endurance, ROM, flexibility minutes (57380) 39   Ther Proc 1 UBE   Ther Proc 1 - Details 2'/2' level 8   PTRx Ther Proc 1 not today- pulleys   PTRx Ther Proc 1 - Details flexion and scaption x 25 each   PTRx Ther Proc 2 end range stretching all planes   PTRx Ther Proc 2 - Details to tolerance; flexion/abduction/scaption/ER/IR/horizontal adduction x 15 minutes   PTRx Ther Proc 4 ER step outs   PTRx Ther Proc 4 - Details RTB x 8 x 5s   PTRx Ther Proc 5 AROM flexion and scaption   PTRx Ther Proc 5 - Details 0-90 degrees x 20, 1#   PTRx Ther Proc 6 therbaand shoulder flexion anchored in door   PTRx Ther Proc 6 - Details bowling motion x 10 with GTB   PTRx Ther Proc 7 Education   PTRx Ther Proc 7 - Details Reviewed frequency and duration of exercises and how to progress appropriately in the future.   Skilled Intervention progression of therex to address ongoing deficits   Patient Response/Progress toelrated well with faituge, no increase in pain   Education   Learner/Method Patient;Demonstration;Pictures/Video;No Barriers to Learning   Plan   Home program PRTx - phone   Plan for next session progress AROM/strength as able   Total Session Time   Timed Code Treatment Minutes 39   Total Treatment Time (sum of timed and untimed services) 39     Pt has met all her goals and discussed with her about calling with any future questions or concerns.     DISCHARGE  Reason for Discharge: Patient has met all goals.  No further expectation of progress.  Patient chooses to  discontinue therapy.    Equipment Issued: therabands     Discharge Plan: Patient to continue home program.    Referring Provider:  Krishna Santiago

## 2024-10-28 ENCOUNTER — PATIENT OUTREACH (OUTPATIENT)
Dept: CARE COORDINATION | Facility: CLINIC | Age: 79
End: 2024-10-28
Payer: COMMERCIAL

## 2024-12-02 RX ORDER — VIT C/E/ZN/COPPR/LUTEIN/ZEAXAN 60 MG-6 MG
1 CAPSULE ORAL DAILY
COMMUNITY
Start: 2024-11-01

## 2024-12-02 SDOH — HEALTH STABILITY: PHYSICAL HEALTH: ON AVERAGE, HOW MANY DAYS PER WEEK DO YOU ENGAGE IN MODERATE TO STRENUOUS EXERCISE (LIKE A BRISK WALK)?: 5 DAYS

## 2024-12-02 SDOH — HEALTH STABILITY: PHYSICAL HEALTH: ON AVERAGE, HOW MANY MINUTES DO YOU ENGAGE IN EXERCISE AT THIS LEVEL?: 10 MIN

## 2024-12-02 ASSESSMENT — SOCIAL DETERMINANTS OF HEALTH (SDOH): HOW OFTEN DO YOU GET TOGETHER WITH FRIENDS OR RELATIVES?: ONCE A WEEK

## 2024-12-03 ENCOUNTER — OFFICE VISIT (OUTPATIENT)
Dept: FAMILY MEDICINE | Facility: CLINIC | Age: 79
End: 2024-12-03
Payer: COMMERCIAL

## 2024-12-03 VITALS
BODY MASS INDEX: 52.63 KG/M2 | WEIGHT: 286 LBS | HEIGHT: 62 IN | RESPIRATION RATE: 20 BRPM | TEMPERATURE: 98.3 F | SYSTOLIC BLOOD PRESSURE: 122 MMHG | DIASTOLIC BLOOD PRESSURE: 76 MMHG | OXYGEN SATURATION: 97 % | HEART RATE: 81 BPM

## 2024-12-03 DIAGNOSIS — E66.01 MORBIDLY OBESE (H): ICD-10-CM

## 2024-12-03 DIAGNOSIS — R00.2 PALPITATIONS: ICD-10-CM

## 2024-12-03 DIAGNOSIS — R73.02 IMPAIRED GLUCOSE TOLERANCE: ICD-10-CM

## 2024-12-03 DIAGNOSIS — G47.33 OSA (OBSTRUCTIVE SLEEP APNEA): Chronic | ICD-10-CM

## 2024-12-03 DIAGNOSIS — E78.5 HYPERLIPIDEMIA WITH TARGET LDL LESS THAN 130: ICD-10-CM

## 2024-12-03 DIAGNOSIS — Z00.00 ENCOUNTER FOR MEDICARE ANNUAL WELLNESS EXAM: Primary | ICD-10-CM

## 2024-12-03 DIAGNOSIS — J44.9 CHRONIC OBSTRUCTIVE PULMONARY DISEASE, UNSPECIFIED COPD TYPE (H): ICD-10-CM

## 2024-12-03 DIAGNOSIS — R01.1 HEART MURMUR: ICD-10-CM

## 2024-12-03 DIAGNOSIS — E03.9 ACQUIRED HYPOTHYROIDISM: ICD-10-CM

## 2024-12-03 DIAGNOSIS — I10 HYPERTENSION GOAL BP (BLOOD PRESSURE) < 140/90: ICD-10-CM

## 2024-12-03 DIAGNOSIS — Z12.31 VISIT FOR SCREENING MAMMOGRAM: ICD-10-CM

## 2024-12-03 PROBLEM — Z96.652 HISTORY OF TOTAL LEFT KNEE REPLACEMENT: Status: RESOLVED | Noted: 2023-10-16 | Resolved: 2024-12-03

## 2024-12-03 PROBLEM — N39.46 MIXED INCONTINENCE: Status: ACTIVE | Noted: 2022-12-14

## 2024-12-03 PROBLEM — N39.41 URGE INCONTINENCE OF URINE: Status: RESOLVED | Noted: 2024-03-27 | Resolved: 2024-12-03

## 2024-12-03 PROBLEM — N39.3 FEMALE STRESS INCONTINENCE: Status: RESOLVED | Noted: 2022-02-22 | Resolved: 2024-12-03

## 2024-12-03 PROBLEM — M12.811 ROTATOR CUFF TEAR ARTHROPATHY OF RIGHT SHOULDER: Status: RESOLVED | Noted: 2024-01-22 | Resolved: 2024-12-03

## 2024-12-03 PROBLEM — Z96.611 H/O TOTAL SHOULDER REPLACEMENT, RIGHT: Status: RESOLVED | Noted: 2024-04-17 | Resolved: 2024-12-03

## 2024-12-03 PROBLEM — M75.101 ROTATOR CUFF TEAR ARTHROPATHY OF RIGHT SHOULDER: Status: RESOLVED | Noted: 2024-01-22 | Resolved: 2024-12-03

## 2024-12-03 LAB
ALBUMIN UR-MCNC: NEGATIVE MG/DL
ANION GAP SERPL CALCULATED.3IONS-SCNC: 10 MMOL/L (ref 7–15)
APPEARANCE UR: CLEAR
BASOPHILS # BLD AUTO: 0.1 10E3/UL (ref 0–0.2)
BASOPHILS NFR BLD AUTO: 1 %
BILIRUB UR QL STRIP: NEGATIVE
BUN SERPL-MCNC: 18.1 MG/DL (ref 8–23)
CALCIUM SERPL-MCNC: 9.5 MG/DL (ref 8.8–10.4)
CHLORIDE SERPL-SCNC: 104 MMOL/L (ref 98–107)
CHOLEST SERPL-MCNC: 169 MG/DL
COLOR UR AUTO: YELLOW
CREAT SERPL-MCNC: 0.78 MG/DL (ref 0.51–0.95)
EGFRCR SERPLBLD CKD-EPI 2021: 78 ML/MIN/1.73M2
EOSINOPHIL # BLD AUTO: 0.1 10E3/UL (ref 0–0.7)
EOSINOPHIL NFR BLD AUTO: 1 %
ERYTHROCYTE [DISTWIDTH] IN BLOOD BY AUTOMATED COUNT: 14.1 % (ref 10–15)
EST. AVERAGE GLUCOSE BLD GHB EST-MCNC: 117 MG/DL
FASTING STATUS PATIENT QL REPORTED: YES
FASTING STATUS PATIENT QL REPORTED: YES
GLUCOSE SERPL-MCNC: 106 MG/DL (ref 70–99)
GLUCOSE UR STRIP-MCNC: NEGATIVE MG/DL
HBA1C MFR BLD: 5.7 % (ref 0–5.6)
HCO3 SERPL-SCNC: 27 MMOL/L (ref 22–29)
HCT VFR BLD AUTO: 38.3 % (ref 35–47)
HDLC SERPL-MCNC: 43 MG/DL
HGB BLD-MCNC: 12 G/DL (ref 11.7–15.7)
HGB UR QL STRIP: NEGATIVE
IMM GRANULOCYTES # BLD: 0 10E3/UL
IMM GRANULOCYTES NFR BLD: 0 %
KETONES UR STRIP-MCNC: NEGATIVE MG/DL
LDLC SERPL CALC-MCNC: 98 MG/DL
LEUKOCYTE ESTERASE UR QL STRIP: NEGATIVE
LYMPHOCYTES # BLD AUTO: 1.2 10E3/UL (ref 0.8–5.3)
LYMPHOCYTES NFR BLD AUTO: 14 %
MCH RBC QN AUTO: 27.8 PG (ref 26.5–33)
MCHC RBC AUTO-ENTMCNC: 31.3 G/DL (ref 31.5–36.5)
MCV RBC AUTO: 89 FL (ref 78–100)
MONOCYTES # BLD AUTO: 0.4 10E3/UL (ref 0–1.3)
MONOCYTES NFR BLD AUTO: 5 %
NEUTROPHILS # BLD AUTO: 6.5 10E3/UL (ref 1.6–8.3)
NEUTROPHILS NFR BLD AUTO: 79 %
NITRATE UR QL: NEGATIVE
NONHDLC SERPL-MCNC: 126 MG/DL
PH UR STRIP: 5.5 [PH] (ref 5–7)
PLATELET # BLD AUTO: 268 10E3/UL (ref 150–450)
POTASSIUM SERPL-SCNC: 4.7 MMOL/L (ref 3.4–5.3)
RBC # BLD AUTO: 4.32 10E6/UL (ref 3.8–5.2)
SODIUM SERPL-SCNC: 141 MMOL/L (ref 135–145)
SP GR UR STRIP: 1.02 (ref 1–1.03)
TRIGL SERPL-MCNC: 138 MG/DL
TSH SERPL DL<=0.005 MIU/L-ACNC: 2.66 UIU/ML (ref 0.3–4.2)
UROBILINOGEN UR STRIP-ACNC: 0.2 E.U./DL
WBC # BLD AUTO: 8.3 10E3/UL (ref 4–11)

## 2024-12-03 PROCEDURE — 84443 ASSAY THYROID STIM HORMONE: CPT | Performed by: FAMILY MEDICINE

## 2024-12-03 PROCEDURE — 83036 HEMOGLOBIN GLYCOSYLATED A1C: CPT | Performed by: FAMILY MEDICINE

## 2024-12-03 PROCEDURE — 93000 ELECTROCARDIOGRAM COMPLETE: CPT | Performed by: FAMILY MEDICINE

## 2024-12-03 PROCEDURE — 80061 LIPID PANEL: CPT | Performed by: FAMILY MEDICINE

## 2024-12-03 PROCEDURE — 36415 COLL VENOUS BLD VENIPUNCTURE: CPT | Performed by: FAMILY MEDICINE

## 2024-12-03 PROCEDURE — 80048 BASIC METABOLIC PNL TOTAL CA: CPT | Performed by: FAMILY MEDICINE

## 2024-12-03 PROCEDURE — 85025 COMPLETE CBC W/AUTO DIFF WBC: CPT | Performed by: FAMILY MEDICINE

## 2024-12-03 PROCEDURE — 81003 URINALYSIS AUTO W/O SCOPE: CPT | Performed by: FAMILY MEDICINE

## 2024-12-03 PROCEDURE — 99214 OFFICE O/P EST MOD 30 MIN: CPT | Mod: 25 | Performed by: FAMILY MEDICINE

## 2024-12-03 PROCEDURE — G0439 PPPS, SUBSEQ VISIT: HCPCS | Performed by: FAMILY MEDICINE

## 2024-12-03 RX ORDER — LEVOTHYROXINE SODIUM 137 UG/1
TABLET ORAL
Qty: 90 TABLET | Refills: 4 | Status: SHIPPED | OUTPATIENT
Start: 2024-12-03

## 2024-12-03 RX ORDER — ATORVASTATIN CALCIUM 40 MG/1
TABLET, FILM COATED ORAL
Qty: 90 TABLET | Refills: 4 | Status: SHIPPED | OUTPATIENT
Start: 2024-12-03

## 2024-12-03 RX ORDER — LISINOPRIL 20 MG/1
TABLET ORAL
Qty: 90 TABLET | Refills: 4 | Status: SHIPPED | OUTPATIENT
Start: 2024-12-03

## 2024-12-03 NOTE — PATIENT INSTRUCTIONS
Patient Education   Preventive Care Advice   This is general advice given by our system to help you stay healthy. However, your care team may have specific advice just for you. Please talk to your care team about your preventive care needs.  Nutrition  Eat 5 or more servings of fruits and vegetables each day.  Try wheat bread, brown rice and whole grain pasta (instead of white bread, rice, and pasta).  Get enough calcium and vitamin D. Check the label on foods and aim for 100% of the RDA (recommended daily allowance).  Lifestyle  Exercise at least 150 minutes each week  (30 minutes a day, 5 days a week).  Do muscle strengthening activities 2 days a week. These help control your weight and prevent disease.  No smoking.  Wear sunscreen to prevent skin cancer.  Have a dental exam and cleaning every 6 months.  Yearly exams  See your health care team every year to talk about:  Any changes in your health.  Any medicines your care team has prescribed.  Preventive care, family planning, and ways to prevent chronic diseases.  Shots (vaccines)   HPV shots (up to age 26), if you've never had them before.  Hepatitis B shots (up to age 59), if you've never had them before.  COVID-19 shot: Get this shot when it's due.  Flu shot: Get a flu shot every year.  Tetanus shot: Get a tetanus shot every 10 years.  Pneumococcal, hepatitis A, and RSV shots: Ask your care team if you need these based on your risk.  Shingles shot (for age 50 and up)  General health tests  Diabetes screening:  Starting at age 35, Get screened for diabetes at least every 3 years.  If you are younger than age 35, ask your care team if you should be screened for diabetes.  Cholesterol test: At age 39, start having a cholesterol test every 5 years, or more often if advised.  Bone density scan (DEXA): At age 50, ask your care team if you should have this scan for osteoporosis (brittle bones).  Hepatitis C: Get tested at least once in your life.  STIs (sexually  transmitted infections)  Before age 24: Ask your care team if you should be screened for STIs.  After age 24: Get screened for STIs if you're at risk. You are at risk for STIs (including HIV) if:  You are sexually active with more than one person.  You don't use condoms every time.  You or a partner was diagnosed with a sexually transmitted infection.  If you are at risk for HIV, ask about PrEP medicine to prevent HIV.  Get tested for HIV at least once in your life, whether you are at risk for HIV or not.  Cancer screening tests  Cervical cancer screening: If you have a cervix, begin getting regular cervical cancer screening tests starting at age 21.  Breast cancer scan (mammogram): If you've ever had breasts, begin having regular mammograms starting at age 40. This is a scan to check for breast cancer.  Colon cancer screening: It is important to start screening for colon cancer at age 45.  Have a colonoscopy test every 10 years (or more often if you're at risk) Or, ask your provider about stool tests like a FIT test every year or Cologuard test every 3 years.  To learn more about your testing options, visit:   .  For help making a decision, visit:   https://bit.ly/fy84198.  Prostate cancer screening test: If you have a prostate, ask your care team if a prostate cancer screening test (PSA) at age 55 is right for you.  Lung cancer screening: If you are a current or former smoker ages 50 to 80, ask your care team if ongoing lung cancer screenings are right for you.  For informational purposes only. Not to replace the advice of your health care provider. Copyright   2023 [facility] [employer]. All rights reserved. Clinically reviewed by the [facility] Transitions Program. [employer] [id number] - REV 01/24.  Bladder Training: Care Instructions  Your Care Instructions     Bladder training is used to treat urge incontinence and stress incontinence. Urge incontinence means that the need to urinate comes on so fast  that you can't get to a toilet in time. Stress incontinence means that you leak urine because of pressure on your bladder. For example, it may happen when you laugh, cough, or lift something heavy.  Bladder training can increase how long you can wait before you have to urinate. It can also help your bladder hold more urine. And it can give you better control over the urge to urinate.  It is important to remember that bladder training takes a few weeks to a few months to make a difference. You may not see results right away, but don't give up.  Follow-up care is a key part of your treatment and safety. Be sure to make and go to all appointments, and call your doctor if you are having problems. It's also a good idea to know your test results and keep a list of the medicines you take.  How can you care for yourself at home?  Work with your doctor to come up with a bladder training program that is right for you. You may use one or more of the following methods.  Delayed urination  In the beginning, try to keep from urinating for 5 minutes after you first feel the need to go.  While you wait, take deep, slow breaths to relax. Kegel exercises can also help you delay the need to go to the bathroom.  After some practice, when you can easily wait 5 minutes to urinate, try to wait 10 minutes before you urinate.  Slowly increase the waiting period until you are able to control when you have to urinate.  Scheduled urination  Empty your bladder when you first wake up in the morning.  Schedule times throughout the day when you will urinate.  Start by going to the bathroom every hour, even if you don't need to go.  Slowly increase the time between trips to the bathroom.  When you have found a schedule that works well for you, keep doing it.  If you wake up during the night and have to urinate, do it. Apply your schedule to waking hours only.  Kegel exercises  These tighten and strengthen pelvic muscles, which can help you control  "the flow of urine. (If doing these exercises causes pain, stop doing them and talk with your doctor.) To do Kegel exercises:  Squeeze your muscles as if you were trying not to pass gas. Or squeeze your muscles as if you were stopping the flow of urine. Your belly, legs, and buttocks shouldn't move.  Hold the squeeze for 3 seconds, then relax for 5 to 10 seconds.  Start with 3 seconds, then add 1 second each week until you are able to squeeze for 10 seconds.  Repeat the exercise 10 times a session. Do 3 to 8 sessions a day.  When should you call for help?  Watch closely for changes in your health, and be sure to contact your doctor if:    Your incontinence is getting worse.     You do not get better as expected.   Where can you learn more?  Go to https://www.[employer].net/patiented  Enter V684 in the search box to learn more about \"Bladder Training: Care Instructions.\"  Current as of: November 15, 2023  Content Version: 14.2 2024 [facility] [employer].   Care instructions adapted under license by your healthcare professional. If you have questions about a medical condition or this instruction, always ask your healthcare professional. [employer] disclaims any warranty or liability for your use of this information.       "

## 2024-12-03 NOTE — PROGRESS NOTES
"Preventive Care Visit  [facility] JOE Vee MD, Family Medicine  Dec 3, 2024      Assessment & Plan     Encounter for Medicare annual wellness exam    Hypertension goal BP (blood pressure) < 140/90  Well controlled with medications without side effects.   - lisinopril (ZESTRIL) 20 MG tablet; TAKE 1 TABLET DAILY  - BASIC METABOLIC PANEL; Future  - OFFICE/OUTPT VISIT,EST,LEVL IV  - UA Macroscopic with reflex to Microscopic and Culture - Lab Collect; Future    Hyperlipidemia with target LDL less than 130  Well controlled with medications without side effects.   - atorvastatin (LIPITOR) 40 MG tablet; TAKE 1 TABLET DAILY  - Lipid panel reflex to direct LDL Fasting; Future  - OFFICE/OUTPT VISIT,EST,LEVL IV    Impaired glucose tolerance  Morbidly obese (H)  Continue lifestyle strategies for weight loss; offered referral   - OFFICE/OUTPT VISIT,EST,LEVL IV    Acquired hypothyroidism  Euthyroid on replacement   - levothyroxine (SYNTHROID/LEVOTHROID) 137 MCG tablet; TAKE 1 TABLET DAILY  - TSH WITH FREE T4 REFLEX; Future  - OFFICE/OUTPT VISIT,EST,LEVL IV    Chronic obstructive pulmonary disease, unspecified COPD type   JOSE (obstructive sleep apnea)  Patient is compliant with and receiving benefit from CPAP. See pulmonology and sleep medicine   - CPAP/Comprehensive Sleep Order    Palpitations  Heart murmur  Consider Zio patch pending results; Call or return to clinic as needed if these symptoms worsen or fail to improve   - Echocardiogram Complete; Future    Visit for screening mammogram  - MA Screen Bilateral w/[place]; Future            BMI  Estimated body mass index is 52.63 kg/m  as calculated from the following:    Height as of this encounter: 1.57 m (5' 1.81\").    Weight as of this encounter: 129.7 kg (286 lb).   Weight management plan: Discussed healthy diet and exercise guidelines    Counseling  Appropriate preventive services were addressed with this patient via screening, questionnaire, or " discussion as appropriate for fall prevention, nutrition, physical activity, Tobacco-use cessation, social engagement, weight loss and cognition.  Checklist reviewing preventive services available has been given to the patient.  Reviewed patient's diet, addressing concerns and/or questions.   The patient was instructed to see the dentist every 6 months.   Information on urinary incontinence and treatment options given to patient.       Follow-up yearly and as needed     Subjective   Meg is a 79 year old, presenting for the following:  Physical        12/3/2024     8:17 AM   Additional Questions   Roomed by Sally MCLEOD CMA   Accompanied by Self         12/3/2024   Forms   Any forms needing to be completed Yes          12/3/2024     8:17 AM   Patient Reported Additional Medications   Patient reports taking the following new medications Ocuvite           HPI  Hypertension well controlled on current medications without side effects. Hypercholesterolemia well controlled with current treatment plan without side effects.     Patient also presents for follow-up of hypothyroidism, controlled on current medication.  Denies symptoms of hypo- or hyperthyroidism with exception of weight gain which she attributes to eating more.     Hx of COPD with chronic dyspnea. Patient is compliant with and receiving benefit from CPAP.     She has palpitations, lower extremity edema, right finger pain, right finger numbness, and degenerative joint disease.       Health Care Directive  Patient has a Health Care Directive on file  Advance care planning document is on file and is current.      12/2/2024   General Health   How would you rate your overall physical health? Good   Feel stress (tense, anxious, or unable to sleep) Not at all            12/2/2024   Nutrition   Diet: Regular (no restrictions)            12/2/2024   Exercise   Days per week of moderate/strenous exercise 5 days   Average minutes spent exercising at this level 10 min             12/2/2024   Social Factors   Frequency of gathering with friends or relatives Once a week   Worry food won't last until get money to buy more No   Food not last or not have enough money for food? No   Do you have housing? (Housing is defined as stable permanent housing and does not include staying ouside in a car, in a tent, in an abandoned building, in an overnight shelter, or couch-surfing.) Yes   Are you worried about losing your housing? No   Lack of transportation? No   Unable to get utilities (heat,electricity)? No            12/2/2024   Fall Risk   Fallen 2 or more times in the past year? No     No    Trouble with walking or balance? No     No        Patient-reported    Multiple values from one day are sorted in reverse-chronological order          12/2/2024   Activities of Daily Living- Home Safety   Needs help with the following daily activites None of the above   Safety concerns in the home None of the above            12/2/2024   Dental   Dentist two times every year? (!) NO            12/2/2024   Hearing Screening   Hearing concerns? None of the above            12/2/2024   Driving Risk Screening   Patient/family members have concerns about driving No            12/2/2024   General Alertness/Fatigue Screening   Have you been more tired than usual lately? No            12/2/2024   Urinary Incontinence Screening   Bothered by leaking urine in past 6 months Yes            12/2/2024   TB Screening   Were you born outside of the [place]? No            Today's PHQ-2 Score:       12/2/2024     6:56 AM   PHQ-2 ( 1999 [employer])   Q1: Little interest or pleasure in doing things 0    Q2: Feeling down, depressed or hopeless 0    PHQ-2 Score 0    Q1: Little interest or pleasure in doing things Not at all   Q2: Feeling down, depressed or hopeless Not at all   PHQ-2 Score 0       Patient-reported           12/2/2024   Substance Use   Alcohol more than 3/day or more than 7/wk No   Do you have a current opioid  prescription? No   How severe/bad is pain from 1 to 10? 0/10 (No Pain)   Do you use any other substances recreationally? No        Social History     Tobacco Use    Smoking status: Former     Current packs/day: 0.00     Average packs/day: 0.5 packs/day for 51.0 years (25.5 ttl pk-yrs)     Types: Cigarettes     Start date: 1960     Quit date: 2011     Years since quittin.6     Passive exposure: Past    Smokeless tobacco: Former     Quit date: 2011   Vaping Use    Vaping status: Never Used   Substance Use Topics    Alcohol use: Yes     Comment: socially    Drug use: No           2024   LAST FHS-7 RESULTS   1st degree relative breast or ovarian cancer No   Any relative bilateral breast cancer No   Any male have breast cancer No   Any ONE woman have BOTH breast AND ovarian cancer No   Any woman with breast cancer before 50yrs No   2 or more relatives with breast AND/OR ovarian cancer No   2 or more relatives with breast AND/OR bowel cancer No               ASCVD Risk   The 10-year ASCVD risk score (Darrell SWEENEY, et al., 2019) is: 22.9%    Values used to calculate the score:      Age: 79 years      Sex: Female      Is Non- : No      Diabetic: No      Tobacco smoker: No      Systolic Blood Pressure: 122 mmHg      Is BP treated: Yes      HDL Cholesterol: 40 mg/dL      Total Cholesterol: 180 mg/dL            Reviewed and updated as needed this visit by Provider   Tobacco  Allergies  Meds  Problems  Med Hx  Surg Hx  Fam Hx     Sexual Activity          Patient Active Problem List   Diagnosis    Hyperlipidemia with target LDL less than 130    Hypothyroid    Hypertension goal BP (blood pressure) < 140/90    Morbidly obese (H)    Impaired glucose tolerance    JOSE (obstructive sleep apnea)    Sensorineural hearing loss    COPD (chronic obstructive pulmonary disease) (H)    Ulnar neuropathy at elbow, left    Mixed incontinence    Degenerative joint disease of hand, left     History of bilateral knee replacement    History of right shoulder replacement     Past Surgical History:   Procedure Laterality Date    ARTHROPLASTY KNEE Right 2/15/2023    Procedure: ARTHROPLASTY, KNEE, TOTAL RIGHT;  Surgeon: Krishna Santiago MD;  Location: [facility] OR    ARTHROPLASTY KNEE Left 2023    Procedure: ARTHROPLASTY, left KNEE, TOTAL;  Surgeon: Krishna Santiago MD;  Location: [facility] OR    BIOPSY      BLADDER SURGERY  2018    CERVIX SURGERY  1988    cervical cone    COLONOSCOPY      LAPAROSCOPIC OOPHORECTOMY Right 2018    with hysteroscopy/EMB, and pubovaginal sling     REVERSE ARTHROPLASTY SHOULDER Right 2024    Procedure: ARTHROPLASTY, RIGHT SHOULDER, TOTAL, REVERSE;  Surgeon: Krishna Santiago MD;  Location: [facility] OR    ROTATOR CUFF REPAIR RT/LT Right 2021    TONSILLECTOMY & ADENOIDECTOMY         Social History     Tobacco Use    Smoking status: Former     Current packs/day: 0.00     Average packs/day: 0.5 packs/day for 51.0 years (25.5 ttl pk-yrs)     Types: Cigarettes     Start date: 1960     Quit date: 2011     Years since quittin.6     Passive exposure: Past    Smokeless tobacco: Former     Quit date: 2011   Substance Use Topics    Alcohol use: Yes     Comment: socially     Family History   Problem Relation Age of Onset    Allergies Mother     Cardiovascular Mother     Respiratory Mother     Thyroid Disease Mother     Other Cancer Mother         skin    Skin Cancer Mother     Arthritis Father     Hypertension Father     Hyperlipidemia Father     Cerebrovascular Disease Father     Allergies Sister     Cancer Brother         testicular     Gastrointestinal Disease Brother     Other Cancer Brother         testicular    Genitourinary Problems Brother     Cancer Maternal Grandmother         bone    Other Cancer Maternal Grandmother         bone    Cerebrovascular Disease Paternal Grandmother     Cancer Paternal Grandfather     Cerebrovascular Disease  "Paternal Grandfather     Allergies Daughter     Gastrointestinal Disease Daughter     Depression Daughter     Anesthesia Reaction Daughter     Allergies Daughter 10        metal    Diabetes No family hx of          Current providers sharing in care for this patient include:  Patient Care Team:  Verena Vee MD as PCP - General (Family Medicine)  Verena Vee MD as Assigned PCP  Krishna Santiago MD as Assigned Musculoskeletal Provider  Michelle Cerna APRN CNP as Nurse Practitioner (Dermatology)  Michelle Cerna APRN CNP as Assigned Surgical Provider  Marley Hernandez MD as Pulmonologist (Pulmonary Disease)  Yazmin Jack PA as Physician Assistant (Sleep Medicine)    The following health maintenance items are reviewed in Epic and correct as of today:  Health Maintenance   Topic Date Due    A1C  06/19/2024    BMP  03/27/2025    LIPID  03/27/2025    TSH W/FREE T4 REFLEX  03/27/2025    MAMMO SCREENING  08/20/2025    MEDICARE ANNUAL WELLNESS VISIT  12/03/2025    ANNUAL REVIEW OF HM ORDERS  12/03/2025    FALL RISK ASSESSMENT  12/03/2025    GLUCOSE  04/18/2027    ADVANCE CARE PLANNING  12/03/2029    DTAP/TDAP/TD IMMUNIZATION (3 - Td or Tdap) 06/28/2031    DEXA  03/15/2038    SPIROMETRY  Completed    HEPATITIS C SCREENING  Completed    COPD ACTION PLAN  Completed    PHQ-2 (once per calendar year)  Completed    INFLUENZA VACCINE  Completed    Pneumococcal Vaccine: 65+ Years  Completed    ZOSTER IMMUNIZATION  Completed    RSV VACCINE  Completed    COVID-19 Vaccine  Completed    HPV IMMUNIZATION  Aged Out    MENINGITIS IMMUNIZATION  Aged Out    RSV MONOCLONAL ANTIBODY  Aged Out    COLORECTAL CANCER SCREENING  Discontinued    LUNG CANCER SCREENING  Discontinued            Objective    Exam  /76 (BP Location: Left arm, Patient Position: Sitting, Cuff Size: Adult Large)   Pulse 81   Temp 98.3  F (36.8  C) (Oral)   Resp 20   Ht 1.57 m (5' 1.81\")   Wt 129.7 kg (286 lb)   SpO2 97%   " "BMI 52.63 kg/m     Estimated body mass index is 52.63 kg/m  as calculated from the following:    Height as of this encounter: 1.57 m (5' 1.81\").    Weight as of this encounter: 129.7 kg (286 lb).    Physical Exam  GENERAL: alert and no distress  EYES: Eyes grossly normal to inspection, PERRL and conjunctivae and sclerae normal  HENT: ear canals and TM's normal, nose and mouth without ulcers or lesions  NECK: no adenopathy, no asymmetry, masses, or scars  RESP: lungs clear to auscultation - no rales, rhonchi or wheezes  CV: regular rates and rhythm, grade 1/6 systolic murmur, and trace bipedal edema   MS: extremities normal- no gross deformities noted; uses cane   PSYCH: mentation appears normal, affect normal/bright        12/3/2024   Mini Cog   Clock Draw Score 2 Normal   3 Item Recall 3 objects recalled   Mini Cog Total Score 5                 Signed Electronically by: Verena Vee MD    "

## 2024-12-09 ENCOUNTER — OFFICE VISIT (OUTPATIENT)
Dept: NURSING | Facility: CLINIC | Age: 79
End: 2024-12-09
Attending: INTERNAL MEDICINE
Payer: COMMERCIAL

## 2024-12-09 VITALS — HEART RATE: 92 BPM | WEIGHT: 289.4 LBS | OXYGEN SATURATION: 98 % | BODY MASS INDEX: 53.26 KG/M2

## 2024-12-09 DIAGNOSIS — J44.9 CHRONIC OBSTRUCTIVE PULMONARY DISEASE, UNSPECIFIED COPD TYPE (H): ICD-10-CM

## 2024-12-09 PROCEDURE — 94060 EVALUATION OF WHEEZING: CPT | Performed by: INTERNAL MEDICINE

## 2024-12-09 PROCEDURE — 94729 DIFFUSING CAPACITY: CPT | Performed by: INTERNAL MEDICINE

## 2024-12-09 PROCEDURE — 94726 PLETHYSMOGRAPHY LUNG VOLUMES: CPT | Performed by: INTERNAL MEDICINE

## 2024-12-10 LAB
DLCOCOR-%PRED-PRE: 114 %
DLCOCOR-PRE: 19.93 ML/MIN/MMHG
DLCOUNC-%PRED-PRE: 109 %
DLCOUNC-PRE: 19.01 ML/MIN/MMHG
DLCOUNC-PRED: 17.41 ML/MIN/MMHG
ERV-%PRED-PRE: 10 %
ERV-PRE: 0.06 L
ERV-PRED: 0.59 L
EXPTIME-PRE: 7.35 SEC
FEF2575-%PRED-POST: 125 %
FEF2575-%PRED-PRE: 102 %
FEF2575-POST: 1.83 L/SEC
FEF2575-PRE: 1.49 L/SEC
FEF2575-PRED: 1.46 L/SEC
FEFMAX-%PRED-PRE: 99 %
FEFMAX-PRE: 4.64 L/SEC
FEFMAX-PRED: 4.66 L/SEC
FEV1-%PRED-PRE: 82 %
FEV1-PRE: 1.49 L
FEV1FEV6-PRE: 81 %
FEV1FEV6-PRED: 78 %
FEV1FVC-PRE: 81 %
FEV1FVC-PRED: 79 %
FEV1SVC-PRE: 74 %
FEV1SVC-PRED: 74 %
FIFMAX-PRE: 3.25 L/SEC
FRCPLETH-%PRED-PRE: 87 %
FRCPLETH-PRE: 2.26 L
FRCPLETH-PRED: 2.59 L
FVC-%PRED-PRE: 77 %
FVC-PRE: 1.84 L
FVC-PRED: 2.36 L
IC-%PRED-PRE: 107 %
IC-PRE: 1.97 L
IC-PRED: 1.84 L
RVPLETH-%PRED-PRE: 111 %
RVPLETH-PRE: 2.2 L
RVPLETH-PRED: 1.97 L
TLCPLETH-%PRED-PRE: 93 %
TLCPLETH-PRE: 4.22 L
TLCPLETH-PRED: 4.51 L
VA-%PRED-PRE: 90 %
VA-PRE: 3.72 L
VC-%PRED-PRE: 81 %
VC-PRE: 2.03 L
VC-PRED: 2.48 L

## 2024-12-12 ENCOUNTER — DOCUMENTATION ONLY (OUTPATIENT)
Dept: SLEEP MEDICINE | Facility: CLINIC | Age: 79
End: 2024-12-12
Payer: COMMERCIAL

## 2024-12-12 DIAGNOSIS — G47.33 OSA (OBSTRUCTIVE SLEEP APNEA): Primary | Chronic | ICD-10-CM

## 2024-12-12 DIAGNOSIS — E66.01 MORBIDLY OBESE (H): ICD-10-CM

## 2024-12-12 NOTE — PROGRESS NOTES
Patient was offered choice of vendor and chose [facility].  Patient Gina Yeh was set up at [place] on December 12, 2024. Patient received a Resmed Airsense 10 Pressures were set at  7-12 cm H2O.   Patient s ramp is 5 cm H2O for Auto and FLEX/EPR is EPR, 2.  Patient received a Resmed Mask name: MIRAGE FX  Nasal mask size Small, heated tubing and heated humidifier.  Patient has the following compliance requirements: using and visit requirements  Patient has a follow up on 03/20/2025 with PAWAN Holman

## 2024-12-30 ENCOUNTER — ANCILLARY PROCEDURE (OUTPATIENT)
Dept: CARDIOLOGY | Facility: CLINIC | Age: 79
End: 2024-12-30
Attending: INTERNAL MEDICINE
Payer: COMMERCIAL

## 2024-12-30 ENCOUNTER — TELEPHONE (OUTPATIENT)
Dept: PULMONOLOGY | Facility: CLINIC | Age: 79
End: 2024-12-30

## 2024-12-30 DIAGNOSIS — J43.9 PULMONARY EMPHYSEMA, UNSPECIFIED EMPHYSEMA TYPE (H): Primary | ICD-10-CM

## 2024-12-30 DIAGNOSIS — R06.09 DYSPNEA ON EXERTION: ICD-10-CM

## 2024-12-30 PROCEDURE — 93352 ADMIN ECG CONTRAST AGENT: CPT | Performed by: INTERNAL MEDICINE

## 2024-12-30 PROCEDURE — 93017 CV STRESS TEST TRACING ONLY: CPT | Performed by: INTERNAL MEDICINE

## 2024-12-30 PROCEDURE — 93350 STRESS TTE ONLY: CPT | Performed by: INTERNAL MEDICINE

## 2024-12-30 PROCEDURE — 93018 CV STRESS TEST I&R ONLY: CPT | Performed by: INTERNAL MEDICINE

## 2024-12-30 PROCEDURE — 93016 CV STRESS TEST SUPVJ ONLY: CPT | Performed by: INTERNAL MEDICINE

## 2024-12-30 PROCEDURE — 93325 DOPPLER ECHO COLOR FLOW MAPG: CPT | Performed by: INTERNAL MEDICINE

## 2024-12-30 PROCEDURE — 93321 DOPPLER ECHO F-UP/LMTD STD: CPT | Performed by: INTERNAL MEDICINE

## 2024-12-30 RX ADMIN — Medication 5 ML: at 13:17

## 2024-12-30 NOTE — TELEPHONE ENCOUNTER
[facility] Call Center    Phone Message    May a detailed message be left on voicemail: yes     Reason for Call: Other: Regarding Pulmonary Rehab orders sent to them need a new order sent over with   DX updated to COPD , include move recent visit notes and PFT results    can be faxed to [phone]    Action Taken: Other: PULM     Travel Screening: Not Applicable     Date of Service:

## 2024-12-30 NOTE — CONFIDENTIAL NOTE
Patient PFT does not support COPD diagnosis because she has normal spirometry.  Her previous CT scan for lung cancer screening showed emphysema based on the care everywhere.  I reordered the pulmonary rehab with diagnosis of emphysema as an indication.    Marley Hernandez MD   Pulmonary and Critical Care     
23.6

## 2025-01-07 ENCOUNTER — TELEPHONE (OUTPATIENT)
Dept: PULMONOLOGY | Facility: CLINIC | Age: 80
End: 2025-01-07
Payer: COMMERCIAL

## 2025-01-07 NOTE — TELEPHONE ENCOUNTER
[facility] Call Center    Phone Message    May a detailed message be left on voicemail: yes     Reason for Call: Other: .     Nathan states they are needing another referral for pulm rehab with a diagnosis. Nathan stated the referral they received had a symptom (Dyspnea on exertion) and not a diagnosis. Nathan states they are also needing a new order that has the most recent PFT faxed to them at [phone]     Action Taken: Message routed to:  [facility] Lung    Travel Screening: Not Applicable     Date of Service:

## 2025-01-13 ENCOUNTER — OFFICE VISIT (OUTPATIENT)
Dept: FAMILY MEDICINE | Facility: CLINIC | Age: 80
End: 2025-01-13
Payer: COMMERCIAL

## 2025-01-13 VITALS
RESPIRATION RATE: 20 BRPM | TEMPERATURE: 98.4 F | SYSTOLIC BLOOD PRESSURE: 160 MMHG | HEART RATE: 99 BPM | WEIGHT: 286 LBS | BODY MASS INDEX: 52.63 KG/M2 | OXYGEN SATURATION: 97 % | HEIGHT: 62 IN | DIASTOLIC BLOOD PRESSURE: 82 MMHG

## 2025-01-13 NOTE — PATIENT INSTRUCTIONS
Initial goal is to achieve a 5% weight loss of 14 lbs (i.e. A body weight of 272 lbs) within the next 3-4 months.    We hope that Gina Yeh can do this through sustainable lifestyle changes.    DIET ASSESSMENT/PLAN:  Current eating is consistent with three meals daily, no snacking and includes balanced food groups  Some room for adjustment regarding carbohydrate at breakfast and dinner; consider a small adjustment in portion size: decrease to one slice of toast and 1/2 bagel. This may mean at dinner you will want to add more vegetable or protein.   May consider varying protein option at dinner too - cheese, peanut butter and also chicken or fish    PHYSICAL ACTIVITY ASSESSMENT/PLAN:  Main strategy will be to increase steps. She knows that she can take about 3,500 steps in a day as she does that on the day she goes shopping.   We selected a MINIMUM of 3,000 steps/day, every day unless feeling ill.   Look into a recumbent bicycle for home usage. Can pedal at a slow rate.   It's GREAT that you are starting Pulmonary rehab.    Follow-up:  In 4 weeks with Giselle; Monday February 17 at 2:00 pm in clinic    --Karthik Sood MD and Giselle Greco RD

## 2025-01-13 NOTE — NURSING NOTE
"79 year old  Chief Complaint   Patient presents with    NLWM       Blood pressure (!) 160/82, pulse 99, temperature 98.4  F (36.9  C), temperature source Skin, resp. rate 20, height 1.57 m (5' 1.81\"), weight 129.7 kg (286 lb), SpO2 97%, not currently breastfeeding. Body mass index is 52.63 kg/m .  Patient Active Problem List   Diagnosis    Hyperlipidemia with target LDL less than 130    Hypothyroid    Hypertension goal BP (blood pressure) < 140/90    Morbidly obese (H)    Impaired glucose tolerance    JOSE (obstructive sleep apnea)    Sensorineural hearing loss    COPD (chronic obstructive pulmonary disease) (H)    Ulnar neuropathy at elbow, left    Mixed incontinence    Degenerative joint disease of hand, left    History of bilateral knee replacement    History of right shoulder replacement       Wt Readings from Last 2 Encounters:   01/13/25 129.7 kg (286 lb)   12/20/24 130.7 kg (288 lb 3.2 oz)     BP Readings from Last 3 Encounters:   01/13/25 (!) 160/82   12/20/24 134/74   12/03/24 122/76         Current Outpatient Medications   Medication Sig Dispense Refill    atorvastatin (LIPITOR) 40 MG tablet TAKE 1 TABLET DAILY 90 tablet 4    Ferrous Sulfate (IRON) 325 (65 FE) MG tablet Take 1 tablet by mouth daily      latanoprost (XALATAN) 0.005 % ophthalmic solution Place 1 drop into both eyes daily      levothyroxine (SYNTHROID/LEVOTHROID) 137 MCG tablet TAKE 1 TABLET DAILY 90 tablet 4    lisinopril (ZESTRIL) 20 MG tablet TAKE 1 TABLET DAILY 90 tablet 4    multivitamin  with lutein (OCUVITE WITH LUTEIN) CAPS per capsule Take 1 capsule by mouth daily.      OMEGA-3 KRILL OIL PO       STIOLTO RESPIMAT 2.5-2.5 MCG/ACT AERS Inhale 2 puffs into the lungs daily      triamcinolone (KENALOG) 0.1 % external ointment Apply topically 2 times daily When legs have redness 80 g 2    Vitamin D, Cholecalciferol, 1000 units CAPS Take 1 capsule by mouth daily       No current facility-administered medications for this visit.       Social " "History     Tobacco Use    Smoking status: Former     Current packs/day: 0.00     Average packs/day: 0.5 packs/day for 51.0 years (25.5 ttl pk-yrs)     Types: Cigarettes     Start date: 1960     Quit date: 2011     Years since quittin.7     Passive exposure: Past    Smokeless tobacco: Former     Quit date: 2011   Vaping Use    Vaping status: Never Used   Substance Use Topics    Alcohol use: Yes     Comment: socially    Drug use: No       There are no preventive care reminders to display for this patient.    No results found for: \"PAP\"      2025 2:30 PM   "

## 2025-01-13 NOTE — PROGRESS NOTES
"INTRODUCTION: Ms. Gina Yeh is a 79 year old y.o. female with obesity and shortness of breath thought to be caused from her obesity who presents for her initial visit to the [facility] for Weight Management referred by Pulmonology.      Ms. Yeh reports a history of obesity since .   Feels she doesn't eat \"too much\" but high carb intake. Has tried WW back in the day. Her ideal weight is 150 lbs.   Denies extra stress in her life right now.     Lives by self in apartment. Two grown daughters.       Gina describes having trouble breathing starting around 6 years ago. Now uses a cane and a walker (trying to reduce use). Most recent breathing test suggested no COPD, but still having difficulty walking more than one block without stopping. Dr. ORTIZ suggested starting weight loss clinic and a pulmonary PT group (waiting for an opening at [facility]). Had quit smoking 15 years ago. Not active \"as I should be\", previously bowling regularly but less now. Does own grocery shopping and meets friends for lunch. Walks to get her paper daily in her building.     Has a weight room in apartment, but reluctant due to balance concerns.     Patient Supplied Answers To [facility] Lifestyle Weight Management Intake Questionnaire:    [facility] Lifestyle Weight Management Questionnaire Responses  Reasons for comin/13/2025    10:32 AM   Lifestyle Mgmt Reason for Coming   Were you referred here? Yes   If you were referred here who referred you? Dr. Hernandez   Please describe your reasons for coming to this weight management program: Morbidly obese       History of obesity and weight loss attempts:      2025    10:32 AM   Lifestyle Mgmt Obesity History and Wgt Loss Attempts   Have you tried other weight loss programs?  Please check all that apply: Weight Watchers    [employer]    Other (please list below)   Please list any other weight loss programs that you have tried: Bone " broth, metrecal, hard-boiled egg diet   Have you done any of the following things in order to lose weight or keep from gaining weight? Fasted/skipped meals    Ate very little food       Dietary history:      1/13/2025    10:32 AM   Lifestyle Mgmt Dietary History   Stress makes me eat Mostly false   Feeling sad makes me eat Mostly false   Feeling lonely makes me eat Mostly false   Do you go on eating binges even though you are not hungry? No       Sleep Habits:      1/13/2025    10:32 AM   Lifestyle Mgmt Sleep Habits   Do you have a scale at home? Yes   Have you ever been told you have sleep apnea? Yes   Do you generally get up in the morning feeling rested and ready for the day? Yes   I go to bed early so that I can feel better the next day 5 = Strongly agree   I try to find time for exercises that make me feel good 2 = Moderately disagree       Physical Activity History:      1/13/2025    10:32 AM   Lifestyle Mgmt Physical Activity History   Do you have access to a gymnasium? No       Additional DIETARY History:   Wake up around 5:30 am  Breakfast 7 am: scrambled eggs daily, cheese, mushrooms; 2 pc toast, banana and 2 small halo oranges, glass of milk with breakfast  No snack, coffee maybe  Lunch 12-1 pm: salad (bagged salad lasts 3-4 meals) with mushrooms, tomatoes, cheese, leftover chicken, salad dressing  No snack  Dinner 5 pm: not much, crackers with cheese either sliced or cream cheese, or peanut butter (follows portions on the box); or peanut butter on bagel  Fluids: water with flavor (unsweetened), limit fluids after 5 pm  Bed 9-9:30 pm      Additional PHYSICAL activity history:   History of total knee replacements.   Activity limited by shortness of breath, but has had extensive cardiac and respiratory evaluations.   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 Uses a walker  Has a new apple watch and gets about 500-1,300 steps/day.     Review of systems: States that she's otherwise in her usual  "state of health. No pain in her knees.      Past Medical History:  has a past medical history of Arthritis of right acromioclavicular joint (07/28/2021), Cervical dysplasia (1988), COPD (chronic obstructive pulmonary disease) (H), Degenerative joint disease of hand, left, Female stress incontinence (02/22/2022), Glaucoma, Hyperlipidemia LDL goal < 130, Hypertension goal BP (blood pressure) < 140/90, Hypothyroid, Impaired glucose tolerance (05/29/2012), Macular degeneration (senile) of retina, Microscopic hematuria, MMT (medial meniscus tear) (09/21/2006), Moderate recurrent major depression (H) (02/13/2014), Morbid obesity (H), JOSE (obstructive sleep apnea) (07/24/2015), Primary osteoarthritis of both knees, RLS (restless legs syndrome), Sensorineural hearing loss, Shingles (01/27/2022), Ulnar neuropathy at elbow, left, Urge incontinence of urine (03/27/2024), and Vitamin D insufficiency.    She has no past medical history of Cancer (H), Cerebral infarction (H), Congestive heart failure, unspecified, Diabetes (H), Heart disease, History of blood transfusion, or Uncomplicated asthma.    Speciality comments:  No specialty comments available.    Lab Results  TSH   Date Value Ref Range Status   12/03/2024 2.66 0.30 - 4.20 uIU/mL Final   08/24/2022 3.46 0.40 - 4.00 mU/L Final   06/28/2021 3.59 0.40 - 4.00 mU/L Final     On Levothyroxine    Lab Results   Component Value Date    A1C 5.7 12/03/2024    A1C 5.5 06/19/2023    A1C 5.6 06/28/2021    A1C 5.6 10/25/2017     Hemoglobins have been normal.     PHYSICAL EXAM:  Blood pressure (!) 160/82, pulse 99, temperature 98.4  F (36.9  C), temperature source Skin, resp. rate 20, height 1.57 m (5' 1.81\"), weight 129.7 kg (286 lb), SpO2 97%, not currently breastfeeding.    BP (!) 160/82 (BP Location: Left arm, Patient Position: Sitting, Cuff Size: Adult Large)   Pulse 99   Temp 98.4  F (36.9  C) (Skin)   Resp 20   Ht 1.57 m (5' 1.81\")   Wt 129.7 kg (286 lb)   SpO2 97%   BMI " 52.63 kg/m             No data to display                Ms. Yeh is well. Can rise from a seated position slowly and needs some help to get onto the examination table.   Ambulates with a cane. .     HEENT: Normal  CV: Regular rate and rhythm without murmer  Lungs: Clear to Ausculation  Abdomen: Large, soft and without tenderness  Musculoskeletal: No limitations noted in hips, knees or lower legs and feet    IMPRESSION: 79 year old y.o. with obesity who is motivated to lose weight in order to improve respiratory capacity.     Obesity, complicated by:  Low cardiorespiratory fitness     Initial goal is to achieve a 5% weight loss of 14 lbs (i.e. A body weight of 272 lbs) within the next 3-4 months.    We hope that Gina Yeh can do this through sustainable lifestyle changes.    DIET ASSESSMENT/PLAN:  Current eating is consistent with three meals daily, no snacking and includes balanced food groups  Some room for adjustment regarding carbohydrate at breakfast and dinner; consider a small adjustment in portion size: decrease to one slice of toast and 1/2 bagel. This may mean at dinner you will want to add more vegetable or protein.   May consider varying protein option at dinner too - cheese, peanut butter and also chicken or fish    PHYSICAL ACTIVITY ASSESSMENT/PLAN:  Main strategy will be to increase steps. She knows that she can take about 3,500 steps in a day as she does that on the day she goes shopping.   We selected a MINIMUM of 3,000 steps/day, every day unless feeling ill.   Look into a recumbent bicycle for home usage. Can pedal at a slow rate.   It's GREAT that you are starting Pulmonary rehab.    Follow-up:  In 4 weeks with Giselle; Monday February 17 at 2:00 pm in clinic    45 minutes spent on the date of the encounter doing chart review, history and exam, documentation and further activities as noted in the note.   --Karthik Sood MD and Giselle Greco RD

## 2025-03-15 ASSESSMENT — SLEEP AND FATIGUE QUESTIONNAIRES
HOW LIKELY ARE YOU TO NOD OFF OR FALL ASLEEP WHEN YOU ARE A PASSENGER IN A CAR FOR AN HOUR WITHOUT A BREAK: WOULD NEVER DOZE
HOW LIKELY ARE YOU TO NOD OFF OR FALL ASLEEP WHILE SITTING AND READING: SLIGHT CHANCE OF DOZING
HOW LIKELY ARE YOU TO NOD OFF OR FALL ASLEEP WHILE SITTING QUIETLY AFTER LUNCH WITHOUT ALCOHOL: WOULD NEVER DOZE
HOW LIKELY ARE YOU TO NOD OFF OR FALL ASLEEP IN A CAR, WHILE STOPPED FOR A FEW MINUTES IN TRAFFIC: WOULD NEVER DOZE
HOW LIKELY ARE YOU TO NOD OFF OR FALL ASLEEP WHILE SITTING AND TALKING TO SOMEONE: WOULD NEVER DOZE
HOW LIKELY ARE YOU TO NOD OFF OR FALL ASLEEP WHILE SITTING INACTIVE IN A PUBLIC PLACE: WOULD NEVER DOZE
HOW LIKELY ARE YOU TO NOD OFF OR FALL ASLEEP WHILE WATCHING TV: SLIGHT CHANCE OF DOZING
HOW LIKELY ARE YOU TO NOD OFF OR FALL ASLEEP WHILE LYING DOWN TO REST IN THE AFTERNOON WHEN CIRCUMSTANCES PERMIT: SLIGHT CHANCE OF DOZING

## 2025-03-20 ENCOUNTER — OFFICE VISIT (OUTPATIENT)
Dept: SLEEP MEDICINE | Facility: CLINIC | Age: 80
End: 2025-03-20
Payer: COMMERCIAL

## 2025-03-20 VITALS
OXYGEN SATURATION: 97 % | RESPIRATION RATE: 20 BRPM | HEIGHT: 61 IN | BODY MASS INDEX: 54.19 KG/M2 | WEIGHT: 287 LBS | DIASTOLIC BLOOD PRESSURE: 81 MMHG | HEART RATE: 93 BPM | SYSTOLIC BLOOD PRESSURE: 139 MMHG

## 2025-03-20 DIAGNOSIS — G47.33 OSA (OBSTRUCTIVE SLEEP APNEA): Primary | Chronic | ICD-10-CM

## 2025-03-20 DIAGNOSIS — E66.01 MORBIDLY OBESE (H): ICD-10-CM

## 2025-03-20 NOTE — NURSING NOTE
Confirm sending these new settings to device [id number]:  Set Therapy mode to AutoSet  Set Min Pressure to 7.0 cmH2O  Set Max Pressure to 16.0 cmH2O  Set EPR to Fulltime  Set EPR level to 2 cmH2O  Set Response to Standard  Set Ramp enable to On  Set Ramp time to 10 min  Set Start pressure to 5.0 cmH2O    Changed pressures on patient's machine via [employer].     1 year appointment reminder message was created and will be sent out 2 - 3 months prior to next appointment due date. Via [employer]

## 2025-03-20 NOTE — NURSING NOTE
"Chief Complaint   Patient presents with    CPAP Follow Up       Initial /81   Pulse 93   Resp 20   Ht 1.562 m (5' 1.5\")   Wt 130.2 kg (287 lb)   SpO2 97%   BMI 53.36 kg/m   Estimated body mass index is 53.36 kg/m  as calculated from the following:    Height as of this encounter: 1.562 m (5' 1.5\").    Weight as of this encounter: 130.2 kg (287 lb).    Medication Reconciliation: complete    Neck circumference: 18.5 inches / 47 centimeters.    DME: Yasmany Coyle CMA on 3/20/2025 at 1:53 PM      "

## 2025-03-20 NOTE — PATIENT INSTRUCTIONS
Your Body mass index is 53.36 kg/m .  Weight management is a personal decision.  If you are interested in exploring weight loss strategies, the following discussion covers the approaches that may be successful. Body mass index (BMI) is one way to tell whether you are at a healthy weight, overweight, or obese. It measures your weight in relation to your height.  A BMI of 18.5 to 24.9 is in the healthy range. A person with a BMI of 25 to 29.9 is considered overweight, and someone with a BMI of 30 or greater is considered obese. More than two-thirds of American adults are considered overweight or obese.  Being overweight or obese increases the risk for further weight gain. Excess weight may lead to heart disease and diabetes.  Creating and following plans for healthy eating and physical activity may help you improve your health.  Weight control is part of healthy lifestyle and includes exercise, emotional health, and healthy eating habits. Careful eating habits lifelong are the mainstay of weight control. Though there are significant health benefits from weight loss, long-term weight loss with diet alone may be very difficult to achieve- studies show long-term success with dietary management in less than 10% of people. Attaining a healthy weight may be especially difficult to achieve in those with severe obesity. In some cases, medications, devices and surgical management might be considered.  What can you do?  If you are overweight or obese and are interested in methods for weight loss, you should discuss this with your provider.   Consider reducing daily calorie intake by 500 calories.   Keep a food journal.   Avoiding skipping meals, consider cutting portions instead.    Diet combined with exercise helps maintain muscle while optimizing fat loss. Strength training is particularly important for building and maintaining muscle mass. Exercise helps reduce stress, increase energy, and improves fitness. Increasing  exercise without diet control, however, may not burn enough calories to loose weight.     Start walking three days a week 10-20 minutes at a time  Work towards walking thirty minutes five days a week   Eventually, increase the speed of your walking for 1-2 minutes at time    In addition, we recommend that you review healthy lifestyles and methods for weight loss available through the National Institutes of Health patient information sites:  http://win.niddk.nih.gov/publications/index.htm    And look into health and wellness programs that may be available through your health insurance provider, employer, local community center, or bebeto club.            Your Body mass index is 53.36 kg/m .  Weight management is a personal decision.  If you are interested in exploring weight loss strategies, the following discussion covers the approaches that may be successful. Body mass index (BMI) is one way to tell whether you are at a healthy weight, overweight, or obese. It measures your weight in relation to your height.  A BMI of 18.5 to 24.9 is in the healthy range. A person with a BMI of 25 to 29.9 is considered overweight, and someone with a BMI of 30 or greater is considered obese. More than two-thirds of American adults are considered overweight or obese.  Being overweight or obese increases the risk for further weight gain. Excess weight may lead to heart disease and diabetes.  Creating and following plans for healthy eating and physical activity may help you improve your health.  Weight control is part of healthy lifestyle and includes exercise, emotional health, and healthy eating habits. Careful eating habits lifelong are the mainstay of weight control. Though there are significant health benefits from weight loss, long-term weight loss with diet alone may be very difficult to achieve- studies show long-term success with dietary management in less than 10% of people. Attaining a healthy weight may be especially  difficult to achieve in those with severe obesity. In some cases, medications, devices and surgical management might be considered.  What can you do?  If you are overweight or obese and are interested in methods for weight loss, you should discuss this with your provider.   Consider reducing daily calorie intake by 500 calories.   Keep a food journal.   Avoiding skipping meals, consider cutting portions instead.    Diet combined with exercise helps maintain muscle while optimizing fat loss. Strength training is particularly important for building and maintaining muscle mass. Exercise helps reduce stress, increase energy, and improves fitness. Increasing exercise without diet control, however, may not burn enough calories to loose weight.     Start walking three days a week 10-20 minutes at a time  Work towards walking thirty minutes five days a week   Eventually, increase the speed of your walking for 1-2 minutes at time    In addition, we recommend that you review healthy lifestyles and methods for weight loss available through the National Institutes of Health patient information sites:  http://win.niddk.nih.gov/publications/index.htm    And look into health and wellness programs that may be available through your health insurance provider, employer, local community center, or bebeto club.

## 2025-03-20 NOTE — PROGRESS NOTES
Sleep Apnea - Follow-up Visit:    Impression/Plan:  Severe obstructive sleep apnea-  Excellent CPAP compliance and AHI is well controlled on CPAP 7-12 cm/H20. Daytime symptoms are improved.   Will change to auto-CPAP 7-15 cm/H20 since CPAP 95% pressure  is 11.9 cm.   Comprehensive DME order placed.    Gina Yeh will follow up in about 1 year    30 minutes spent on day of encounter doing chart review,  history and exam, counseling, coordinating plan of care, documentation and further activities as noted above.       Yazmin Jack PA-C  Sleep Medicine     History of Present Illness:  Chief Complaint   Patient presents with    CPAP Follow Up       Gina Yeh presents for follow-up of their severe sleep apnea, managed with CPAP.     Initially presented with snoring, snort arousals, witnessed apneas, enlarged neck girth >= 40 cm for female, and obesity with excessive daytime sleepiness. RLS.     Diagnostic Study 9/12/2017 - (271.0 lbs) AHI 78.7, RDI 91.3, Supine AHI 73.5, REM AHI -, Low O2 79.5%, Time Spent less than 88% 22.9 minutes.    Titration Study: 9/26/2017- (271.0 lbs) The patient was titrated at pressures ranging from 5 cmH2O up to 8 cmH2O. The optimal pressure achieved was 8 cmH2O with a residual AHI of 1.1 events per hour. Time in REM supine on final pressure was 22.5 minutes. PLM index was 25.6 movements per hour. The PLM Arousal Index was 8.0 per hour.    [facility] medical resmed    Do you use a CPAP Machine at home: (Patient-Rptd) Yes  Overall, on a scale of 0-10 how would you rate your CPAP (0 poor, 10 great): (Patient-Rptd) 10    What type of mask do you use: (Patient-Rptd) Nasal Mask  Is your mask comfortable: (Patient-Rptd) Yes  If not, why:      Is your mask leaking: (Patient-Rptd) No  If yes, where do you feel it:    How many night per week does the mask leak (0-7):      Do you notice snoring with mask on: (Patient-Rptd) No  Do you notice gasping arousals with mask on:  (Patient-Rptd) No  Are you having significant oral or nasal dryness: (Patient-Rptd) No  Is the pressure setting comfortable: (Patient-Rptd) Yes  If not, why:      What is your typical bedtime: (Patient-Rptd) 9 pm  How long does it take you to go to sleep on PAP therapy: (Patient-Rptd) Less than 15 min  What time do you typically get out of bed for the day: (Patient-Rptd) 5 to 6 am  How many hours on average per night are you using PAP therapy: (Patient-Rptd) 8  How many hours are you sleeping per night: (Patient-Rptd) 8  Do you feel well rested in the morning: (Patient-Rptd) Yes      [employer]   Auto-PAP 7.0 - 12.0 cmH2O 30 day usage data:    96% of days with > 4 hours of use. 0/30 days with no use.   Average use 437 minutes per day.   95%ile Leak 0.97 L/min.   CPAP 95% pressure 11.9 cm.   AHI 1.23 events per hour.       EPWORTH SLEEPINESS SCALE         3/15/2025     9:21 AM    Waldron Sleepiness Scale ( CONNER Rich  [id number]<br>ESS - [place]/English - Final version - 21 Nov 07 - [facility] [place].)   Sitting and reading Slight chance of dozing   Watching TV Slight chance of dozing   Sitting, inactive in a public place (e.g. a theatre or a meeting) Would never doze   As a passenger in a car for an hour without a break Would never doze   Lying down to rest in the afternoon when circumstances permit Slight chance of dozing   Sitting and talking to someone Would never doze   Sitting quietly after a lunch without alcohol Would never doze   In a car, while stopped for a few minutes in traffic Would never doze   Waldron Score (MC) 3   Waldron Score (Sleep) 3        Patient-reported       INSOMNIA SEVERITY INDEX (JONNY)          3/15/2025     9:16 AM   Insomnia Severity Index (JONNY)   Difficulty falling asleep 1   Difficulty staying asleep 2   Problems waking up too early 1   How SATISFIED/DISSATISFIED are you with your CURRENT sleep pattern? 1   How NOTICEABLE to others do you think your sleep problem is in terms of impairing  the quality of your life? 0   How WORRIED/DISTRESSED are you about your current sleep problem? 0   To what extent do you consider your sleep problem to INTERFERE with your daily functioning (e.g. daytime fatigue, mood, ability to function at work/daily chores, concentration, memory, mood, etc.) CURRENTLY? 0   JONNY Total Score 5        Patient-reported       Guidelines for Scoring/Interpretation:  Total score categories:  0-7 = No clinically significant insomnia   8-14 = Subthreshold insomnia   15-21 = Clinical insomnia (moderate severity)  22-28 = Clinical insomnia (severe)  Used via courtesy of www.Aireumealth.va.gov with permission from Mac Naik PhD., [facility]        Past medical/surgical history, family history, social history, medications and allergies were reviewed.        Problem List:  Patient Active Problem List    Diagnosis Date Noted    Hypertension goal BP (blood pressure) < 140/90      Priority: High    Hyperlipidemia with target LDL less than 130      Priority: High    History of right shoulder replacement 04/17/2024     Priority: Medium    History of bilateral knee replacement 09/11/2023     Priority: Medium    Degenerative joint disease of hand, left 02/06/2023     Priority: Medium    Mixed incontinence 12/14/2022     Priority: Medium    Ulnar neuropathy at elbow, left      Priority: Medium    COPD (chronic obstructive pulmonary disease) (H)      Priority: Medium    Sensorineural hearing loss      Priority: Medium    JOSE (obstructive sleep apnea) 07/24/2015     Priority: Medium     Diagnostic Study 9/12/2017 - (271.0 lbs) AHI 78.7, RDI 91.3, Supine AHI 73.5, REM AHI -, Low O2 79.5%, Time Spent <=88% 22.9 minutes.  Titration Study: 9/26/2017- (271.0 lbs) The patient was titrated at pressures ranging from 5 cmH2O up to 8 cmH2O.  The optimal pressure achieved was 8 cmH2O with a residual AHI of 1.1 events per hour.  Time in REM supine on final pressure was 22.5 minutes.  PLM index was 25.6  "movements per hour.  The PLM Arousal Index was 8.0 per hour.      Morbidly obese (H) 05/29/2012     Priority: Medium     Bariatric surgery consult offered      Impaired glucose tolerance 05/29/2012     Priority: Medium    Hypothyroid      Priority: Medium        /81   Pulse 93   Resp 20   Ht 1.562 m (5' 1.5\")   Wt 130.2 kg (287 lb)   SpO2 97%   BMI 53.36 kg/m     "

## 2025-03-30 ENCOUNTER — HEALTH MAINTENANCE LETTER (OUTPATIENT)
Age: 80
End: 2025-03-30

## 2025-04-23 ENCOUNTER — TELEPHONE (OUTPATIENT)
Dept: PULMONOLOGY | Facility: CLINIC | Age: 80
End: 2025-04-23
Payer: COMMERCIAL

## 2025-04-23 DIAGNOSIS — J44.9 CHRONIC OBSTRUCTIVE PULMONARY DISEASE, UNSPECIFIED COPD TYPE (H): ICD-10-CM

## 2025-04-23 RX ORDER — TIOTROPIUM BROMIDE AND OLODATEROL 3.124; 2.736 UG/1; UG/1
2 SPRAY, METERED RESPIRATORY (INHALATION) DAILY
Qty: 12 G | Refills: 1 | Status: SHIPPED | OUTPATIENT
Start: 2025-04-23

## 2025-04-23 NOTE — TELEPHONE ENCOUNTER
[facility] Call Center    Phone Message    May a detailed message be left on voicemail: yes     Reason for Call: Other: Meg wants to have Dr. Hernandez rewrite her Rx for her inhaler, so she can have a 90 supply delivered at one time, please call Meg if there are more questions. Per pharmacy they just need authorization to send the other 2 out to her. Thank you      Action Taken: Other: Pulm    Travel Screening: Not Applicable     Date of Service:

## 2025-07-21 ENCOUNTER — PATIENT OUTREACH (OUTPATIENT)
Dept: CARE COORDINATION | Facility: CLINIC | Age: 80
End: 2025-07-21
Payer: COMMERCIAL

## 2025-07-24 ENCOUNTER — TRANSFERRED RECORDS (OUTPATIENT)
Dept: HEALTH INFORMATION MANAGEMENT | Facility: CLINIC | Age: 80
End: 2025-07-24
Payer: COMMERCIAL

## (undated) DEVICE — NDL ECLIPSE 22GA 1.5"

## (undated) DEVICE — NDL ECLIPSE 18GA 1.5"

## (undated) DEVICE — DRAPE STERI TOWEL SM 1000

## (undated) DEVICE — DRAPE IOBAN INCISE 23X17" 6650EZ

## (undated) DEVICE — TUBING SUCTION 12"X1/4" N612

## (undated) DEVICE — SUTURE VICRYL+ 2 27IN CP UNDYED VCP195H

## (undated) DEVICE — DRAPE EXTREMITY W/ARMBOARD 29405

## (undated) DEVICE — ADH SKIN CLOSURE PREMIERPRO EXOFIN 1.0ML 3470

## (undated) DEVICE — DRAPE MAYO STAND 23X54 8337

## (undated) DEVICE — GLOVE BIOGEL INDICATOR 7.5 LF 41675

## (undated) DEVICE — PACK TOTAL JOINT STD LATEX

## (undated) DEVICE — TOURNIQUET SGL BLADDER 34"X4" PURPLE 5921-034-135

## (undated) DEVICE — SU VICRYL 0 CP-1 27" UND J267H

## (undated) DEVICE — BNDG ESMARK 6" STERILE

## (undated) DEVICE — SU MONOCRYL 3-0 PS-2 18" UND Y497G

## (undated) DEVICE — SU ETHIBOND 2 V-37 4X30" MX69G

## (undated) DEVICE — FILTER HEPA FLUID TRAP NEPTUNE 0703-040-001

## (undated) DEVICE — KIT DRAIN CLOSED WOUND SUCTION MED 400ML RESVR

## (undated) DEVICE — SU MONOCRYL 3-0 PS-2 27" Y427H

## (undated) DEVICE — DRSG AQUACEL AG 3.5X6.0" HYDROFIBER 412010

## (undated) DEVICE — BONE CEMENT MIXEVAC HI VAC W/CARTRIDGE 0306-563-000

## (undated) DEVICE — Device

## (undated) DEVICE — ESU PENCIL SMOKE EVAC W/ROCKER SWITCH 0703-047-000

## (undated) DEVICE — DRAPE U SPLIT 74X120" 29440

## (undated) DEVICE — HOOD FLYTE 0408-800-000

## (undated) DEVICE — STRAP STIRRUP W/SLIP 30187-030

## (undated) DEVICE — BONE CLEANING TIP INTERPULSE  0210-010-000

## (undated) DEVICE — BLADE SAW RECIP STRK 70X12.5X0.80MM 0277-096-277

## (undated) DEVICE — STOCKING SLEEVE VASOPRESS COMPRESSION CALF MED VP501M

## (undated) DEVICE — SU VICRYL 2-0 CT-1 27" UND J259H

## (undated) DEVICE — SUCTION IRR SYSTEM W/O TIP INTERPULSE HANDPIECE 0210-100-000

## (undated) DEVICE — SU DERMABOND ADVANCED .7ML DNX12

## (undated) DEVICE — SU VICRYL #2 OS-8 VIOLET 18" J719T

## (undated) DEVICE — BLADE SAW SAGITTAL STRK 18X90X1.19MM HD SYS 6 6118-119-090

## (undated) DEVICE — DRAPE IOBAN INCISE 36X23" 6651EZ

## (undated) DEVICE — GLOVE BIOGEL PI INDICATOR 8.0 LF 41680

## (undated) DEVICE — GUIDE AUG REAMER ZIM COMPR MINI BUSHING   110031869

## (undated) DEVICE — PIN STEINMANN 1/8" THRD 406669

## (undated) DEVICE — SUCTION MANIFOLD NEPTUNE 2 SYS 4 PORT 0702-020-000

## (undated) DEVICE — PREP CHLORAPREP 26ML TINTED ORANGE  260815

## (undated) DEVICE — GLOVE PROTEXIS BLUE W/NEU-THERA 8.0  2D73EB80

## (undated) DEVICE — SYR 05ML LL W/O NDL

## (undated) DEVICE — CUFF TOURN 44IN STRL DISP

## (undated) DEVICE — DRSG AQUACEL AG 3.5X14"  422607

## (undated) DEVICE — DRAPE POUCH INSTRUMENT 1018

## (undated) DEVICE — GLOVE BIOGEL PI SZ 7.5 40875

## (undated) DEVICE — GLOVE ESTEEM BLUE W/NEU-THERA 7.5  2D73PB75

## (undated) DEVICE — BLADE REAMER NEXGEN 29MM PILOT KNEE PATELLA 00-5979-095-29

## (undated) DEVICE — SOL NACL 0.9% IRRIG 3000ML BAG 07972-08

## (undated) DEVICE — SOL WATER IRRIG 1000ML BOTTLE 07139-09

## (undated) DEVICE — SCREW GUIDE AUGMENT REAM 110040300

## (undated) DEVICE — BIT DRILL BIOMET PERIPHERAL SCR 2.7MM SS 405889

## (undated) DEVICE — DRILL ZIM COMPR AUG 2.7MM 110040202

## (undated) DEVICE — GLOVE BIOGEL PI SZ 8.0 40880

## (undated) DEVICE — IMM KIT SHOULDER TMAX MASK FACE 7210559

## (undated) DEVICE — GLOVE PROTEXIS W/NEU-THERA 7.5  2D73TE75

## (undated) DEVICE — DRSG AQUACEL AG HYDROFIBER  3.5X10" 422605

## (undated) DEVICE — DRAPE POUCH IRR 1016

## (undated) DEVICE — IMM KNEE 24" 79-80030

## (undated) RX ORDER — PROPOFOL 10 MG/ML
INJECTION, EMULSION INTRAVENOUS
Status: DISPENSED
Start: 2023-02-15

## (undated) RX ORDER — PROPOFOL 10 MG/ML
INJECTION, EMULSION INTRAVENOUS
Status: DISPENSED
Start: 2023-09-20

## (undated) RX ORDER — BUPIVACAINE HYDROCHLORIDE AND EPINEPHRINE 5; 5 MG/ML; UG/ML
INJECTION, SOLUTION EPIDURAL; INTRACAUDAL; PERINEURAL
Status: DISPENSED
Start: 2023-02-15

## (undated) RX ORDER — FENTANYL CITRATE-0.9 % NACL/PF 10 MCG/ML
PLASTIC BAG, INJECTION (ML) INTRAVENOUS
Status: DISPENSED
Start: 2023-02-15

## (undated) RX ORDER — FENTANYL CITRATE 50 UG/ML
INJECTION, SOLUTION INTRAMUSCULAR; INTRAVENOUS
Status: DISPENSED
Start: 2023-09-20

## (undated) RX ORDER — PHENYLEPHRINE HYDROCHLORIDE 10 MG/ML
INJECTION INTRAVENOUS
Status: DISPENSED
Start: 2023-02-15

## (undated) RX ORDER — CEFAZOLIN SODIUM 1 G/3ML
INJECTION, POWDER, FOR SOLUTION INTRAMUSCULAR; INTRAVENOUS
Status: DISPENSED
Start: 2024-04-17

## (undated) RX ORDER — DEXAMETHASONE SODIUM PHOSPHATE 10 MG/ML
INJECTION, SOLUTION INTRAMUSCULAR; INTRAVENOUS
Status: DISPENSED
Start: 2023-09-20

## (undated) RX ORDER — FENTANYL CITRATE 50 UG/ML
INJECTION, SOLUTION INTRAMUSCULAR; INTRAVENOUS
Status: DISPENSED
Start: 2023-02-15

## (undated) RX ORDER — LIDOCAINE HYDROCHLORIDE 10 MG/ML
INJECTION, SOLUTION EPIDURAL; INFILTRATION; INTRACAUDAL; PERINEURAL
Status: DISPENSED
Start: 2023-09-20

## (undated) RX ORDER — BUPIVACAINE HYDROCHLORIDE 5 MG/ML
INJECTION, SOLUTION EPIDURAL; INTRACAUDAL
Status: DISPENSED
Start: 2024-04-17

## (undated) RX ORDER — DEXAMETHASONE SODIUM PHOSPHATE 10 MG/ML
INJECTION, SOLUTION INTRAMUSCULAR; INTRAVENOUS
Status: DISPENSED
Start: 2024-04-17

## (undated) RX ORDER — BUPIVACAINE HYDROCHLORIDE AND EPINEPHRINE 5; 5 MG/ML; UG/ML
INJECTION, SOLUTION EPIDURAL; INTRACAUDAL; PERINEURAL
Status: DISPENSED
Start: 2023-09-20

## (undated) RX ORDER — FENTANYL CITRATE-0.9 % NACL/PF 10 MCG/ML
PLASTIC BAG, INJECTION (ML) INTRAVENOUS
Status: DISPENSED
Start: 2024-04-17

## (undated) RX ORDER — ONDANSETRON 2 MG/ML
INJECTION INTRAMUSCULAR; INTRAVENOUS
Status: DISPENSED
Start: 2023-09-20

## (undated) RX ORDER — ONDANSETRON 2 MG/ML
INJECTION INTRAMUSCULAR; INTRAVENOUS
Status: DISPENSED
Start: 2024-04-17

## (undated) RX ORDER — DEXMEDETOMIDINE HYDROCHLORIDE 100 UG/ML
INJECTION, SOLUTION INTRAVENOUS
Status: DISPENSED
Start: 2023-09-20

## (undated) RX ORDER — VANCOMYCIN HYDROCHLORIDE 1 G/20ML
INJECTION, POWDER, LYOPHILIZED, FOR SOLUTION INTRAVENOUS
Status: DISPENSED
Start: 2024-04-17

## (undated) RX ORDER — LIDOCAINE HYDROCHLORIDE 10 MG/ML
INJECTION, SOLUTION EPIDURAL; INFILTRATION; INTRACAUDAL; PERINEURAL
Status: DISPENSED
Start: 2024-04-17

## (undated) RX ORDER — EPHEDRINE SULFATE 50 MG/ML
INJECTION, SOLUTION INTRAMUSCULAR; INTRAVENOUS; SUBCUTANEOUS
Status: DISPENSED
Start: 2024-04-17

## (undated) RX ORDER — FENTANYL CITRATE 50 UG/ML
INJECTION, SOLUTION INTRAMUSCULAR; INTRAVENOUS
Status: DISPENSED
Start: 2024-04-17

## (undated) RX ORDER — DEXAMETHASONE SODIUM PHOSPHATE 10 MG/ML
INJECTION, SOLUTION INTRAMUSCULAR; INTRAVENOUS
Status: DISPENSED
Start: 2023-02-15

## (undated) RX ORDER — ONDANSETRON 2 MG/ML
INJECTION INTRAMUSCULAR; INTRAVENOUS
Status: DISPENSED
Start: 2023-02-15